# Patient Record
Sex: FEMALE | Race: WHITE | Employment: OTHER | URBAN - METROPOLITAN AREA
[De-identification: names, ages, dates, MRNs, and addresses within clinical notes are randomized per-mention and may not be internally consistent; named-entity substitution may affect disease eponyms.]

---

## 2017-02-24 ENCOUNTER — OFFICE VISIT (OUTPATIENT)
Dept: ORTHOPEDIC SURGERY | Age: 77
End: 2017-02-24

## 2017-02-24 VITALS
HEIGHT: 61 IN | BODY MASS INDEX: 27.49 KG/M2 | TEMPERATURE: 97.7 F | SYSTOLIC BLOOD PRESSURE: 137 MMHG | WEIGHT: 145.6 LBS | HEART RATE: 57 BPM | DIASTOLIC BLOOD PRESSURE: 65 MMHG

## 2017-02-24 DIAGNOSIS — Z96.652 STATUS POST TOTAL LEFT KNEE REPLACEMENT: ICD-10-CM

## 2017-02-24 DIAGNOSIS — M54.5 BILATERAL LOW BACK PAIN, UNSPECIFIED CHRONICITY, WITH SCIATICA PRESENCE UNSPECIFIED: Primary | ICD-10-CM

## 2017-02-24 DIAGNOSIS — M16.11 PRIMARY OSTEOARTHRITIS OF RIGHT HIP: ICD-10-CM

## 2017-02-24 DIAGNOSIS — M17.12 PRIMARY OSTEOARTHRITIS OF LEFT KNEE: ICD-10-CM

## 2017-02-24 RX ORDER — BETAMETHASONE SODIUM PHOSPHATE AND BETAMETHASONE ACETATE 3; 3 MG/ML; MG/ML
6 INJECTION, SUSPENSION INTRA-ARTICULAR; INTRALESIONAL; INTRAMUSCULAR; SOFT TISSUE ONCE
Qty: 1 ML | Refills: 0
Start: 2017-02-24 | End: 2017-02-24

## 2017-02-24 NOTE — PROGRESS NOTES
[address] [place] [address]  [phone]           Patient: Esperanza Falcon                MRN: [id number]       SSN: [id number]  YOB: 1940        AGE: 68 y.o. SEX: female  Body mass index is 27.51 kg/(m^2). PCP: Kala Atkins MD  02/24/17      This office note has been dictated. REVIEW OF SYSTEMS:  Constitutional: Negative for fever, chills, weight loss and malaise/fatigue. HENT: Negative. Eyes: Negative. Respiratory: Negative. Cardiovascular: Negative. Gastrointestinal: No bowel incontinence or constipation. Genitourinary: No bladder incontinence or saddle anesthesia. Skin: Negative. Neurological: Negative. Endo/Heme/Allergies: Negative. Psychiatric/Behavioral: Negative. Musculoskeletal: As per HPI above. Past Medical History:   Diagnosis Date    Constipation     Hypercholesteremia     Microscopic hematuria     Stroke [facility])     blurred vision, resolved (taking plavix)     PEACE (stress urinary incontinence, female)     UTI (urinary tract infection)          Current Outpatient Prescriptions:     B.infantis-B.ani-B.long-B.bifi (PROBIOTIC 4X) 10-15 mg TbEC, Take  by mouth., Disp: , Rfl:     polyethylene glycol (MIRALAX) 17 gram/dose powder, Take 17 g by mouth daily. , Disp: , Rfl:     clopidogrel (PLAVIX) 75 mg tablet, 75 mg., Disp: , Rfl:     ESTRACE 0.01 % (0.1 mg/gram) vaginal cream, APPLY 2/3 LENGTH OF PINKY FINGER AND INSERT INTO VAGINA ONCE A WEEK, Disp: 42.5 g, Rfl: 0    amLODIPine (NORVASC) 5 mg tablet, 5 mg., Disp: , Rfl:     sertraline (ZOLOFT) 50 mg tablet, 50 mg., Disp: , Rfl:     ferrous sulfate 325 mg (65 mg iron) tablet, Take 1 Tab by mouth two (2) times daily (with meals). , Disp: 60 Tab, Rfl: 2    simvastatin (ZOCOR) 40 mg tablet, Take  by mouth nightly., Disp: , Rfl:     CHOLECALCIFEROL, VITAMIN D3, (VITAMIN D3 PO), Take 1,000 Units by mouth daily. , Disp: , Rfl:     estradiol (ESTRACE) 0.01 % (0.1 mg/g) vaginal cream, use one  x  /wik, Disp: 42.5 g, Rfl: 3    fish oil-dha-epa 1,200-144-216 mg Cap, Take  by mouth.  , Disp: , Rfl:     oxyCODONE-acetaminophen (PERCOCET 7.5) 7.5-325 mg per tablet, Take 1-2 Tabs by mouth every four (4) hours as needed. Max Daily Amount: 12 Tabs., Disp: 60 Tab, Rfl: 0    Allergies   Allergen Reactions    Erythromycin Rash and Itching           Furacin [Nitrofurazone] Hives and Other (comments)     Intolerance    Tobrex [Tobramycin Sulfate] Other (comments)     Intolerance       Social History     Social History    Marital status:      Spouse name: N/A    Number of children: N/A    Years of education: N/A     Occupational History    Not on file. Social History Main Topics    Smoking status: Never Smoker    Smokeless tobacco: Not on file    Alcohol use No    Drug use: No    Sexual activity: Not on file     Other Topics Concern    Not on file     Social History Narrative       Past Surgical History:   Procedure Laterality Date    HX CATARACT REMOVAL Bilateral     HX CHOLECYSTECTOMY  1972    HX OTHER SURGICAL      skin ca removed     HX TONSILLECTOMY  1946           * Patient was identified by name and date of birth   * Agreement on procedure being performed was verified  * Risks and Benefits explained to the patient  * Procedure site verified and marked as necessary  * Patient was positioned for comfort  * Consent was signed and verified  11:16 AM    The patient was instructed on post injection care. We did see Ms. Hollie Grande for follow-up in regards to multiple problems. The patient is having low back pain, which is radiating into her lower extremities and buttocks when she is up ambulating for extended periods, as well as standing at the kitchen sink doing dishes or cooking. She denies any change in bowel or bladder habits. She is having some right hip pain as well on occasion.  She is having some trouble getting in and out of a car. She is still able to put her socks and shoes on without much problem. She denies any injury to the hip. In regards to the knees she is status post right knee replacement and did very well with it. She is very happy with the results. The left knee does have known advancing arthritis, which is worsening. The last injection gave her moderate relief. PHYSICAL EXAMINATION: In general the patient is alert and oriented x 3 and is in no acute distress. The patient is well-developed and well-nourished with a normal affect. The patient is afebrile. HEENT:  Head is normocephalic and atraumatic. Pupils are equally round and reactive to light and accommodation. Extraocular eye movements are intact. Neck is supple. Trachea is midline. No JVD is present. Breathing is nonlabored. Examination of the back reveals the skin to be intact. There is no erythema or ecchymosis. There is no warmth or signs for infection or cellulitis. There is a curve noted to the lumbar spine. There are no paraspinal muscular spasms. The pelvis is stable. There is no pain with palpation to the trochanteric bursa. She has well maintained range of motion of the right hip. However, forced internal rotation does cause some mild discomfort to the groin and buttocks. There is negative straight leg raise. There is negative calf tenderness and swelling. There is negative Homans. There is no evidence of DVT noted. Examination of the left knee reveals the skin to be intact. There is no erythema or ecchymosis. There is no warmth or signs for infection or cellulitis. She has pain to palpation tricompartmentally and crepitus arising from the anterior compartment. Right knee skin is intact. The surgical wound has healed nicely. There is full range of motion and excellent stability. Patella tracks nicely without rubs or crepitus.      RADIOGRAPHS:  Review of radiographs including AP and lateral of the lumbar spine reveals multilevel degenerative changes with a right apex lumbar scoliotic curve. It does reveal advanced arthritis of the right hip with approximately 1 mm of joint space remaining and some osteophyte formation noted. There is degenerative disc disease at multiple levels. ASSESSMENT:     1. Lumbar degenerative disc disease and scoliosis. 2. Right hip osteoarthritis. 3. Right knee replacement. 4. Left knee osteoarthritis. PLAN:  At this point we discussed treatment options. We are going to move forward with an MRI of the lumbar spine and refer her to The [address] [place] [address] for further evaluation and treatment. I think that a lot of her symptomatology is coming from her back. She does have arthritis of her hip. We will get her set up for intraarticular injection for the right hip. Hopefully this will provide her with some relief. Today in the office we will move forward with a Cortisone injection for the left knee. After informed and written consent the left knee was prepped with Betadine and 6 mg of Celestone was injected without complications. The patient tolerated the injection well. She was instructed on post injection care. We will see her back in the office in approximately three months time for reevaluation or sooner if necessary.               JR Woodrow DAO, PAWolfgangC, ATC

## 2017-03-07 ENCOUNTER — HOSPITAL ENCOUNTER (OUTPATIENT)
Age: 77
Discharge: HOME | End: 2017-03-07
Attending: PHYSICIAN ASSISTANT
Payer: MEDICARE

## 2017-03-07 DIAGNOSIS — M54.5 BILATERAL LOW BACK PAIN, UNSPECIFIED CHRONICITY, WITH SCIATICA PRESENCE UNSPECIFIED: ICD-10-CM

## 2017-03-07 PROCEDURE — 72148 MRI LUMBAR SPINE W/O DYE: CPT

## 2017-03-09 ENCOUNTER — HOSPITAL ENCOUNTER (OUTPATIENT)
Dept: GENERAL RADIOLOGY | Age: 77
Discharge: HOME | End: 2017-03-09
Attending: PHYSICIAN ASSISTANT
Payer: MEDICARE

## 2017-03-09 DIAGNOSIS — M16.11 PRIMARY OSTEOARTHRITIS OF RIGHT HIP: ICD-10-CM

## 2017-03-09 PROCEDURE — 74011636320 HC RX REV CODE- 636/320: Performed by: PHYSICIAN ASSISTANT

## 2017-03-09 PROCEDURE — 77002 NEEDLE LOCALIZATION BY XRAY: CPT

## 2017-03-09 PROCEDURE — 74011250636 HC RX REV CODE- 250/636: Performed by: PHYSICIAN ASSISTANT

## 2017-03-09 PROCEDURE — 74011000250 HC RX REV CODE- 250: Performed by: PHYSICIAN ASSISTANT

## 2017-03-09 RX ORDER — METHYLPREDNISOLONE ACETATE 40 MG/ML
40 INJECTION, SUSPENSION INTRA-ARTICULAR; INTRALESIONAL; INTRAMUSCULAR; SOFT TISSUE
Status: COMPLETED | OUTPATIENT
Start: 2017-03-09 | End: 2017-03-09

## 2017-03-09 RX ORDER — LIDOCAINE HYDROCHLORIDE 10 MG/ML
30 INJECTION, SOLUTION EPIDURAL; INFILTRATION; INTRACAUDAL; PERINEURAL
Status: COMPLETED | OUTPATIENT
Start: 2017-03-09 | End: 2017-03-09

## 2017-03-09 RX ADMIN — LIDOCAINE HYDROCHLORIDE 10 ML: 10 INJECTION, SOLUTION EPIDURAL; INFILTRATION; INTRACAUDAL; PERINEURAL at 13:00

## 2017-03-09 RX ADMIN — METHYLPREDNISOLONE ACETATE 40 MG: 40 INJECTION, SUSPENSION INTRA-ARTICULAR; INTRALESIONAL; INTRAMUSCULAR; SOFT TISSUE at 13:00

## 2017-03-09 RX ADMIN — IOPAMIDOL 8 ML: 408 INJECTION, SOLUTION INTRATHECAL at 13:00

## 2017-03-27 ENCOUNTER — OFFICE VISIT (OUTPATIENT)
Dept: ORTHOPEDIC SURGERY | Age: 77
End: 2017-03-27

## 2017-03-27 VITALS
HEIGHT: 61 IN | SYSTOLIC BLOOD PRESSURE: 148 MMHG | OXYGEN SATURATION: 99 % | TEMPERATURE: 98.5 F | HEART RATE: 67 BPM | WEIGHT: 143.6 LBS | DIASTOLIC BLOOD PRESSURE: 49 MMHG | BODY MASS INDEX: 27.11 KG/M2 | RESPIRATION RATE: 16 BRPM

## 2017-03-27 DIAGNOSIS — M47.816 LUMBAR FACET ARTHROPATHY: ICD-10-CM

## 2017-03-27 DIAGNOSIS — M79.2 NEURITIS: ICD-10-CM

## 2017-03-27 DIAGNOSIS — M48.061 LUMBAR SPINAL STENOSIS: Primary | ICD-10-CM

## 2017-03-27 RX ORDER — GABAPENTIN 100 MG/1
200 CAPSULE ORAL
Qty: 60 CAP | Refills: 1 | Status: SHIPPED | OUTPATIENT
Start: 2017-03-27 | End: 2017-05-11 | Stop reason: SDUPTHER

## 2017-03-27 NOTE — MR AVS SNAPSHOT
Visit Information Date & Time Provider Department Dept. Phone Encounter #  
 3/27/2017  1:00 PM Sean De La Cruz MD [facility] [phone] [id number] Follow-up Instructions Return in about 6 weeks (around 5/8/2017) for PT follow up, Medication follow up. Your Appointments 5/26/2017 10:30 AM  
Follow Up with Lev Traore PA-C  
[facility] ([facility]) Appt Note: LT KNEE [address] [facility], [address] [facility]  
[phone] [address] [facility], [address] Upcoming Health Maintenance Date Due DTaP/Tdap/Td series (1 - Tdap) 9/16/1961 ZOSTER VACCINE AGE 60> 9/16/2000 GLAUCOMA SCREENING Q2Y 9/16/2005 OSTEOPOROSIS SCREENING (DEXA) 9/16/2005 Pneumococcal 65+ Low/Medium Risk (1 of 2 - PCV13) 9/16/2005 MEDICARE YEARLY EXAM 9/16/2005 INFLUENZA AGE 9 TO ADULT 8/1/2016 Allergies as of 3/27/2017  Review Complete On: 3/27/2017 By: Maureen Flores Severity Noted Reaction Type Reactions Erythromycin  11/14/2014   Side Effect Rash, Itching Furacin [Nitrofurazone]  09/27/2012    Hives, Other (comments) Intolerance Tobrex [Tobramycin Sulfate]  09/27/2012    Other (comments) Intolerance Current Immunizations  Never Reviewed No immunizations on file. Not reviewed this visit You Were Diagnosed With   
  
 Codes Comments Lumbar spinal stenosis    -  Primary ICD-10-CM: M48.06 
ICD-9-CM: 724.02 Lumbar facet arthropathy (HCC)     ICD-10-CM: M12.88 ICD-9-CM: 721.3 Neuritis     ICD-10-CM: M79.2 ICD-9-CM: 729.2 Vitals BP Pulse Temp Resp Height(growth percentile) Weight(growth percentile) 148/49 67 98.5 °F (36.9 °C) (Oral) 16 5' 1\" (1.549 m) 143 lb 9.6 oz (65.1 kg) SpO2 BMI OB Status Smoking Status 99% 27.13 kg/m2 Menopause Never Smoker BMI and BSA Data Body Mass Index Body Surface Area  
 27.13 kg/m 2 1.67 m 2 Preferred Pharmacy Pharmacy Name Phone [address], [address] [facility] [address] [facility] [phone] Your Updated Medication List  
  
   
This list is accurate as of: 3/27/17  2:04 PM.  Always use your most recent med list. amLODIPine 5 mg tablet Commonly known as:  Savage Presser 5 mg.  
  
 clopidogrel 75 mg Tab Commonly known as:  PLAVIX 75 mg.  
  
 * estradiol 0.01 % (0.1 mg/gram) vaginal cream  
Commonly known as:  ESTRACE  
use one  x  Shivani Carwin * ESTRACE 0.01 % (0.1 mg/gram) vaginal cream  
Generic drug:  estradiol APPLY 2/3 LENGTH OF PINKY FINGER AND INSERT INTO VAGINA ONCE A WEEK  
  
 ferrous sulfate 325 mg (65 mg iron) tablet Take 1 Tab by mouth two (2) times daily (with meals). fish oil-dha-epa 1,200-144-216 mg Cap Take  by mouth.  
  
 gabapentin 100 mg capsule Commonly known as:  NEURONTIN Take 2 Caps by mouth nightly. MIRALAX 17 gram/dose powder Generic drug:  polyethylene glycol Take 17 g by mouth daily as needed. oxyCODONE-acetaminophen 7.5-325 mg per tablet Commonly known as:  PERCOCET 7.5 Take 1-2 Tabs by mouth every four (4) hours as needed. Max Daily Amount: 12 Tabs. PROBIOTIC 4X 10-15 mg Tbec Generic drug:  B.infantis-B.ani-B.long-B.bifi Take  by mouth. sertraline 50 mg tablet Commonly known as:  ZOLOFT  
50 mg.  
  
 simvastatin 40 mg tablet Commonly known as:  ZOCOR Take  by mouth nightly. VITAMIN D3 PO Take 1,000 Units by mouth daily. * Notice: This list has 2 medication(s) that are the same as other medications prescribed for you. Read the directions carefully, and ask your doctor or other care provider to review them with you. Prescriptions Printed Refills  
 gabapentin (NEURONTIN) 100 mg capsule 1 Sig: Take 2 Caps by mouth nightly. Class: Print Route: Oral  
  
We Performed the Following REFERRAL TO PHYSICAL THERAPY [XHY15 Custom] Comments:  
 Eval/Treat Aqua Therapy Follow-up Instructions Return in about 6 weeks (around 5/8/2017) for PT follow up, Medication follow up. Referral Information Referral ID Referred By Referred To  
  
 [id number] Otelia Pallas Not Available Visits Status Start Date End Date 1 New Request 3/27/17 3/27/18 If your referral has a status of pending review or denied, additional information will be sent to support the outcome of this decision. Patient Instructions Low Back Arthritis: Exercises Your Care Instructions Here are some examples of typical rehabilitation exercises for your condition. Start each exercise slowly. Ease off the exercise if you start to have pain. Your doctor or physical therapist will tell you when you can start these exercises and which ones will work best for you. When you are not being active, find a comfortable position for rest. Some people are comfortable on the floor or a medium-firm bed with a small pillow under their head and another under their knees. Some people prefer to lie on their side with a pillow between their knees. Don't stay in one position for too long. Take short walks (10 to 20 minutes) every 2 to 3 hours. Avoid slopes, hills, and stairs until you feel better. Walk only distances you can manage without pain, especially leg pain. How to do the exercises Pelvic tilt 1. Lie on your back with your knees bent. 2. \"Brace\" your stomachtighten your muscles by pulling in and imagining your belly button moving toward your spine. 3. Press your lower back into the floor. You should feel your hips and pelvis rock back. 4. Hold for 6 seconds while breathing smoothly. 5. Relax and allow your pelvis and hips to rock forward. 6. Repeat 8 to 12 times. Back stretches 1. Get down on your hands and knees on the floor. 2. Relax your head and allow it to droop. Round your back up toward the ceiling until you feel a nice stretch in your upper, middle, and lower back. Hold this stretch for as long as it feels comfortable, or about 15 to 30 seconds. 3. Return to the starting position with a flat back while you are on your hands and knees. 4. Let your back sway by pressing your stomach toward the floor. Lift your buttocks toward the ceiling. 5. Hold this position for 15 to 30 seconds. 6. Repeat 2 to 4 times. Follow-up care is a key part of your treatment and safety. Be sure to make and go to all appointments, and call your doctor if you are having problems. It's also a good idea to know your test results and keep a list of the medicines you take. Where can you learn more? Go to http://deacon-matthew.info/. Enter B525 in the search box to learn more about \"Low Back Arthritis: Exercises. \" Current as of: May 23, 2016 Content Version: 11.1 © 4042-2609 [employer]. Care instructions adapted under license by [employer] (which disclaims liability or warranty for this information). If you have questions about a medical condition or this instruction, always ask your healthcare professional. [address] any warranty or liability for your use of this information. Lumbar Stenosis: Care Instructions Your Care Instructions Stenosis in the spine is a narrowing of the canal that is around the spinal cord and nerve roots in your back. It can happen as part of aging. Sometimes bone and other tissue grow into this canal and press on the spinal nerves. This can cause pain, numbness, and weakness. When it happens in the lower part of your back, it is called lumbar stenosis. Lumbar stenosis can cause problems in the legs, feet, and rear end (buttocks). You may be able to relieve symptoms of lumbar stenosis with pain medicine. Your doctor may suggest physical therapy and exercises to keep your spine strong and flexible. Some people try cortisone shots to reduce swelling. If pain and numbness in your legs are still so bad that you cannot do your normal activities, you may need surgery. Follow-up care is a key part of your treatment and safety. Be sure to make and go to all appointments, and call your doctor if you are having problems. It's also a good idea to know your test results and keep a list of the medicines you take. How can you care for yourself at home? · Take an over-the-counter pain medicine, such as acetaminophen (Tylenol), ibuprofen (Advil, Motrin), or naproxen (Aleve). Read and follow all instructions on the label. · Do not take two or more pain medicines at the same time unless the doctor told you to. Many pain medicines have acetaminophen, which is Tylenol. Too much acetaminophen (Tylenol) can be harmful. · Stay at a healthy weight. Being overweight puts extra strain on your spine. · Change positions often when you sit or stand. This can ease pain. For example, lean forward. This may reduce pressure on the spinal cord and its nerves. · Avoid doing things that make your symptoms worse. Walking downhill and standing for a long time may cause pain. · Stretch and strengthen your back muscles as your doctor or physical therapist recommends. If your doctor says it is okay to do them, these exercises may help. ¨ Lie on your back with your knees bent. Gently pull one bent knee to your chest. Put that foot back on the floor, and then pull the other knee to your chest. 
¨ Do pelvic tilts. Lie on your back with your knees bent. Tighten your stomach muscles. Pull your belly button (navel) in and up toward your ribs. You should feel like your back is pressing to the floor and your hips and pelvis are slightly lifting off the floor.  Hold for 6 seconds while breathing smoothly. ¨ Stand with your back flat against a wall. Slowly slide down until your knees are slightly bent. Hold for 10 seconds, then slide back up the wall. · Remove or change anything in your house that may cause you to fall. Keep walkways clear of clutter, electrical cords, and throw rugs. When should you call for help? Call 911 anytime you think you may need emergency care. For example, call if: 
· You are unable to move a leg at all. Call your doctor now or seek immediate medical care if: 
· You have new or worse symptoms in your legs, belly, or buttocks. Symptoms may include: ¨ Numbness or tingling. ¨ Weakness. ¨ Pain. · You lose bladder or bowel control. Watch closely for changes in your health, and be sure to contact your doctor if: 
· You are not getting better as expected. Where can you learn more? Go to http://deacon-matthew.info/. Toni Hansen in the search box to learn more about \"Lumbar Stenosis: Care Instructions. \" Current as of: May 23, 2016 Content Version: 11.1 © 7750-3275 AccessSportsMedia.com. Care instructions adapted under license by [employer] (which disclaims liability or warranty for this information). If you have questions about a medical condition or this instruction, always ask your healthcare professional. [place] [address] any warranty or liability for your use of this information. Introducing [facility] & HEALTH SERVICES! Dear Matt Trinidad: Thank you for requesting a [employer] account. Our records indicate that you already have an active [employer] account. You can access your account anytime at [email or web address] Did you know that you can access your hospital and ER discharge instructions at any time in [employer]? You can also review all of your test results from your hospital stay or ER visit. Additional Information If you have questions, please visit the Frequently Asked Questions section of the [employer] website at [email or web address] Remember, [employer]O is NOT to be used for urgent needs. For medical emergencies, dial 911. Now available from your iPhone and Android! Please provide this summary of care documentation to your next provider. Your primary care clinician is listed as Jules Underwood. If you have any questions after today's visit, please call [phone].

## 2017-03-27 NOTE — PATIENT INSTRUCTIONS
Low Back Arthritis: Exercises  Your Care Instructions  Here are some examples of typical rehabilitation exercises for your condition. Start each exercise slowly. Ease off the exercise if you start to have pain. Your doctor or physical therapist will tell you when you can start these exercises and which ones will work best for you. When you are not being active, find a comfortable position for rest. Some people are comfortable on the floor or a medium-firm bed with a small pillow under their head and another under their knees. Some people prefer to lie on their side with a pillow between their knees. Don't stay in one position for too long. Take short walks (10 to 20 minutes) every 2 to 3 hours. Avoid slopes, hills, and stairs until you feel better. Walk only distances you can manage without pain, especially leg pain. How to do the exercises  Pelvic tilt    1. Lie on your back with your knees bent. 2. \"Brace\" your stomach--tighten your muscles by pulling in and imagining your belly button moving toward your spine. 3. Press your lower back into the floor. You should feel your hips and pelvis rock back. 4. Hold for 6 seconds while breathing smoothly. 5. Relax and allow your pelvis and hips to rock forward. 6. Repeat 8 to 12 times. Back stretches    1. Get down on your hands and knees on the floor. 2. Relax your head and allow it to droop. Round your back up toward the ceiling until you feel a nice stretch in your upper, middle, and lower back. Hold this stretch for as long as it feels comfortable, or about 15 to 30 seconds. 3. Return to the starting position with a flat back while you are on your hands and knees. 4. Let your back sway by pressing your stomach toward the floor. Lift your buttocks toward the ceiling. 5. Hold this position for 15 to 30 seconds. 6. Repeat 2 to 4 times. Follow-up care is a key part of your treatment and safety.  Be sure to make and go to all appointments, and call your doctor if you are having problems. It's also a good idea to know your test results and keep a list of the medicines you take. Where can you learn more? Go to http://deacon-matthew.info/. Enter E941 in the search box to learn more about \"Low Back Arthritis: Exercises. \"  Current as of: May 23, 2016  Content Version: 11.1  © 7955-3664 [employer]. Care instructions adapted under license by [employer] (which disclaims liability or warranty for this information). If you have questions about a medical condition or this instruction, always ask your healthcare professional. [place] [address] any warranty or liability for your use of this information. Lumbar Stenosis: Care Instructions  Your Care Instructions    Stenosis in the spine is a narrowing of the canal that is around the spinal cord and nerve roots in your back. It can happen as part of aging. Sometimes bone and other tissue grow into this canal and press on the spinal nerves. This can cause pain, numbness, and weakness. When it happens in the lower part of your back, it is called lumbar stenosis. Lumbar stenosis can cause problems in the legs, feet, and rear end (buttocks). You may be able to relieve symptoms of lumbar stenosis with pain medicine. Your doctor may suggest physical therapy and exercises to keep your spine strong and flexible. Some people try cortisone shots to reduce swelling. If pain and numbness in your legs are still so bad that you cannot do your normal activities, you may need surgery. Follow-up care is a key part of your treatment and safety. Be sure to make and go to all appointments, and call your doctor if you are having problems. It's also a good idea to know your test results and keep a list of the medicines you take. How can you care for yourself at home?   · Take an over-the-counter pain medicine, such as acetaminophen (Tylenol), ibuprofen (Advil, Motrin), or naproxen (Mauricio). Read and follow all instructions on the label. · Do not take two or more pain medicines at the same time unless the doctor told you to. Many pain medicines have acetaminophen, which is Tylenol. Too much acetaminophen (Tylenol) can be harmful. · Stay at a healthy weight. Being overweight puts extra strain on your spine. · Change positions often when you sit or stand. This can ease pain. For example, lean forward. This may reduce pressure on the spinal cord and its nerves. · Avoid doing things that make your symptoms worse. Walking downhill and standing for a long time may cause pain. · Stretch and strengthen your back muscles as your doctor or physical therapist recommends. If your doctor says it is okay to do them, these exercises may help. ¨ Lie on your back with your knees bent. Gently pull one bent knee to your chest. Put that foot back on the floor, and then pull the other knee to your chest.  ¨ Do pelvic tilts. Lie on your back with your knees bent. Tighten your stomach muscles. Pull your belly button (navel) in and up toward your ribs. You should feel like your back is pressing to the floor and your hips and pelvis are slightly lifting off the floor. Hold for 6 seconds while breathing smoothly. ¨ Stand with your back flat against a wall. Slowly slide down until your knees are slightly bent. Hold for 10 seconds, then slide back up the wall. · Remove or change anything in your house that may cause you to fall. Keep walkways clear of clutter, electrical cords, and throw rugs. When should you call for help? Call 911 anytime you think you may need emergency care. For example, call if:  · You are unable to move a leg at all. Call your doctor now or seek immediate medical care if:  · You have new or worse symptoms in your legs, belly, or buttocks. Symptoms may include:  ¨ Numbness or tingling. ¨ Weakness. ¨ Pain. · You lose bladder or bowel control.   Watch closely for changes in your health, and be sure to contact your doctor if:  · You are not getting better as expected. Where can you learn more? Go to http://deacon-matthew.info/. Julia Canseco in the search box to learn more about \"Lumbar Stenosis: Care Instructions. \"  Current as of: May 23, 2016  Content Version: 11.1  © 1106-6592 [employer]. Care instructions adapted under license by [employer] (which disclaims liability or warranty for this information). If you have questions about a medical condition or this instruction, always ask your healthcare professional. [place] [address] any warranty or liability for your use of this information.

## 2017-03-27 NOTE — PROGRESS NOTES
[facility]  [address] [place], [address]  Phone: [phone]  Fax: [phone]          HISTORY OF PRESENT ILLNESS:  Reinier Owen is a 68 y.o. female with history of lumbar pain. Pt presents to the office today as a new patient referred by Dr. Julia Urban. She c/o pain in the lower back that occasionally radiates down the thighs. Pt had a prior right knee replacement in 04/2015 and states that she experiences left knee pain at this time. Pt's pain is worse when standing and walking and better when sitting and lying down. She admits to relief when bending forward. Pt reports difficulty finding a comfortable position at night due to pain in the right hip/buttock. Pt has been taking Plavix for years. Pt at this time [facility]. Pt states that she likes to work in her yard with her . Pain Scale: 3/10     PCP: William Lawson MD      Past Medical History:   Diagnosis Date    Constipation     Hypercholesteremia     Microscopic hematuria     Stroke [facility])     blurred vision, resolved (taking plavix)     PEACE (stress urinary incontinence, female)     UTI (urinary tract infection)         Social History     Social History    Marital status:      Spouse name: N/A    Number of children: N/A    Years of education: N/A     Occupational History    Not on file. Social History Main Topics    Smoking status: Never Smoker    Smokeless tobacco: Not on file    Alcohol use No    Drug use: No    Sexual activity: Not on file     Other Topics Concern    Not on file     Social History Narrative       Current Outpatient Prescriptions   Medication Sig Dispense Refill    gabapentin (NEURONTIN) 100 mg capsule Take 2 Caps by mouth nightly. 60 Cap 1    B.infantis-B.ani-B.long-B.bifi (PROBIOTIC 4X) 10-15 mg TbEC Take  by mouth.       polyethylene glycol (MIRALAX) 17 gram/dose powder Take 17 g by mouth daily as needed.  clopidogrel (PLAVIX) 75 mg tablet 75 mg.      ESTRACE 0.01 % (0.1 mg/gram) vaginal cream APPLY 2/3 LENGTH OF PINKY FINGER AND INSERT INTO VAGINA ONCE A WEEK 42.5 g 0    simvastatin (ZOCOR) 40 mg tablet Take  by mouth nightly.  CHOLECALCIFEROL, VITAMIN D3, (VITAMIN D3 PO) Take 1,000 Units by mouth daily.  estradiol (ESTRACE) 0.01 % (0.1 mg/g) vaginal cream use one  x  /wik 42.5 g 3    fish oil-dha-epa 1,200-144-216 mg Cap Take  by mouth.  amLODIPine (NORVASC) 5 mg tablet 5 mg.  sertraline (ZOLOFT) 50 mg tablet 50 mg.      ferrous sulfate 325 mg (65 mg iron) tablet Take 1 Tab by mouth two (2) times daily (with meals). 60 Tab 2    oxyCODONE-acetaminophen (PERCOCET 7.5) 7.5-325 mg per tablet Take 1-2 Tabs by mouth every four (4) hours as needed. Max Daily Amount: 12 Tabs. 60 Tab 0       Allergies   Allergen Reactions    Erythromycin Rash and Itching           Furacin [Nitrofurazone] Hives and Other (comments)     Intolerance    Tobrex [Tobramycin Sulfate] Other (comments)     Intolerance       REVIEW OF SYSTEMS    Constitutional: Negative for fever, chills, or weight change. Respiratory: Negative for cough or shortness of breath. Cardiovascular: Negative for chest pain or palpitations. Gastrointestinal: Negative for acid reflux, change in bowel habits, or constipation. Genitourinary: Negative for dysuria and flank pain. Musculoskeletal: Positive for lumbar pain. Skin: Negative for rash. Neurological: Negative for headaches, dizziness, or numbness. Endo/Heme/Allergies: Negative for increased bruising. Psychiatric/Behavioral: Negative for difficulty with sleep.     PHYSICAL EXAMINATION  Visit Vitals    /49    Pulse 67    Temp 98.5 °F (36.9 °C) (Oral)    Resp 16    Ht 5' 1\" (1.549 m)    Wt 143 lb 9.6 oz (65.1 kg)    SpO2 99%    BMI 27.13 kg/m2       Constitutional: Awake, alert, and in no acute distress  HEENT: Normocephalic. Atraumatic. Oropharynx is moist and clear. PERRL. EOMI. Sclerae are nonicteric  Heart: Regular rate and rhythm  Lungs: Clear to auscultation bilaterally  Abdomen: Soft and nontender. Bowel sounds are present  Neurological: 1+ symmetrical DTRs in the upper extremities. 1+ symmetrical DTRs in the lower extremities. Sensation to light touch is intact. Negative Adamaris's sign bilaterally. Skin: warm, dry, and intact. Musculoskeletal: Good ROM in the cervical spine on all planes. No tenderness to palpation in the upper trapezius or lower lumbar regions. No tenderness to palpation over the SI joints or over the greater trochanters. Pt's right shoulder is higher than the left. Moderate pain with extension and axial loading. Better with forward flexion. No pain with internal or external rotation of her hips. Negative straight leg raise bilaterally. Biceps  Triceps Deltoids Wrist Ext Wrist Flex Hand Intrin   Right +4/5 +4/5 +4/5 +4/5 +4/5 +4/5   Left +4/5 +4/5 +4/5 +4/5 +4/5 +4/5      Hip Flex  Quads Hamstrings Ankle DF EHL Ankle PF   Right +4/5 +4/5 +4/5 +4/5 +4/5 +4/5   Left +4/5 +4/5 +4/5 +4/5 +4/5 +4/5     IMAGING:    Lumbar spine MRI from 03/07/2017 was personally reviewed with the Pt and demonstrated:    Results from Hospital Encounter encounter on 03/07/17   MRI LUMB SPINE WO CONT   Narrative Procedure: MRI of the lumbar spine without contrast.    CPT code: 25182    Comparisons: None. Indications:  Low back pain and radiculopathy    Technique: T1 weighted, T2 FSE with fat saturation, FSE inversion recovery  sagittal images are supplemented by T2 weighted with fat saturation and T1  weighted axial images. Findings: There is dextroscoliosis centered at approximately L2. Not well evaluated  without coronal images. Sagittal images reveal overall normal vertebral body morphology. No fractures  noted. No suspicious lesions.   Alignments are anatomic, no evidence for subluxation. Conus medullaris ends at the L1 vertebral body level. Correlation of axial and sagittal images reveals the following:    At L1-L2: Mild circumferential disc osteophyte complex. Mild facet arthropathy. Mild central canal stenosis. Moderate to severe foraminal stenosis. At L2-L3: Mild retrolisthesis L2 on L3. Vertebral body oriented to the right  secondary to scoliosis. Uncovering of the disc posterior. Overlying broad-based  disc osteophyte complex. Mild facet arthropathy and buckling of the ligamentum  flavum. Moderate central canal stenosis. Moderate to severe or for severe left  foraminal stenosis. Moderate right. At L3-L4: Moderate circumferential disc osteophyte complex. Vertebral body  oriented to the right secondary to scoliosis. Moderate facet arthropathy and  buckling of the ligamentum flavum posteriorly resulting in moderate or moderate  to severe central canal stenosis moderate to severe or severe left foraminal  stenosis. Moderate right foraminal stenosis. At L4-L5: Moderate circumferential disc osteophyte complex. Vertebral bodies  oriented to the right. Moderate facet arthropathy and prominent buckling of the  ligamentum flavum. Again moderate central canal stenosis. Moderate left and  moderate to severe right foraminal stenosis. At L5-S1: Grade 1 anterolisthesis of L5 on S1 without evidence for pars defects. Uncovering of the disc posteriorly and overlying broad-based disc protrusion. Moderate facet arthropathy with fluid in the facet joints and buckling of the  ligamentum flavum. Moderate central canal stenosis. Moderate to severe bilateral  foraminal stenosis. Visualized portions of the sacroiliac joints are unremarkable. Incidentally  imaged retroperitoneal structures are unremarkable as well. Impression Impression:    Multilevel multifactorial degenerative changes as above.        ASSESSMENT   Danielle Conte was seen today for back pain and hip pain.    Diagnoses and all orders for this visit:    Lumbar spinal stenosis  -     REFERRAL TO PHYSICAL THERAPY    Lumbar facet arthropathy (HCC)  -     REFERRAL TO PHYSICAL THERAPY    Neuritis  -     REFERRAL TO PHYSICAL THERAPY  -     gabapentin (NEURONTIN) 100 mg capsule; Take 2 Caps by mouth nightly. IMPRESSION AND PLAN:  Louana Osler is a 68 y.o. female with history of lumbar pain. She c/o pain in the lower back that occasionally radiates down the thighs. Pt had a prior right knee replacement in 04/2015 and states that she experiences left knee pain at this time. She reports difficulty finding a comfortable position at night due to pain in the right hip/buttock. 1) Pt was given information on lumbar stenosis and lumbar arthritis exercises. 2) Discussed treatment options with the Pt, including medication, physical therapy, steroid injections, and surgical intervention. 3) I recommend the Pt try water exercise, mary chi, and chair yoga. 4) She was informed of proper lifting mechanics. 5) [facility] 6) I recommended the Pt try Tylenol Arthritis if needed. 7) Pt was prescribed Neurontin 100 mg 2 tabs QHS, tapering up as directed. 8) Ms. Sameera Decker has a reminder for a \"due or due soon\" health maintenance. I have asked that she contact her primary care provider, Kristin Sky MD, for follow-up on this health maintenance. 9)  reviewed. 10) Pt will follow-up in 6 weeks.         Written by Valdemar August, as dictated by Bjorn Sutton MD.

## 2017-04-11 ENCOUNTER — HOSPITAL ENCOUNTER (OUTPATIENT)
Dept: PHYSICAL THERAPY | Age: 77
Discharge: HOME | End: 2017-04-11
Payer: MEDICARE

## 2017-04-11 PROCEDURE — G8981 BODY POS CURRENT STATUS: HCPCS

## 2017-04-11 PROCEDURE — G8982 BODY POS GOAL STATUS: HCPCS

## 2017-04-11 PROCEDURE — 97110 THERAPEUTIC EXERCISES: CPT

## 2017-04-11 PROCEDURE — 97162 PT EVAL MOD COMPLEX 30 MIN: CPT

## 2017-04-11 NOTE — PROGRESS NOTES
PT DAILY TREATMENT NOTE - [facility]     Patient Name: Flavia Chaudhry  Date:2017  : 1940  [x]  Patient  Verified  Payor: Brian Chavarria / Plan[place] / Product Type: Medicare /    In time:11:15  Out time:11:59  Total Treatment Time (min): 44  Total Timed Codes (min): 10  1:1 Treatment Time ([address] only): 44   Visit #: 1 of 10    Treatment Area: Spinal stenosis, lumbar region [M48.06]  Other specific arthropathies, not elsewhere classified, other specified site [M12.88]  Neuralgia and neuritis, unspecified [M79.2]    SUBJECTIVE  Pain Level (0-10 scale): 2  Any medication changes, allergies to medications, adverse drug reactions, diagnosis change, or new procedure performed?: [x] No    [] Yes (see summary sheet for update)  Subjective functional status/changes:   [] No changes reported  See POC    OBJECTIVE    34 min [x]Eval                  []Re-Eval     10 min Therapeutic Exercise:  [] See flow sheet :HEP instruction and demonstration, pt education regarding anatomy and physiology of the spine and LEs and how it relates to the pt's condition. Rationale: increase ROM and increase strength to improve the patients ability to tolerate ADLs       With   [] TE   [] TA   [] neuro   [] other: Patient Education: [x] Review HEP    [] Progressed/Changed HEP based on:   [] positioning   [] body mechanics   [] transfers   [] heat/ice application    [] other:      Other Objective/Functional Measures: See evaluation     Pain Level (0-10 scale) post treatment: 0    ASSESSMENT/Changes in Function: Pt given HEP handout to perform. Pt understood exercises in HEP handout. Pt demonstrated decreased AROM, decreased strength, and muscle tightness. We will trial 1x/week on land and 1x/week aquatic therapy to help with core stability and overall trunk mobility/stiffness.  Pt would benefit from physical therapy to improve the above impairments to help the pt return to performing ADLs, functional and yard work activities. Patient will continue to benefit from skilled PT services to modify and progress therapeutic interventions, address functional mobility deficits, address ROM deficits, address strength deficits, analyze and address soft tissue restrictions, analyze and cue movement patterns, analyze and modify body mechanics/ergonomics, assess and modify postural abnormalities, address imbalance/dizziness and instruct in home and community integration to attain remaining goals.      [x]  See Plan of Care  []  See progress note/recertification  []  See Discharge Summary         Progress towards goals / Updated goals:  See POC    PLAN  [x]  Upgrade activities as tolerated     [x]  Continue plan of care  [x]  Update interventions per flow sheet       []  Discharge due to:_  []  Other:_      [facility] 4/11/2017  11:55 AM    Future Appointments  Date Time Provider Arthur Carvalho   4/13/2017 12:30 PM Roberta Clamp, PTA [facility]   4/18/2017 1:00 PM Alberta Hence, PT [facility]   4/20/2017 9:30 AM Roberta Clamp, PTA [facility]   4/25/2017 12:30 PM Alberta Hence, PT [facility]   4/28/2017 10:00 AM Roberta Clamp, PTA [facility]   5/1/2017 9:30 AM Corey Gramajo [facility]   5/4/2017 10:00 AM Roberta Clamp, PTA [facility]   5/8/2017 9:30 AM Corey Gramajo [facility]   5/10/2017 11:00 AM Roberta Clamp, PTA [facility]   5/11/2017 10:50 AM [facility]  [address] [facility]   5/26/2017 10:30 AM [facility]

## 2017-04-11 NOTE — PROGRESS NOTES
[facility]  [address]  [place], [address]  [phone] fax    Plan of Care/ Statement of Necessity for Physical Therapy Services    Patient name: Jaswinder Nunez Start of Care: 2017   Referral source: Juan M Richardson MD : 1940    Medical Diagnosis: Spinal stenosis, lumbar region [M48.06]  Other specific arthropathies, not elsewhere classified, other specified site [M12.88]  Neuralgia and neuritis, unspecified [M79.2]   Onset Date: 6 months with worsening over the past 4 months    Treatment Diagnosis: LBP   Prior Hospitalization: see medical history Provider#: [id number]   Medications: Verified on Patient summary List    Comorbidities: hx of skin cancer (states she had the cancerous lesions removed), scoliosis, CVA  which affected the right eye \"healed itself\", osteoporosis, arthritis, right TKA , bladder support surgery 2016 (states her MD gave her clearance to perform aquatic therapy)   Prior Level of Function: Independent with ADLs, functional, and yard work activities with no pain reported before 6 months ago. The Plan of Care and following information is based on the information from the initial evaluation. Assessment/ key information:   Pt is a 68year old female who presents to therapy today with LBP. Pt also reports having right hip pain as well. Pt states that she noticed her symptoms about 6 months ago and \"it has been worsening since the beginning of the year\". Pt reports having trouble lifting laundry baskets, standing tolerance, throbbing pain in the legs, and trouble sleeping. MRI from 3/7/2017 states dextroscoliosis at L2, osteophytes at several lumbar levels, foraminal stenosis at several lumbar levels, mild retrolisthesis L2 on L3, grade 1 anterolisthesis of L5 on S1. Pt demonstrated decreased AROM, decreased strength, and muscle tightness.  We will trial 1x/week on land and 1x/week aquatic therapy to help with core stability and overall trunk mobility/stiffness. Pt would benefit from physical therapy to improve the above impairments to help the pt return to performing ADLs, functional and yard work activities. Evaluation Complexity History HIGH Complexity :3+ comorbidities / personal factors will impact the outcome/ POC ; Examination MEDIUM Complexity : 3 Standardized tests and measures addressing body structure, function, activity limitation and / or participation in recreation  ;Presentation LOW Complexity : Stable, uncomplicated  ;Clinical Decision Making MEDIUM Complexity : FOTO score of 26-74  Overall Complexity Rating: MEDIUM  Problem List: pain affecting function, decrease ROM, decrease strength, impaired gait/ balance, decrease ADL/ functional abilitiies, decrease activity tolerance, decrease flexibility/ joint mobility and decrease transfer abilities   Treatment Plan may include any combination of the following: Therapeutic exercise, Therapeutic activities, Neuromuscular re-education, Physical agent/modality, Gait/balance training, Manual therapy, Aquatic therapy, Patient education, Self Care training, Functional mobility training, Home safety training and Stair training  Patient / Family readiness to learn indicated by: asking questions, trying to perform skills and interest  Persons(s) to be included in education: patient (P)  Barriers to Learning/Limitations: None  Patient Goal (s): pain with be less  Patient Self Reported Health Status: good  Rehabilitation Potential: fair    Short Term Goals: To be accomplished in 1 weeks:  1. Pt will report compliance and independence to [facility] to help the pt manage their pain and symptoms. Long Term Goals: To be accomplished in 5 weeks:  1. Pt will increase FOTO score to 54 points to improve ability to perform ADLs.   2. Pt will increase AROM l/s EXT to > 50% of WNL, B rotation and SB to 50-75% of WNL to improve mobility needed for prolonged standing. 3. Pt will increase AROM right hip ABD to 35 degs to improve ability to tolerate yard work. 4. Pt will report being able fall asleep with mild to no difficulty secondary to LBP to improve sleep quality. Frequency / Duration: Patient to be seen 2 times per week for 5 weeks. Patient/ Caregiver education and instruction: Diagnosis, prognosis, self care, activity modification and exercises   [x]  Plan of care has been reviewed with SHELIA    G-Codes (GP)  Position   Current  CK= 40-59%   Goal  CK= 40-59%    The severity rating is based on clinical judgment and the FOTO score. Certification Period: 4/11/2017 - 5/10/2017    Ismael Gamino, PT 4/11/2017 11:55 AM    ________________________________________________________________________    I certify that the above Therapy Services are being furnished while the patient is under my care. I agree with the treatment plan and certify that this therapy is necessary.     [de-identified] Signature:____________________  Date:____________Time: _________    Please sign and return to [facility] [address] [place] [address], [facility] [address]  [phone] fax

## 2017-04-13 ENCOUNTER — HOSPITAL ENCOUNTER (OUTPATIENT)
Dept: PHYSICAL THERAPY | Age: 77
Discharge: HOME | End: 2017-04-13
Payer: MEDICARE

## 2017-04-13 PROCEDURE — 97140 MANUAL THERAPY 1/> REGIONS: CPT

## 2017-04-13 PROCEDURE — 97110 THERAPEUTIC EXERCISES: CPT

## 2017-04-13 NOTE — PROGRESS NOTES
PT DAILY TREATMENT NOTE - [facility] 3-16    Patient Name: Lizeth Hidalgo  Date:2017  : 1940  [x]  Patient  Verified  Payor: [place] MED[place]ARE / Plan: [place] MEDICARE PART A & B / Product Type: Medicare /    In time:4:30  Out time:5:26  Total Treatment Time (min): 56  Total Timed Codes (min): 46  1:1 Treatment Time ([address] only): 36   Visit #: 2 of 8    Treatment Area: Spinal stenosis, lumbar region [M48.06]  Other specific arthropathies, not elsewhere classified, other specified site [M12.88]  Neuralgia and neuritis, unspecified [M79.2]    SUBJECTIVE  Pain Level (0-10 scale): 2/10  Any medication changes, allergies to medications, adverse drug reactions, diagnosis change, or new procedure performed?: [x] No    [] Yes (see summary sheet for update)  Subjective functional status/changes:   [] No changes reported  The patient states that her back was quite bothersome last night. OBJECTIVE  Modality rationale: decrease pain and increase tissue extensibility to improve the patients ability to improve ADL ease. Min Type Additional Details   10 []  Ice     [x]  heat  []  Ice massage  []  Laser   []  Anodyne Position: seated  Location: lumbar spine   [] Skin assessment post-treatment:  []intact []redness- no adverse reaction    []redness  adverse reaction:     38 min Therapeutic Exercise:  [] See flow sheet :   Rationale: increase ROM and increase strength to improve the patients ability to improve ADl ease. 8 min Manual Therapy:  PROM - R hip/flex/IR/ER   Rationale: decrease pain, increase ROM and increase tissue extensibility to improve ADL ease. With   [] TE   [] TA   [] neuro   [] other: Patient Education: [x] Review HEP    [] Progressed/Changed HEP based on:   [] positioning   [] body mechanics   [] transfers   [] heat/ice application    [] other:      Other Objective/Functional Measures:   Fairly good tolerance to PT up to this point. Noted groin pain with closed chain activity.  Posterior glute complaints may be related to R hip  osteoarthritic in origin. Pain Level (0-10 scale) post treatment: 1/10    ASSESSMENT/Changes in Function: The patient reports compliance. Monitor complaints of R groin and arthritic in origin. Patient will continue to benefit from skilled PT services to modify and progress therapeutic interventions, address functional mobility deficits, address ROM deficits, address strength deficits, analyze and address soft tissue restrictions, analyze and cue movement patterns, analyze and modify body mechanics/ergonomics, assess and modify postural abnormalities and instruct in home and community integration to attain remaining goals. []  See Plan of Care  []  See progress note/recertification  []  See Discharge Summary         Progress towards goals / Updated goals:  Short Term Goals: To be accomplished in 1 weeks:  1. Pt will report compliance and independence to [facility] to help the pt manage their pain and symptoms. Met - 4/13/2017    Long Term Goals: To be accomplished in 5 weeks:  1. Pt will increase FOTO score to 54 points to improve ability to perform ADLs. 2. Pt will increase AROM l/s EXT to > 50% of WNL, B rotation and SB to 50-75% of WNL to improve mobility needed for prolonged standing. 3. Pt will increase AROM right hip ABD to 35 degs to improve ability to tolerate yard work.   4. Pt will report being able fall asleep with mild to no difficulty secondary to LBP to improve sleep quality.     PLAN  []  Upgrade activities as tolerated     [x]  Continue plan of care  []  Update interventions per flow sheet       []  Discharge due to:_  []  Other:_      Javi Hooker, PT 4/13/2017  7:07 PM

## 2017-04-18 ENCOUNTER — HOSPITAL ENCOUNTER (OUTPATIENT)
Dept: PHYSICAL THERAPY | Age: 77
Discharge: HOME | End: 2017-04-18
Payer: MEDICARE

## 2017-04-18 PROCEDURE — 97113 AQUATIC THERAPY/EXERCISES: CPT

## 2017-04-18 NOTE — PROGRESS NOTES
PT DAILY TREATMENT NOTE - [facility]     Patient Name: Joselito Lambert  Date:2017  : 1940  [x]  Patient  Verified  Payor: Michael Bello / Plan: [place] MEDICARE PART A & B / Product Type: Medicare /    In time:1:07  Out time:1:38  Total Treatment Time (min): 31  Total Timed Codes (min): 31  1:1 Treatment Time ( [address] only): 31   Visit #: 3 of 8    Treatment Area: Other specific arthropathies, not elsewhere classified, other specified site [M12.88]  Neuralgia and neuritis, unspecified [M79.2]  Low back pain [M54.5]    SUBJECTIVE  Pain Level (0-10 scale): 1  Any medication changes, allergies to medications, adverse drug reactions, diagnosis change, or new procedure performed?: [x] No    [] Yes (see summary sheet for update)  Subjective functional status/changes:   [] No changes reported  \"I felt good after the land session. The pain is a 1 today. Stiffness mostly\"    OBJECTIVE    31 min Therapeutic Exercise:  [x] See flow sheet : aquatics   Rationale: increase ROM and increase strength to improve the patients ability to tolerate ADLs          With   [] TE   [] TA   [] neuro   [] other: Patient Education: [x] Review HEP    [] Progressed/Changed HEP based on:   [] positioning   [] body mechanics   [] transfers   [] heat/ice application    [] other:      Other Objective/Functional Measures: Initiated aquatic exercises today. Pain Level (0-10 scale) post treatment: 0    ASSESSMENT/Changes in Function: Focused most of the session today on B hip and low back mobility/ROM. Needed cueing to avoid marching too far forward and maintain erect posture during marches today. Attempted TM but pt had was unable to take normal size steps and was \"hopping\" instead of walking, even with verbal and visual cueing. Will attempt the TM again in upcoming sessions. Reported having no pain post session. Continue POC as tolerated.     Patient will continue to benefit from skilled PT services to modify and progress therapeutic interventions, address functional mobility deficits, address ROM deficits, address strength deficits, analyze and address soft tissue restrictions, analyze and cue movement patterns, analyze and modify body mechanics/ergonomics, assess and modify postural abnormalities, address imbalance/dizziness and instruct in home and community integration to attain remaining goals. []  See Plan of Care  []  See progress note/recertification  []  See Discharge Summary         Progress towards goals / Updated goals:  Short Term Goals: To be accomplished in 1 weeks:  1. Pt will report compliance and independence to [facility] to help the pt manage their pain and symptoms. Met - 4/13/2017    Long Term Goals: To be accomplished in 5 weeks:  1. Pt will increase FOTO score to 54 points to improve ability to perform ADLs. 2. Pt will increase AROM l/s EXT to > 50% of WNL, B rotation and SB to 50-75% of WNL to improve mobility needed for prolonged standing. 3. Pt will increase AROM right hip ABD to 35 degs to improve ability to tolerate yard work. 4. Pt will report being able fall asleep with mild to no difficulty secondary to LBP to improve sleep quality.     PLAN  [x]  Upgrade activities as tolerated     [x]  Continue plan of care  [x]  Update interventions per flow sheet       []  Discharge due to:_  []  Other:_      Lorraine Riley, PT 4/18/2017  1:20 PM    Future Appointments  Date Time Provider Arthur Carvalho   4/20/2017 9:30 AM [facility] Mixer[facility]   4/25/2017 12:30 PM [facility]   4/28/2017 10:00 AM Mariana Mixer, PTA [facility]   5/1/2017 9:30 AM Any Bree [facility]   5/4/2017 10:00 AM Mariana Mixer, PTA [facility]   5/8/2017 9:30 AM Any Bree [facility]   5/10/2017 11:00 AM Mariana Mixer, PTA [facility]   5/11/2017 10:50 AM John Alvarado  [address]   5/26/2017 10:30 AM ROOSEVELT Neville [address]

## 2017-04-20 ENCOUNTER — HOSPITAL ENCOUNTER (OUTPATIENT)
Dept: PHYSICAL THERAPY | Age: 77
Discharge: HOME | End: 2017-04-20
Payer: MEDICARE

## 2017-04-20 PROCEDURE — 97140 MANUAL THERAPY 1/> REGIONS: CPT

## 2017-04-20 PROCEDURE — 97112 NEUROMUSCULAR REEDUCATION: CPT

## 2017-04-20 NOTE — PROGRESS NOTES
PT DAILY TREATMENT NOTE - [facility]     Patient Name: Redd Gonzalez  Date:2017  : 1940  [x]  Patient  Verified  Payor: Roland Luciano / Plan: [place] MEDICARE PART A & B / Product Type: Medicare /    In time:9:35  Out time:10:33  Total Treatment Time (min): 58  Total Timed Codes (min): 48  1:1 Treatment Time ( [address] only): 32   Visit #: 4 of 8    Treatment Area:  Other specific arthropathies, not elsewhere classified, other specified site [M12.88]  Neuralgia and neuritis, unspecified [M79.2]  Low back pain [M54.5]    SUBJECTIVE  Pain Level (0-10 scale): 1  Any medication changes, allergies to medications, adverse drug reactions, diagnosis change, or new procedure performed?: [x] No    [] Yes (see summary sheet for update)  Subjective functional status/changes:   [] No changes reported  Pt reports improving pain levels at night when trying to sleep    OBJECTIVE    Modality rationale: decrease pain and increase tissue extensibility to improve the patients ability to perform daily tasks and ADLs with increased ease   Min Type Additional Details    [] Estim:  []Unatt       []IFC  []Premod                        []Other:  []w/ice   []w/heat  Position:  Location:    [] Estim: []Att    []TENS instruct  []NMES                    []Other:  []w/US   []w/ice   []w/heat  Position:  Location:    []  Traction: [] Cervical       []Lumbar                       [] Prone          []Supine                       []Intermittent   []Continuous Lbs:  [] before manual  [] after manual    []  Ultrasound: []Continuous   [] Pulsed                           []1MHz   []3MHz W/cm2:  Location:    []  Iontophoresis with dexamethasone         Location: [] Take home patch   [] In clinic   10 []  Ice     [x]  heat  []  Ice massage  []  Laser   []  Anodyne Position: supine  Location: L/S    []  Laser with stim  []  Other:  Position:  Location:    []  Vasopneumatic Device Pressure:       [] lo [] med [] hi   Temperature: [] lo [] med [] hi [] Skin assessment post-treatment:  []intact []redness- no adverse reaction    []redness  adverse reaction:         32 min Therapeutic Exercise:  [] See flow sheet :   Rationale: increase ROM and increase strength to improve the patients ability to perform daily tasks and ADLs with increased efficiency and improved tolerance    8 min Neuromuscular Re-education:  []  See flow sheet :   Rationale: increase strength, improve coordination and increase proprioception  to improve the patients ability to activate core musculature fore reduced lumbar pain with daily activities    8 min Manual Therapy:  PROM R hip- flex/IR/ER/ext, STM R L/S   Rationale: increase ROM and increase tissue extensibility to improve functional mobility and ease with ADLs               With   [] TE   [] TA   [] neuro   [] other: Patient Education: [x] Review HEP    [] Progressed/Changed HEP based on:   [] positioning   [] body mechanics   [] transfers   [] heat/ice application    [] other:      Other Objective/Functional Measures: pt with limited excursion during PPT performance, fair TA activation     Pain Level (0-10 scale) post treatment: 0    ASSESSMENT/Changes in Function: Pt reports improved sleep at night with reduction in lumbar pain. She still has limited walking/activity tolerance at this time but reports good overall functional mobility. R hip mobility limited compared to L but pt reports only intermittent groin discomfort at this time. Continue per POC    Patient will continue to benefit from skilled PT services to modify and progress therapeutic interventions, address functional mobility deficits, address ROM deficits, address strength deficits, analyze and address soft tissue restrictions, analyze and cue movement patterns, analyze and modify body mechanics/ergonomics and assess and modify postural abnormalities to attain remaining goals.      []  See Plan of Care  []  See progress note/recertification  []  See Discharge Summary Progress towards goals / Updated goals:  Short Term Goals: To be accomplished in 1 weeks:  1. Pt will report compliance and independence to HEP to help the pt manage their pain and symptoms. Met - 4/13/2017    Long Term Goals: To be accomplished in 5 weeks:  1. Pt will increase FOTO score to 54 points to improve ability to perform ADLs. 2. Pt will increase AROM l/s EXT to > 50% of WNL, B rotation and SB to 50-75% of WNL to improve mobility needed for prolonged standing. 3. Pt will increase AROM right hip ABD to 35 degs to improve ability to tolerate yard work. 4. Pt will report being able fall asleep with mild to no difficulty secondary to LBP to improve sleep quality.  Pt reports improving sleep 4/20/17     PLAN  [x]  Upgrade activities as tolerated     []  Continue plan of care  []  Update interventions per flow sheet       []  Discharge due to:_  []  Other:_      Redd Mills DPT 4/20/2017  9:41 AM    Future Appointments  Date Time Provider Arthur Carvalho   4/25/2017 12:30 PM Bj Castillo, PT [facility]   4/28/2017 10:00 AM Azalee Fess, PTA [facility]   5/1/2017 9:30 AM Redd Mills [facility]   5/4/2017 10:00 AM Azalee Fess, PTA [facility]   5/8/2017 9:30 AM Redd Mills [facility]   5/10/2017 11:00 AM Azalee Fess, PTA [facility]   5/11/2017 10:50 AM Vishal Sanders  [facility]   5/26/2017 10:30 AM ROOSEVELT Dahl [address]

## 2017-04-25 ENCOUNTER — HOSPITAL ENCOUNTER (OUTPATIENT)
Dept: PHYSICAL THERAPY | Age: 77
Discharge: HOME | End: 2017-04-25
Payer: MEDICARE

## 2017-04-25 PROCEDURE — 97113 AQUATIC THERAPY/EXERCISES: CPT

## 2017-04-25 NOTE — PROGRESS NOTES
PT DAILY TREATMENT NOTE - [facility]     Patient Name: Sam Sage  Date:2017  : 1940  [x]  Patient  Verified  Payor: Sim Albrecht / Plan: [place] MEDICARE PART A & B / Product Type: Medicare /    In time: 12:29   Out time: 1:00  Total Treatment Time (min): 31  Total Timed Codes (min): 31  1:1 Treatment Time ( [address] only): 31  Visit #: 5 of 8    Treatment Area: Other specific arthropathies, not elsewhere classified, other specified site [M12.88]  Neuralgia and neuritis, unspecified [M79.2]  Low back pain [M54.5]    SUBJECTIVE  Pain Level (0-10 scale): 2 with walking, 0 at rest  Any medication changes, allergies to medications, adverse drug reactions, diagnosis change, or new procedure performed?: [x] No    [] Yes (see summary sheet for update)  Subjective functional status/changes:   [] No changes reported  \"Just some pain when I am walking\"    OBJECTIVE    31 min Therapeutic Exercise:  [x] See flow sheet : aquatics   Rationale: increase ROM and increase strength to improve the patients ability to perform ADLs          With   [] TE   [] TA   [] neuro   [] other: Patient Education: [x] Review HEP    [] Progressed/Changed HEP based on:   [] positioning   [] body mechanics   [] transfers   [] heat/ice application    [] other:      Other Objective/Functional Measures: Increased reps per flow sheet to improve mobility and ROM of the LEs and low back. Finger-touch UE support needed for hipx3 exercise on both LEs. FOTO: 45 points (increase in 4 points since start of care). Pain Level (0-10 scale) post treatment: 0.5-1    ASSESSMENT/Changes in Function: Pt reports having improvement in overall mobility since starting therapy. Pt was able to walk on the TM today with moderate verbal cueing for heel to toe pattern (B UE support needed for TM). Decreased LBP reported post session today. Continue POC as tolerated.      Patient will continue to benefit from skilled PT services to modify and progress therapeutic interventions, address functional mobility deficits, address ROM deficits, address strength deficits, analyze and address soft tissue restrictions, analyze and cue movement patterns, analyze and modify body mechanics/ergonomics and assess and modify postural abnormalities to attain remaining goals. []  See Plan of Care  []  See progress note/recertification  []  See Discharge Summary         Progress towards goals / Updated goals:  Short Term Goals: To be accomplished in 1 weeks:  1. Pt will report compliance and independence to [facility] to help the pt manage their pain and symptoms. Met - 4/13/2017    Long Term Goals: To be accomplished in 5 weeks:  1. Pt will increase FOTO score to 54 points to improve ability to perform ADLs. Progressing 45 points (increase in 4 points since start of care) 4/25/2017  2. Pt will increase AROM l/s EXT to > 50% of WNL, B rotation and SB to 50-75% of WNL to improve mobility needed for prolonged standing. 3. Pt will increase AROM right hip ABD to 35 degs to improve ability to tolerate yard work. 4. Pt will report being able fall asleep with mild to no difficulty secondary to LBP to improve sleep quality.  Pt reports improving sleep 4/20/17     PLAN  [x]  Upgrade activities as tolerated     [x]  Continue plan of care  [x]  Update interventions per flow sheet       []  Discharge due to:_  []  Other:_      Adenike Paredes, PT 4/25/2017  12:45 PM    Future Appointments  Date Time Provider Arthur Carvalho   4/25/2017 12:30 PM Adenike Paredes, PT [facility]   4/28/2017 10:00 AM Hector Guerrier PTA [facility]   5/1/2017 9:30 AM Vineete Friday [facility]   5/4/2017 10:00 AM [facility]   5/8/2017 9:30 AM Vineet Friday [facility]   5/10/2017 11:00 AM Hector Guerrier PTA [facility]   5/11/2017 10:50 AM Ki Hopper  [address] 5/26/2017 10:30 AM ROOSEVELT Arnold Dani 69

## 2017-04-28 ENCOUNTER — HOSPITAL ENCOUNTER (OUTPATIENT)
Dept: PHYSICAL THERAPY | Age: 77
Discharge: HOME | End: 2017-04-28
Payer: MEDICARE

## 2017-04-28 PROCEDURE — 97140 MANUAL THERAPY 1/> REGIONS: CPT

## 2017-04-28 PROCEDURE — 97110 THERAPEUTIC EXERCISES: CPT

## 2017-04-28 PROCEDURE — 97112 NEUROMUSCULAR REEDUCATION: CPT

## 2017-04-28 NOTE — PROGRESS NOTES
PT DAILY TREATMENT NOTE - [facility]     Patient Name: Jean Calix  Date:2017  : 1940  [x]  Patient  Verified  Payor: Azucena Hawthorne / Plan: [place] / Product Type: Medicare /    In time:10:00  Out time:11:00  Total Treatment Time (min): 60  Total Timed Codes (min): 50  1:1 Treatment Time ( [address] only): 41   Visit #: 6 of 8    Treatment Area: Other specific arthropathies, not elsewhere classified, other specified site [M12.88]  Neuralgia and neuritis, unspecified [M79.2]  Low back pain [M54.5]    SUBJECTIVE  Pain Level (0-10 scale): 2/10  Any medication changes, allergies to medications, adverse drug reactions, diagnosis change, or new procedure performed?: [x] No    [] Yes (see summary sheet for update)  Subjective functional status/changes:   [] No changes reported  \"I think it's helping. \"    OBJECTIVE    Modality rationale: decrease pain and increase tissue extensibility to improve the patients ability to perform ADL's.    Min Type Additional Details    [] Estim:  []Unatt       []IFC  []Premod                        []Other:  []w/ice   []w/heat  Position:  Location:    [] Estim: []Att    []TENS instruct  []NMES                    []Other:  []w/US   []w/ice   []w/heat  Position:  Location:    []  Traction: [] Cervical       []Lumbar                       [] Prone          []Supine                       []Intermittent   []Continuous Lbs:  [] before manual  [] after manual    []  Ultrasound: []Continuous   [] Pulsed                           []1MHz   []3MHz W/cm2:  Location:    []  Iontophoresis with dexamethasone         Location: [] Take home patch   [] In clinic   10 []  Ice     [x]  heat  []  Ice massage  []  Laser   []  Anodyne Position: Seated  Location:L/S    []  Laser with stim  []  Other:  Position:  Location:    []  Vasopneumatic Device Pressure:       [] lo [] med [] hi   Temperature: [] lo [] med [] hi   [] Skin assessment post-treatment:  []intact []redness- no adverse reaction    []redness  adverse reaction:     32 min Therapeutic Exercise:  [x] See flow sheet :   Rationale: increase ROM and increase strength to improve the patients ability to perform ADL's. 10 min Neuromuscular Re-education:  [x]  See flow sheet :   Rationale: increase strength and increase proprioception  to improve the patients ability to perform functional activities. 8 min Manual Therapy:  STM/DTM (R) QL, L/S paraspinals and piriformis. (R) hip IR/ER PROM. Rationale: decrease pain, increase ROM, increase tissue extensibility and decrease trigger points to improve activity tolerance. With   [x] TE   [] TA   [] neuro   [] other: Patient Education: [x] Review HEP    [] Progressed/Changed HEP based on:   [] positioning   [] body mechanics   [] transfers   [] heat/ice application    [] other:      Other Objective/Functional Measures: Cueing for TA contraction with standing exercises. Pain Level (0-10 scale) post treatment: 0/10    ASSESSMENT/Changes in Function: Restrictions noted in (R) piriformis and L/S paraspinals. Pt reported a decrease in symptoms after treatment. Patient will continue to benefit from skilled PT services to modify and progress therapeutic interventions, address functional mobility deficits, address ROM deficits, address strength deficits, analyze and address soft tissue restrictions and analyze and modify body mechanics/ergonomics to attain remaining goals. [x]  See Plan of Care  []  See progress note/recertification  []  See Discharge Summary         Progress towards goals / Updated goals:  Short Term Goals: To be accomplished in 1 weeks:  1. Pt will report compliance and independence to HEP to help the pt manage their pain and symptoms. Met - 4/13/2017    Long Term Goals: To be accomplished in 5 weeks:  1. Pt will increase FOTO score to 54 points to improve ability to perform ADLs. Progressing 45 points (increase in 4 points since start of care) 4/25/2017  2.  Pt will increase AROM l/s EXT to > 50% of WNL, B rotation and SB to 50-75% of WNL to improve mobility needed for prolonged standing. 3. Pt will increase AROM right hip ABD to 35 degs to improve ability to tolerate yard work. 4. Pt will report being able fall asleep with mild to no difficulty secondary to LBP to improve sleep quality.  Pt reports improving sleep 4/20/17     PLAN  []  Upgrade activities as tolerated     [x]  Continue plan of care  []  Update interventions per flow sheet       []  Discharge due to:_  []  Other:_      Almita Calhoun PTA 4/28/2017  9:49 AM    Future Appointments  Date Time Provider Arthur Maia   4/28/2017 10:00 AM Almita Calhoun PTA [facility]   5/1/2017 9:30 AM Roselyn Segundo [facility]   5/4/2017 10:00 [facility]   5/8/2017 9:30 AM [facility]   5/10/2017 11:00 AM [facility]   5/11/2017 10:50 AM Sahil Bunn  [address]   5/26/2017 10:30 AM ROOSEVELT Turpin Dani 69

## 2017-05-01 ENCOUNTER — HOSPITAL ENCOUNTER (OUTPATIENT)
Dept: PHYSICAL THERAPY | Age: 77
Discharge: HOME | End: 2017-05-01
Payer: MEDICARE

## 2017-05-01 PROCEDURE — 97113 AQUATIC THERAPY/EXERCISES: CPT

## 2017-05-01 NOTE — PROGRESS NOTES
PT DAILY TREATMENT NOTE - [facility]     Patient Name: Prema Berry  Date:2017  : 1940  [x]  Patient  Verified  Payor: Linnea Rios / Plan: [place] MEDICARE PART A & B / Product Type: Medicare /    In time:9:34  Out time:10:00  Total Treatment Time (min): 26  Total Timed Codes (min): 26  1:1 Treatment Time ( [address] only): 26   Visit #: 7 of 8    Treatment Area: Other specific arthropathies, not elsewhere classified, other specified site [M12.88]  Neuralgia and neuritis, unspecified [M79.2]  Low back pain [M54.5]    SUBJECTIVE  Pain Level (0-10 scale): 1  Any medication changes, allergies to medications, adverse drug reactions, diagnosis change, or new procedure performed?: [x] No    [] Yes (see summary sheet for update)  Subjective functional status/changes:   [] No changes reported  \"I feel pretty good\"    OBJECTIVE    26 min Therapeutic Exercise:  [] See flow sheet :aquatics   Rationale: increase ROM, improve coordination and increase proprioception to improve the patients ability to perform daily tasks and community ambulation with increased ease            With   [] TE   [] TA   [] neuro   [] other: Patient Education: [x] Review HEP    [] Progressed/Changed HEP based on:   [] positioning   [] body mechanics   [] transfers   [] heat/ice application    [] other:      Other Objective/Functional Measures: pt shows some increasing confidence removing UE assist, still requires intermittent HHA though    Pt demonstrates limited mobility 90-90 hip IR/ER R>L     Pain Level (0-10 scale) post treatment: 0    ASSESSMENT/Changes in Function: Pt reports that she is feeling better but still inconsistent with \"stiffness\". She shows good tolerance and form with all therex requiring only minimal cuing. Hip mobility still limited R>L at this time. Continue to progress per POC.      Patient will continue to benefit from skilled PT services to modify and progress therapeutic interventions, address functional mobility deficits, address ROM deficits, address strength deficits, analyze and address soft tissue restrictions, analyze and cue movement patterns, analyze and modify body mechanics/ergonomics and assess and modify postural abnormalities to attain remaining goals. []  See Plan of Care  []  See progress note/recertification  []  See Discharge Summary         Progress towards goals / Updated goals:  Short Term Goals: To be accomplished in 1 weeks:  1. Pt will report compliance and independence to [facility] to help the pt manage their pain and symptoms. Met - 4/13/2017    Long Term Goals: To be accomplished in 5 weeks:  1. Pt will increase FOTO score to 54 points to improve ability to perform ADLs. Progressing 45 points (increase in 4 points since start of care) 4/25/2017  2. Pt will increase AROM l/s EXT to > 50% of WNL, B rotation and SB to 50-75% of WNL to improve mobility needed for prolonged standing. Met- 50% ext, 60% SB B 5/1/17  3. Pt will increase AROM right hip ABD to 35 degs to improve ability to tolerate yard work. 4. Pt will report being able fall asleep with mild to no difficulty secondary to LBP to improve sleep quality.  Pt reports improving sleep 4/20/17     PLAN  []  Upgrade activities as tolerated     []  Continue plan of care  []  Update interventions per flow sheet       []  Discharge due to:_  []  Other:_      Ellen Neumann DPT 5/1/2017  9:34 AM    Future Appointments  Date Time Provider Arthur Maia   5/4/2017 10:00 AM Azar Rincon PTA [facility]   5/8/2017 9:30 AM Ellen Neumann [facility]   5/10/2017 11:00 AM Azar Rincon PTA [facility]   5/11/2017 10:50 AM Leandra Smith  [address]   5/26/2017 10:30 AM ROOSEVELT Adhikari 75

## 2017-05-04 ENCOUNTER — HOSPITAL ENCOUNTER (OUTPATIENT)
Dept: PHYSICAL THERAPY | Age: 77
Discharge: HOME | End: 2017-05-04
Payer: MEDICARE

## 2017-05-04 PROCEDURE — G8982 BODY POS GOAL STATUS: HCPCS

## 2017-05-04 PROCEDURE — G8981 BODY POS CURRENT STATUS: HCPCS

## 2017-05-04 PROCEDURE — 97110 THERAPEUTIC EXERCISES: CPT

## 2017-05-04 PROCEDURE — 97112 NEUROMUSCULAR REEDUCATION: CPT

## 2017-05-04 NOTE — PROGRESS NOTES
PT DAILY TREATMENT NOTE - [facility]     Patient Name: Prema Berry  Date:2017  : 1940  [x]  Patient  Verified  Payor: [place] MEDICARE / Plan: [place] MEDICARE PART A & B / Product Type: Medicare /    In time:10:02  Out time:10:54  Total Treatment Time (min): 52  Total Timed Codes (min): 42  1:1 Treatment Time ( [address] only): 24   Visit #: 8 of 8    Treatment Area: Other specific arthropathies, not elsewhere classified, other specified site [M12.88]  Neuralgia and neuritis, unspecified [M79.2]  Low back pain [M54.5]    SUBJECTIVE  Pain Level (0-10 scale): 2/10  Any medication changes, allergies to medications, adverse drug reactions, diagnosis change, or new procedure performed?: [x] No    [] Yes (see summary sheet for update)  Subjective functional status/changes:   [] No changes reported  \"I did a lot of work yesterday, scrubbed a floor and trimmed some hedges, I'm sore today. \"    OBJECTIVE    Modality rationale: decrease pain and increase tissue extensibility to improve the patients ability to perform ADL's.    Min Type Additional Details    [] Estim:  []Unatt       []IFC  []Premod                        []Other:  []w/ice   []w/heat  Position:  Location:    [] Estim: []Att    []TENS instruct  []NMES                    []Other:  []w/US   []w/ice   []w/heat  Position:  Location:    []  Traction: [] Cervical       []Lumbar                       [] Prone          []Supine                       []Intermittent   []Continuous Lbs:  [] before manual  [] after manual    []  Ultrasound: []Continuous   [] Pulsed                           []1MHz   []3MHz W/cm2:  Location:    []  Iontophoresis with dexamethasone         Location: [] Take home patch   [] In clinic   10 []  Ice     [x]  heat  []  Ice massage  []  Laser   []  Anodyne Position: Seated  Location:L/S    []  Laser with stim  []  Other:  Position:  Location:    []  Vasopneumatic Device Pressure:       [] lo [] med [] hi   Temperature: [] lo [] med [] hi   [] Skin assessment post-treatment:  []intact []redness- no adverse reaction    []redness  adverse reaction:     34 min Therapeutic Exercise:  [x] See flow sheet :   Rationale: increase ROM and increase strength to improve the patients ability to perform ADL's.    8 min Neuromuscular Re-education:  [x]  See flow sheet :   Rationale: increase strength and increase proprioception  to improve the patients ability to perform functional activities. With   [x] TE   [] TA   [] neuro   [] other: Patient Education: [x] Review HEP    [] Progressed/Changed HEP based on:   [] positioning   [] body mechanics   [] transfers   [] heat/ice application    [] other:      Other Objective/Functional Measures: Pt reports 50% improvement with sleep tolerance. AROM (R) hip abd 26 degrees, tested supine secondary to trunk lateral flexion with hip abd while standing. Improved TA contraction, required occasional cueing. Pain Level (0-10 scale) post treatment: 1/10    ASSESSMENT/Changes in Function:     []  See Plan of Care  [x]  See progress note/recertification  []  See Discharge Summary         Progress towards goals / Updated goals:  Short Term Goals: To be accomplished in 1 weeks:  1. Pt will report compliance and independence to HEP to help the pt manage their pain and symptoms. Met - 4/13/2017    Long Term Goals: To be accomplished in 5 weeks:  1. Pt will increase FOTO score to 54 points to improve ability to perform ADLs. Progressing 45 points (increase in 4 points since start of care) 4/25/2017  2. Pt will increase AROM l/s EXT to > 50% of WNL, B rotation and SB to 50-75% of WNL to improve mobility needed for prolonged standing. Met- 50% ext, 60% SB B 5/1/17  3. Pt will increase AROM right hip ABD to 35 degs to improve ability to tolerate yard work. - AROM (R hip abd 26 degrees. 5/4/2017  4. Pt will report being able fall asleep with mild to no difficulty secondary to LBP to improve sleep quality.  Pt reports improving sleep 4/20/17; Pt reports 50% improvement. 5/4/2017    PLAN  []  Upgrade activities as tolerated     [x]  Continue plan of care  []  Update interventions per flow sheet       []  Discharge due to:_  [x]  Other:_   Transition to performing only land based therapy.     Hui Salmon PTA 5/4/2017  10:21 AM    Future Appointments  Date Time Provider Arthur Montalvoi   5/8/2017 9:30 AM [id number] [facility]   5/10/2017 11:00 AM [facility]   5/11/2017 10:50 AM Karla Johnston  [address]   5/26/2017 10:30 AM ROOSEVELT Jordan Dani 69

## 2017-05-08 ENCOUNTER — APPOINTMENT (OUTPATIENT)
Dept: PHYSICAL THERAPY | Age: 77
End: 2017-05-08
Payer: MEDICARE

## 2017-05-08 ENCOUNTER — HOSPITAL ENCOUNTER (OUTPATIENT)
Dept: PHYSICAL THERAPY | Age: 77
Discharge: HOME | End: 2017-05-08
Payer: MEDICARE

## 2017-05-08 PROCEDURE — 97110 THERAPEUTIC EXERCISES: CPT

## 2017-05-08 PROCEDURE — 97112 NEUROMUSCULAR REEDUCATION: CPT

## 2017-05-08 NOTE — PROGRESS NOTES
PT DAILY TREATMENT NOTE - [facility]     Patient Name: Rossi Ordonez  Date:2017  : 1940  [x]  Patient  Verified  Payor: Brayan Tovar / Plan: [place] MEDICARE PART A & B / Product Type: Medicare /    In time:10:03  Out time:10:38  Total Treatment Time (min): 35  Total Timed Codes (min): 35  1:1 Treatment Time ( [address] only): 35   Visit #: 1 of 6    Treatment Area: Other specific arthropathies, not elsewhere classified, other specified site [M12.88]  Neuralgia and neuritis, unspecified [M79.2]  Low back pain [M54.5]    SUBJECTIVE  Pain Level (0-10 scale): 1/10  Any medication changes, allergies to medications, adverse drug reactions, diagnosis change, or new procedure performed?: [x] No    [] Yes (see summary sheet for update)  Subjective functional status/changes:   [] No changes reported  \"It's no worse. \"    OBJECTIVE    Modality rationale: PD modalities.    Min Type Additional Details    [] Estim:  []Unatt       []IFC  []Premod                        []Other:  []w/ice   []w/heat  Position:  Location:    [] Estim: []Att    []TENS instruct  []NMES                    []Other:  []w/US   []w/ice   []w/heat  Position:  Location:    []  Traction: [] Cervical       []Lumbar                       [] Prone          []Supine                       []Intermittent   []Continuous Lbs:  [] before manual  [] after manual    []  Ultrasound: []Continuous   [] Pulsed                           []1MHz   []3MHz W/cm2:  Location:    []  Iontophoresis with dexamethasone         Location: [] Take home patch   [] In clinic    []  Ice     []  heat  []  Ice massage  []  Laser   []  Anodyne Position:  Location:    []  Laser with stim  []  Other:  Position:  Location:    []  Vasopneumatic Device Pressure:       [] lo [] med [] hi   Temperature: [] lo [] med [] hi   [] Skin assessment post-treatment:  []intact []redness- no adverse reaction    []redness  adverse reaction:     27 min Therapeutic Exercise:  [x] See flow sheet :   Rationale: increase ROM and increase strength to improve the patients ability to perform ADL's.    8 min Neuromuscular Re-education:  [x]  See flow sheet :   Rationale: increase strength, improve balance and increase proprioception  to improve the patients ability to perform functional activities. With   [x] TE   [] TA   [] neuro   [] other: Patient Education: [x] Review HEP    [] Progressed/Changed HEP based on:   [] positioning   [] body mechanics   [] transfers   [] heat/ice application    [] other:      Other Objective/Functional Measures: Added marching with shoulder extension. Pain Level (0-10 scale) post treatment: 1/10    ASSESSMENT/Changes in Function: Demonstrated improved TA contraction with PPT, mild difficulty maintaining TA and balance standing marching with shoulder extension. Pt reported no pain increase while performing exercises. Patient will continue to benefit from skilled PT services to modify and progress therapeutic interventions, address functional mobility deficits, address ROM deficits, address strength deficits, analyze and cue movement patterns and analyze and modify body mechanics/ergonomics to attain remaining goals. [x]  See Plan of Care  []  See progress note/recertification  []  See Discharge Summary         Progress towards goals / Updated goals:  Short Term Goals: To be accomplished in 1 weeks:  1. Pt will report compliance and independence to HEP to help the pt manage their pain and symptoms. Met - 4/13/2017    Long Term Goals: To be accomplished in 5 weeks:  1. Pt will increase FOTO score to 54 points to improve ability to perform ADLs. Progressing 45 points (increase in 4 points since start of care) 4/25/2017  2. Pt will increase AROM l/s EXT to > 50% of WNL, B rotation and SB to 50-75% of WNL to improve mobility needed for prolonged standing. Met- 50% ext, 60% SB B 5/1/17  3. Pt will increase AROM right hip ABD to 35 degs to improve ability to tolerate yard work.  - AROM (R hip abd 26 degrees. 5/4/2017  4. Pt will report being able fall asleep with mild to no difficulty secondary to LBP to improve sleep quality. Pt reports improving sleep 4/20/17; Pt reports 50% improvement.  5/4/2017    PLAN  []  Upgrade activities as tolerated     [x]  Continue plan of care  []  Update interventions per flow sheet       []  Discharge due to:_  []  Other:_      [facility] 5/8/2017  9:32 AM    Future Appointments  Date Time Provider Arthur Carvalho   5/8/2017 10:00 AM [facility]   [facility]   5/11/2017 10:50 AM Noemi Kraft  [address] [facility]   5/16/2017 11:00 [facility]   5/18/2017 10:00 AM [facility]   5/22/2017 11:30 [facility]   5/26/2017 9:00 AM [facility]   5/26/2017 10:30 AM ROOSEVELT Serrano Dani 69

## 2017-05-10 ENCOUNTER — APPOINTMENT (OUTPATIENT)
Dept: PHYSICAL THERAPY | Age: 77
End: 2017-05-10
Payer: MEDICARE

## 2017-05-10 ENCOUNTER — HOSPITAL ENCOUNTER (OUTPATIENT)
Dept: PHYSICAL THERAPY | Age: 77
Discharge: HOME | End: 2017-05-10
Payer: MEDICARE

## 2017-05-10 PROCEDURE — 97110 THERAPEUTIC EXERCISES: CPT

## 2017-05-10 PROCEDURE — 97112 NEUROMUSCULAR REEDUCATION: CPT

## 2017-05-10 NOTE — PROGRESS NOTES
PT DAILY TREATMENT NOTE - [facility]     Patient Name: Raine Kramer  Date:5/10/2017  : 1940  [x]  Patient  Verified  Payor: [place] MEDICARE / Plan: [place] MEDICARE PART A & B / Product Type: Medicare /    In time:11:00  Out time:11:49  Total Treatment Time (min): 49  Total Timed Codes (min): 39  1:1 Treatment Time ( [address] only): 30   Visit #: 2 of 6    Treatment Area: Other specific arthropathies, not elsewhere classified, other specified site [M12.88]  Neuralgia and neuritis, unspecified [M79.2]  Low back pain [M54.5]    SUBJECTIVE  Pain Level (0-10 scale): 1/10  Any medication changes, allergies to medications, adverse drug reactions, diagnosis change, or new procedure performed?: [x] No    [] Yes (see summary sheet for update)  Subjective functional status/changes:   [] No changes reported  \"I'm doing okay, wasn't sore after the last visit. \"    OBJECTIVE     Modality rationale: decrease pain and increase tissue extensibility to improve the patients ability to perform ADL's.    Min Type Additional Details    [] Estim:  []Unatt       []IFC  []Premod                        []Other:  []w/ice   []w/heat  Position:  Location:    [] Estim: []Att    []TENS instruct  []NMES                    []Other:  []w/US   []w/ice   []w/heat  Position:  Location:    []  Traction: [] Cervical       []Lumbar                       [] Prone          []Supine                       []Intermittent   []Continuous Lbs:  [] before manual  [] after manual    []  Ultrasound: []Continuous   [] Pulsed                           []1MHz   []3MHz W/cm2:  Location:    []  Iontophoresis with dexamethasone         Location: [] Take home patch   [] In clinic   10 []  Ice     [x]  heat  []  Ice massage  []  Laser   []  Anodyne Position: Seated  Location:L/S    []  Laser with stim  []  Other:  Position:  Location:    []  Vasopneumatic Device Pressure:       [] lo [] med [] hi   Temperature: [] lo [] med [] hi   [] Skin assessment post-treatment: []intact []redness- no adverse reaction    []redness  adverse reaction:     29 min Therapeutic Exercise:  [x] See flow sheet :   Rationale: increase ROM and increase strength to improve the patients ability to perform ADL's. 10 min Neuromuscular Re-education:  [x]  See flow sheet :   Rationale: increase strength and increase proprioception  to improve the patients ability to perform functional activities. With   [x] TE   [] TA   [] neuro   [] other: Patient Education: [x] Review HEP    [] Progressed/Changed HEP based on:   [] positioning   [] body mechanics   [] transfers   [] heat/ice application    [] other:      Other Objective/Functional Measures: Added mini-squats, pt able to perform with proper mechanics, no pain associated with exercise. Pain Level (0-10 scale) post treatment: 0/10     ASSESSMENT/Changes in Function: Pt reports improved ease with performing ADL's. Patient will continue to benefit from skilled PT services to modify and progress therapeutic interventions, address functional mobility deficits, address ROM deficits, address strength deficits, analyze and cue movement patterns and analyze and modify body mechanics/ergonomics to attain remaining goals. [x]  See Plan of Care  []  See progress note/recertification  []  See Discharge Summary         Progress towards goals / Updated goals:  Short Term Goals: To be accomplished in 1 weeks:  1. Pt will report compliance and independence to HEP to help the pt manage their pain and symptoms. Met - 4/13/2017    Long Term Goals: To be accomplished in 5 weeks:   1. Pt will increase FOTO score to 54 points to improve ability to perform ADLs. Progressing 45 points (increase in 4 points since start of care) 4/25/2017  2. Pt will increase AROM l/s EXT to > 50% of WNL, B rotation and SB to 50-75% of WNL to improve mobility needed for prolonged standing. Met- 50% ext, 60% SB B 5/1/17  3.  Pt will increase AROM right hip ABD to 35 degs to improve ability to tolerate yard work. - AROM (R hip abd 26 degrees. 5/4/2017  4. Pt will report being able fall asleep with mild to no difficulty secondary to LBP to improve sleep quality. Pt reports improving sleep 4/20/17; Pt reports 50% improvement.  5/4/2017    PLAN  []  Upgrade activities as tolerated     [x]  Continue plan of care  []  Update interventions per flow sheet       []  Discharge due to:_  []  Other:_      Allen Salgado PTA 5/10/2017  10:54 AM    Future Appointments  Date Time Provider Arthur Carvalho   5/10/2017 11:00 AM [facility]   5/11/2017 10:50 AM Leopoldo Dally,  [address]   5/16/2017 11:00 AM Allen Salgado PTA [facility]   5/18/2017 10:00 AM Lesley Marie [facility]   5/22/2017 11:30 AM Allen Salgado P[facility]   5/26/2017 9:00 AM Allen Salgado PTA [facility]   5/26/2017 10:30 AM ROOSEVELT Pacheco 69

## 2017-05-11 ENCOUNTER — OFFICE VISIT (OUTPATIENT)
Dept: ORTHOPEDIC SURGERY | Age: 77
End: 2017-05-11

## 2017-05-11 VITALS
RESPIRATION RATE: 18 BRPM | HEART RATE: 62 BPM | TEMPERATURE: 98.2 F | OXYGEN SATURATION: 98 % | SYSTOLIC BLOOD PRESSURE: 151 MMHG | WEIGHT: 146 LBS | BODY MASS INDEX: 27.59 KG/M2 | DIASTOLIC BLOOD PRESSURE: 65 MMHG

## 2017-05-11 DIAGNOSIS — M47.816 LUMBAR FACET ARTHROPATHY: Primary | ICD-10-CM

## 2017-05-11 DIAGNOSIS — M48.061 LUMBAR SPINAL STENOSIS: ICD-10-CM

## 2017-05-11 DIAGNOSIS — M79.2 NEURITIS: ICD-10-CM

## 2017-05-11 RX ORDER — GABAPENTIN 100 MG/1
CAPSULE ORAL
Qty: 90 CAP | Refills: 2 | Status: SHIPPED | OUTPATIENT
Start: 2017-05-11 | End: 2017-08-14 | Stop reason: SDUPTHER

## 2017-05-11 NOTE — PROGRESS NOTES
[facility]  [address] [place], [facility] [address]  Phone: [phone]  Fax: [phone]      ASSESSMENT   Sam Murry was seen today for back pain and follow-up. Diagnoses and all orders for this visit:    Lumbar facet arthropathy (HCC)    Lumbar spinal stenosis    Neuritis  -     gabapentin (NEURONTIN) 100 mg capsule; 1 in the am and 2 in the evening as directed  Indications: NEUROPATHIC PAIN         IMPRESSION AND PLAN:  Dorothy Priest is a 68 y.o. female with history of lumbar pain. Pt c/o pain in the lower back that occasionally radiates down the thighs. She has tried physical therapy since her last office visit and admits to gradual improvement. Pt started Neurontin 100 mg 2 tabs since her last office visit but is unsure if it is effective. 1) Pt was given information on lumbar arthritis exercises. 2) Discussed treatment options with the Pt, including physical therapy, medication, steroid injections, and a RFA. 3) She will increase her Neurontin 100 mg to 1 tabs AM and 2 tabs QHS to better manage her symptoms. Pt received refills at this time. 4) Pt will continue with physical therapy. 5) I recommended the Pt try mary chi and chair yoga. 6) Ms. Zara Martinez has a reminder for a \"due or due soon\" health maintenance. I have asked that she contact her primary care provider, Ana Rahman MD, for follow-up on this health maintenance. 7)  demonstrated consistency with prescribing. 8) Pt will follow-up in 6-8 weeks. HISTORY OF PRESENT ILLNESS:  Dorothy Priest is a 68 y.o. female with history of lumbar pain. Pt c/o pain in the lower back that occasionally radiates down the thighs. She has tried physical therapy since her last office visit and admits to gradual improvement.  Pt states that she attended 4 sessions of aqua physical therapy and recently switched to land physical therapy where she uses a stationary bicycle and performed stretches. She continues to perform exercises in the morning on days she does not attend physical therapy. Pt admits to increased stiffness when waking in the morning. Her lower back pain is worse when standing and walking. She started Neurontin 100 mg 2 tabs since her last office visit but is unsure if it is effective. Pt denies experiencing any numbness, tingling or burning pain in the legs and feet at this time. She denies experiencing any day time drowsiness. Pt at this time desires to proceed with medication evaluation. Of note, Pt had a prior right knee replacement. Pain Scale: 1/10    PCP: Tarah Singh MD       Past Medical History:   Diagnosis Date    Constipation     Hypercholesteremia     Microscopic hematuria     Stroke [facility])     blurred vision, resolved (taking plavix)     PEACE (stress urinary incontinence, female)     UTI (urinary tract infection)         Social History     Social History    Marital status:      Spouse name: N/A    Number of children: N/A    Years of education: N/A     Occupational History    Not on file. Social History Main Topics    Smoking status: Never Smoker    Smokeless tobacco: Not on file    Alcohol use No    Drug use: No    Sexual activity: Not on file     Other Topics Concern    Not on file     Social History Narrative       Current Outpatient Prescriptions   Medication Sig Dispense Refill    gabapentin (NEURONTIN) 100 mg capsule 1 in the am and 2 in the evening as directed  Indications: NEUROPATHIC PAIN 90 Cap 2    B.infantis-B.ani-B.long-B.bifi (PROBIOTIC 4X) 10-15 mg TbEC Take  by mouth.  polyethylene glycol (MIRALAX) 17 gram/dose powder Take 17 g by mouth daily as needed.  clopidogrel (PLAVIX) 75 mg tablet Take 75 mg by mouth daily.       ESTRACE 0.01 % (0.1 mg/gram) vaginal cream APPLY 2/3 LENGTH OF PINKY FINGER AND INSERT INTO VAGINA ONCE A WEEK 42.5 g 0    amLODIPine (NORVASC) 5 mg tablet Take 5 mg by mouth daily.      sertraline (ZOLOFT) 50 mg tablet 50 mg.      ferrous sulfate 325 mg (65 mg iron) tablet Take 1 Tab by mouth two (2) times daily (with meals). 60 Tab 2    simvastatin (ZOCOR) 40 mg tablet Take  by mouth nightly.  CHOLECALCIFEROL, VITAMIN D3, (VITAMIN D3 PO) Take 1,000 Units by mouth daily.  estradiol (ESTRACE) 0.01 % (0.1 mg/g) vaginal cream use one  x  /wik 42.5 g 3    fish oil-dha-epa 1,200-144-216 mg Cap Take  by mouth.  oxyCODONE-acetaminophen (PERCOCET 7.5) 7.5-325 mg per tablet Take 1-2 Tabs by mouth every four (4) hours as needed. Max Daily Amount: 12 Tabs. 60 Tab 0       Allergies   Allergen Reactions    Erythromycin Rash and Itching       inflammation    Furacin [Nitrofurazone] Hives and Other (comments)     Intolerance    Tobrex [Tobramycin Sulfate] Other (comments)     Intolerance, extreme redness         REVIEW OF SYSTEMS    Constitutional: Negative for fever, chills, or weight change. Respiratory: Negative for cough or shortness of breath. Cardiovascular: Negative for chest pain or palpitations. Gastrointestinal: Negative for acid reflux, change in bowel habits, or constipation. Genitourinary: Negative for dysuria and flank pain. Musculoskeletal: Positive for lumbar pain. Skin: Negative for rash. Neurological: Negative for headaches, dizziness, or numbness. Endo/Heme/Allergies: Negative for increased bruising. Psychiatric/Behavioral: Negative for difficulty with sleep. PHYSICAL EXAMINATION  Visit Vitals    /65    Pulse 62    Temp 98.2 °F (36.8 °C) (Oral)    Resp 18    Wt 146 lb (66.2 kg)    SpO2 98%    BMI 27.59 kg/m2       Constitutional: Awake, alert, and in no acute distress  Neurological: 1+ symmetrical DTRs in the lower extremities. Sensation to light touch is intact. Skin: warm, dry, and intact. Musculoskeletal: Tenderness to palpation in the lower lumbar region and over the right SI joint.  Moderate pain with extension and axial loading. Slightly limited ROM with internal rotation of her right hip. Negative straight leg raise bilaterally. Hip Flex  Quads Hamstrings Ankle DF EHL Ankle PF   Right +4/5 +4/5 +4/5 +4/5 +4/5 +4/5   Left +4/5 +4/5 +4/5 +4/5 +4/5 +4/5     IMAGING:    Lumbar spine MRI from 05/11/2017 was personally reviewed with the Pt and demonstrated:    Results from [place] encounter on 03/07/17   MRI LUMB SPINE WO CONT   Narrative Procedure: MRI of the lumbar spine without contrast.    CPT code: 98231    Comparisons: None. Indications:  Low back pain and radiculopathy    Technique: T1 weighted, T2 FSE with fat saturation, FSE inversion recovery  sagittal images are supplemented by T2 weighted with fat saturation and T1  weighted axial images. Findings: There is dextroscoliosis centered at approximately L2. Not well evaluated  without coronal images. Sagittal images reveal overall normal vertebral body morphology. No fractures  noted. No suspicious lesions. Alignments are anatomic, no evidence for subluxation. Conus medullaris ends at the L1 vertebral body level. Correlation of axial and sagittal images reveals the following:    At L1-L2: Mild circumferential disc osteophyte complex. Mild facet arthropathy. Mild central canal stenosis. Moderate to severe foraminal stenosis. At L2-L3: Mild retrolisthesis L2 on L3. Vertebral body oriented to the right  secondary to scoliosis. Uncovering of the disc posterior. Overlying broad-based  disc osteophyte complex. Mild facet arthropathy and buckling of the ligamentum  flavum. Moderate central canal stenosis. Moderate to severe or for severe left  foraminal stenosis. Moderate right. At L3-L4: Moderate circumferential disc osteophyte complex. Vertebral body  oriented to the right secondary to scoliosis.  Moderate facet arthropathy and  buckling of the ligamentum flavum posteriorly resulting in moderate or moderate  to severe central canal stenosis moderate to severe or severe left foraminal  stenosis. Moderate right foraminal stenosis. At L4-L5: Moderate circumferential disc osteophyte complex. Vertebral bodies  oriented to the right. Moderate facet arthropathy and prominent buckling of the  ligamentum flavum. Again moderate central canal stenosis. Moderate left and  moderate to severe right foraminal stenosis. At L5-S1: Grade 1 anterolisthesis of L5 on S1 without evidence for pars defects. Uncovering of the disc posteriorly and overlying broad-based disc protrusion. Moderate facet arthropathy with fluid in the facet joints and buckling of the  ligamentum flavum. Moderate central canal stenosis. Moderate to severe bilateral  foraminal stenosis. Visualized portions of the sacroiliac joints are unremarkable. Incidentally  imaged retroperitoneal structures are unremarkable as well. Impression Impression:    Multilevel multifactorial degenerative changes as above. Written by Hayes Torre, as dictated by Dangelo Finney MD.  I, Dr. Dangelo Finney confirm that all documentation is accurate.

## 2017-05-11 NOTE — PATIENT INSTRUCTIONS
Low Back Arthritis: Exercises  Your Care Instructions  Here are some examples of typical rehabilitation exercises for your condition. Start each exercise slowly. Ease off the exercise if you start to have pain. Your doctor or physical therapist will tell you when you can start these exercises and which ones will work best for you. When you are not being active, find a comfortable position for rest. Some people are comfortable on the floor or a medium-firm bed with a small pillow under their head and another under their knees. Some people prefer to lie on their side with a pillow between their knees. Don't stay in one position for too long. Take short walks (10 to 20 minutes) every 2 to 3 hours. Avoid slopes, hills, and stairs until you feel better. Walk only distances you can manage without pain, especially leg pain. How to do the exercises  Pelvic tilt    1. Lie on your back with your knees bent. 2. \"Brace\" your stomach--tighten your muscles by pulling in and imagining your belly button moving toward your spine. 3. Press your lower back into the floor. You should feel your hips and pelvis rock back. 4. Hold for 6 seconds while breathing smoothly. 5. Relax and allow your pelvis and hips to rock forward. 6. Repeat 8 to 12 times. Back stretches    1. Get down on your hands and knees on the floor. 2. Relax your head and allow it to droop. Round your back up toward the ceiling until you feel a nice stretch in your upper, middle, and lower back. Hold this stretch for as long as it feels comfortable, or about 15 to 30 seconds. 3. Return to the starting position with a flat back while you are on your hands and knees. 4. Let your back sway by pressing your stomach toward the floor. Lift your buttocks toward the ceiling. 5. Hold this position for 15 to 30 seconds. 6. Repeat 2 to 4 times. Follow-up care is a key part of your treatment and safety.  Be sure to make and go to all appointments, and call your doctor if you are having problems. It's also a good idea to know your test results and keep a list of the medicines you take. Where can you learn more? Go to http://deacon-matthew.info/. Enter T392 in the search box to learn more about \"Low Back Arthritis: Exercises. \"  Current as of: May 23, 2016  Content Version: 11.2  © 5070-0059 [employer]. Care instructions adapted under license by [employer] (which disclaims liability or warranty for this information). If you have questions about a medical condition or this instruction, always ask your healthcare professional. [place] [address] any warranty or liability for your use of this information. Learning About Medial Branch Block and Neurotomy  What are medial branch block and neurotomy? Facet joints connect your vertebrae to each other. Problems in these joints can cause chronic (long-term) pain in the neck or back. They can sometimes affect the shoulders, arms, buttocks, or legs. Medial branch nerves are the nerves that carry many of the pain messages from your facet joints. Radiofrequency medial branch neurotomy is a type of medial branch neurotomy that is used to relieve arthritis pain. It uses radio waves to damage nerves in your neck or back so that they can no longer send pain messages to your brain. Before your doctor knows if a neurotomy will help you, he or she will do a medial branch block to find out if certain nerves are the ones that are a source of your pain. You will need two separate visits to the outpatient center or hospital to have both procedures. How is a medial branch block done? The doctor will use a tiny needle to numb the skin where you will get the block. Then he or she puts the block needle into the numbed area. You may feel some pressure, but you should not feel pain.  Using fluoroscopy (live X-ray) to guide the needle, the doctor injects medicine onto one or more nerves to make them numb. If you get relief from your pain in the next 4 to 6 hours, it's a sign that those nerves may be contributing to your pain. The relief will last only a short time. You may then have a medial branch neurotomy at a later visit to try to get longer relief. It takes 20 to 30 minutes to get the block. You can go home after the doctor watches you for about an hour. You will get instructions on how to report how much pain you have when you are at home. You will need someone to drive you home. How is medial branch neurotomy done? The doctor will use a tiny needle to numb the skin where you will get the neurotomy. Then he or she puts the neurotomy needle into the numbed area. You may feel some pressure. Using fluoroscopy (live X-ray) to guide the needle, the doctor sends radio waves through the needle to the nerve for 60 to 90 seconds. The radio waves heat the nerve, which damages it. The doctor may do this several times. And he or she may treat more than one nerve. It takes 45 to 90 minutes to get a neurotomy, depending on how many nerves are heated. You will probably go home 30 to 60 minutes later. You will need someone to drive you home. What can you expect after a neurotomy? You may feel a little sore or tender at the injection site at first. But after a successful neurotomy, most people have pain relief right away. It often lasts for 9 to 12 months or longer. Sometimes the pain relief is permanent. If your pain does come back, it may mean that the damaged nerve has healed and can send pain messages again. Or it can mean that a different nerve is causing pain. Your doctor will discuss your options with you. Follow-up care is a key part of your treatment and safety. Be sure to make and go to all appointments, and call your doctor if you are having problems. It's also a good idea to know your test results and keep a list of the medicines you take. Where can you learn more?   Go to http://deacon-matthew.info/. Enter S369 in the search box to learn more about \"Learning About Medial Branch Block and Neurotomy. \"  Current as of: October 14, 2016  Content Version: 11.2  © 3370-3848 [employer]. Care instructions adapted under license by [employer] (which disclaims liability or warranty for this information). If you have questions about a medical condition or this instruction, always ask your healthcare professional. [address] any warranty or liability for your use of this information.

## 2017-05-16 ENCOUNTER — APPOINTMENT (OUTPATIENT)
Dept: PHYSICAL THERAPY | Age: 77
End: 2017-05-16
Payer: MEDICARE

## 2017-05-18 ENCOUNTER — HOSPITAL ENCOUNTER (OUTPATIENT)
Dept: PHYSICAL THERAPY | Age: 77
Discharge: HOME | End: 2017-05-18
Payer: MEDICARE

## 2017-05-18 PROCEDURE — 97112 NEUROMUSCULAR REEDUCATION: CPT

## 2017-05-18 PROCEDURE — 97140 MANUAL THERAPY 1/> REGIONS: CPT

## 2017-05-18 NOTE — PROGRESS NOTES
PT DAILY TREATMENT NOTE - [facility]     Patient Name: Anjum Pettit  Date:2017  : 1940  [x]  Patient  Verified  Payor: [place] MEDICARE / Plan: [place] MEDICARE PART A & B / Product Type: Medicare /    In time:10:00  Out time:10:56  Total Treatment Time (min): 56  Total Timed Codes (min): 46  1:1 Treatment Time ( [address] only): 33   Visit #: 3 of 6    Treatment Area:  Other specific arthropathies, not elsewhere classified, other specified site [M12.88]  Neuralgia and neuritis, unspecified [M79.2]  Low back pain [M54.5]    SUBJECTIVE  Pain Level (0-10 scale): 3  Any medication changes, allergies to medications, adverse drug reactions, diagnosis change, or new procedure performed?: [x] No    [] Yes (see summary sheet for update)  Subjective functional status/changes:   [] No changes reported  Pt report some soreness today from working in her yard, specifically hamstrings and R glute    OBJECTIVE    Modality rationale: decrease pain and increase tissue extensibility to improve the patients ability to perform daily tasks and ADLs   Min Type Additional Details    [] Estim:  []Unatt       []IFC  []Premod                        []Other:  []w/ice   []w/heat  Position:  Location:    [] Estim: []Att    []TENS instruct  []NMES                    []Other:  []w/US   []w/ice   []w/heat  Position:  Location:    []  Traction: [] Cervical       []Lumbar                       [] Prone          []Supine                       []Intermittent   []Continuous Lbs:  [] before manual  [] after manual    []  Ultrasound: []Continuous   [] Pulsed                           []1MHz   []3MHz W/cm2:  Location:    []  Iontophoresis with dexamethasone         Location: [] Take home patch   [] In clinic   10 []  Ice     [x]  heat  []  Ice massage  []  Laser   []  Anodyne Position: seated  Location: L/S    []  Laser with stim  []  Other:  Position:  Location:    []  Vasopneumatic Device Pressure:       [] lo [] med [] hi   Temperature: [] lo [] med [] hi   [] Skin assessment post-treatment:  []intact []redness- no adverse reaction    []redness  adverse reaction:     28 min Therapeutic Exercise:  [] See flow sheet :   Rationale: increase ROM and increase strength to improve the patients ability to perform functional tasks with increased independence and tolerance    10 min Neuromuscular Re-education:  []  See flow sheet :   Rationale: improve coordination and increase proprioception  to improve the patients ability to activate core musculature for improved lumbar pain with daily activities    8 min Manual Therapy:  DTM R glutes, sacral springs   Rationale: decrease pain and increase tissue extensibility to improve functional mobility and ADL ease               With   [] TE   [] TA   [] neuro   [] other: Patient Education: [x] Review HEP    [] Progressed/Changed HEP based on:   [] positioning   [] body mechanics   [] transfers   [] heat/ice application    [] other:      Other Objective/Functional Measures:   Pt continues to demonstrate limited AROM hip abd on R, increased PROM with L hip stabilization     Pain Level (0-10 scale) post treatment: 1    ASSESSMENT/Changes in Function: Pt reports feeling some increased soreness post yardwork, she tolerates all interventions well with reduced pain post session. Continued pelvic instability limiting R hip AROM abduction and contributing to pt posterior glute soreness. Continue to progress core activation and stability. Patient will continue to benefit from skilled PT services to modify and progress therapeutic interventions, address functional mobility deficits, address ROM deficits, address strength deficits, analyze and address soft tissue restrictions, analyze and cue movement patterns, analyze and modify body mechanics/ergonomics and assess and modify postural abnormalities to attain remaining goals.      []  See Plan of Care  []  See progress note/recertification  []  See Discharge Summary         Progress towards goals / Updated goals:  Short Term Goals: To be accomplished in 1 weeks:  1. Pt will report compliance and independence to HEP to help the pt manage their pain and symptoms. Met - 4/13/2017    Long Term Goals: To be accomplished in 5 weeks:   1. Pt will increase FOTO score to 54 points to improve ability to perform ADLs. Progressing 45 points (increase in 4 points since start of care) 4/25/2017  2. Pt will increase AROM l/s EXT to > 50% of WNL, B rotation and SB to 50-75% of WNL to improve mobility needed for prolonged standing. Met- 50% ext, 60% SB B 5/1/17  3. Pt will increase AROM right hip ABD to 35 degs to improve ability to tolerate yard work. - AROM (R hip abd 26 degrees. 5/4/2017; slow progress unchanged from last measurement 5/18/17  4. Pt will report being able fall asleep with mild to no difficulty secondary to LBP to improve sleep quality. Pt reports improving sleep 4/20/17; Pt reports 50% improvement.  5/4/2017    PLAN  [x]  Upgrade activities as tolerated     []  Continue plan of care  []  Update interventions per flow sheet       []  Discharge due to:_  []  Other:_      Any Giron DPT 5/18/2017  10:10 AM    Future Appointments  Date Time Provider Arthur Carvalho   5/22/2017 11:30 AM Mariana Lopez PTA [facility]   5/26/2017 9:00 AM Mariana Lopez PTA [facility]   5/26/2017 10:30 AM ROOSEVELT Farias   5/30/2017 1:00 PM [id number] [facility]   7/5/2017 11:15 AM John Alvarado  [id number]

## 2017-05-22 ENCOUNTER — HOSPITAL ENCOUNTER (OUTPATIENT)
Dept: PHYSICAL THERAPY | Age: 77
Discharge: HOME | End: 2017-05-22
Payer: MEDICARE

## 2017-05-22 PROCEDURE — 97112 NEUROMUSCULAR REEDUCATION: CPT

## 2017-05-22 NOTE — PROGRESS NOTES
PT DAILY TREATMENT NOTE - [facility]     Patient Name: Rai Quiver  Date:2017  : 1940  [x]  Patient  Verified  Payor: Elton Smith / Plan: [place] MEDICARE PART A & B / Product Type: Medicare /    In time:11:30  Out time:12:24  Total Treatment Time (min): 54  Total Timed Codes (min): 44  1:1 Treatment Time ( [address] only): 20   Visit #: 4 of 6    Treatment Area: Other specific arthropathies, not elsewhere classified, other specified site [M12.88]  Neuralgia and neuritis, unspecified [M79.2]  Low back pain [M54.5]    SUBJECTIVE  Pain Level (0-10 scale): 1/10  Any medication changes, allergies to medications, adverse drug reactions, diagnosis change, or new procedure performed?: [x] No    [] Yes (see summary sheet for update)  Subjective functional status/changes:   [] No changes reported  \"Haven't had any trouble at home. \"    OBJECTIVE    Modality rationale: decrease pain and increase tissue extensibility to improve the patients ability to perform ADL's.    Min Type Additional Details    [] Estim:  []Unatt       []IFC  []Premod                        []Other:  []w/ice   []w/heat  Position:  Location:    [] Estim: []Att    []TENS instruct  []NMES                    []Other:  []w/US   []w/ice   []w/heat  Position:  Location:    []  Traction: [] Cervical       []Lumbar                       [] Prone          []Supine                       []Intermittent   []Continuous Lbs:  [] before manual  [] after manual    []  Ultrasound: []Continuous   [] Pulsed                           []1MHz   []3MHz W/cm2:  Location:    []  Iontophoresis with dexamethasone         Location: [] Take home patch   [] In clinic   10 []  Ice     [x]  heat  []  Ice massage  []  Laser   []  Anodyne Position: Supine  Location:(R) hip    []  Laser with stim  []  Other:  Position:  Location:    []  Vasopneumatic Device Pressure:       [] lo [] med [] hi   Temperature: [] lo [] med [] hi   [] Skin assessment post-treatment:  []intact []redness- no adverse reaction    []redness  adverse reaction:     34 min Therapeutic Exercise:  [x] See flow sheet :   Rationale: increase ROM and increase strength to improve the patients ability to perform ADL's. 10 min Neuromuscular Re-education:  [x]  See flow sheet :   Rationale: increase strength and increase proprioception  to improve the patients ability to perform functional activities. With   [x] TE   [] TA   [] neuro   [] other: Patient Education: [x] Review HEP    [] Progressed/Changed HEP based on:   [] positioning   [] body mechanics   [] transfers   [] heat/ice application    [] other:      Other Objective/Functional Measures: Added butterfly hip adductor stretch for 1'. Pain Level (0-10 scale) post treatment: 0/10    ASSESSMENT/Changes in Function: Pt reports occasional pain in (R) groin and low back if she performs a lot of standing activities in one day. AROM Hip abd (L)>(R). Patient will continue to benefit from skilled PT services to modify and progress therapeutic interventions, address functional mobility deficits, address ROM deficits, address strength deficits, analyze and cue movement patterns and analyze and modify body mechanics/ergonomics to attain remaining goals. [x]  See Plan of Care  []  See progress note/recertification  []  See Discharge Summary         Progress towards goals / Updated goals:  Short Term Goals: To be accomplished in 1 weeks:  1. Pt will report compliance and independence to HEP to help the pt manage their pain and symptoms. Met - 4/13/2017    Long Term Goals: To be accomplished in 5 weeks:   1. Pt will increase FOTO score to 54 points to improve ability to perform ADLs. Progressing 45 points (increase in 4 points since start of care) 4/25/2017  2. Pt will increase AROM l/s EXT to > 50% of WNL, B rotation and SB to 50-75% of WNL to improve mobility needed for prolonged standing. Met- 50% ext, 60% SB B 5/1/17  3.  Pt will increase AROM right hip ABD to 35 degs to improve ability to tolerate yard work. - AROM (R hip abd 26 degrees. 5/4/2017; slow progress unchanged from last measurement 5/18/17  4. Pt will report being able fall asleep with mild to no difficulty secondary to LBP to improve sleep quality. Pt reports improving sleep 4/20/17; Pt reports 50% improvement.  5/4/2017    PLAN  []  Upgrade activities as tolerated     [x]  Continue plan of care  []  Update interventions per flow sheet       []  Discharge due to:_  []  Other:_      Lynsey Madrigal PTA 5/22/2017  11:41 AM    Future Appointments  Date Time Provider Arthur Carvalho   5/26/2017 9:00 AM Lynsey Madrigal PTA [id number]   5/26/2017 10:30 AM ROOSEVELT Rojo Dani [address]   5/30/2017 1:00 PM [id number] [facility]   6/29/2017 9:30 AM HBV IVAN LENA  [facility]   7/5/2017 11:15 AM Robert Kang  [address]

## 2017-05-26 ENCOUNTER — OFFICE VISIT (OUTPATIENT)
Dept: ORTHOPEDIC SURGERY | Age: 77
End: 2017-05-26

## 2017-05-26 ENCOUNTER — HOSPITAL ENCOUNTER (OUTPATIENT)
Dept: PHYSICAL THERAPY | Age: 77
Discharge: HOME | End: 2017-05-26
Payer: MEDICARE

## 2017-05-26 VITALS
WEIGHT: 144.2 LBS | DIASTOLIC BLOOD PRESSURE: 66 MMHG | BODY MASS INDEX: 27.23 KG/M2 | HEART RATE: 59 BPM | HEIGHT: 61 IN | SYSTOLIC BLOOD PRESSURE: 125 MMHG | TEMPERATURE: 96.9 F

## 2017-05-26 DIAGNOSIS — M17.12 PRIMARY OSTEOARTHRITIS OF LEFT KNEE: Primary | ICD-10-CM

## 2017-05-26 PROCEDURE — 97110 THERAPEUTIC EXERCISES: CPT

## 2017-05-26 PROCEDURE — 97112 NEUROMUSCULAR REEDUCATION: CPT

## 2017-05-26 RX ORDER — BETAMETHASONE SODIUM PHOSPHATE AND BETAMETHASONE ACETATE 3; 3 MG/ML; MG/ML
6 INJECTION, SUSPENSION INTRA-ARTICULAR; INTRALESIONAL; INTRAMUSCULAR; SOFT TISSUE ONCE
Qty: 1 ML | Refills: 0
Start: 2017-05-26 | End: 2017-05-26

## 2017-05-26 NOTE — PROGRESS NOTES
PT DAILY TREATMENT NOTE - [facility]     Patient Name: Jak Butler  Date:2017  : 1940  [x]  Patient  Verified  Payor: Sudeep Johnson / Plan: [place] MEDICARE PART A & B / Product Type: Medicare /    In time:9:03  Out time:9:44  Total Treatment Time (min): 41  Total Timed Codes (min): 41  1:1 Treatment Time ( [address] only): 41   Visit #: 5 of 6    Treatment Area: Other specific arthropathies, not elsewhere classified, other specified site [M12.88]  Neuralgia and neuritis, unspecified [M79.2]  Low back pain [M54.5]    SUBJECTIVE  Pain Level (0-10 scale): 0/10  Any medication changes, allergies to medications, adverse drug reactions, diagnosis change, or new procedure performed?: [x] No    [] Yes (see summary sheet for update)  Subjective functional status/changes:   [] No changes reported  \"Feeling good. \"    OBJECTIVE    Modality rationale: PD   Min Type Additional Details    [] Estim:  []Unatt       []IFC  []Premod                        []Other:  []w/ice   []w/heat  Position:  Location:    [] Estim: []Att    []TENS instruct  []NMES                    []Other:  []w/US   []w/ice   []w/heat  Position:  Location:    []  Traction: [] Cervical       []Lumbar                       [] Prone          []Supine                       []Intermittent   []Continuous Lbs:  [] before manual  [] after manual    []  Ultrasound: []Continuous   [] Pulsed                           []1MHz   []3MHz W/cm2:  Location:    []  Iontophoresis with dexamethasone         Location: [] Take home patch   [] In clinic    []  Ice     []  heat  []  Ice massage  []  Laser   []  Anodyne Position:  Location:    []  Laser with stim  []  Other:  Position:  Location:    []  Vasopneumatic Device Pressure:       [] lo [] med [] hi   Temperature: [] lo [] med [] hi   [] Skin assessment post-treatment:  []intact []redness- no adverse reaction    []redness  adverse reaction:     31 min Therapeutic Exercise:  [x] See flow sheet :   Rationale: increase ROM and increase strength to improve the patients ability to perform ADL's. 10 min Neuromuscular Re-education:  [x]  See flow sheet :   Rationale: increase strength and increase proprioception  to improve the patients ability to perform functional activities. With   [x] TE   [] TA   [] neuro   [] other: Patient Education: [x] Review HEP    [] Progressed/Changed HEP based on:   [] positioning   [] body mechanics   [] transfers   [] heat/ice application    [] other:      Other Objective/Functional Measures: Pt denied pain during treatment. Pain Level (0-10 scale) post treatment: 0/10    ASSESSMENT/Changes in Function: FOTO improved to 53%. Patient will continue to benefit from skilled PT services to modify and progress therapeutic interventions, address functional mobility deficits, address ROM deficits, address strength deficits, analyze and cue movement patterns and analyze and modify body mechanics/ergonomics to attain remaining goals. [x]  See Plan of Care  []  See progress note/recertification  []  See Discharge Summary         Progress towards goals / Updated goals:  Short Term Goals: To be accomplished in 1 weeks:  1. Pt will report compliance and independence to HEP to help the pt manage their pain and symptoms. Met - 4/13/2017    Long Term Goals: To be accomplished in 5 weeks:   1. Pt will increase FOTO score to 54 points to improve ability to perform ADLs. Progressing 45 points (increase in 4 points since start of care) 4/25/2017; Almost met, 53%. 5/26/2017  2. Pt will increase AROM l/s EXT to > 50% of WNL, B rotation and SB to 50-75% of WNL to improve mobility needed for prolonged standing. Met- 50% ext, 60% SB B 5/1/17  3. Pt will increase AROM right hip ABD to 35 degs to improve ability to tolerate yard work. - AROM (R hip abd 26 degrees. 5/4/2017; slow progress unchanged from last measurement 5/18/17  4.  Pt will report being able fall asleep with mild to no difficulty secondary to LBP to improve sleep quality. Pt reports improving sleep 4/20/17; Pt reports 50% improvement.  5/4/2017    PLAN  []  Upgrade activities as tolerated     [x]  Continue plan of care  []  Update interventions per flow sheet       []  Discharge due to:_  []  Other:_      Suzanne Ramirezland, PTA 5/26/2017  9:04 AM    Future Appointments  Date Time Provider Arthur Carvalho   5/26/2017 10:30 AM ROOSEVELT Donahue Dani [address]   5/30/2017 1:00 PM [id number] [facility]   6/29/2017 9:30 AM HBV IVAN LENA RM [facility]   7/5/2017 11:15 AM Noemi Kraft  [address]

## 2017-05-26 NOTE — PROGRESS NOTES
Chief Complaint   Patient presents with    Knee Pain     Chidi    Hip Pain     Right    Back Pain     Lower

## 2017-05-26 NOTE — PROGRESS NOTES
[address] [place] [address]  [phone]           Patient: Beau Walls                MRN: [id number]       SSN: [id number]  YOB: 1940        AGE: 68 y.o. SEX: female  Body mass index is 27.25 kg/(m^2). PCP: Ray Squires MD  05/26/17      This office note has been dictated. REVIEW OF SYSTEMS:  Constitutional: Negative for fever, chills, weight loss and malaise/fatigue. HENT: Negative. Eyes: Negative. Respiratory: Negative. Cardiovascular: Negative. Gastrointestinal: No bowel incontinence or constipation. Genitourinary: No bladder incontinence or saddle anesthesia. Skin: Negative. Neurological: Negative. Endo/Heme/Allergies: Negative. Psychiatric/Behavioral: Negative. Musculoskeletal: As per HPI above. Past Medical History:   Diagnosis Date    Constipation     Hypercholesteremia     Microscopic hematuria     Stroke [facility])     blurred vision, resolved (taking plavix)     PEACE (stress urinary incontinence, female)     UTI (urinary tract infection)          Current Outpatient Prescriptions:     gabapentin (NEURONTIN) 100 mg capsule, 1 in the am and 2 in the evening as directed  Indications: NEUROPATHIC PAIN, Disp: 90 Cap, Rfl: 2    B.infantis-B.ani-B.long-B.bifi (PROBIOTIC 4X) 10-15 mg TbEC, Take  by mouth., Disp: , Rfl:     polyethylene glycol (MIRALAX) 17 gram/dose powder, Take 17 g by mouth daily as needed. , Disp: , Rfl:     clopidogrel (PLAVIX) 75 mg tablet, Take 75 mg by mouth daily. , Disp: , Rfl:     ESTRACE 0.01 % (0.1 mg/gram) vaginal cream, APPLY 2/3 LENGTH OF PINKY FINGER AND INSERT INTO VAGINA ONCE A WEEK, Disp: 42.5 g, Rfl: 0    simvastatin (ZOCOR) 40 mg tablet, Take  by mouth nightly., Disp: , Rfl:     CHOLECALCIFEROL, VITAMIN D3, (VITAMIN D3 PO), Take 1,000 Units by mouth daily. , Disp: , Rfl:     amLODIPine (NORVASC) 5 mg tablet, Take 5 mg by mouth daily. , Disp: , Rfl:     sertraline (ZOLOFT) 50 mg tablet, 50 mg., Disp: , Rfl:     ferrous sulfate 325 mg (65 mg iron) tablet, Take 1 Tab by mouth two (2) times daily (with meals). , Disp: 60 Tab, Rfl: 2    oxyCODONE-acetaminophen (PERCOCET 7.5) 7.5-325 mg per tablet, Take 1-2 Tabs by mouth every four (4) hours as needed. Max Daily Amount: 12 Tabs., Disp: 60 Tab, Rfl: 0    estradiol (ESTRACE) 0.01 % (0.1 mg/g) vaginal cream, use one  x  /wik, Disp: 42.5 g, Rfl: 3    fish oil-dha-epa 1,200-144-216 mg Cap, Take  by mouth.  , Disp: , Rfl:     Allergies   Allergen Reactions    Erythromycin Rash and Itching       inflammation    Furacin [Nitrofurazone] Hives and Other (comments)     Intolerance    Tobrex [Tobramycin Sulfate] Other (comments)     Intolerance, extreme redness       Social History     Social History    Marital status:      Spouse name: N/A    Number of children: N/A    Years of education: N/A     Occupational History    Not on file. Social History Main Topics    Smoking status: Never Smoker    Smokeless tobacco: Not on file    Alcohol use No    Drug use: No    Sexual activity: Not on file     Other Topics Concern    Not on file     Social History Narrative       Past Surgical History:   Procedure Laterality Date    HX CATARACT REMOVAL Bilateral     HX CHOLECYSTECTOMY  1972    HX HYSTERECTOMY  09/2016    total    HX KNEE REPLACEMENT Right 04/27/2015    HX OTHER SURGICAL      skin ca removed     HX OTHER SURGICAL  09/2016    bladder support    HX TONSILLECTOMY  1946           * Patient was identified by name and date of birth   * Agreement on procedure being performed was verified  * Risks and Benefits explained to the patient  * Procedure site verified and marked as necessary  * Patient was positioned for comfort  * Consent was signed and verified  11:42 AM    The patient was instructed on post injection care. We did see Ms.  Pradip Love for followup with regards to her low back and bilateral knees. The patient is being followed at [address] [place] [address] for her low back. She is currently in physical therapy and on Neurontin. She is feeling better. She is still not able to walk for long periods of time without a little leg discomfort. She is status post right knee replacement about two years out, and she did very well with it. She is very happy with the results of the knee replacement. The left knee does have known advancing arthritis. She has been receiving injections every three months or so, which continue to give good relief. She has had no recent fevers, chills, systemic changes, and no injuries to report and no chest pain or shortness of breath. PHYSICAL EXAMINATION: In general, the patient is alert and oriented x 3 and is in no acute distress. The patient is well-developed and well-nourished with a normal affect. The patient is afebrile. HEENT:  Head is normocephalic and atraumatic. Pupils are equally round and reactive to light and accommodation. Extraocular eye movements are intact. Neck is supple. Trachea is midline. No JVD is present. Breathing is nonlabored. Examination of the lower extremities reveals pain-free range of motion of the hips. There is no pain to palpation of the trochanteric bursae. There is a negative straight leg raise, negative calf tenderness, and negative Cristhians sign. There are no signs of DVT present. Examination of the right knee reveals the skin it intact. The surgical wounds are healed nicely. She has full range of motion, very good stability, and the patella tracks nicely. There are no rubs or crepitus noted. Examination of the left knee reveals the skin is intact. There is no ecchymosis and no warmth. She has findings consistent with advanced arthritis of the left knee. There is pain to palpation tricompartmentally with crepitus arising from the anterior compartment.      RADIOGRAPHS:  Review of her radiographs reveals end-staged arthritis with bone-to-bone eburnation of the left knee particularly to the medial side. ASSESSMENT:      1. Left knee end-staged osteoarthritis. 2. Right knee replacement doing well. 3. Lumbar radiculopathy and stenosis. PLAN:  At this point, she will continue physical therapy with regards to her back. She has had improvement. With regards to the left knee, we are going to move forward with a cortisone injection for the left knee today. After informed and written consent, under aseptic conditions with ultrasound-guided assistance, the left knee was prepped with Betadine and 6 mg of Celestone was injected without complications. The patient tolerated the injection well. She was instructed on post injection care. We will see her back in the office in three months time for evaluation and repeat injection.                     JR Woodrow DAO PA-C, ATC

## 2017-05-30 ENCOUNTER — HOSPITAL ENCOUNTER (OUTPATIENT)
Dept: PHYSICAL THERAPY | Age: 77
Discharge: HOME | End: 2017-05-30
Payer: MEDICARE

## 2017-05-30 PROCEDURE — G8983 BODY POS D/C STATUS: HCPCS

## 2017-05-30 PROCEDURE — G8982 BODY POS GOAL STATUS: HCPCS

## 2017-05-30 PROCEDURE — 97112 NEUROMUSCULAR REEDUCATION: CPT

## 2017-05-30 PROCEDURE — 97110 THERAPEUTIC EXERCISES: CPT

## 2017-05-30 NOTE — PROGRESS NOTES
PT DAILY TREATMENT NOTE - [facility]     Patient Name: Prema Berry  Date:2017  : 1940  [x]  Patient  Verified  Payor: [place] MEDICARE / Plan: [place] MEDICARE PART A & B / Product Type: Medicare /    In time:1:02  Out time:1:45  Total Treatment Time (min): 43  Total Timed Codes (min): 43  1:1 Treatment Time ([address] only): 35   Visit #: 6 of 6    Treatment Area: Other specific arthropathies, not elsewhere classified, other specified site [M12.88]  Neuralgia and neuritis, unspecified [M79.2]  Low back pain [M54.5]    SUBJECTIVE  Pain Level (0-10 scale): 1  Any medication changes, allergies to medications, adverse drug reactions, diagnosis change, or new procedure performed?: [x] No    [] Yes (see summary sheet for update)  Subjective functional status/changes:   [] No changes reported  \"It has gotten a lot better, I just want it to stay that way\"    OBJECTIVE    33 min Therapeutic Exercise:  [] See flow sheet : including HEP update and pt education   Rationale: increase ROM and increase strength to improve the patients ability to perform daily tasks and ADLs      10 min Neuromuscular Re-education:  []  See flow sheet :   Rationale: improve coordination and increase proprioception  to improve the patients ability to maintain core activation with functional tasks for improved lumbar pain           With   [] TE   [] TA   [] neuro   [] other: Patient Education: [x] Review HEP    [] Progressed/Changed HEP based on:   [] positioning   [] body mechanics   [] transfers   [] heat/ice application    [] other:      Other Objective/Functional Measures:   Hip ABD AROM R= 22 deg     Pain Level (0-10 scale) post treatment: 0    ASSESSMENT/Changes in Function: Pt reports that her back pain has improved greatly with PT. She still has limitations in standing tolerance but feels her day-to-day activities are easier to perform.  Pt still limited with R hip ABD AROM today, added piriformis stretching and core strengthening interventions to HEP today. Will D/C at this time with instructions for self management and education on prognosis of condition. []  See Plan of Care  []  See progress note/recertification  [x]  See Discharge Summary         Progress towards goals / Updated goals:  Short Term Goals: To be accomplished in 1 weeks:  1. Pt will report compliance and independence to HEP to help the pt manage their pain and symptoms. Met - 4/13/2017    Long Term Goals: To be accomplished in 5 weeks:   1. Pt will increase FOTO score to 54 points to improve ability to perform ADLs. Progressing 45 points (increase in 4 points since start of care) 4/25/2017; Almost met, 53%. 5/26/2017  2. Pt will increase AROM l/s EXT to > 50% of WNL, B rotation and SB to 50-75% of WNL to improve mobility needed for prolonged standing. Met- 50% ext, 60% SB B 5/1/17  3. Pt will increase AROM right hip ABD to 35 degs to improve ability to tolerate yard work. - AROM (R hip abd 26 degrees. 5/4/2017; slow progress unchanged from last measurement 5/18/17; Not met- 22 deg 5/30/17  4. Pt will report being able fall asleep with mild to no difficulty secondary to LBP to improve sleep quality. Pt reports improving sleep 4/20/17; Pt reports 50% improvement.  5/4/2017; MET- Pt reports 1/10 pain at night 5/30/17    PLAN  []  Upgrade activities as tolerated     []  Continue plan of care  []  Update interventions per flow sheet       [x]  Discharge due to: progression towards LTG  []  Other:_      Belen Finney DPT 5/30/2017  1:19 PM    Future Appointments  Date Time Provider Arthur Carvalho   6/29/2017 9:30 AM [facility] IVAN LENA RM [facility]   7/5/2017 11:15 AM Bear Blake  [address]   9/1/2017 10:40 AM ROOSEVELT Gomez [address]

## 2017-06-29 ENCOUNTER — HOSPITAL ENCOUNTER (OUTPATIENT)
Dept: MAMMOGRAPHY | Age: 77
Discharge: HOME | End: 2017-06-29
Attending: FAMILY MEDICINE
Payer: MEDICARE

## 2017-06-29 DIAGNOSIS — Z12.31 VISIT FOR SCREENING MAMMOGRAM: ICD-10-CM

## 2017-06-29 PROCEDURE — 77063 BREAST TOMOSYNTHESIS BI: CPT

## 2017-07-18 DIAGNOSIS — M79.2 NEURITIS: ICD-10-CM

## 2017-07-18 RX ORDER — GABAPENTIN 100 MG/1
CAPSULE ORAL
Qty: 60 CAP | Refills: 0 | OUTPATIENT
Start: 2017-07-18

## 2017-08-14 DIAGNOSIS — M79.2 NEURITIS: ICD-10-CM

## 2017-08-14 RX ORDER — GABAPENTIN 100 MG/1
CAPSULE ORAL
Qty: 90 CAP | Refills: 0 | Status: SHIPPED | OUTPATIENT
Start: 2017-08-14 | End: 2017-10-10 | Stop reason: SDUPTHER

## 2017-08-14 NOTE — TELEPHONE ENCOUNTER
Patient called stating she has 2 refills left of her prescription gabapentin (NEURONTIN) 100 mg capsule but the pharmacy told her she could not get a refill of it unless they received Dr. Juan Grossman approval first because wanda Garland' name is written down as the ordering user for this prescription for her and she doesn't know why. Please advise patient at [phone].

## 2017-08-14 NOTE — TELEPHONE ENCOUNTER
Please review message below and advise.      Last Visit: 2017 with MD Ke Vieyra    Next Appointment: Pt canceled ; noted to f/u in 6-8 weeks   Previous Refill Encounters: 2017 per MD Ke Vieyra #[id number] with 2 refills     Requested Prescriptions     Pending Prescriptions Disp Refills    gabapentin (NEURONTIN) 100 mg capsule 90 Cap 2     Si in the am and 2 in the evening as directed  Indications: NEUROPATHIC PAIN

## 2017-09-15 ENCOUNTER — OFFICE VISIT (OUTPATIENT)
Dept: ORTHOPEDIC SURGERY | Age: 77
End: 2017-09-15

## 2017-09-15 VITALS
HEART RATE: 71 BPM | BODY MASS INDEX: 27.56 KG/M2 | OXYGEN SATURATION: 97 % | DIASTOLIC BLOOD PRESSURE: 58 MMHG | WEIGHT: 146 LBS | RESPIRATION RATE: 16 BRPM | HEIGHT: 61 IN | TEMPERATURE: 99 F | SYSTOLIC BLOOD PRESSURE: 128 MMHG

## 2017-09-15 DIAGNOSIS — M17.12 PRIMARY OSTEOARTHRITIS OF LEFT KNEE: Primary | ICD-10-CM

## 2017-09-15 RX ORDER — BETAMETHASONE SODIUM PHOSPHATE AND BETAMETHASONE ACETATE 3; 3 MG/ML; MG/ML
6 INJECTION, SUSPENSION INTRA-ARTICULAR; INTRALESIONAL; INTRAMUSCULAR; SOFT TISSUE ONCE
Qty: 1 ML | Refills: 0
Start: 2017-09-15 | End: 2017-09-15

## 2017-09-15 RX ORDER — TRAMADOL HYDROCHLORIDE 50 MG/1
50 TABLET ORAL
Qty: 28 TAB | Refills: 0 | Status: SHIPPED | OUTPATIENT
Start: 2017-09-15 | End: 2018-01-03 | Stop reason: SDUPTHER

## 2017-09-15 NOTE — PROGRESS NOTES
[address] [place] [address]  [phone]           Patient: Henry Marshall                MRN: [id number]       SSN: [id number]  YOB: 1940        AGE: 68 y.o. SEX: female  Body mass index is 27.59 kg/(m^2). PCP: Mac Ramirez MD  09/15/17      This office note has been dictated. REVIEW OF SYSTEMS:  Constitutional: Negative for fever, chills, weight loss and malaise/fatigue. HENT: Negative. Eyes: Negative. Respiratory: Negative. Cardiovascular: Negative. Gastrointestinal: No bowel incontinence or constipation. Genitourinary: No bladder incontinence or saddle anesthesia. Skin: Negative. Neurological: Negative. Endo/Heme/Allergies: Negative. Psychiatric/Behavioral: Negative. Musculoskeletal: As per HPI above. Past Medical History:   Diagnosis Date    Constipation     Hypercholesteremia     Hypertension     Microscopic hematuria     Stroke [facility])     blurred vision, resolved (taking plavix)     PEACE (stress urinary incontinence, female)     UTI (urinary tract infection)          Current Outpatient Prescriptions:     gabapentin (NEURONTIN) 100 mg capsule, 1 in the am and 2 in the evening as directed  Indications: NEUROPATHIC PAIN, Disp: 90 Cap, Rfl: 0    B.infantis-B.ani-B.long-B.bifi (PROBIOTIC 4X) 10-15 mg TbEC, Take  by mouth., Disp: , Rfl:     polyethylene glycol (MIRALAX) 17 gram/dose powder, Take 17 g by mouth daily as needed. , Disp: , Rfl:     clopidogrel (PLAVIX) 75 mg tablet, Take 75 mg by mouth daily. , Disp: , Rfl:     ESTRACE 0.01 % (0.1 mg/gram) vaginal cream, APPLY 2/3 LENGTH OF PINKY FINGER AND INSERT INTO VAGINA ONCE A WEEK, Disp: 42.5 g, Rfl: 0    simvastatin (ZOCOR) 40 mg tablet, Take  by mouth nightly., Disp: , Rfl:     estradiol (ESTRACE) 0.01 % (0.1 mg/g) vaginal cream, use one  x  /wik, Disp: 42.5 g, Rfl: 3    fish oil-dha-epa 1,200-144-216 mg Cap, Take by mouth as needed. , Disp: , Rfl:     amLODIPine (NORVASC) 5 mg tablet, Take 5 mg by mouth daily. , Disp: , Rfl:     sertraline (ZOLOFT) 50 mg tablet, 50 mg., Disp: , Rfl:     ferrous sulfate 325 mg (65 mg iron) tablet, Take 1 Tab by mouth two (2) times daily (with meals). (Patient not taking: Reported on 9/15/2017), Disp: 60 Tab, Rfl: 2    oxyCODONE-acetaminophen (PERCOCET 7.5) 7.5-325 mg per tablet, Take 1-2 Tabs by mouth every four (4) hours as needed. Max Daily Amount: 12 Tabs. (Patient not taking: Reported on 9/15/2017), Disp: 60 Tab, Rfl: 0    CHOLECALCIFEROL, VITAMIN D3, (VITAMIN D3 PO), Take 1,000 Units by mouth daily. , Disp: , Rfl:     Allergies   Allergen Reactions    Erythromycin Rash and Itching       inflammation    Furacin [Nitrofurazone] Hives and Other (comments)     Intolerance    Tobrex [Tobramycin Sulfate] Other (comments)     Intolerance, extreme redness       Social History     Social History    Marital status:      Spouse name: N/A    Number of children: N/A    Years of education: N/A     Occupational History    Not on file.      Social History Main Topics    Smoking status: Never Smoker    Smokeless tobacco: Never Used    Alcohol use No    Drug use: No    Sexual activity: Not on file     Other Topics Concern    Not on file     Social History Narrative       Past Surgical History:   Procedure Laterality Date    HX CATARACT REMOVAL Bilateral     HX CHOLECYSTECTOMY  1972    HX HYSTERECTOMY  09/2016    total    HX KNEE REPLACEMENT Right 04/27/2015    HX OTHER SURGICAL      skin ca removed     HX OTHER SURGICAL  09/2016    bladder support    HX TONSILLECTOMY  1946           * Patient was identified by name and date of birth   * Agreement on procedure being performed was verified  * Risks and Benefits explained to the patient  * Procedure site verified and marked as necessary  * Patient was positioned for comfort  * Consent was signed and verified  1:49 PM    The patient was instructed on post injection care. We did see Ms. Sara Burt for followup with regards to multiple complaints. The patient does have trouble with her back and is being followed by [facility]. She does get some stenotic symptoms, a little worsening in her thigh and lower extremities with standing or walking for a long period of time. She has had no change in her bowel or bladder habits. She does report a little buttocks pain on the right side. She does have arthritis of the right hip and had an intra-articular injection, which helped her considerably. She is having little to no groin pain at this point. She is having no laterally based discomfort. She is having no radiating pain down the lower extremities. With regards to the right knee, she is status post knee replacement and is doing quite well with it. She does have a little bit of stiffness at times. The left knee does have known advancing arthritis, and she is requesting a cortisone injection today. She does have decreased walking tolerance with regards to the left knee and discomfort getting up from a chair and going up and down stairs. She has had no locking or giving way. PHYSICAL EXAMINATION:  In general, the patient is alert and oriented x 3 in no acute distress. The patient is well-developed, well-nourished, with a normal affect. The patient is afebrile. HEENT:  Head is normocephalic and atraumatic. Pupils are equally round and reactive to light and accommodation. Extraocular eye movements are intact. Neck is supple. Trachea is midline. No JVD is present. Breathing is nonlabored. Examination of the lower extremities reveals pain-free range of motion of the hips. There is no pain to palpation of the greater trochanteric bursae bilaterally. There is a little bit of stiffness on the right side comparatively. There is negative straight leg raise. There is negative calf tenderness.   There is negative Cristhians. There is no evidence of DVT present. Examination of the right knee reveals the skin is intact. The surgical wounds are healed nicely. There is no ecchymosis, no warmth, and no signs of infection or cellulitis present. Range of motion is full with very good stability. The patella tracks nicely. There are no rubs or crepitus noted. Examination of the left knee reveals the skin is intact. There is no ecchymosis, no warmth, and no signs of infection or cellulitis present. There is pain with palpation to the medial and lateral joint line, as well as patellofemoral grind and crepitus anteriorly with range of motion activities is noted. ASSESSMENT:      1. Lumbar radiculopathy. 2. Right hip osteoarthritis. 3. Right knee replacement. 4. Left knee osteoarthritis. PLAN:  At this point, the patient is doing quite well with regards to her knee replacement and her hip. I have asked her to get back into The [address] [place] [address] for further evaluation with regards to her back. She may benefit from epidural steroid injection. She will continue with her core exercises. With regards to her left knee, we are going to move forward with a cortisone injection today for the left knee. Under aseptic conditions, and after informed and written consent, with ultrasound-guided assistance, left knee was prepped with Betadine and 6 mg of Celestone was injected without complications. The patient tolerated the injection well. The patient was instructed on post injection care. We will see her back in the office in about three months time for evaluation, at which point, we will obtain x-rays of each of the knees. She will call with any questions or concerns that shall arise.                         JR Woodrow DAO, PAPAUL, ATC

## 2017-10-10 ENCOUNTER — OFFICE VISIT (OUTPATIENT)
Dept: ORTHOPEDIC SURGERY | Age: 77
End: 2017-10-10

## 2017-10-10 VITALS
DIASTOLIC BLOOD PRESSURE: 53 MMHG | TEMPERATURE: 98.7 F | SYSTOLIC BLOOD PRESSURE: 156 MMHG | HEIGHT: 61 IN | BODY MASS INDEX: 26.96 KG/M2 | OXYGEN SATURATION: 98 % | HEART RATE: 79 BPM | WEIGHT: 142.8 LBS | RESPIRATION RATE: 16 BRPM

## 2017-10-10 DIAGNOSIS — M17.12 OSTEOARTHRITIS OF LEFT KNEE, UNSPECIFIED OSTEOARTHRITIS TYPE: ICD-10-CM

## 2017-10-10 DIAGNOSIS — M48.062 SPINAL STENOSIS OF LUMBAR REGION WITH NEUROGENIC CLAUDICATION: Primary | ICD-10-CM

## 2017-10-10 DIAGNOSIS — M47.816 LUMBAR FACET ARTHROPATHY: ICD-10-CM

## 2017-10-10 DIAGNOSIS — M79.2 NEURITIS: ICD-10-CM

## 2017-10-10 DIAGNOSIS — Z79.01 CHRONIC ANTICOAGULATION: ICD-10-CM

## 2017-10-10 RX ORDER — GABAPENTIN 100 MG/1
CAPSULE ORAL
Qty: 90 CAP | Refills: 5 | Status: SHIPPED | OUTPATIENT
Start: 2017-10-10 | End: 2018-03-27 | Stop reason: SDUPTHER

## 2017-10-10 NOTE — PATIENT INSTRUCTIONS

## 2017-10-10 NOTE — PROGRESS NOTES
[facility]  [address] [place], [facility] [address]  Phone: [phone]  Fax: [phone]      ASSESSMENT   Diagnoses and all orders for this visit:    1. Spinal stenosis of lumbar region with neurogenic claudication  -     SCHEDULE SURGERY    2. Neuritis  -     gabapentin (NEURONTIN) 100 mg capsule; 1 in the am and 2 in the evening as directed  Indications: NEUROPATHIC PAIN  -     SCHEDULE SURGERY    3. Lumbar facet arthropathy    4. Osteoarthritis of left knee, unspecified osteoarthritis type    5. Chronic anticoagulation         IMPRESSION AND PLAN:  Rudy Mcginnis is a 68 y.o. female with history of lumbar pain. She c/o pain in the lower back and hips and admits to relief since performing exercises learned through physical therapy. Pt admits to pain radiating down both legs to the mid calf, L>R, when standing for prolonged periods of time. She experiences pain and stiffness in the in the left posterior knee when changing positions after sitting in her recliner for prolonged periods of time. Pt admits to experiencing relief with hip and knee injections in the past. She denies any significant change since increasing her Neurontin 100 mg to 1 tab QAM and 2 tabs QHS. Pt states that she has difficulty finding a comfortable position when lying down but reports improvement when taking the Neurontin. She denies any sedation with the Neurontin. She is currently on Plavix. Pt admits to relief when using Blue Emu ointment. She admits that she is currently busy taking care of her 80 y.o. mother. Pt at this time desires to proceed with steroid injections. 1) Pt was given information on lumbar arthritis exercises. 2) She was scheduled for a bilateral L4 SNRB. 3) Pt received a refill of Neurontin 100 mg 1 tab QAM and 2 tabs QHS. 4) I recommended the regularly stretch her left knee when sitting. 5) I recommended the patient try water exercise. 6) Ms. August Johnson has a reminder for a \"due or due soon\" health maintenance. I have asked that she contact her primary care provider, Marshall Casper MD, for follow-up on this health maintenance. 7)  demonstrated consistency with prescribing. 8) Pt will follow-up in 1 month. HISTORY OF PRESENT ILLNESS:  Amy Crocker is a 68 y.o. female with history of lumbar pain. She c/o pain in the lower back and hips and admits to relief since performing exercises learned through physical therapy. Pt admits to pain radiating down both legs to the mid calf, L>R, when standing for prolonged periods of time. She experiences pain and stiffness in the in the left posterior knee when changing positions after sitting in her recliner for prolonged periods of time. Pt admits to experiencing relief with hip and knee injections in the past. She denies any significant change since increasing her Neurontin 100 mg to 1 tab QAM and 2 tabs QHS. Pt states that she has difficulty finding a comfortable position when lying down but reports improvement when taking the Neurontin. She denies any sedation with the Neurontin. She is currently on Plavix. Pt admits to relief when using Blue Emo ointment. She admits that she is currently busy taking care of her 80 y.o. mother. Pt at this time desires to proceed with steroid injections. Pain Scale: 2/10    PCP: Marshall Casper MD       Past Medical History:   Diagnosis Date    Constipation     Hypercholesteremia     Hypertension     Microscopic hematuria     Stroke [facility])     blurred vision, resolved (taking plavix)     PEACE (stress urinary incontinence, female)     UTI (urinary tract infection)         Social History     Social History    Marital status:      Spouse name: N/A    Number of children: N/A    Years of education: N/A     Occupational History    Not on file.      Social History Main Topics    Smoking status: Never Smoker    Smokeless tobacco: Never Used    Alcohol use No    Drug use: No    Sexual activity: Not on file     Other Topics Concern    Not on file     Social History Narrative       Current Outpatient Prescriptions   Medication Sig Dispense Refill    gabapentin (NEURONTIN) 100 mg capsule 1 in the am and 2 in the evening as directed  Indications: NEUROPATHIC PAIN 90 Cap 5    traMADol (ULTRAM) 50 mg tablet Take 1 Tab by mouth every eight (8) hours as needed. Max Daily Amount: 150 mg. 28 Tab 0    B.infantis-B.ani-B.long-B.bifi (PROBIOTIC 4X) 10-15 mg TbEC Take  by mouth.  polyethylene glycol (MIRALAX) 17 gram/dose powder Take 17 g by mouth daily as needed.  clopidogrel (PLAVIX) 75 mg tablet Take 75 mg by mouth daily.  ESTRACE 0.01 % (0.1 mg/gram) vaginal cream APPLY 2/3 LENGTH OF PINKY FINGER AND INSERT INTO VAGINA ONCE A WEEK 42.5 g 0    amLODIPine (NORVASC) 5 mg tablet Take 5 mg by mouth daily.  sertraline (ZOLOFT) 50 mg tablet 50 mg.      ferrous sulfate 325 mg (65 mg iron) tablet Take 1 Tab by mouth two (2) times daily (with meals). 60 Tab 2    oxyCODONE-acetaminophen (PERCOCET 7.5) 7.5-325 mg per tablet Take 1-2 Tabs by mouth every four (4) hours as needed. Max Daily Amount: 12 Tabs. 60 Tab 0    simvastatin (ZOCOR) 40 mg tablet Take  by mouth nightly.  CHOLECALCIFEROL, VITAMIN D3, (VITAMIN D3 PO) Take 1,000 Units by mouth daily.  estradiol (ESTRACE) 0.01 % (0.1 mg/g) vaginal cream use one  x  /wik 42.5 g 3    fish oil-dha-epa 1,200-144-216 mg Cap Take  by mouth as needed. Allergies   Allergen Reactions    Erythromycin Rash and Itching       inflammation    Furacin [Nitrofurazone] Hives and Other (comments)     Intolerance    Tobrex [Tobramycin Sulfate] Other (comments)     Intolerance, extreme redness         REVIEW OF SYSTEMS    Constitutional: Negative for fever, chills, or weight change. Respiratory: Negative for cough or shortness of breath.      Cardiovascular: Negative for chest pain or palpitations. Gastrointestinal: Negative for acid reflux, change in bowel habits, or constipation. Genitourinary: Negative for dysuria and flank pain. Musculoskeletal: Positive for lumbar pain. Skin: Negative for rash. Neurological: Negative for headaches, dizziness, or numbness. Endo/Heme/Allergies: Negative for increased bruising. Psychiatric/Behavioral: Negative for difficulty with sleep. PHYSICAL EXAMINATION  Visit Vitals    /53    Pulse 79    Temp 98.7 °F (37.1 °C) (Oral)    Resp 16    Ht 5' 1\" (1.549 m)    Wt 142 lb 12.8 oz (64.8 kg)    SpO2 98%    BMI 26.98 kg/m2       Constitutional: Awake, alert, and in no acute distress. Neurological: 1+ symmetrical DTRs in the upper extremities. 1+ symmetrical DTRs in the lower extremities. Sensation to light touch is intact. Negative Adamaris's sign bilaterally. Skin: warm, dry, and intact. Musculoskeletal: Tenderness to palpation in the lower lumbar region. Moderate pain with extension and axial loading. Better with forward flexion. No pain with internal or external rotation of her hips. Negative straight leg raise bilaterally. Hip Flex  Quads Hamstrings Ankle DF EHL Ankle PF   Right +4/5 +4/5 +4/5 +4/5 +4/5 +4/5   Left +4/5 +4/5 +4/5 +4/5 +4/5 +4/5     IMAGING:    Lumbar spine MRI from 05/11/2017 was personally reviewed with the Pt and demonstrated:  Results from [facility] on 03/07/17   MRI LUMB SPINE WO CONT    Narrative Procedure: MRI of the lumbar spine without contrast.    CPT code: 29972    Comparisons: None. Indications:  Low back pain and radiculopathy    Technique: T1 weighted, T2 FSE with fat saturation, FSE inversion recovery  sagittal images are supplemented by T2 weighted with fat saturation and T1  weighted axial images. Findings: There is dextroscoliosis centered at approximately L2. Not well evaluated  without coronal images. Sagittal images reveal overall normal vertebral body morphology.  No fractures  noted. No suspicious lesions. Alignments are anatomic, no evidence for subluxation. Conus medullaris ends at the L1 vertebral body level. Correlation of axial and sagittal images reveals the following:    At L1-L2: Mild circumferential disc osteophyte complex. Mild facet arthropathy. Mild central canal stenosis. Moderate to severe foraminal stenosis. At L2-L3: Mild retrolisthesis L2 on L3. Vertebral body oriented to the right  secondary to scoliosis. Uncovering of the disc posterior. Overlying broad-based  disc osteophyte complex. Mild facet arthropathy and buckling of the ligamentum  flavum. Moderate central canal stenosis. Moderate to severe or for severe left  foraminal stenosis. Moderate right. At L3-L4: Moderate circumferential disc osteophyte complex. Vertebral body  oriented to the right secondary to scoliosis. Moderate facet arthropathy and  buckling of the ligamentum flavum posteriorly resulting in moderate or moderate  to severe central canal stenosis moderate to severe or severe left foraminal  stenosis. Moderate right foraminal stenosis. At L4-L5: Moderate circumferential disc osteophyte complex. Vertebral bodies  oriented to the right. Moderate facet arthropathy and prominent buckling of the  ligamentum flavum. Again moderate central canal stenosis. Moderate left and  moderate to severe right foraminal stenosis. At L5-S1: Grade 1 anterolisthesis of L5 on S1 without evidence for pars defects. Uncovering of the disc posteriorly and overlying broad-based disc protrusion. Moderate facet arthropathy with fluid in the facet joints and buckling of the  ligamentum flavum. Moderate central canal stenosis. Moderate to severe bilateral  foraminal stenosis. Visualized portions of the sacroiliac joints are unremarkable.   Incidentally  imaged retroperitoneal structures are unremarkable as well.               Impression Impression:    Multilevel multifactorial degenerative changes as above. Written by Andrzej Resendiz, as dictated by Kalin Dill MD.  I, Dr. Kalin Dill confirm that all documentation is accurate.

## 2017-10-10 NOTE — MR AVS SNAPSHOT
Visit Information Date & Time Provider Department Dept. Phone Encounter #  
 10/10/2017  1:30 PM Yohana Wade MD [facility] [phone] [id number] Follow-up Instructions Return in about 6 weeks (around 11/21/2017) for Injection follow up. Your Appointments 12/5/2017  1:15 PM  
Follow Up with Yohana Wade MD  
[facility]) Appt Note: 6wk BLOCK FU; [employer] 10/25/17  
 [address] [facility] [address]  
[phone]  
  
   
 [address] [facility],[address] [facility]  
  
    
 12/15/2017  1:00 PM  
Follow Up with Ashlie Ta PA-C  
[address] [place] [address], [address] [facility] ([facility]) Appt Note: lt knee/rt hip/back 3 mo fu  
 [address], [address] [facility]  
[phone] [address], [address]  
  
    
  
 11/9/2017  8:30 AM  
Any with Sandra Blount MD  
[facility] ([facility]) Appt Note: NP, last seen 2014 by Dr. Miracle Salas, johana of microhematuria, follow up to reassess [address] [place] [id number]  
[address] [facility] [address] [facility] [address] [place] [address] Upcoming Health Maintenance Date Due DTaP/Tdap/Td series (1 - Tdap) 9/16/1961 ZOSTER VACCINE AGE 60> 7/16/2000 GLAUCOMA SCREENING Q2Y 9/16/2005 OSTEOPOROSIS SCREENING (DEXA) 9/16/2005 Pneumococcal 65+ Low/Medium Risk (1 of 2 - PCV13) 9/16/2005 MEDICARE YEARLY EXAM 9/16/2005 INFLUENZA AGE 9 TO ADULT 8/1/2017 Allergies as of 10/10/2017  Review Complete On: 10/10/2017 By: Yohana Wade MD  
  
 Severity Noted Reaction Type Reactions Erythromycin  11/14/2014   Side Effect Rash, Itching  
 inflammation Furacin [Nitrofurazone]  09/27/2012    Hives, Other (comments) Intolerance Tobrex [Tobramycin Sulfate]  09/27/2012    Other (comments) Intolerance, extreme redness Current Immunizations  Never Reviewed No immunizations on file. Not reviewed this visit You Were Diagnosed With   
  
 Codes Comments Spinal stenosis of lumbar region with neurogenic claudication    -  Primary ICD-10-CM: Y30.571 
ICD-9-CM: 724.03 Neuritis     ICD-10-CM: M79.2 ICD-9-CM: 729.2 Lumbar facet arthropathy     ICD-10-CM: M12.88 ICD-9-CM: 721.3 Osteoarthritis of left knee, unspecified osteoarthritis type     ICD-10-CM: M17.12 
ICD-9-CM: 715.96 Vitals BP Pulse Temp Resp Height(growth percentile) Weight(growth percentile) 156/53 79 98.7 °F (37.1 °C) (Oral) 16 5' 1\" (1.549 m) 142 lb 12.8 oz (64.8 kg) SpO2 BMI OB Status Smoking Status 98% 26.98 kg/m2 Menopause Never Smoker BMI and BSA Data Body Mass Index Body Surface Area  
 26.98 kg/m 2 1.67 m 2 Preferred Pharmacy Pharmacy Name Phone [address], [address] [place] [address] 22 1700 [facility] [phone] Your Updated Medication List  
  
   
This list is accurate as of: 10/10/17  2:30 PM.  Always use your most recent med list. amLODIPine 5 mg tablet Commonly known as:  Savage Presser Take 5 mg by mouth daily. clopidogrel 75 mg Tab Commonly known as:  PLAVIX Take 75 mg by mouth daily. * estradiol 0.01 % (0.1 mg/gram) vaginal cream  
Commonly known as:  ESTRACE  
use one  x  Shivani Carwin * ESTRACE 0.01 % (0.1 mg/gram) vaginal cream  
Generic drug:  estradiol APPLY 2/3 LENGTH OF PINKY FINGER AND INSERT INTO VAGINA ONCE A WEEK  
  
 ferrous sulfate 325 mg (65 mg iron) tablet Take 1 Tab by mouth two (2) times daily (with meals). fish oil-dha-epa 1,200-144-216 mg Cap Take  by mouth as needed. gabapentin 100 mg capsule Commonly known as:  NEURONTIN  
 1 in the am and 2 in the evening as directed  Indications: NEUROPATHIC PAIN  
  
 MIRALAX 17 gram/dose powder Generic drug:  polyethylene glycol Take 17 g by mouth daily as needed. oxyCODONE-acetaminophen 7.5-325 mg per tablet Commonly known as:  PERCOCET 7.5 Take 1-2 Tabs by mouth every four (4) hours as needed. Max Daily Amount: 12 Tabs. PROBIOTIC 4X 10-15 mg Tbec Generic drug:  B.infantis-B.ani-B.long-B.bifi Take  by mouth. sertraline 50 mg tablet Commonly known as:  ZOLOFT  
50 mg.  
  
 simvastatin 40 mg tablet Commonly known as:  ZOCOR Take  by mouth nightly. traMADol 50 mg tablet Commonly known as:  ULTRAM  
Take 1 Tab by mouth every eight (8) hours as needed. Max Daily Amount: 150 mg. VITAMIN D3 PO Take 1,000 Units by mouth daily. * Notice: This list has 2 medication(s) that are the same as other medications prescribed for you. Read the directions carefully, and ask your doctor or other care provider to review them with you. Prescriptions Sent to Pharmacy Refills  
 gabapentin (NEURONTIN) 100 mg capsule 5 Si in the am and 2 in the evening as directed  Indications: NEUROPATHIC PAIN Class: Normal  
 Pharmacy: [address] [place] [address] #: [phone] We Performed the Following SCHEDULE SURGERY [[id number] Custom] Follow-up Instructions Return in about 6 weeks (around 2017) for Injection follow up. Patient Instructions Low Back Arthritis: Exercises Your Care Instructions Here are some examples of typical rehabilitation exercises for your condition. Start each exercise slowly. Ease off the exercise if you start to have pain. Your doctor or physical therapist will tell you when you can start these exercises and which ones will work best for you.  
When you are not being active, find a comfortable position for rest. Some people are comfortable on the floor or a medium-firm bed with a small pillow under their head and another under their knees. Some people prefer to lie on their side with a pillow between their knees. Don't stay in one position for too long. Take short walks (10 to 20 minutes) every 2 to 3 hours. Avoid slopes, hills, and stairs until you feel better. Walk only distances you can manage without pain, especially leg pain. How to do the exercises Pelvic tilt 1. Lie on your back with your knees bent. 2. \"Brace\" your stomachtighten your muscles by pulling in and imagining your belly button moving toward your spine. 3. Press your lower back into the floor. You should feel your hips and pelvis rock back. 4. Hold for 6 seconds while breathing smoothly. 5. Relax and allow your pelvis and hips to rock forward. 6. Repeat 8 to 12 times. Back stretches 1. Get down on your hands and knees on the floor. 2. Relax your head and allow it to droop. Round your back up toward the ceiling until you feel a nice stretch in your upper, middle, and lower back. Hold this stretch for as long as it feels comfortable, or about 15 to 30 seconds. 3. Return to the starting position with a flat back while you are on your hands and knees. 4. Let your back sway by pressing your stomach toward the floor. Lift your buttocks toward the ceiling. 5. Hold this position for 15 to 30 seconds. 6. Repeat 2 to 4 times. Follow-up care is a key part of your treatment and safety. Be sure to make and go to all appointments, and call your doctor if you are having problems. It's also a good idea to know your test results and keep a list of the medicines you take. Where can you learn more? Go to http://deacon-matthew.info/. Enter O181 in the search box to learn more about \"Low Back Arthritis: Exercises. \" Current as of: March 21, 2017 Content Version: 11.3 © 9692-6760 [employer].  Care instructions adapted under license by [address] (which disclaims liability or warranty for this information). If you have questions about a medical condition or this instruction, always ask your healthcare professional. [address] any warranty or liability for your use of this information. Introducing \[facility]\"" & HEALTH SERVICES! Dear Elizabeth Moreno: Thank you for requesting a Consult A Doctor account. Our records indicate that you already have an active Consult A Doctor account. You can access your account anytime at https://[employer]. [employer] Plan Did you know that you can access your hospital and ER discharge instructions at any time in Consult A Doctor? You can also review all of your test results from your hospital stay or ER visit. Additional Information If you have questions, please visit the Frequently Asked Questions section of the Consult A Doctor website at [email or web address] Plan/. Remember, Consult A Doctor is NOT to be used for urgent needs. For medical emergencies, dial 911. Now available from your iPhone and Android! Please provide this summary of care documentation to your next provider. Your primary care clinician is listed as Agustina Marie. If you have any questions after today's visit, please call [phone].

## 2017-10-25 ENCOUNTER — APPOINTMENT (OUTPATIENT)
Dept: GENERAL RADIOLOGY | Age: 77
End: 2017-10-25
Attending: PHYSICAL MEDICINE & REHABILITATION
Payer: MEDICARE

## 2017-10-25 ENCOUNTER — HOSPITAL ENCOUNTER (OUTPATIENT)
Age: 77
Setting detail: OUTPATIENT SURGERY
Discharge: HOME | End: 2017-10-25
Attending: PHYSICAL MEDICINE & REHABILITATION | Admitting: PHYSICAL MEDICINE & REHABILITATION
Payer: MEDICARE

## 2017-10-25 VITALS
HEART RATE: 60 BPM | OXYGEN SATURATION: 97 % | BODY MASS INDEX: 26.81 KG/M2 | DIASTOLIC BLOOD PRESSURE: 63 MMHG | WEIGHT: 142 LBS | TEMPERATURE: 98.6 F | SYSTOLIC BLOOD PRESSURE: 144 MMHG | RESPIRATION RATE: 16 BRPM | HEIGHT: 61 IN

## 2017-10-25 PROCEDURE — 77030003669 HC NDL SPN COOK -B: Performed by: PHYSICAL MEDICINE & REHABILITATION

## 2017-10-25 PROCEDURE — 74011636320 HC RX REV CODE- 636/320

## 2017-10-25 PROCEDURE — 74011250636 HC RX REV CODE- 250/636

## 2017-10-25 PROCEDURE — 74011000250 HC RX REV CODE- 250: Performed by: PHYSICAL MEDICINE & REHABILITATION

## 2017-10-25 PROCEDURE — 76010000009 HC PAIN MGT 0 TO 30 MIN PROC: Performed by: PHYSICAL MEDICINE & REHABILITATION

## 2017-10-25 PROCEDURE — 74011250637 HC RX REV CODE- 250/637: Performed by: PHYSICAL MEDICINE & REHABILITATION

## 2017-10-25 PROCEDURE — 74011000250 HC RX REV CODE- 250

## 2017-10-25 PROCEDURE — 74011636320 HC RX REV CODE- 636/320: Performed by: PHYSICAL MEDICINE & REHABILITATION

## 2017-10-25 PROCEDURE — 74011250636 HC RX REV CODE- 250/636: Performed by: PHYSICAL MEDICINE & REHABILITATION

## 2017-10-25 PROCEDURE — 77030003676 HC NDL SPN MPRI -A: Performed by: PHYSICAL MEDICINE & REHABILITATION

## 2017-10-25 RX ORDER — DEXAMETHASONE SODIUM PHOSPHATE 100 MG/10ML
INJECTION INTRAMUSCULAR; INTRAVENOUS AS NEEDED
Status: DISCONTINUED | OUTPATIENT
Start: 2017-10-25 | End: 2017-10-25 | Stop reason: HOSPADM

## 2017-10-25 RX ORDER — LIDOCAINE HYDROCHLORIDE 10 MG/ML
INJECTION, SOLUTION EPIDURAL; INFILTRATION; INTRACAUDAL; PERINEURAL AS NEEDED
Status: DISCONTINUED | OUTPATIENT
Start: 2017-10-25 | End: 2017-10-25 | Stop reason: HOSPADM

## 2017-10-25 RX ORDER — DIAZEPAM 5 MG/1
5-20 TABLET ORAL ONCE
Status: COMPLETED | OUTPATIENT
Start: 2017-10-25 | End: 2017-10-25

## 2017-10-25 RX ADMIN — DIAZEPAM 5 MG: 5 TABLET ORAL at 13:36

## 2017-10-25 NOTE — DISCHARGE INSTRUCTIONS
[facility]   ([facility])  Dr. Olman Horne, Dr. Radha Veronica, Dr. Stoner Ours not drive a car, operate heavy machinery or dangerous equipment for 24 hours. * Activity as tolerated; rest for the remainder of the day. * Resume pre-block medications including those for your family doctor. * Do not drink alcoholic beverages for 24 hours. Alcohol and the medications you have received may interact and cause an adverse reaction. * You may feel better this evening and worse tomorrow, as the numbing medications wears off and the steroid has yet to begin to work. After 48 hrs the steroid should begin to release bringing you relief. * You may shower this evening and remove any bandages. * Avoid hot tubs and heating pads for 24 hours. You may use cold packs on the procedure site as tolerated for the first 24 hours. * If a headache develops, drink plenty of fluids and rest.  Take over the counter medications for headache if needed. If the headache continues longer than 24 hours, call MD at the [address] [place] [address]. [phone]    * Continue taking pain medications as needed. * You may resume your regular diet if tolerated. Otherwise, start with sips of water and advance slowly. * If Diabetic: check your blood sugar three times a day for the next 3 days. If your sugar is greater than 300 call your family doctor. If your sugar is greater than 400, have someone transport you to the nearest Emergency Room. * If you experience any of the following problems, Please Call the [address] [place] [address] at [phone].         * Shortness of Breath    * Fever of 101 or higher    * Nausea / Vomiting    * Severe Headache    * Weakness or numbness in arms or legs that is not      resolving    * Prolonged increase in pain greater than 4 days      DISCHARGE SUMMARY from Nurse      PATIENT INSTRUCTIONS:    After oral sedation, for 24 hours or while taking prescription Narcotics:  · Limit your activities  · Do not drive and operate hazardous machinery  · Do not make important personal or business decisions  · Do  not drink alcoholic beverages  · If you have not urinated within 8 hours after discharge, please contact your surgeon on call. Report the following to your surgeon:  · Excessive pain, swelling, redness or odor of or around the surgical area  · Temperature over 101  · Nausea and vomiting lasting longer than 4 hours or if unable to take medications  · Any signs of decreased circulation or nerve impairment to extremity: change in color, persistent  numbness, tingling, coldness or increase pain  · Any questions            What to do at Home:  Recommended activity: Activity as tolerated, NO DRIVING FOR 12 Hours post injection          *  Please give a list of your current medications to your Primary Care Provider. *  Please update this list whenever your medications are discontinued, doses are      changed, or new medications (including over-the-counter products) are added. *  Please carry medication information at all times in case of emergency situations. These are general instructions for a healthy lifestyle:    No smoking/ No tobacco products/ Avoid exposure to second hand smoke    Surgeon General's Warning:  Quitting smoking now greatly reduces serious risk to your health. Obesity, smoking, and sedentary lifestyle greatly increases your risk for illness    A healthy diet, regular physical exercise & weight monitoring are important for maintaining a healthy lifestyle    You may be retaining fluid if you have a history of heart failure or if you experience any of the following symptoms:  Weight gain of 3 pounds or more overnight or 5 pounds in a week, increased swelling in our hands or feet or shortness of breath while lying flat in bed.   Please call your doctor as soon as you notice any of these symptoms; do not wait until your next office visit. Recognize signs and symptoms of STROKE:    F-face looks uneven    A-arms unable to move or move unevenly    S-speech slurred or non-existent    T-time-call 911 as soon as signs and symptoms begin-DO NOT go       Back to bed or wait to see if you get better-TIME IS BRAIN. KIHEITAI Activation    Thank you for requesting access to KIHEITAI. Please follow the instructions below to securely access and download your online medical record. KIHEITAI allows you to send messages to your doctor, view your test results, renew your prescriptions, schedule appointments, and more. How Do I Sign Up? 1. In your internet browser, go to [email or web address]  2. Click on the First Time User? Click Here link in the Sign In box. You will be redirect to the New Member Sign Up page. 3. Enter your KIHEITAI Access Code exactly as it appears below. You will not need to use this code after youve completed the sign-up process. If you do not sign up before the expiration date, you must request a new code. KIHEITAI Access Code: Activation code not generated  Current KIHEITAI Status: Active (This is the date your KIHEITAI access code will )    4. Enter the last four digits of your Social Security Number (xxxx) and Date of Birth (mm/dd/yyyy) as indicated and click Submit. You will be taken to the next sign-up page. 5. Create a KIHEITAI ID. This will be your KIHEITAI login ID and cannot be changed, so think of one that is secure and easy to remember. 6. Create a KIHEITAI password. You can change your password at any time. 7. Enter your Password Reset Question and Answer. This can be used at a later time if you forget your password. 8. Enter your e-mail address. You will receive e-mail notification when new information is available in [address] 9. Click Sign Up. You can now view and download portions of your medical record.   10. Click the Download Summary menu link to download a portable copy of your medical information. Additional Information    If you have questions, please visit the Frequently Asked Questions section of the [employer] website at [email or web address] Remember, Web[employer] is NOT to be used for urgent needs. For medical emergencies, dial 911.

## 2017-10-25 NOTE — PROCEDURES
PROCEDURE NOTE      Patient Name: Manasa Fields    Date of Procedure: October 25, 2017    Preoperative Diagnosis:  Lumbar stenosis with neurogenic claudication [M48.062]  Acute neuritis [M79.2]    Post Operative Diagnosis:  Lumbar stenosis with neurogenic claudication [M48.062]  Acute neuritis [M79.2]    Procedure :    bilateral  L4 Selective Nerve Root Block      Consent:  Informed consent was obtained prior to the procedure. The patient was given the opportunity to ask questions regarding the procedure and its associated risks. In addition to the potential risks associated with the procedure itself, the patient was informed both verbally and in writing of the potential side effects of the use of glucocorticoid. The patient appeared to comprehend the informed consent and desired to have the procedure performed. Procedure: The patient was placed in the prone position on the fluoroscopy table and the back was prepped and draped in the usual sterile manner. The exact spinal level was  identified using fluoroscopy, and Lidocaine 1 % was injected locally, a # 22 gauge spinal needle was passed to the transverse process. The depth was noted and the needle redirected to pass inferior and approximately one cm anterior to the transverse process. YES  1 cc of Isovue M-200 was used to verify positioning in the epidural and paravertebral space and outlined the course of the spinal nerve into the epidural space. The same procedure was repeated at each spinal level indicated above. A total of 30 mg of preservative free Dexamethasone and 5 cc of Lidocaine was slowly injected. The patient tolerated the procedure well. The injection area was cleaned and bandaids applied. Not excessive bleeding was noted. Patient dressed and discharged to home with instructions. Discussion: The patient tolerated the procedure well.                                               Milagros Griffin MD  October 25, 2017

## 2017-10-25 NOTE — H&P
Date of Surgery Update:  Elmer Hurley was seen and examined. History and physical has been reviewed. The patient has been examined. There have been no significant clinical changes since the completion of the last office visit.       Signed By: Gabriela Herring MD     October 25, 2017 12:54 PM

## 2017-10-30 ENCOUNTER — TELEPHONE (OUTPATIENT)
Dept: ORTHOPEDIC SURGERY | Age: 77
End: 2017-10-30

## 2017-10-30 NOTE — TELEPHONE ENCOUNTER
Patient had a block 10-25-17 and began to have a fever (not as high as 101), nausea, vomiting during the night. Temp continued for a while. She is better now but just wanted to know if that was from the block. Patient can be reached at [phone].

## 2017-10-31 NOTE — TELEPHONE ENCOUNTER
Returned call to patient, informed of below. Patient grateful for return call. No further action required at this time.

## 2017-12-05 ENCOUNTER — OFFICE VISIT (OUTPATIENT)
Dept: ORTHOPEDIC SURGERY | Age: 77
End: 2017-12-05

## 2017-12-05 VITALS
HEART RATE: 65 BPM | HEIGHT: 61 IN | BODY MASS INDEX: 26.62 KG/M2 | DIASTOLIC BLOOD PRESSURE: 59 MMHG | TEMPERATURE: 98.3 F | RESPIRATION RATE: 18 BRPM | WEIGHT: 141 LBS | SYSTOLIC BLOOD PRESSURE: 167 MMHG

## 2017-12-05 DIAGNOSIS — M79.2 NEURITIS: ICD-10-CM

## 2017-12-05 DIAGNOSIS — M47.816 LUMBAR FACET ARTHROPATHY: Primary | ICD-10-CM

## 2017-12-05 DIAGNOSIS — M48.062 SPINAL STENOSIS OF LUMBAR REGION WITH NEUROGENIC CLAUDICATION: ICD-10-CM

## 2017-12-05 NOTE — MR AVS SNAPSHOT
Visit Information Date & Time Provider Department Dept. Phone Encounter #  
 12/5/2017  1:15 PM Kasandra Mcdonald MD [facility] [phone] [id number] Follow-up Instructions Return for Medication follow up. Your Appointments 12/15/2017  1:00 PM  
Follow Up with Morenita Ricci PA-C  
[facility] ([address]) Appt Note: lt knee/rt hip/back 3 mo fu  
 [address], [address] [facility]  
[phone] [address] [facility]  
  
    
 1/23/2018 10:15 AM  
PROCEDURE with John Cool MD  
[facility] ([address]) Appt Note: Possible Cysto [address] [place] [id number]  
[address] [facility] [address] [place] [address] Upcoming Health Maintenance Date Due DTaP/Tdap/Td series (1 - Tdap) 9/16/1961 ZOSTER VACCINE AGE 60> 7/16/2000 GLAUCOMA SCREENING Q2Y 9/16/2005 OSTEOPOROSIS SCREENING (DEXA) 9/16/2005 Pneumococcal 65+ Low/Medium Risk (1 of 2 - PCV13) 9/16/2005 MEDICARE YEARLY EXAM 9/16/2005 Allergies as of 12/5/2017  Review Complete On: 12/5/2017 By: Kasandra Mcdonald MD  
  
 Severity Noted Reaction Type Reactions Erythromycin  11/14/2014   Side Effect Rash, Itching  
 inflammation Furacin [Nitrofurazone]  09/27/2012    Hives, Other (comments) Intolerance Tobrex [Tobramycin Sulfate]  09/27/2012    Other (comments) Intolerance, extreme redness Current Immunizations  Never Reviewed No immunizations on file. Not reviewed this visit You Were Diagnosed With   
  
 Codes Comments Lumbar facet arthropathy    -  Primary ICD-10-CM: M12.88 ICD-9-CM: 721.3 Spinal stenosis of lumbar region with neurogenic claudication     ICD-10-CM: M48.062 
ICD-9-CM: 724.03 Neuritis     ICD-10-CM: M79.2 ICD-9-CM: 729.2 Vitals OB Status Smoking Status Menopause Never Smoker Preferred Pharmacy Pharmacy Name Phone [address], [address] [facility] [address] [address] [facility] [phone] Your Updated Medication List  
  
   
This list is accurate as of: 12/5/17  1:49 PM.  Always use your most recent med list. amLODIPine 5 mg tablet Commonly known as:  Izetta Harder Take 5 mg by mouth daily. clopidogrel 75 mg Tab Commonly known as:  PLAVIX Take 75 mg by mouth daily. * estradiol 0.01 % (0.1 mg/gram) vaginal cream  
Commonly known as:  ESTRACE  
use one  x  Osorio Conn * ESTRACE 0.01 % (0.1 mg/gram) vaginal cream  
Generic drug:  estradiol APPLY 2/3 LENGTH OF PINKY FINGER AND INSERT INTO VAGINA ONCE A WEEK  
  
 ferrous sulfate 325 mg (65 mg iron) tablet Take 1 Tab by mouth two (2) times daily (with meals). fish oil-dha-epa 1,200-144-216 mg Cap Take  by mouth as needed. gabapentin 100 mg capsule Commonly known as:  NEURONTIN  
1 in the am and 2 in the evening as directed  Indications: NEUROPATHIC PAIN  
  
 MIRALAX 17 gram/dose powder Generic drug:  polyethylene glycol Take 17 g by mouth daily as needed. oxyCODONE-acetaminophen 7.5-325 mg per tablet Commonly known as:  PERCOCET 7.5 Take 1-2 Tabs by mouth every four (4) hours as needed. Max Daily Amount: 12 Tabs. PROBIOTIC 4X 10-15 mg Tbec Generic drug:  B.infantis-B.ani-B.long-B.bifi Take  by mouth. sertraline 50 mg tablet Commonly known as:  ZOLOFT  
50 mg.  
  
 simvastatin 40 mg tablet Commonly known as:  ZOCOR Take  by mouth nightly. traMADol 50 mg tablet Commonly known as:  ULTRAM  
Take 1 Tab by mouth every eight (8) hours as needed. Max Daily Amount: 150 mg. VITAMIN D3 PO Take 1,000 Units by mouth daily. * Notice:   This list has 2 medication(s) that are the same as other medications prescribed for you. Read the directions carefully, and ask your doctor or other care provider to review them with you. Follow-up Instructions Return for Medication follow up. Patient Instructions Low Back Arthritis: Exercises Your Care Instructions Here are some examples of typical rehabilitation exercises for your condition. Start each exercise slowly. Ease off the exercise if you start to have pain. Your doctor or physical therapist will tell you when you can start these exercises and which ones will work best for you. When you are not being active, find a comfortable position for rest. Some people are comfortable on the floor or a medium-firm bed with a small pillow under their head and another under their knees. Some people prefer to lie on their side with a pillow between their knees. Don't stay in one position for too long. Take short walks (10 to 20 minutes) every 2 to 3 hours. Avoid slopes, hills, and stairs until you feel better. Walk only distances you can manage without pain, especially leg pain. How to do the exercises Pelvic tilt 1. Lie on your back with your knees bent. 2. \"Brace\" your stomach-tighten your muscles by pulling in and imagining your belly button moving toward your spine. 3. Press your lower back into the floor. You should feel your hips and pelvis rock back. 4. Hold for 6 seconds while breathing smoothly. 5. Relax and allow your pelvis and hips to rock forward. 6. Repeat 8 to 12 times. Back stretches 1. Get down on your hands and knees on the floor. 2. Relax your head and allow it to droop. Round your back up toward the ceiling until you feel a nice stretch in your upper, middle, and lower back. Hold this stretch for as long as it feels comfortable, or about 15 to 30 seconds. 3. Return to the starting position with a flat back while you are on your hands and knees. 4. Let your back sway by pressing your stomach toward the floor. Lift your buttocks toward the ceiling. 5. Hold this position for 15 to 30 seconds. 6. Repeat 2 to 4 times. Follow-up care is a key part of your treatment and safety. Be sure to make and go to all appointments, and call your doctor if you are having problems. It's also a good idea to know your test results and keep a list of the medicines you take. Where can you learn more? Go to http://deacon-matthew.info/. Enter K693 in the search box to learn more about \"Low Back Arthritis: Exercises. \" Current as of: March 21, 2017 Content Version: 11.4 © 4557-3973 [employer] Care instructions adapted under license by [employer] (which disclaims liability or warranty for this information). If you have questions about a medical condition or this instruction, always ask your healthcare professional. [address] any warranty or liability for your use of this information. Introducing [facility]! Dear Lisa Luna: Thank you for requesting a [employer] account. Our records indicate that you already have an active [employer] account. You can access your account anytime at [email or web address] Did you know that you can access your hospital and ER discharge instructions at any time in [employer]? You can also review all of your test results from your hospital stay or ER visit. Additional Information If you have questions, please visit the Frequently Asked Questions section of the [employer] website at [email or web address] Remember, [employer] is NOT to be used for urgent needs. For medical emergencies, dial 911. Now available from your iPhone and Android! Please provide this summary of care documentation to your next provider. Your primary care clinician is listed as Jodie Miller.  If you have any questions after today's visit, please call [phone].

## 2017-12-05 NOTE — PROGRESS NOTES
[facility]  [address] [place], [facility] [address]  Phone: [phone]  Fax: [phone]      ASSESSMENT   Diagnoses and all orders for this visit:    1. Lumbar facet arthropathy    2. Spinal stenosis of lumbar region with neurogenic claudication    3. Neuritis         IMPRESSION AND PLAN:  Lyndsey Tavares is a 68 y.o. female with history of lumbar pain. Pt tried a bilateral L4 SNRB on 10/25/2017 with improvement. She discontinued Neurontin 100 mg 2 weeks ago when she was experiencing hematuria during an office visit with Dr. Eladio Hart. 1) Pt was given information on lumbar arthritis exercises. 2) She is not a candidate for NSAID's due to chronic anticoagulation with Plavix. 3) Discussed trying Cymbalta if needed. 4) Pt may restart Neurontin 100 mg 1 tab QAM and 2 tabs QHS after following up with Dr. Eladio Hart. 5) Ms. Yaya Klein has a reminder for a \"due or due soon\" health maintenance. I have asked that she contact her primary care provider, Buddy Armstrong MD, for follow-up on this health maintenance. 6)  demonstrated consistency with prescribing. 7) Pt will follow-up in 1 month or sooner if needed. HISTORY OF PRESENT ILLNESS:  Lyndsey Tavares is a 68 y.o. female with history of lumbar pain. She tried a bilateral L4 SNRB on 10/25/2017 with improvement. Pt continues to experience pain particularly when walking long distances. She repots improvement in her right hip since trying the injections. Pt followed up with Dr. Eladio Hart and reports that she had blood in her urine. She states that she was reading the potential side effects on Neurontin 100 mg which included hematuria so she discontinued the Neurontin 2 weeks ago. Pt reports that he will be rechecked by Dr. Eladio Hart on 01/23/2017 and will wait for these results before restarting the Neurontin. She admits to experiencing some increased pain when discontinuing the Neurontin.  Of note, she is currently on Plavix. She is not currently on antidepressants. Pt at this time desires to continue with current care. Pain Scale: 4/10    PCP: Yanira Coelho MD       Past Medical History:   Diagnosis Date    Constipation     Hypercholesteremia     Hypertension     Microscopic hematuria     Stroke [facility])     blurred vision, resolved (taking plavix)     PEACE (stress urinary incontinence, female)     UTI (urinary tract infection)         Social History     Social History    Marital status:      Spouse name: N/A    Number of children: N/A    Years of education: N/A     Occupational History    Not on file. Social History Main Topics    Smoking status: Never Smoker    Smokeless tobacco: Never Used    Alcohol use No    Drug use: No    Sexual activity: Not on file     Other Topics Concern    Not on file     Social History Narrative       Current Outpatient Prescriptions   Medication Sig Dispense Refill    gabapentin (NEURONTIN) 100 mg capsule 1 in the am and 2 in the evening as directed  Indications: NEUROPATHIC PAIN 90 Cap 5    traMADol (ULTRAM) 50 mg tablet Take 1 Tab by mouth every eight (8) hours as needed. Max Daily Amount: 150 mg. 28 Tab 0    clopidogrel (PLAVIX) 75 mg tablet Take 75 mg by mouth daily.  ESTRACE 0.01 % (0.1 mg/gram) vaginal cream APPLY 2/3 LENGTH OF PINKY FINGER AND INSERT INTO VAGINA ONCE A WEEK 42.5 g 0    amLODIPine (NORVASC) 5 mg tablet Take 5 mg by mouth daily.  ferrous sulfate 325 mg (65 mg iron) tablet Take 1 Tab by mouth two (2) times daily (with meals). 60 Tab 2    oxyCODONE-acetaminophen (PERCOCET 7.5) 7.5-325 mg per tablet Take 1-2 Tabs by mouth every four (4) hours as needed. Max Daily Amount: 12 Tabs. 60 Tab 0    simvastatin (ZOCOR) 40 mg tablet Take  by mouth nightly.  CHOLECALCIFEROL, VITAMIN D3, (VITAMIN D3 PO) Take 1,000 Units by mouth daily.       estradiol (ESTRACE) 0.01 % (0.1 mg/g) vaginal cream use one  x  /wik 42.5 g 3  B.infantis-B.ani-B.long-B.bifi (PROBIOTIC 4X) 10-15 mg TbEC Take  by mouth.  polyethylene glycol (MIRALAX) 17 gram/dose powder Take 17 g by mouth daily as needed.  sertraline (ZOLOFT) 50 mg tablet 50 mg.      fish oil-dha-epa 1,200-144-216 mg Cap Take  by mouth as needed. Allergies   Allergen Reactions    Erythromycin Rash and Itching       inflammation    Furacin [Nitrofurazone] Hives and Other (comments)     Intolerance    Tobrex [Tobramycin Sulfate] Other (comments)     Intolerance, extreme redness         REVIEW OF SYSTEMS    Constitutional: Negative for fever, chills, or weight change. Respiratory: Negative for cough or shortness of breath. Cardiovascular: Negative for chest pain or palpitations. Gastrointestinal: Negative for acid reflux, change in bowel habits, or constipation. Genitourinary: Negative for dysuria and flank pain. Musculoskeletal: Positive for lumbar pain. Skin: Negative for rash. Neurological: Negative for headaches, dizziness, or numbness. Endo/Heme/Allergies: Negative for increased bruising. Psychiatric/Behavioral: Negative for difficulty with sleep. PHYSICAL EXAMINATION  Visit Vitals    /59    Pulse 65    Temp 98.3 °F (36.8 °C) (Oral)    Resp 18    Ht 5' 1\" (1.549 m)    Wt 141 lb (64 kg)    BMI 26.64 kg/m2       Constitutional: Awake, alert, and in no acute distress. Neurological: 1+ symmetrical DTRs in the lower extremities. Sensation to light touch is intact. Skin: warm, dry, and intact. Musculoskeletal: Minimal Tenderness to palpation in the lower lumbar region. No pain with extension, axial loading, or forward flexion. No pain with internal or external rotation of her hips. Negative straight leg raise bilaterally.      Hip Flex  Quads Hamstrings Ankle DF EHL Ankle PF   Right +4/5 +4/5 +4/5 +4/5 +4/5 +4/5   Left +4/5 +4/5 +4/5 +4/5 +4/5 +4/5     IMAGING:    Lumbar spine MRI from 05/11/2017 was personally reviewed with the Pt and demonstrated:  Results from Hospital Encounter on 03/07/17   MRI LUMB SPINE WO CONT     Narrative Procedure: MRI of the lumbar spine without contrast.    CPT code: 08509    Comparisons: None. Indications:  Low back pain and radiculopathy    Technique: T1 weighted, T2 FSE with fat saturation, FSE inversion recovery  sagittal images are supplemented by T2 weighted with fat saturation and T1  weighted axial images. Findings: There is dextroscoliosis centered at approximately L2. Not well evaluated  without coronal images. Sagittal images reveal overall normal vertebral body morphology. No fractures  noted. No suspicious lesions. Alignments are anatomic, no evidence for subluxation.      Conus medullaris ends at the L1 vertebral body level. Correlation of axial and sagittal images reveals the following:    At L1-L2: Mild circumferential disc osteophyte complex. Mild facet arthropathy. Mild central canal stenosis. Moderate to severe foraminal stenosis. At L2-L3: Mild retrolisthesis L2 on L3. Vertebral body oriented to the right  secondary to scoliosis. Uncovering of the disc posterior. Overlying broad-based  disc osteophyte complex. Mild facet arthropathy and buckling of the ligamentum  flavum. Moderate central canal stenosis. Moderate to severe or for severe left  foraminal stenosis. Moderate right. At L3-L4: Moderate circumferential disc osteophyte complex. Vertebral body  oriented to the right secondary to scoliosis. Moderate facet arthropathy and  buckling of the ligamentum flavum posteriorly resulting in moderate or moderate  to severe central canal stenosis moderate to severe or severe left foraminal  stenosis. Moderate right foraminal stenosis. At L4-L5: Moderate circumferential disc osteophyte complex. Vertebral bodies  oriented to the right. Moderate facet arthropathy and prominent buckling of the  ligamentum flavum. Again moderate central canal stenosis.  Moderate left and  moderate to severe right foraminal stenosis. At L5-S1: Grade 1 anterolisthesis of L5 on S1 without evidence for pars defects. Uncovering of the disc posteriorly and overlying broad-based disc protrusion. Moderate facet arthropathy with fluid in the facet joints and buckling of the  ligamentum flavum. Moderate central canal stenosis. Moderate to severe bilateral  foraminal stenosis. Visualized portions of the sacroiliac joints are unremarkable.  Incidentally  imaged retroperitoneal structures are unremarkable as well.                  Impression Impression:    Multilevel multifactorial degenerative changes as above. Written by Oleg Ibarra, as dictated by Ran Dietrich MD.  I, Dr. Ran Dietrich confirm that all documentation is accurate.

## 2017-12-05 NOTE — PATIENT INSTRUCTIONS

## 2017-12-15 ENCOUNTER — OFFICE VISIT (OUTPATIENT)
Dept: ORTHOPEDIC SURGERY | Age: 77
End: 2017-12-15

## 2017-12-15 VITALS
WEIGHT: 141.6 LBS | TEMPERATURE: 98.4 F | HEART RATE: 65 BPM | HEIGHT: 61 IN | RESPIRATION RATE: 15 BRPM | BODY MASS INDEX: 26.73 KG/M2 | DIASTOLIC BLOOD PRESSURE: 67 MMHG | SYSTOLIC BLOOD PRESSURE: 152 MMHG

## 2017-12-15 DIAGNOSIS — M25.551 BILATERAL HIP PAIN: ICD-10-CM

## 2017-12-15 DIAGNOSIS — M17.12 PRIMARY OSTEOARTHRITIS OF LEFT KNEE: ICD-10-CM

## 2017-12-15 DIAGNOSIS — Z96.651 HISTORY OF TOTAL RIGHT KNEE REPLACEMENT: Primary | ICD-10-CM

## 2017-12-15 DIAGNOSIS — M25.552 BILATERAL HIP PAIN: ICD-10-CM

## 2017-12-15 RX ORDER — BETAMETHASONE SODIUM PHOSPHATE AND BETAMETHASONE ACETATE 3; 3 MG/ML; MG/ML
6 INJECTION, SUSPENSION INTRA-ARTICULAR; INTRALESIONAL; INTRAMUSCULAR; SOFT TISSUE ONCE
Qty: 1 ML | Refills: 0
Start: 2017-12-15 | End: 2017-12-15

## 2017-12-15 NOTE — PROGRESS NOTES
[address] [place] [address]  [phone]           Patient: Didi Tuttle                MRN: [id number]       SSN: [id number]  YOB: 1940        AGE: 68 y.o. SEX: female  Body mass index is 26.76 kg/(m^2). PCP: Christa De Paz MD  12/15/17      This office note has been dictated. REVIEW OF SYSTEMS:  Constitutional: Negative for fever, chills, weight loss and malaise/fatigue. HENT: Negative. Eyes: Negative. Respiratory: Negative. Cardiovascular: Negative. Gastrointestinal: No bowel incontinence or constipation. Genitourinary: No bladder incontinence or saddle anesthesia. Skin: Negative. Neurological: Negative. Endo/Heme/Allergies: Negative. Psychiatric/Behavioral: Negative. Musculoskeletal: As per HPI above. Past Medical History:   Diagnosis Date    Constipation     Hypercholesteremia     Hypertension     Microscopic hematuria     Stroke [facility])     blurred vision, resolved (taking plavix)     PEACE (stress urinary incontinence, female)     UTI (urinary tract infection)          Current Outpatient Prescriptions:     gabapentin (NEURONTIN) 100 mg capsule, 1 in the am and 2 in the evening as directed  Indications: NEUROPATHIC PAIN, Disp: 90 Cap, Rfl: 5    traMADol (ULTRAM) 50 mg tablet, Take 1 Tab by mouth every eight (8) hours as needed. Max Daily Amount: 150 mg., Disp: 28 Tab, Rfl: 0    polyethylene glycol (MIRALAX) 17 gram/dose powder, Take 17 g by mouth daily as needed. , Disp: , Rfl:     clopidogrel (PLAVIX) 75 mg tablet, Take 75 mg by mouth daily. , Disp: , Rfl:     ESTRACE 0.01 % (0.1 mg/gram) vaginal cream, APPLY 2/3 LENGTH OF PINKY FINGER AND INSERT INTO VAGINA ONCE A WEEK, Disp: 42.5 g, Rfl: 0    simvastatin (ZOCOR) 40 mg tablet, Take  by mouth nightly., Disp: , Rfl:     CHOLECALCIFEROL, VITAMIN D3, (VITAMIN D3 PO), Take 1,000 Units by mouth daily. , Disp: , Rfl:    estradiol (ESTRACE) 0.01 % (0.1 mg/g) vaginal cream, use one  x  /wik, Disp: 42.5 g, Rfl: 3    B.infantis-B.ani-B.long-B.bifi (PROBIOTIC 4X) 10-15 mg TbEC, Take  by mouth., Disp: , Rfl:     amLODIPine (NORVASC) 5 mg tablet, Take 5 mg by mouth daily. , Disp: , Rfl:     sertraline (ZOLOFT) 50 mg tablet, 50 mg., Disp: , Rfl:     ferrous sulfate 325 mg (65 mg iron) tablet, Take 1 Tab by mouth two (2) times daily (with meals). , Disp: 60 Tab, Rfl: 2    oxyCODONE-acetaminophen (PERCOCET 7.5) 7.5-325 mg per tablet, Take 1-2 Tabs by mouth every four (4) hours as needed. Max Daily Amount: 12 Tabs., Disp: 60 Tab, Rfl: 0    fish oil-dha-epa 1,200-144-216 mg Cap, Take  by mouth as needed. , Disp: , Rfl:     Allergies   Allergen Reactions    Erythromycin Rash and Itching       inflammation    Furacin [Nitrofurazone] Hives and Other (comments)     Intolerance    Tobrex [Tobramycin Sulfate] Other (comments)     Intolerance, extreme redness       Social History     Social History    Marital status:      Spouse name: N/A    Number of children: N/A    Years of education: N/A     Occupational History    Not on file.      Social History Main Topics    Smoking status: Never Smoker    Smokeless tobacco: Never Used    Alcohol use No    Drug use: No    Sexual activity: Not on file     Other Topics Concern    Not on file     Social History Narrative       Past Surgical History:   Procedure Laterality Date    HX CATARACT REMOVAL Bilateral     HX CHOLECYSTECTOMY  1972    HX HYSTERECTOMY  09/2016    total    HX KNEE REPLACEMENT Right 04/27/2015    HX OTHER SURGICAL      skin ca removed     HX OTHER SURGICAL  09/2016    bladder support    HX TONSILLECTOMY  1946           * Patient was identified by name and date of birth   * Agreement on procedure being performed was verified  * Risks and Benefits explained to the patient  * Procedure site verified and marked as necessary  * Patient was positioned for comfort  * Consent was signed and verified  1:43 PM    The patient was instructed on post injection care. We did see Ms. Vickie Coronel for followup with regards to multiple problems. The patient is having low back pain with lower extremity discomfort when she is up and ambulating or standing at the kitchen sink for a long period of time. She does have known stenosis of her back. She has had no change in her bowel or bladder habits. She is also having a little bit of intermittent right hip discomfort. She has had no real groin pain at this point and no laterally-based discomfort. There is more buttocks discomfort. She is status post right knee replacement. She did very well with it and is very happy with the results of the knee replacement. The left knee does have known advanced osteoarthritis. She had an injection three months ago, which gave her good relief. She is requesting another one today. She has had no recent fevers, chills, systemic changes, or injuries to report, and no chest pain or shortness of breath. She takes an occasional Ultram for pain. PHYSICAL EXAMINATION:  In general, the patient is alert and oriented x 3 in no acute distress. The patient is well-developed, well-nourished, with a normal affect. The patient is afebrile. HEENT:  Head is normocephalic and atraumatic. Pupils are equally round and reactive to light and accommodation. Extraocular eye movements are intact. Neck is supple. Trachea is midline. No JVD is present. Breathing is nonlabored. Examination of the back reveals the skin is intact. There is no ecchymosis and no warmth. There is no pain with palpation to the midline lumbar spine, as well as paraspinally. There is no pain to the sciatic notch. The pelvis is stable. She has pretty well-maintained range of motion of the hips. Only forced internal rotation on the right side causes a little discomfort.   There is no pain to palpation to the greater trochanteric bursae. There is negative straight leg raise. There is negative calf tenderness. There is negative Cristhians. There is no evidence of DVT present. Examination of the right knee reveals the skin is intact. The surgical wounds are healed nicely. There is no ecchymosis, no warmth, and no signs for infection or cellulitis present. She has full range of motion, very good stability, and the patella tracks nicely without rubs or crepitus noted. Examination of the left knee reveals the skin is intact. There is no ecchymosis, no warmth, and no signs for infection or cellulitis present. There is pain to palpation tricompartmentally and crepitus arising from the anterior compartment. Findings are consistent with advanced arthritis of the left knee. RADIOGRAPHS:  Radiographs in the office today, including AP, tunnel, lateral, and skyline of each of the knees, shows on the right side, total knee components are well-fixed. No evidence of loosening or fracture is noted. The left knee does confirm end-staged arthritis tricompartmentally without evidence for fracture noted. AP of the pelvis does confirm right hip end-stage osteoarthritis and no fracture or dislocation is seen. The lower lumbar spine shows significant degenerative disc disease and spondylosis. ASSESSMENT:      1. Lumbar degenerative disc disease. 2. Lumbar stenosis. 3. Right hip end-stage osteoarthritis. 4. Right knee replacement. 5. Left knee end-stage osteoarthritis. PLAN:  At this point, we discussed treatment options. This patient is basically having minimal hip discomfort. She is having intermittent back discomfort. Currently, it is pretty quiescent. With regards to the knee, cortisone injections still give her good relief, so we will repeat the cortisone injection for the left knee today.  Under aseptic conditions, after informed and written consent, and appropriate time out performed, with ultrasound-guided assistance, the left knee was prepped with Betadine and 6 mg of Celestone was injected without complications. The patient tolerated the injection well. The patient was instructed on post injection care. We will plan on seeing her back in the office in about three months time for evaluation. We did discuss referral to The [address] [place] [address] if she would like. She will let us know if she would like to move forward with this.                           JR Woodrow DAO, PA-C, ATC

## 2018-01-03 DIAGNOSIS — M17.12 PRIMARY OSTEOARTHRITIS OF LEFT KNEE: ICD-10-CM

## 2018-01-03 NOTE — TELEPHONE ENCOUNTER
Patient is requesting a refill of TRAMADOL. She wants to  her rx today.  Please advise at [phone]

## 2018-01-03 NOTE — TELEPHONE ENCOUNTER
PHONE IN RX    Last Visit: 12/15/2017 with JESSIKA Coto   Next Appointment: 03/16/2018 with JESSIKA Coto   Previous Refill Encounters: 09/15/2017 per JESSIKA Coto #[id number]     Requested Prescriptions     Pending Prescriptions Disp Refills    traMADol (ULTRAM) 50 mg tablet 28 Tab 0     Sig: Take 1 Tab by mouth every eight (8) hours as needed. Max Daily Amount: 150 mg.

## 2018-01-09 RX ORDER — TRAMADOL HYDROCHLORIDE 50 MG/1
50 TABLET ORAL
Qty: 28 TAB | Refills: 0 | OUTPATIENT
Start: 2018-01-09 | End: 2018-03-26 | Stop reason: SDUPTHER

## 2018-02-20 ENCOUNTER — HOSPITAL ENCOUNTER (OUTPATIENT)
Dept: CT IMAGING | Age: 78
Discharge: HOME | End: 2018-02-20
Attending: UROLOGY
Payer: MEDICARE

## 2018-02-20 DIAGNOSIS — N13.30 HYDRONEPHROSIS DETERMINED BY ULTRASOUND: ICD-10-CM

## 2018-02-20 LAB — CREAT UR-MCNC: 0.6 MG/DL (ref 0.6–1.3)

## 2018-02-20 PROCEDURE — 74178 CT ABD&PLV WO CNTR FLWD CNTR: CPT

## 2018-02-20 PROCEDURE — 82565 ASSAY OF CREATININE: CPT

## 2018-02-20 PROCEDURE — 74011636320 HC RX REV CODE- 636/320: Performed by: UROLOGY

## 2018-02-20 RX ADMIN — IOPAMIDOL 80 ML: 755 INJECTION, SOLUTION INTRAVENOUS at 14:31

## 2018-03-16 ENCOUNTER — OFFICE VISIT (OUTPATIENT)
Dept: ORTHOPEDIC SURGERY | Age: 78
End: 2018-03-16

## 2018-03-16 VITALS
OXYGEN SATURATION: 99 % | BODY MASS INDEX: 23.69 KG/M2 | TEMPERATURE: 99.1 F | HEIGHT: 65 IN | HEART RATE: 64 BPM | DIASTOLIC BLOOD PRESSURE: 61 MMHG | WEIGHT: 142.2 LBS | SYSTOLIC BLOOD PRESSURE: 145 MMHG

## 2018-03-16 DIAGNOSIS — M17.12 PRIMARY OSTEOARTHRITIS OF LEFT KNEE: Primary | ICD-10-CM

## 2018-03-16 RX ORDER — BETAMETHASONE SODIUM PHOSPHATE AND BETAMETHASONE ACETATE 3; 3 MG/ML; MG/ML
6 INJECTION, SUSPENSION INTRA-ARTICULAR; INTRALESIONAL; INTRAMUSCULAR; SOFT TISSUE ONCE
Qty: 1 ML | Refills: 0
Start: 2018-03-16 | End: 2018-03-16

## 2018-03-16 NOTE — PROGRESS NOTES
[address] [place] [address]  [phone]           Patient: Cresencio Louise                MRN: [id number]       SSN: [id number]  YOB: 1940        AGE: 68 y.o. SEX: female  Body mass index is 23.66 kg/(m^2). PCP: Bonny Cooks, MD  03/16/18      This office note has been dictated. REVIEW OF SYSTEMS:  Constitutional: Negative for fever, chills, weight loss and malaise/fatigue. HENT: Negative. Eyes: Negative. Respiratory: Negative. Cardiovascular: Negative. Gastrointestinal: No bowel incontinence or constipation. Genitourinary: No bladder incontinence or saddle anesthesia. Skin: Negative. Neurological: Negative. Endo/Heme/Allergies: Negative. Psychiatric/Behavioral: Negative. Musculoskeletal: As per HPI above. Past Medical History:   Diagnosis Date    Constipation     Hypercholesteremia     Hypertension     Microscopic hematuria     Stroke [facility])     blurred vision, resolved (taking plavix)     PEACE (stress urinary incontinence, female)     UTI (urinary tract infection)          Current Outpatient Prescriptions:     traMADol (ULTRAM) 50 mg tablet, Take 1 Tab by mouth every eight (8) hours as needed. Max Daily Amount: 150 mg., Disp: 28 Tab, Rfl: 0    gabapentin (NEURONTIN) 100 mg capsule, 1 in the am and 2 in the evening as directed  Indications: NEUROPATHIC PAIN, Disp: 90 Cap, Rfl: 5    B.infantis-B.ani-B.long-B.bifi (PROBIOTIC 4X) 10-15 mg TbEC, Take  by mouth., Disp: , Rfl:     polyethylene glycol (MIRALAX) 17 gram/dose powder, Take 17 g by mouth daily as needed. , Disp: , Rfl:     clopidogrel (PLAVIX) 75 mg tablet, Take 75 mg by mouth daily. , Disp: , Rfl:     ESTRACE 0.01 % (0.1 mg/gram) vaginal cream, APPLY 2/3 LENGTH OF PINKY FINGER AND INSERT INTO VAGINA ONCE A WEEK, Disp: 42.5 g, Rfl: 0    simvastatin (ZOCOR) 40 mg tablet, Take  by mouth nightly., Disp: , Rfl:    CHOLECALCIFEROL, VITAMIN D3, (VITAMIN D3 PO), Take 1,000 Units by mouth daily. , Disp: , Rfl:     estradiol (ESTRACE) 0.01 % (0.1 mg/g) vaginal cream, use one  x  /wik, Disp: 42.5 g, Rfl: 3    amLODIPine (NORVASC) 5 mg tablet, Take 5 mg by mouth daily. , Disp: , Rfl:     sertraline (ZOLOFT) 50 mg tablet, 50 mg., Disp: , Rfl:     ferrous sulfate 325 mg (65 mg iron) tablet, Take 1 Tab by mouth two (2) times daily (with meals). , Disp: 60 Tab, Rfl: 2    oxyCODONE-acetaminophen (PERCOCET 7.5) 7.5-325 mg per tablet, Take 1-2 Tabs by mouth every four (4) hours as needed. Max Daily Amount: 12 Tabs., Disp: 60 Tab, Rfl: 0    fish oil-dha-epa 1,200-144-216 mg Cap, Take  by mouth as needed. , Disp: , Rfl:     Allergies   Allergen Reactions    Erythromycin Rash and Itching       inflammation    Furacin [Nitrofurazone] Hives and Other (comments)     Intolerance    Tobrex [Tobramycin Sulfate] Other (comments)     Intolerance, extreme redness       Social History     Social History    Marital status:      Spouse name: N/A    Number of children: N/A    Years of education: N/A     Occupational History    Not on file.      Social History Main Topics    Smoking status: Never Smoker    Smokeless tobacco: Never Used    Alcohol use No    Drug use: No    Sexual activity: Not on file     Other Topics Concern    Not on file     Social History Narrative       Past Surgical History:   Procedure Laterality Date    HX CATARACT REMOVAL Bilateral     HX CHOLECYSTECTOMY  1972    HX HYSTERECTOMY  09/2016    total    HX KNEE REPLACEMENT Right 04/27/2015    HX OTHER SURGICAL      skin ca removed     HX OTHER SURGICAL  09/2016    bladder support    HX TONSILLECTOMY  1946           * Patient was identified by name and date of birth   * Agreement on procedure being performed was verified  * Risks and Benefits explained to the patient  * Procedure site verified and marked as necessary  * Patient was positioned for comfort  * Consent was signed and verified  1:10 PM    The patient was instructed on post injection care. We did see Ms. Maynor Khanna for followup with regards to her right hip and left knee. She had a right knee replacement and did very well it. She is getting along pretty well with the hip and the knee, which have known end-staged arthritis of each of the areas. She had an intraarticular injection to the right hip about six months ago. It did not give her much relief. She has intermittent discomfort to the right hip. She is stable and able to walk around. She does have a little trouble putting on shoes and socks and getting in and out of bed. She has occasional groin discomfort and occasional thigh discomfort. With regards to the left knee, she is also getting around pretty well. The injections still give her pretty good relief. She has had no recent fevers, chills, systemic changes, or injuries to report. PHYSICAL EXAMINATION:  In general, the patient is alert and oriented x 3 in no acute distress. The patient is well-developed, well-nourished, with a normal affect. The patient is afebrile. HEENT:  Head is normocephalic and atraumatic. Pupils are equally round and reactive to light and accommodation. Extraocular eye movements are intact. Neck is supple. Trachea is midline. No JVD is present. Breathing is nonlabored. Examination of the lower extremities reveals pretty well maintained range of motion fo the hips. Forced internal rotation does cause groin discomfort on the right side. There is no pain to palpation of the trochanteric bursae. There is negative straight leg raise. There is negative calf tenderness. There is negative Cristhians. There is no evidence of DVT present. Examination of the left knee reveals the skin is intact. There is no ecchymosis, no warmth, and no signs of infection or cellulitis present.   She does have pain to palpation to the medial joint line, as well as patellofemoral grind and crepitus anteriorly with range of motion activities noted. RADIOGRAPHS:  Review of previous radiographs confirms end-staged arthritis of the right hip with bone-to-bone eburnation, as well as the left knee with bone-to-bone eburnation. ASSESSMENT:      1. Right knee replacement doing well. 2. Left knee end-staged osteoarthritis. 3. Right hip end-stage osteoarthritis. PLAN:  At this point, we discussed treatment options. The patient is getting along pretty well. We will continue with conservative treatment. Today, in the office, we are going to move forward with a cortisone injection for the left knee. Under aseptic conditions, after informed and written consent, and appropriate time out performed, with ultrasound-guided assistance, the left knee was prepped with Betadine and 6 mg of Celestone was injected without complications. The patient tolerated the injection well. The patient was instructed on post injection care. We will see her back in the office in about three months time for evaluation. If she would like to have an intraarticular injection of the right hip, I have asked her to give us a call, and we can get this scheduled for her.                     JR Woodrow DAO, PAWolfgangC, ATC

## 2018-03-26 DIAGNOSIS — M17.12 PRIMARY OSTEOARTHRITIS OF LEFT KNEE: ICD-10-CM

## 2018-03-26 NOTE — TELEPHONE ENCOUNTER
PHONE IN RX    Last Visit: 03/16/2018 with JESSIKA Zamora    Next Appointment: 06/01/2018 with JESSIKA Zamora   Previous Refill Encounters: 01/09/2018 per JESSIKA Zamora #[id number]     Requested Prescriptions     Pending Prescriptions Disp Refills    traMADol (ULTRAM) 50 mg tablet 28 Tab 0     Sig: Take 1 Tab by mouth every eight (8) hours as needed. Max Daily Amount: 150 mg.

## 2018-03-26 NOTE — TELEPHONE ENCOUNTER
PATIENT CALLED FOR REFILL ON TRAMADOL MEDICATION FROM DR. Dereck Daniels. Yokasta Cardoza. [phone]. PATIENT TEL. [phone].

## 2018-03-27 ENCOUNTER — OFFICE VISIT (OUTPATIENT)
Dept: ORTHOPEDIC SURGERY | Age: 78
End: 2018-03-27

## 2018-03-27 VITALS
HEART RATE: 70 BPM | SYSTOLIC BLOOD PRESSURE: 153 MMHG | BODY MASS INDEX: 23.32 KG/M2 | TEMPERATURE: 96.8 F | RESPIRATION RATE: 16 BRPM | DIASTOLIC BLOOD PRESSURE: 59 MMHG | HEIGHT: 65 IN | OXYGEN SATURATION: 98 % | WEIGHT: 140 LBS

## 2018-03-27 DIAGNOSIS — M79.2 NEURITIS: Primary | ICD-10-CM

## 2018-03-27 DIAGNOSIS — M62.830 MUSCLE SPASM OF BACK: ICD-10-CM

## 2018-03-27 DIAGNOSIS — Z79.01 CHRONIC ANTICOAGULATION: ICD-10-CM

## 2018-03-27 DIAGNOSIS — M25.551 RIGHT HIP PAIN: ICD-10-CM

## 2018-03-27 DIAGNOSIS — M47.816 LUMBAR FACET ARTHROPATHY: ICD-10-CM

## 2018-03-27 RX ORDER — ACETAMINOPHEN 325 MG/1
650 TABLET ORAL
COMMUNITY

## 2018-03-27 RX ORDER — GABAPENTIN 100 MG/1
CAPSULE ORAL
Qty: 120 CAP | Refills: 5 | Status: SHIPPED | OUTPATIENT
Start: 2018-03-27 | End: 2018-11-13 | Stop reason: SDUPTHER

## 2018-03-27 NOTE — PROGRESS NOTES
[facility]  [address], [address]  Phone: [phone]  Fax: [phone]    Pt's YOB: 1940    ASSESSMENT   Diagnoses and all orders for this visit:    1. Neuritis  -     gabapentin (NEURONTIN) 100 mg capsule; 2 in the am and 2 in the evening as directed  Indications: NEUROPATHIC PAIN  -     REFERRAL TO PHYSICAL THERAPY    2. Lumbar facet arthropathy  -     REFERRAL TO PHYSICAL THERAPY    3. Muscle spasm of back  -     REFERRAL TO PHYSICAL THERAPY    4. Right hip pain  -     REFERRAL TO PHYSICAL THERAPY    5. Chronic anticoagulation         IMPRESSION AND PLAN:  Brenda Flores is a 68 y.o. female with history of lumbar pain. Pt states that she had further workup with Dr. Eleanor Beck for hematuria. Per patient the results were normal so she restarted the Neurontin 100 mg 1 tab QAM and 2 tabs QHS. 1) Pt was given information on lumbar arthritis exercises. 2) She is not a candidate for NSAID's due to chronic anticoagulation with Plavix. 3) Pt will increase her Neurontin 100 mg to 2 tabs QAM and 2 tabs QHS to better manage her daytime symptoms. 4) She was informed of proper lifting mechanics. 5) [facility]. 6) Ms. Onofre Pruitt has a reminder for a \"due or due soon\" health maintenance. I have asked that she contact her primary care provider, Gisell Doyle MD, for follow-up on this health maintenance. 7)  demonstrated consistency with prescribing. 8) Pt will follow-up in 3 months or sooner if needed. HISTORY OF PRESENT ILLNESS:  Brenda Flores is a 68 y.o. female with history of lumbar pain. Pt states that she had further workup with Dr. Eleanor Beck for hematuria. Per patient the results were normal so she restarted the Neurontin 100 mg. Of note, patient discontinued the medication since she was unsure if this was caused by the Neurontin.  She admits to relief when taking the Neurontin at night and states that it allows her to find a comfortable position. Pt denies any sedation with the Neurontin and is unsure if her morning dose of the medication provides relief. She admits that she experienced right hip with the colder weather and activity but states that this has improved over the last couple weeks. Of note, she is currently on Plavix. Pt takes Ultram 50 mg rarely as her pain warrants as prescribed by Raj Ballesteros. She tried aqua physical therapy in the past and wishes to try sessions at this time. Pt desires to proceed with medication evaluation and aqua physical therapy. Pain Scale: 5/10    PCP: Huy Reyes MD     Past Medical History:   Diagnosis Date    Constipation     Hypercholesteremia     Hypertension     Microscopic hematuria     Stroke [facility])     blurred vision, resolved (taking plavix)     PEACE (stress urinary incontinence, female)     UTI (urinary tract infection)         Social History     Social History    Marital status:      Spouse name: N/A    Number of children: N/A    Years of education: N/A     Occupational History    Not on file. Social History Main Topics    Smoking status: Never Smoker    Smokeless tobacco: Never Used    Alcohol use No    Drug use: No    Sexual activity: Not on file     Other Topics Concern    Not on file     Social History Narrative       Current Outpatient Prescriptions   Medication Sig Dispense Refill    acetaminophen (TYLENOL) 325 mg tablet Take 650 mg by mouth every six (6) hours as needed for Pain.  gabapentin (NEURONTIN) 100 mg capsule 2 in the am and 2 in the evening as directed  Indications: NEUROPATHIC PAIN 120 Cap 5    polyethylene glycol (MIRALAX) 17 gram/dose powder Take 17 g by mouth daily as needed.  clopidogrel (PLAVIX) 75 mg tablet Take 75 mg by mouth daily.       ESTRACE 0.01 % (0.1 mg/gram) vaginal cream APPLY 2/3 LENGTH OF PINKY FINGER AND INSERT INTO VAGINA ONCE A WEEK 42.5 g 0    simvastatin (ZOCOR) 40 mg tablet Take  by mouth nightly.  CHOLECALCIFEROL, VITAMIN D3, (VITAMIN D3 PO) Take 1,000 Units by mouth daily.  fish oil-dha-epa 1,200-144-216 mg Cap Take  by mouth as needed.  traMADol (ULTRAM) 50 mg tablet Take 1 Tab by mouth every eight (8) hours as needed. Max Daily Amount: 150 mg. 28 Tab 0    oxyCODONE-acetaminophen (PERCOCET 7.5) 7.5-325 mg per tablet Take 1-2 Tabs by mouth every four (4) hours as needed. Max Daily Amount: 12 Tabs. 60 Tab 0       Allergies   Allergen Reactions    Erythromycin Rash and Itching       inflammation    Furacin [Nitrofurazone] Hives and Other (comments)     Intolerance    Tobrex [Tobramycin Sulfate] Other (comments)     Intolerance, extreme redness         REVIEW OF SYSTEMS    Constitutional: Negative for fever, chills, or weight change. Respiratory: Negative for cough or shortness of breath. Cardiovascular: Negative for chest pain or palpitations. Gastrointestinal: Negative for acid reflux, change in bowel habits, or constipation. Genitourinary: Negative for dysuria and flank pain. Musculoskeletal: Positive for lumbar pain. Skin: Negative for rash. Neurological: Negative for headaches, dizziness, or numbness. Endo/Heme/Allergies: Negative for increased bruising. Psychiatric/Behavioral: Negative for difficulty with sleep. PHYSICAL EXAMINATION  Visit Vitals    /59    Pulse 70    Temp 96.8 °F (36 °C) (Oral)    Resp 16    Ht 5' 5\" (1.651 m)    Wt 140 lb (63.5 kg)    SpO2 98%    BMI 23.3 kg/m2       Constitutional: Awake, alert, and in no acute distress. Neurological: 1+ symmetrical DTRs in the lower extremities. Sensation to light touch is intact. Skin: warm, dry, and intact. Musculoskeletal: Minimal tenderness to palpation in the lower lumbar region. No pain with extension, axial loading, or forward flexion. Mild pain with internal rotation of her right hip; no pain on the left.  Negative straight leg raise bilaterally. Hip Flex  Quads Hamstrings Ankle DF EHL Ankle PF   Right +4/5 +4/5 +4/5 +4/5 +4/5 +4/5   Left +4/5 +4/5 +4/5 +4/5 +4/5 +4/5     IMAGING:    Lumbar spine MRI from 05/11/2017 was personally reviewed with the Pt and demonstrated:  Results from [facility] on 03/07/17   MRI LUMB SPINE WO CONT     Narrative Procedure: MRI of the lumbar spine without contrast.    CPT code: 36737    Comparisons: None. Indications:  Low back pain and radiculopathy    Technique: T1 weighted, T2 FSE with fat saturation, FSE inversion recovery  sagittal images are supplemented by T2 weighted with fat saturation and T1  weighted axial images. Findings: There is dextroscoliosis centered at approximately L2. Not well evaluated  without coronal images. Sagittal images reveal overall normal vertebral body morphology. No fractures  noted. No suspicious lesions. Alignments are anatomic, no evidence for subluxation.      Conus medullaris ends at the L1 vertebral body level. Correlation of axial and sagittal images reveals the following:    At L1-L2: Mild circumferential disc osteophyte complex. Mild facet arthropathy. Mild central canal stenosis. Moderate to severe foraminal stenosis. At L2-L3: Mild retrolisthesis L2 on L3. Vertebral body oriented to the right  secondary to scoliosis. Uncovering of the disc posterior. Overlying broad-based  disc osteophyte complex. Mild facet arthropathy and buckling of the ligamentum  flavum. Moderate central canal stenosis. Moderate to severe or for severe left  foraminal stenosis. Moderate right. At L3-L4: Moderate circumferential disc osteophyte complex. Vertebral body  oriented to the right secondary to scoliosis. Moderate facet arthropathy and  buckling of the ligamentum flavum posteriorly resulting in moderate or moderate  to severe central canal stenosis moderate to severe or severe left foraminal  stenosis. Moderate right foraminal stenosis.     At L4-L5: Moderate circumferential disc osteophyte complex. Vertebral bodies  oriented to the right. Moderate facet arthropathy and prominent buckling of the  ligamentum flavum. Again moderate central canal stenosis. Moderate left and  moderate to severe right foraminal stenosis. At L5-S1: Grade 1 anterolisthesis of L5 on S1 without evidence for pars defects. Uncovering of the disc posteriorly and overlying broad-based disc protrusion. Moderate facet arthropathy with fluid in the facet joints and buckling of the  ligamentum flavum. Moderate central canal stenosis. Moderate to severe bilateral  foraminal stenosis. Visualized portions of the sacroiliac joints are unremarkable.  Incidentally  imaged retroperitoneal structures are unremarkable as well.                  Impression Impression:    Multilevel multifactorial degenerative changes as above. Written by Issa Montejo, as dictated by Seth Bolden MD.  I, Dr. Seth Bolden confirm that all documentation is accurate.

## 2018-03-27 NOTE — MR AVS SNAPSHOT
[address] [facility] [facility] [address] 
[phone] Patient: Michelle Huynh MRN: [id number] Visit Information Date & Time Provider Department Dept. Phone Encounter #  
 3/27/2018 11:00 AM Suzie Gotti, 27 [facility] [id number] [phone] Follow-up Instructions Return in about 3 months (around 6/27/2018) for Medication follow up, PT follow up. Your Appointments 6/1/2018  1:10 PM  
Follow Up with Zena Garza PA-C  
[facility] ([facility] Finch) Appt Note: LT KNEE/RT HIP 3 mo fu  
 [address] [facility], [address] [facility]  
[phone] [address] [place] [address], [facility] [address]  
  
    
  
 8/14/2018 11:00 AM  
Any with Merlin Mayhew, MD  
[facility] ([facility] Finch) Appt Note: Return in about 6 months (around 8/13/2018) for follow up. [address] [facility] [address] [facility] [address] [place] [address]  
  
   
 [id number] [place] [address] Upcoming Health Maintenance Date Due DTaP/Tdap/Td series (1 - Tdap) 9/16/1961 ZOSTER VACCINE AGE 60> 7/16/2000 GLAUCOMA SCREENING Q2Y 9/16/2005 Bone Densitometry (Dexa) Screening 9/16/2005 Pneumococcal 65+ Low/Medium Risk (1 of 2 - PCV13) 9/16/2005 MEDICARE YEARLY EXAM 3/14/2018 Allergies as of 3/27/2018  Review Complete On: 3/27/2018 By: Suzie Gotti MD  
  
 Severity Noted Reaction Type Reactions Erythromycin  11/14/2014   Side Effect Rash, Itching  
 inflammation Furacin [Nitrofurazone]  09/27/2012    Hives, Other (comments) Intolerance Tobrex [Tobramycin Sulfate]  09/27/2012    Other (comments) Intolerance, extreme redness Current Immunizations  Never Reviewed No immunizations on file. Not reviewed this visit You Were Diagnosed With   
  
 Codes Comments Lumbar facet arthropathy    -  Primary ICD-10-CM: M12.88 ICD-9-CM: 721.3 Neuritis     ICD-10-CM: M79.2 ICD-9-CM: 729.2 Muscle spasm of back     ICD-10-CM: K56.019 ICD-9-CM: 724.8 Right hip pain     ICD-10-CM: M25.551 ICD-9-CM: 719.45 Vitals BP Pulse Temp Resp Height(growth percentile) Weight(growth percentile) 153/59 70 96.8 °F (36 °C) (Oral) 16 5' 5\" (1.651 m) 140 lb (63.5 kg) SpO2 BMI OB Status Smoking Status 98% 23.3 kg/m2 Menopause Never Smoker Vitals History BMI and BSA Data Body Mass Index Body Surface Area  
 23.3 kg/m 2 1.71 m 2 Preferred Pharmacy Pharmacy Name Phone [address], [address] [place] [address] 22 [phone] [facility] [phone] Your Updated Medication List  
  
   
This list is accurate as of 3/27/18 12:09 PM.  Always use your most recent med list.  
  
  
  
  
 clopidogrel 75 mg Tab Commonly known as:  PLAVIX Take 75 mg by mouth daily. ESTRACE 0.01 % (0.1 mg/gram) vaginal cream  
Generic drug:  estradiol APPLY 2/3 LENGTH OF PINKY FINGER AND INSERT INTO VAGINA ONCE A WEEK  
  
 fish oil-dha-epa 1,200-144-216 mg Cap Take  by mouth as needed. gabapentin 100 mg capsule Commonly known as:  NEURONTIN  
2 in the am and 2 in the evening as directed  Indications: NEUROPATHIC PAIN  
  
 MIRALAX 17 gram/dose powder Generic drug:  polyethylene glycol Take 17 g by mouth daily as needed. oxyCODONE-acetaminophen 7.5-325 mg per tablet Commonly known as:  PERCOCET 7.5 Take 1-2 Tabs by mouth every four (4) hours as needed. Max Daily Amount: 12 Tabs. simvastatin 40 mg tablet Commonly known as:  ZOCOR Take  by mouth nightly. traMADol 50 mg tablet Commonly known as:  ULTRAM  
Take 1 Tab by mouth every eight (8) hours as needed. Max Daily Amount: 150 mg.  
  
 TYLENOL 325 mg tablet Generic drug:  acetaminophen Take 650 mg by mouth every six (6) hours as needed for Pain. VITAMIN D3 PO Take 1,000 Units by mouth daily. Prescriptions Printed Refills  
 gabapentin (NEURONTIN) 100 mg capsule 5 Si in the am and 2 in the evening as directed  Indications: NEUROPATHIC PAIN Class: Print We Performed the Following REFERRAL TO PHYSICAL THERAPY [OTI00 Custom] Comments:  
 Eval/Treat Aqua Therapy. Follow-up Instructions Return in about 3 months (around 2018) for Medication follow up, PT follow up. Referral Information Referral ID Referred By Referred To  
  
 [id number] Doug [facility] [address] [facility]. [address] Phone: [phone] Fax: [phone] Visits Status Start Date End Date 1 New Request 3/27/18 3/27/19 If your referral has a status of pending review or denied, additional information will be sent to support the outcome of this decision. Patient Instructions Low Back Arthritis: Exercises Your Care Instructions Here are some examples of typical rehabilitation exercises for your condition. Start each exercise slowly. Ease off the exercise if you start to have pain. Your doctor or physical therapist will tell you when you can start these exercises and which ones will work best for you. When you are not being active, find a comfortable position for rest. Some people are comfortable on the floor or a medium-firm bed with a small pillow under their head and another under their knees. Some people prefer to lie on their side with a pillow between their knees. Don't stay in one position for too long. Take short walks (10 to 20 minutes) every 2 to 3 hours. Avoid slopes, hills, and stairs until you feel better. Walk only distances you can manage without pain, especially leg pain. How to do the exercises Pelvic tilt 1. Lie on your back with your knees bent. 2. \"Brace\" your stomach-tighten your muscles by pulling in and imagining your belly button moving toward your spine. 3. Press your lower back into the floor. You should feel your hips and pelvis rock back. 4. Hold for 6 seconds while breathing smoothly. 5. Relax and allow your pelvis and hips to rock forward. 6. Repeat 8 to 12 times. Back stretches 1. Get down on your hands and knees on the floor. 2. Relax your head and allow it to droop. Round your back up toward the ceiling until you feel a nice stretch in your upper, middle, and lower back. Hold this stretch for as long as it feels comfortable, or about 15 to 30 seconds. 3. Return to the starting position with a flat back while you are on your hands and knees. 4. Let your back sway by pressing your stomach toward the floor. Lift your buttocks toward the ceiling. 5. Hold this position for 15 to 30 seconds. 6. Repeat 2 to 4 times. Follow-up care is a key part of your treatment and safety. Be sure to make and go to all appointments, and call your doctor if you are having problems. It's also a good idea to know your test results and keep a list of the medicines you take. Where can you learn more? Go to http://deacon-matthew.info/. Enter N976 in the search box to learn more about \"Low Back Arthritis: Exercises. \" Current as of: March 21, 2017 Content Version: 11.4 © 8107-8660 Healthwise, Incorporated. Care instructions adapted under license by [employer] (which disclaims liability or warranty for this information). If you have questions about a medical condition or this instruction, always ask your healthcare professional. [address] any warranty or liability for your use of this information. Introducing [facility]! Dear Amber Tinsley: Thank you for requesting a Gre[employer] account.   Our records indicate that you already have an active Aductions account. You can access your account anytime at [email or web address] Did you know that you can access your hospital and ER discharge instructions at any time in Aductions? You can also review all of your test results from your hospital stay or ER visit. Additional Information If you have questions, please visit the Frequently Asked Questions section of the Aductions website at [email or web address] Remember, Aductions is NOT to be used for urgent needs. For medical emergencies, dial 911. Now available from your iPhone and Android! Please provide this summary of care documentation to your next provider. Your primary care clinician is listed as Abel Dasilva. If you have any questions after today's visit, please call [phone].

## 2018-03-27 NOTE — PATIENT INSTRUCTIONS

## 2018-03-28 RX ORDER — TRAMADOL HYDROCHLORIDE 50 MG/1
50 TABLET ORAL
Qty: 28 TAB | Refills: 0 | OUTPATIENT
Start: 2018-03-28 | End: 2018-08-28 | Stop reason: SDUPTHER

## 2018-03-29 PROBLEM — Z79.01 CHRONIC ANTICOAGULATION: Status: ACTIVE | Noted: 2018-03-29

## 2018-04-11 ENCOUNTER — HOSPITAL ENCOUNTER (OUTPATIENT)
Dept: PHYSICAL THERAPY | Age: 78
Discharge: HOME | End: 2018-04-11
Payer: MEDICARE

## 2018-04-11 PROCEDURE — 97162 PT EVAL MOD COMPLEX 30 MIN: CPT

## 2018-04-11 PROCEDURE — G8978 MOBILITY CURRENT STATUS: HCPCS

## 2018-04-11 PROCEDURE — G8979 MOBILITY GOAL STATUS: HCPCS

## 2018-04-11 PROCEDURE — 97110 THERAPEUTIC EXERCISES: CPT

## 2018-04-11 NOTE — PROGRESS NOTES
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]  Muscle spasm of back [M62.830]  Pain in right hip [M25.551]    SUBJECTIVE  Pain Level (0-10 scale): 5/10  Any medication changes, allergies to medications, adverse drug reactions, diagnosis change, or new procedure performed?: [x] No    [] Yes (see summary sheet for update)  Subjective functional status/changes:   [] No changes reported  See POC    OBJECTIVE    40 min [x]Eval                  []Re-Eval       15 min Therapeutic Exercise:  [x] See flow sheet : HEP   Rationale: increase ROM and increase strength to improve the patients ability to perform ADLs and functional mobility tasks with improved ease and decreased pain. With   [] TE   [] TA   [] neuro   [] other: Patient Education: [x] Review HEP    [] Progressed/Changed HEP based on:   [] positioning   [] body mechanics   [] transfers   [] heat/ice application    [] other:      Other Objective/Functional Measures:      Pain Level (0-10 scale) post treatment: 10    ASSESSMENT/Changes in Function: see POC    Patient will continue to benefit from skilled PT services to modify and progress therapeutic interventions, address functional mobility deficits, address ROM deficits, address strength deficits, analyze and cue movement patterns, analyze and modify body mechanics/ergonomics and assess and modify postural abnormalities to attain remaining goals.      []  See Plan of Care  []  See progress note/recertification  []  See Discharge Summary         Progress towards goals / Updated goals:  Per POC    PLAN  []  Upgrade activities as tolerated     []  Continue plan of care  []  Update interventions per flow sheet       []  Discharge due to:_  []  Other:_      Katherine Acevedo, PT 4/11/2018  3:34 PM    Future Appointments  Date Time Provider Arthur Maia   4/20/2018 2:15 PM Katherine Acevedo, PT [facility]   4/24/2018 1:30 PM Katherine Acevedo, PT [facility]   4/26/2018 9:45 AM Katherine Acevedo, PT [facility]   4/30/2018 12:45 PM Katherine Acevedo, PT [facility]   5/2/2018 12:45 PM Katherine Acevedo, PT [facility]   5/7/2018 12:45 PM Katherine Caevedo, PT [facility]   5/9/2018 12:45 PM Katherine Acevedo, PT [facility]   6/1/2018 1:10 PM [facility]illo [facility]   6/19/2018 10:45 AM Andrés Mathis  [address]    8/14/2018 11:00 AM Leonor Giles MD [facility]

## 2018-04-20 ENCOUNTER — HOSPITAL ENCOUNTER (OUTPATIENT)
Dept: PHYSICAL THERAPY | Age: 78
Discharge: HOME | End: 2018-04-20
Payer: MEDICARE

## 2018-04-20 PROCEDURE — 97113 AQUATIC THERAPY/EXERCISES: CPT

## 2018-04-20 NOTE — PROGRESS NOTES
PT DAILY TREATMENT NOTE - [facility]     Patient Name: Lisa Piper  Date:2018  : 1940   [x]  Patient  Verified  Payor: VA MEDICARE / Plan: VA MEDICARE PART A & B / Product Type: Medicare /    In time:2:05pm  Out time:2:44pm  Total Treatment Time (min): 39  Total Timed Codes (min): 39  1:1 Treatment Time ([address] only): 44   Visit #: 2 of 8    Treatment Area: Pain in right hip [M25.551]  Low back pain [M54.5]    SUBJECTIVE  Pain Level (0-10 scale): 3/10  Any medication changes, allergies to medications, adverse drug reactions, diagnosis change, or new procedure performed?: [x] No    [] Yes (see summary sheet for update)  Subjective functional status/changes:   [] No changes reported  Pt c/o right LB/right buttocks pain. OBJECTIVE       39 min Therapeutic Exercise:  [x] See flow sheet : Aquatic therapy    Rationale: increase ROM and increase strength to improve the patients ability to perform ADLs and functional mobility tasks with improved ease and decreased pain. With   [] TE    [] TA   [] neuro   [] other: Patient Education: [x] Review HEP    [] Progressed/Changed HEP based on:   [] positioning   [] body mechanics   [] transfers   [] heat/ice application    [] other:      Other Objective/Functional Measures: Initiated aquatic exercises today's session. Pain Level (0-10 scale) post treatment: 210    ASSESSMENT/Changes in Function: Pt tolerated first aquatic session very well with good postural awareness during exercises. Patient will continue to benefit from skilled PT services to modify and progress therapeutic interventions, address functional mobility deficits, address ROM deficits, address strength deficits, analyze and cue movement patterns, analyze and modify body mechanics/ergonomics and assess and modify postural abnormalities to attain remaining goals.      []  See Plan of Care  []  See progress note/recertification  []  See Discharge Summary         Progress towards goals / Updated goals:  Short Term Goals: To be accomplished in 2 weeks:                        1. Pt will be (I) and compliant with HEP to maximize therapeutic benefit. Goal met: Pt reports doing her HEP at least once per day, sometimes twice per day. (4/20/18). 2. Pt will have decrease in average pain level to 3-4/10 on the PAS to improve activity tolerance. Long Term Goals: To be accomplished in 4 weeks:                        1. Pt will have improved FOTO score to 54 or greater, to indicate improvement in functional task ability. 2. Pt will be (I) with aquatic exercises to transition to community pool to continue on her own for self-management of symptoms. 3. Pt will demonstrate increased trunk AROM in all planes void of pain, to improve ability to perform ADLs. 4. Pt will demonstrate increase in B hip flexion strength by 1/2-1 MMT grade. 5. Pt will report no difficulty getting in/out of a car.                         6. Pt will improve standing tolerance to 30 minutes to improve ability to tolerate cooking.         PLAN  [x]  Upgrade activities as tolerated     [x]  Continue plan of care  []  Update interventions per flow sheet       []  Discharge due to:_  []  Other:_      Oni Peace, PT 4/20/2018  2:17 PM    Future Appointments  Date Time Provider Arthur Maia   4/24/2018 1:30 PM Oni Peace, PT [facility]   4/26/2018 9:45 AM Oni Peace, PT [facility]   4/30/2018 12:45 PM Oni Peace, PT [facility]   5/2/2018 12:45 PM Oni Peace, PT [facility]   5/7/2018 10:30 AM Oni Peace, PT [facility]   5/9/2018 12:45 PM Oni Peace, PT [facility]   6/1/2018 1:10 PM Jonne Baumgarten, PA-C [facility]   6/19/2018 10:45 AM Ragena Mcardle,  [address]   8/14/2018 11:00 AM Priscila Sanders MD [facility] [address]

## 2018-04-24 ENCOUNTER — HOSPITAL ENCOUNTER (OUTPATIENT)
Dept: PHYSICAL THERAPY | Age: 78
Discharge: HOME | End: 2018-04-24
Payer: MEDICARE

## 2018-04-24 PROCEDURE — 97113 AQUATIC THERAPY/EXERCISES: CPT

## 2018-04-24 NOTE — PROGRESS NOTES
PT DAILY TREATMENT NOTE - [facility]     Patient Name: Marcus Herrmann  Date:2018   : 1940  [x]  Patient  Verified  Payor: [place] MEDICARE / Plan: [place] MEDICARE PART A & B / Product Type: Medicare /    In time:1:30pm  Out time:2:11pm  Total Treatment Time (min): 41  Total Timed Codes (min): 41  1:1 Treatment Time ( [address] only): 41   Visit #: 3 of 8    Treatment Area: Pain in right hip [M25.551]  Low back pain [M54.5]    SUBJECTIVE  Pain Level (0-10 scale): 10  Any medication changes, allergies to medications, adverse drug reactions, diagnosis change, or new procedure performed?: [x] No    [] Yes (see summary sheet for update)  Subjective functional status/changes:   [] No changes reported  Pt states that she did yardwork yesterday and \"it was hard getting up and down\". OBJECTIVE      41 min Therapeutic Exercise:  [x] See flow sheet : Aquatic therapy    Rationale: increase ROM and increase strength to improve the patients ability to perform ADLs and functional mobility tasks with improved ease and decreased pain. With   [] TE   [] TA   [] neuro   [] other: Patient Education: [x] Review HEP    [] Progressed/Changed HEP based on:   [] positioning   [] body mechanics   [] transfers   [] heat/ice application    [] other:      Other Objective/Functional Measures: Increased toe/heel raises and UE horizontal abd/add to 15 reps each. Pain Level (0-10 scale) post treatment: 1/10    ASSESSMENT/Changes in Function: Pt reports having no pain throughout session. Patient will continue to benefit from skilled PT services to modify and progress therapeutic interventions, address functional mobility deficits, address ROM deficits, address strength deficits, analyze and cue movement patterns, analyze and modify body mechanics/ergonomics and assess and modify postural abnormalities to attain remaining goals.      []  See Plan of Care  []  See progress note/recertification  []  See Discharge Summary         Progress towards goals / Updated goals:  Short Term Goals: To be accomplished in 2 weeks:                        0. Pt will be (I) and compliant with HEP to maximize therapeutic benefit.  Goal met: Pt reports doing her HEP at least once per day, sometimes twice per day. (4/20/18).                                     2. Pt will have decrease in average pain level to 3-4/10 on the PAS to improve activity tolerance. Long Term Goals: To be accomplished in 4 weeks:                        8. Pt will have improved FOTO score to 54 or greater, to indicate improvement in functional task ability.                       2. Pt will be (I) with aquatic exercises to transition to community pool to continue on her own for self-management of symptoms.                       6. Pt will demonstrate increased trunk AROM in all planes void of pain, to improve ability to perform ADLs.                       1. Pt will demonstrate increase in B hip flexion strength by 1/2-1 MMT grade.                        5.  Pt will report no difficulty getting in/out of a car.                        6. Pt will improve standing tolerance to 30 minutes to improve ability to tolerate cooking.         PLAN  [x]  Upgrade activities as tolerated     [x]  Continue plan of care  []  Update interventions per flow sheet       []  Discharge due to:_  []  Other:_      Sharyle Mood, PT 4/24/2018  1:37 PM    Future Appointments  Date Time Provider Arthur Carvalho   4/26/2018 9:45 AM Sharyle Mood, PT [facility]   4/30/2018 12:45 PM Sharyle Mood, PT [facility]   5/2/2018 12:45 PM Sharyle Mood, PT [facility]   5/7/2018 10:30 AM Sharyle Mood, PT [facility]   5/9/2018 12:45 PM Sharyle Mood, PT [facility]   6/1/2018 1:10 PM Dimple Arnold PA-C [facility] SCHED   6/19/2018 10:45 AM Nikos Mi  [address]   8/14/2018 11:00 AM MD Maya Orlando

## 2018-04-26 ENCOUNTER — HOSPITAL ENCOUNTER (OUTPATIENT)
Dept: PHYSICAL THERAPY | Age: 78
Discharge: HOME | End: 2018-04-26
Payer: MEDICARE

## 2018-04-26 PROCEDURE — 97113 AQUATIC THERAPY/EXERCISES: CPT

## 2018-04-26 NOTE — PROGRESS NOTES
PT DAILY TREATMENT NOTE - [facility]     Patient Name: Delilah Lai  Date:2018  : 1940  [x]  Patient  Verified  Payor: VA MEDICARE / Plan: VA MEDICARE PART A & B / Product Type: Medicare /    In time:9:43am  Out time:10:28am  Total Treatment Time (min): 45  Total Timed Codes (min): 45  1:1 Treatment Time ( [address] only): 39   Visit #: 4 of 8    Treatment Area: Pain in right hip [M25.551]  Low back pain [M54.5]    SUBJECTIVE  Pain Level (0-10 scale): 4/10  Any medication changes, allergies to medications, adverse drug reactions, diagnosis change, or new procedure performed?: [x] No    [] Yes (see summary sheet for update)  Subjective functional status/changes:   [] No changes reported  Pt states, \"I was on my feet a lot yesterday\". OBJECTIVE    45 min Therapeutic Exercise:  [x] See flow sheet : Aquatic therapy    Rationale: increase ROM and increase strength to improve the patients ability to perform ADLs and functional mobility tasks with improved ease and decreased pain. With   [] TE   [] TA   [] neuro   [] other: Patient Education: [x] Review HEP    [] Progressed/Changed HEP based on:   [] positioning   [] body mechanics   [] transfers   [] heat/ice application    [] other:      Other Objective/Functional Measures: Added UE Shoulder extension w/open paddles and added open paddles to Shoulder horizontal abd/add. Increased hip flex, ext, abd and mini squats to 15 reps each. Pain Level (0-10 scale) post treatment: 1/10    ASSESSMENT/Changes in Function: Pt reports significant decrease in pain level during and immediately after treatment session. Discussed [facility]Rehab [facility] at [facility] with pt and pt voiced interest in this program after she discharges from PT.      Patient will continue to benefit from skilled PT services to modify and progress therapeutic interventions, address functional mobility deficits, address ROM deficits, address strength deficits, analyze and cue movement patterns, analyze and modify body mechanics/ergonomics and assess and modify postural abnormalities to attain remaining goals. []  See Plan of Care  []  See progress note/recertification  []  See Discharge Summary         Progress towards goals / Updated goals:  Short Term Goals: To be accomplished in 2 weeks:                        2. Pt will be (I) and compliant with HEP to maximize therapeutic benefit.  Goal met: Pt reports doing her HEP at least once per day, sometimes twice per day.  (4/20/18).                                      9. Pt will have decrease in average pain level to 3-4/10 on the PAS to improve activity tolerance. Long Term Goals: To be accomplished in 4 weeks:                        0. Pt will have improved FOTO score to 54 or greater, to indicate improvement in functional task ability.                       8. Pt will be (I) with aquatic exercises to transition to community pool to continue on her own for self-management of symptoms.                       8. Pt will demonstrate increased trunk AROM in all planes void of pain, to improve ability to perform ADLs.                       1. Pt will demonstrate increase in B hip flexion strength by 1/2-1 MMT grade.                        5.  Pt will report no difficulty getting in/out of a car.                        6. Pt will improve standing tolerance to 30 minutes to improve ability to tolerate cooking.  Not met: Pt reports standing tolerance of approx 5 minutes when cooking, before she has back and right hip pain.  (4/26/18).         PLAN  [x]  Upgrade activities as tolerated     [x]  Continue plan of care  []  Update interventions per flow sheet       []  Discharge due to:_  []  Other:_      Gavin Serrano PT 4/26/2018  10:04 AM    Future Appointments  Date Time Provider Arthur Carvalho   4/30/2018 12:45 PM Gavin Serrano PT [facility]   5/2/2018 12:45 PM [facility]   5/7/2018 10:30 AM Cliff Brito, PT [facility]   5/9/2018 12:45 PM Cliff Brito, PT [facility]   6/1/2018 1:10 PM [facility]   6/19/2018 10:45 AM Dale Mullins  [address]   8/14/2018 11:00 AM Gerson Giraldo MD [facility]

## 2018-04-30 ENCOUNTER — HOSPITAL ENCOUNTER (OUTPATIENT)
Dept: PHYSICAL THERAPY | Age: 78
Discharge: HOME | End: 2018-04-30
Payer: MEDICARE

## 2018-04-30 PROCEDURE — 97113 AQUATIC THERAPY/EXERCISES: CPT

## 2018-04-30 NOTE — PROGRESS NOTES
PT DAILY TREATMENT NOTE - [facility]     Patient Name: Manuel Dodd   Date:2018  : 1940  [x]  Patient  Verified  Payor: VA MEDICARE / Plan: VA MEDICARE PART A & B / Product Type: Medicare /    In time:12:45pm  Out time:1:29pm  Total Treatment Time (min): 44  Total Timed Codes (min): 44  1:1 Treatment Time ([address] only): 40   Visit #: 5 of 8    Treatment Area: Pain in right hip [M25.551]  Low back pain [M54.5]    SUBJECTIVE  Pain Level (0-10 scale): 10  Any medication changes, allergies to medications, adverse drug reactions, diagnosis change, or new procedure performed?: [x] No    [] Yes (see summary sheet for update)  Subjective functional status/changes:   [] No changes reported  Pt c/o \"aching\" in her LB and right hip region and states, \"sometimes it goes down to my knees\". OBJECTIVE    44 min Therapeutic Exercise:  [x] See flow sheet : Aquatic therapy    Rationale: increase ROM and increase strength to improve the patients ability to perform ADLs and functional mobility tasks with improved ease and decreased pain. With   [] TE   [] TA   [] neuro   [] other: Patient Education: [x] Review HEP    [] Progressed/Changed HEP based on:   [] positioning   [] body mechanics   [] transfers   [] heat/ice application    [] other:      Other Objective/Functional Measures: Increased toe/heel raises to 20 reps, LE circles to 15 reps and step ups to 12 reps fwd and lat. FOTO score of 44 (lumbar spine) and FOTO score of 55 (hip). Pain Level (0-10 scale) post treatment: .5/10    ASSESSMENT/Changes in Function: Pt reports overall decrease in pain and improvement in functional ability since beginning PT.       Patient will continue to benefit from skilled PT services to modify and progress therapeutic interventions, address functional mobility deficits, address ROM deficits, address strength deficits, analyze and cue movement patterns, analyze and modify body mechanics/ergonomics and assess and modify postural abnormalities to attain remaining goals. []  See Plan of Care  []  See progress note/recertification  []  See Discharge Summary         Progress towards goals / Updated goals:  Short Term Goals: To be accomplished in 2 weeks:                        3. Pt will be (I) and compliant with HEP to maximize therapeutic benefit.  Goal met: Pt reports doing her HEP at least once per day, sometimes twice per day.  (4/20/18).                                      2. Pt will have decrease in average pain level to 3-4/10 on the PAS to improve activity tolerance. Long Term Goals: To be accomplished in 4 weeks:                        9. Pt will have improved FOTO score to 54 or greater, to indicate improvement in functional task ability. Progressing: FOTO score of 44 (lumbar spine) and FOTO score of 55 (hip). (4/30/18).                       4. Pt will be (I) with aquatic exercises to transition to community pool to continue on her own for self-management of symptoms.                       6. Pt will demonstrate increased trunk AROM in all planes void of pain, to improve ability to perform ADLs.                       4. Pt will demonstrate increase in B hip flexion strength by 1/2-1 MMT grade.                        5.  Pt will report no difficulty getting in/out of a car.                        6. Pt will improve standing tolerance to 30 minutes to improve ability to tolerate cooking.  Not met: Pt reports standing tolerance of approx 5 minutes when cooking, before she has back and right hip pain.  (4/26/18).         PLAN  [x]  Upgrade activities as tolerated     [x]  Continue plan of care  []  Update interventions per flow sheet       []  Discharge due to:_  []  Other:_      Mikal Haley PT 4/30/2018  12:49 PM    Future Appointments  Date Time Provider Arthur Carvalho   5/2/2018 12:45 PM Mikal Haley PT [facility]   5/7/2018 10:30 [facility]   5/9/2018 12:45 PM Gladis Buck, PT [facility]   6/1/2018 1:10 PM [facility]   6/19/2018 10:45 AM Manisha Yost  [address]   8/14/2018 11:00 AM Sylvia San MD [facility]

## 2018-05-02 ENCOUNTER — HOSPITAL ENCOUNTER (OUTPATIENT)
Dept: PHYSICAL THERAPY | Age: 78
Discharge: HOME | End: 2018-05-02
Payer: MEDICARE

## 2018-05-02 PROCEDURE — 97113 AQUATIC THERAPY/EXERCISES: CPT

## 2018-05-02 NOTE — PROGRESS NOTES
PT DAILY TREATMENT NOTE - [facility]     Patient Name: Baljit Snider  Date:2018  : 1940  [x]  Patient  Verified  Payor: VA MEDICARE / Plan: VA MEDICARE PART A & B / Product Type: Medicare /    In time:12:48pm  Out time: 1:30pm  Total Treatment Time (min): 42  Total Timed Codes (min): 42  1:1 Treatment Time ( [address] only): 42   Visit #: 6 of 8    Treatment Area: Pain in right hip [M25.551]  Low back pain [M54.5]    SUBJECTIVE  Pain Level (0-10 scale): .5/10  Any medication changes, allergies to medications, adverse drug reactions, diagnosis change, or new procedure performed?: [x] No    [] Yes (see summary sheet for update)  Subjective functional status/changes:   [] No changes reported  Pt states she did \" a little yardwork yesterday\" but nothing too strenuous. She reports having no pain at rest, but continues to report decreased standing tolerance. OBJECTIVE      42 min Therapeutic Exercise:  [x] See flow sheet : Aquatic therapy    Rationale: increase ROM and increase strength to improve the patients ability to perform ADLs and functional mobility tasks with improved ease and decreased pain. With   [] TE   [] TA   [] neuro   [] other: Patient Education: [x] Review HEP    [] Progressed/Changed HEP based on:   [] positioning   [] body mechanics   [] transfers   [] heat/ice application    [] other:      Other Objective/Functional Measures: Increased mini squats to 2x10 reps. Pain Level (0-10 scale) post treatment: .5/10    ASSESSMENT/Changes in Function: Pt has good tolerance of aquatic exercises and states she would like to continue aquatic exercises at community pool after discharge from PT.       Patient will continue to benefit from skilled PT services to modify and progress therapeutic interventions, address functional mobility deficits, address ROM deficits, address strength deficits, analyze and cue movement patterns, analyze and modify body mechanics/ergonomics and assess and modify postural abnormalities to attain remaining goals. []  See Plan of Care  []  See progress note/recertification  []  See Discharge Summary         Progress towards goals / Updated goals:  Short Term Goals: To be accomplished in 2 weeks:                        8. Pt will be (I) and compliant with HEP to maximize therapeutic benefit.  Goal met: Pt reports doing her HEP at least once per day, sometimes twice per day.  (4/20/18).                                      0. Pt will have decrease in average pain level to 3-4/10 on the PAS to improve activity tolerance. Long Term Goals: To be accomplished in 4 weeks:                        9. Pt will have improved FOTO score to 54 or greater, to indicate improvement in functional task ability. Progressing: FOTO score of 44 (lumbar spine) and FOTO score of 55 (hip). (4/30/18).                       1. Pt will be (I) with aquatic exercises to transition to community pool to continue on her own for self-management of symptoms.                       0. Pt will demonstrate increased trunk AROM in all planes void of pain, to improve ability to perform ADLs.                       0. Pt will demonstrate increase in B hip flexion strength by 1/2-1 MMT grade.                        5. Pt will report no difficulty getting in/out of a car.                        6. Pt will improve standing tolerance to 30 minutes to improve ability to tolerate cooking.  Not met: Pt reports standing tolerance of approx 5 minutes when cooking, before she has back and right hip pain.  (4/26/18).          PLAN  [x]  Upgrade activities as tolerated     [x]  Continue plan of care  []  Update interventions per flow sheet       []  Discharge due to:_  []  Other:_      Katherine Acevedo PT 5/2/2018  12:59 PM    Future Appointments  Date Time Provider Arthur Carvalho   5/7/2018 10:30 AM Katherine Acevedo PT [facility]   5/9/2018 12:45 PM Katherine Acevedo PT [facility]   6/1/2018 1:10 PM ROOSEVELT Fitch [address]   6/19/2018 10:45 AM Dale Mullins  [address] 8/14/2018 11:00 AM Gerson Giraldo MD [facility]

## 2018-05-07 ENCOUNTER — HOSPITAL ENCOUNTER (OUTPATIENT)
Dept: PHYSICAL THERAPY | Age: 78
Discharge: HOME | End: 2018-05-07
Payer: MEDICARE

## 2018-05-07 PROCEDURE — 97113 AQUATIC THERAPY/EXERCISES: CPT

## 2018-05-07 NOTE — PROGRESS NOTES
PT DAILY TREATMENT NOTE - [facility]     Patient Name: Delilah Lai  Date:2018  : 1940  [x]  Patient  Verified  Payor: VA MEDICARE / Plan: VA MEDICARE PART A & B / Product Type: Medicare /    In time:10:31am  Out time:11:15am  Total Treatment Time (min): 44  Total Timed Codes (min): 44  1:1 Treatment Time ([address] only): 40   Visit #: 7 of 8    Treatment Area: Pain in right hip [M25.551]  Low back pain [M54.5]    SUBJECTIVE  Pain Level (0-10 scale): 0/10  Any medication changes, allergies to medications, adverse drug reactions, diagnosis change, or new procedure performed?: [x] No    [] Yes (see summary sheet for update)  Subjective functional status/changes:   [x] No changes reported      OBJECTIVE      44 min Therapeutic Exercise:  [x] See flow sheet : Aquatic therapy    Rationale: increase ROM and increase strength to improve the patients ability to perform ADLs and functional mobility tasks with improved ease and decreased pain. With   [] TE   [] TA   [] neuro   [] other: Patient Education: [x] Review HEP    [] Progressed/Changed HEP based on:   [] positioning   [] body mechanics   [] transfers   [] heat/ice application    [] other:      Other Objective/Functional Measures: Improved mobility during and after aquatic exercises. Pain Level (0-10 scale) post treatment: 0/10    ASSESSMENT/Changes in Function: Pt states she plans to attend the Post-Rehab program that is offered at the [facility] to continue with aquatic exercises on her own. Pt reports decreased stiffness following aquatic exercises. Patient will continue to benefit from skilled PT services to modify and progress therapeutic interventions, address functional mobility deficits, address ROM deficits, address strength deficits, analyze and cue movement patterns, analyze and modify body mechanics/ergonomics and assess and modify postural abnormalities to attain remaining goals.      []  See Plan of Care  []  See progress note/recertification  []  See Discharge Summary         Progress towards goals / Updated goals:  Short Term Goals: To be accomplished in 2 weeks:                        1. Pt will be (I) and compliant with HEP to maximize therapeutic benefit.  Goal met: Pt reports doing her HEP at least once per day, sometimes twice per day.  (4/20/18).                                      6. Pt will have decrease in average pain level to 3-4/10 on the PAS to improve activity tolerance. Long Term Goals: To be accomplished in 4 weeks:                        3. Pt will have improved FOTO score to 54 or greater, to indicate improvement in functional task ability. Progressing: FOTO score of 44 (lumbar spine) and FOTO score of 55 (hip). (4/30/18).                          7. Pt will be (I) with aquatic exercises to transition to community pool to continue on her own for self-management of symptoms.                       8. Pt will demonstrate increased trunk AROM in all planes void of pain, to improve ability to perform ADLs.                       0. Pt will demonstrate increase in B hip flexion strength by 1/2-1 MMT grade.                        5. Pt will report no difficulty getting in/out of a car. Pt reports \"this is a little better\", and states \"I haven't had to lift my leg as much lately\". (5/7/18).                       0. Pt will improve standing tolerance to 30 minutes to improve ability to tolerate cooking.  Not met: Pt reports standing tolerance of approx 5 minutes when cooking, before she has back and right hip pain.  (4/26/18).          PLAN  []  Upgrade activities as tolerated     [x]  Continue plan of care  []  Update interventions per flow sheet       []  Discharge due to:_  []  Other:_      Carmencita Gandhi, PT 5/7/2018  10:37 AM    Future Appointments  Date Time Provider Arthur Carvalho   5/9/2018 12:45 PM Carmencita Gandhi, PT [facility]   6/1/2018 1:10 PM Yolanda Restrepo PA-C [address] SCHED   6/19/2018 10:45 AM Andrés Mathis  [address] 8/14/2018 11:00 AM [facility]

## 2018-05-09 ENCOUNTER — APPOINTMENT (OUTPATIENT)
Dept: PHYSICAL THERAPY | Age: 78
End: 2018-05-09
Payer: MEDICARE

## 2018-05-16 ENCOUNTER — APPOINTMENT (OUTPATIENT)
Dept: PHYSICAL THERAPY | Age: 78
End: 2018-05-16
Payer: MEDICARE

## 2018-05-17 ENCOUNTER — APPOINTMENT (OUTPATIENT)
Dept: PHYSICAL THERAPY | Age: 78
End: 2018-05-17
Payer: MEDICARE

## 2018-05-18 ENCOUNTER — HOSPITAL ENCOUNTER (OUTPATIENT)
Dept: PHYSICAL THERAPY | Age: 78
Discharge: HOME | End: 2018-05-18
Payer: MEDICARE

## 2018-05-18 PROCEDURE — G8979 MOBILITY GOAL STATUS: HCPCS

## 2018-05-18 PROCEDURE — 97113 AQUATIC THERAPY/EXERCISES: CPT

## 2018-05-18 PROCEDURE — G8980 MOBILITY D/C STATUS: HCPCS

## 2018-05-18 NOTE — PROGRESS NOTES
[facility]  [address]  [place], [address]  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]  Muscle spasm of back [M62.830]  Pain in right hip [M25.551] Onset Date:2012                         Treatment Diagnosis: LB pain, Right hip pain   Prior Hospitalization: see medical history Provider#: [id number]   Medications: Verified on Patient summary List    Comorbidities: Osteoporosis, arthritis, skin cancer, scoliosis, stroke, right TKR ()   Prior Level of Function: (I) with all ADLs and functional mobility tasks         Visits from Start of Care: 8    Missed Visits: 1  Reporting Period : 18 to 18    Summary of Care: Pt has made good progress with aquatic therapy and is (I) with aquatic exercises to continue on her own at this time. Pt has improvement in trunk AROM and in LE strength and in activity tolerance. Pt stated that she signed up for the Post-Rehab program at the [facility] to continue with aquatic exercises on her own.         Progress towards goals:  Short Term Goals:                        8. Pt will be (I) and compliant with HEP to maximize therapeutic benefit.  Goal met: Pt reports doing her HEP at least once per day, sometimes twice per day.  (18).                                      0. Pt will have decrease in average pain level to 3-4/10 on the PAS to improve activity tolerance. Goal met: Pt reports average pain level of 3/10 when walking.  (18). [address]                        6. Pt will have improved FOTO score to 54 or greater, to indicate improvement in functional task ability.  Progressing: FOTO score of 44 (lumbar spine) and FOTO score of 55 (hip). (4/30/18).  Progressing: FOTO score of 44 (lumbar spine) and FOTO score of 56 (hip). (5/18/18).                       9. Pt will be (I) with aquatic exercises to transition to community pool to continue on her own for self-management of symptoms. Goal met: Pt is (I) with aquatic exercises and will now transition to Post-Rehab program at the [facility]. (5/18/18).                       9. Pt will demonstrate increased trunk AROM in all planes void of pain, to improve ability to perform ADLs. Goal met: Trunk AROM: WNL all planes without pain.  (5/18/18).                                                      4. Pt will demonstrate increase in B hip flexion strength by 1/2-1 MMT grade. Goal met: MMT: B hip flex 4+/5 (5/18/18).                       6. Pt will report no difficulty getting in/out of a car. Pt reports \"this is a little better\", and states \"I haven't had to lift my leg as much lately\".  (5/7/18).  Pt continues to report having \"a little difficulty\" getting in/out of a car. (5/18/18).                       8. Pt will improve standing tolerance to 30 minutes to improve ability to tolerate cooking.  Not met: Pt reports standing tolerance of approx 5 minutes when cooking, before she has back and right hip pain.  (4/26/18).  Goal met: Pt reports tolerating 30 minutes of standing to complete functional tasks. (5/18/18).          G-Codes (GP)  Mobility    Goal  CK= 40-59%  D/C  CK= 40-59%      The severity rating is based on clinical judgment and the FOTO score.     ASSESSMENT/RECOMMENDATIONS:  [x]Discontinue therapy: [x]Patient has reached or is progressing toward set goals      []Patient is non-compliant or has abdicated      []Due to lack of appreciable progress towards set Arthur Phillips PT 5/18/2018 2:53 PM

## 2018-05-18 NOTE — PROGRESS NOTES
PT DAILY TREATMENT NOTE - [facility]     Patient Name: Osman Hernandez  Date:2018  : 1940  [x]  Patient  Verified  Payor: [place] MEDICARE / Plan: VA MEDICARE PART A & B / Product Type: Medicare /    In time:1:30pm  Out time:2:17pm  Total Treatment Time (min): 47  Total Timed Codes (min): 47  1:1 Treatment Time ( [address] only): 52   Visit #: 8 of 8    Treatment Area: Pain in right hip [M25.551]  Low back pain [M54.5]    SUBJECTIVE  Pain Level (0-10 scale): 0-3/10  Any medication changes, allergies to medications, adverse drug reactions, diagnosis change, or new procedure performed?: [x] No    [] Yes (see summary sheet for update)  Subjective functional status/changes:   [] No changes reported  Pt states, \"It only hurts when I'm walking and it's not that bad\". Pt stated that she signed up for the Post-Rehab program at the [place] to continue with aquatic exercises on her own. OBJECTIVE      47 min Therapeutic Exercise:  [x] See flow sheet : Aquatic therapy    Rationale: increase ROM and increase strength to improve the patients ability to perform ADLs and functional mobility tasks with improved ease and decreased pain. With   [] TE   [] TA   [] neuro   [] other: Patient Education: [x] Review HEP    [] Progressed/Changed HEP based on:   [] positioning   [] body mechanics   [] transfers   [] heat/ice application    [] other:      Other Objective/Functional Measures: Trunk AROM: WNL all planes without pain. MMT: B LE hip flex 4+/5, B knee flex/ext 5/5. Pain Level (0-10 scale) post treatment: 0/10    ASSESSMENT/Changes in Function: Pt has made good progress with aquatic therapy and is (I) with aquatic exercises to continue on her own at this time. Pt has improvement in trunk AROM and in LE strength and in activity tolerance.          []  See Plan of Care  []  See progress note/recertification  [x]  See Discharge Summary         Progress towards goals / Updated goals:  Short Term Goals: To be accomplished in 2 weeks:                        3. Pt will be (I) and compliant with HEP to maximize therapeutic benefit.  Goal met: Pt reports doing her HEP at least once per day, sometimes twice per day.  (4/20/18).                                      0. Pt will have decrease in average pain level to 3-4/10 on the PAS to improve activity tolerance. Goal met: Pt reports average pain level of 3/10 when walking.  (5/18/18). Long Term Goals: To be accomplished in 4 weeks:                        9. Pt will have improved FOTO score to 54 or greater, to indicate improvement in functional task ability. Progressing: FOTO score of 44 (lumbar spine) and FOTO score of 55 (hip). (4/30/18).  Progressing: FOTO score of 44 (lumbar spine) and FOTO score of 56 (hip). (5/18/18).                       8. Pt will be (I) with aquatic exercises to transition to community pool to continue on her own for self-management of symptoms. Goal met: Pt is (I) with aquatic exercises and will now transition to Post-Rehab program at the [place]. (5/18/18).                       5. Pt will demonstrate increased trunk AROM in all planes void of pain, to improve ability to perform ADLs. Goal met: Trunk AROM: WNL all planes without pain.  (5/18/18).                                 4. Pt will demonstrate increase in B hip flexion strength by 1/2-1 MMT grade. Goal met: MMT: B hip flex 4+/5 (5/18/18).                       6. Pt will report no difficulty getting in/out of a car. Pt reports \"this is a little better\", and states \"I haven't had to lift my leg as much lately\". (5/7/18). Pt continues to report having \"a little difficulty\" getting in/out of a car. (5/18/18).                       2. Pt will improve standing tolerance to 30 minutes to improve ability to tolerate cooking.  Not met: Pt reports standing tolerance of approx 5 minutes when cooking, before she has back and right hip pain.  (4/26/18).   Goal met: Pt reports tolerating 30 minutes of standing to complete functional tasks.  (5/18/18).          PLAN  []  Upgrade activities as tolerated     []  Continue plan of care  []  Update interventions per flow sheet       [x]  Discharge due to:_  []  Other:_      Chasidy Prince, PT 5/18/2018  1:52 PM    Future Appointments  Date Time Provider Arthur Carvalho   6/1/2018 1:10 PM ROOSEVELT Gardner Dani 69   6/19/2018 10:45 AM Quang Gutierrez  [address]   8/14/2018 11:00 AM Jarett Key MD [facility]

## 2018-07-06 DIAGNOSIS — M17.12 PRIMARY OSTEOARTHRITIS OF LEFT KNEE: ICD-10-CM

## 2018-07-06 RX ORDER — TRAMADOL HYDROCHLORIDE 50 MG/1
50 TABLET ORAL
Qty: 28 TAB | Refills: 0 | OUTPATIENT
Start: 2018-07-06

## 2018-07-06 NOTE — TELEPHONE ENCOUNTER
PHONE IN RX    Last Visit: 03/16/2018 with JESSIKA Poe    Next Appointment: 07/12/2018 with JESSIKA Poe   Previous Refill Encounters: 03/28/2018 per JESSIKA Poe #[id number]    Requested Prescriptions     Pending Prescriptions Disp Refills    traMADol (ULTRAM) 50 mg tablet 28 Tab 0     Sig: Take 1 Tab by mouth every eight (8) hours as needed. Max Daily Amount: 150 mg.

## 2018-07-06 NOTE — TELEPHONE ENCOUNTER
Patient called requesting a refill of the prescription traMADol (ULTRAM) 50 mg tablet. Please advise patient regarding this at [phone].

## 2018-07-06 NOTE — TELEPHONE ENCOUNTER
Patient called back to check on refill for Ultram 50mg and I explained to her she needed to come in for an appointment. She states she is doing better and wanted to cancel her appointment on 7/12/18 and  R/S for 8/2/18.

## 2018-07-18 ENCOUNTER — HOSPITAL ENCOUNTER (OUTPATIENT)
Dept: MAMMOGRAPHY | Age: 78
Discharge: HOME | End: 2018-07-18
Attending: FAMILY MEDICINE
Payer: MEDICARE

## 2018-07-18 DIAGNOSIS — Z12.31 VISIT FOR SCREENING MAMMOGRAM: ICD-10-CM

## 2018-07-18 PROCEDURE — 77063 BREAST TOMOSYNTHESIS BI: CPT

## 2018-08-01 ENCOUNTER — OFFICE VISIT (OUTPATIENT)
Dept: ORTHOPEDIC SURGERY | Age: 78
End: 2018-08-01

## 2018-08-01 VITALS
BODY MASS INDEX: 27.52 KG/M2 | DIASTOLIC BLOOD PRESSURE: 63 MMHG | TEMPERATURE: 98 F | RESPIRATION RATE: 14 BRPM | HEART RATE: 60 BPM | OXYGEN SATURATION: 98 % | HEIGHT: 60 IN | SYSTOLIC BLOOD PRESSURE: 151 MMHG | WEIGHT: 140.2 LBS

## 2018-08-01 DIAGNOSIS — M48.062 SPINAL STENOSIS OF LUMBAR REGION WITH NEUROGENIC CLAUDICATION: ICD-10-CM

## 2018-08-01 DIAGNOSIS — M47.816 LUMBAR FACET ARTHROPATHY: Primary | ICD-10-CM

## 2018-08-01 DIAGNOSIS — M16.11 OSTEOARTHRITIS OF RIGHT HIP, UNSPECIFIED OSTEOARTHRITIS TYPE: ICD-10-CM

## 2018-08-01 RX ORDER — TRIAMCINOLONE ACETONIDE 1 MG/G
CREAM TOPICAL
Refills: 0 | COMMUNITY
Start: 2018-07-06 | End: 2019-04-24

## 2018-08-01 RX ORDER — 1.1% SODIUM FLUORIDE PRESCRIPTION DENTAL CREAM 5 MG/G
CREAM DENTAL
Refills: 99 | COMMUNITY
Start: 2018-07-30

## 2018-08-01 RX ORDER — MUPIROCIN 20 MG/G
OINTMENT TOPICAL
COMMUNITY
Start: 2018-06-21

## 2018-08-01 NOTE — MR AVS SNAPSHOT
[address] [facility] [place] [address] 
[phone] Patient: Keshia Patiño MRN: [id number] OIM:[id number]/[id number] Visit Information Date & Time Provider Department Dept. Phone Encounter #  
 8/1/2018 10:30 AM Catherine Jackson MD [facility] [phone] [id number] Follow-up Instructions Return if symptoms worsen or fail to improve. Your Appointments 8/2/2018  2:10 PM  
Follow Up with Alexsandra Butler PA-C  
[facility] \[facility] [place] [facility]\"" ([address] [facility]) Appt Note: LT KNEE/RT HIP 3 mo fu; LT KNEE/RT HIP 3 mo fu r/s from 06/01/18 pt feeling better req this date and time; pt [facility]12[facility] [address] [facility]  
[phone] [address]  
  
    
  
 8/14/2018 11:00 AM  
Any with Alejo Drummond MD  
[facility] ([address] [facility]) Appt Note: Return in about 6 months (around 8/13/2018) for follow up. [address] [facility] [address] [address] [address] [place] [address]  
  
   
 [address] [facility] [address] [facility] [address] [facility] Upcoming Health Maintenance Date Due DTaP/Tdap/Td series (1 - Tdap) 9/16/1961 ZOSTER VACCINE AGE 60> 7/16/2000 GLAUCOMA SCREENING Q2Y 9/16/2005 Bone Densitometry (Dexa) Screening 9/16/2005 Pneumococcal 65+ Low/Medium Risk (1 of 2 - PCV13) 9/16/2005 MEDICARE YEARLY EXAM 3/14/2018 Influenza Age 5 to Adult 8/1/2018 Allergies as of 8/1/2018  Review Complete On: 8/1/2018 By: Catherine Jackson MD  
  
 Severity Noted Reaction Type Reactions Erythromycin  11/14/2014   Side Effect Rash, Itching  
 inflammation Furacin [Nitrofurazone]  09/27/2012    Hives, Other (comments) Intolerance Tobrex [Tobramycin Sulfate]  09/27/2012    Other (comments) Intolerance, extreme redness Current Immunizations  Never Reviewed No immunizations on file. Not reviewed this visit You Were Diagnosed With   
  
 Codes Comments Lumbar facet arthropathy (HCC)    -  Primary ICD-10-CM: M46.96 
ICD-9-CM: 721.3 Spinal stenosis of lumbar region with neurogenic claudication     ICD-10-CM: M48.062 
ICD-9-CM: 724.03 Osteoarthritis of right hip, unspecified osteoarthritis type     ICD-10-CM: M16.11 
ICD-9-CM: 715.95 Vitals BP Pulse Temp Resp Height(growth percentile) Weight(growth percentile) 151/63 60 98 °F (36.7 °C) (Oral) 14 5' (1.524 m) 140 lb 3.2 oz (63.6 kg) SpO2 BMI OB Status Smoking Status 98% 27.38 kg/m2 Menopause Never Smoker BMI and BSA Data Body Mass Index Body Surface Area  
 27.38 kg/m 2 1.64 m 2 Preferred Pharmacy Pharmacy Name Phone [address], [address] [place] [address] 22 [address]00 [facility] [phone] Your Updated Medication List  
  
   
This list is accurate as of 8/1/18 11:32 AM.  Always use your most recent med list.  
  
  
  
  
 clopidogrel 75 mg Tab Commonly known as:  PLAVIX Take 75 mg by mouth daily. ESTRACE 0.01 % (0.1 mg/gram) vaginal cream  
Generic drug:  estradiol APPLY 2/3 LENGTH OF PINKY FINGER AND INSERT INTO VAGINA ONCE A WEEK  
  
 fish oil-dha-epa 1,200-144-216 mg Cap Take  by mouth as needed. gabapentin 100 mg capsule Commonly known as:  NEURONTIN  
2 in the am and 2 in the evening as directed  Indications: NEUROPATHIC PAIN  
  
 MIRALAX 17 gram/dose powder Generic drug:  polyethylene glycol Take 17 g by mouth daily as needed. mupirocin 2 % ointment Commonly known as:  [facility] Use 1 Application to affected area 3 Times Daily. SF 5000 PLUS 1.1 % Crea Generic drug:  fluoride (sodium) BRUSH ON PASTE AS DIRECTED EVERY DAY  
  
 simvastatin 40 mg tablet Commonly known as:  ZOCOR  
 Take  by mouth nightly. traMADol 50 mg tablet Commonly known as:  ULTRAM  
Take 1 Tab by mouth every eight (8) hours as needed. Max Daily Amount: 150 mg.  
  
 triamcinolone acetonide 0.1 % topical cream  
Commonly known as:  KENALOG  
APPLY TO AFFECTED AREA BID TYLENOL 325 mg tablet Generic drug:  acetaminophen Take 650 mg by mouth every six (6) hours as needed for Pain. VITAMIN D3 PO Take 1,000 Units by mouth daily. Follow-up Instructions Return if symptoms worsen or fail to improve. Patient Instructions Preventing Falls: Care Instructions Your Care Instructions Getting around your home safely can be a challenge if you have injuries or health problems that make it easy for you to fall. Loose rugs and furniture in walkways are among the dangers for many older people who have problems walking or who have poor eyesight. People who have conditions such as arthritis, osteoporosis, or dementia also have to be careful not to fall. You can make your home safer with a few simple measures. Follow-up care is a key part of your treatment and safety. Be sure to make and go to all appointments, and call your doctor if you are having problems. It's also a good idea to know your test results and keep a list of the medicines you take. How can you care for yourself at home? Taking care of yourself · You may get dizzy if you do not drink enough water. To prevent dehydration, drink plenty of fluids, enough so that your urine is light yellow or clear like water. Choose water and other caffeine-free clear liquids. If you have kidney, heart, or liver disease and have to limit fluids, talk with your doctor before you increase the amount of fluids you drink. · Exercise regularly to improve your strength, muscle tone, and balance. Walk if you can. Swimming may be a good choice if you cannot walk easily. · Have your vision and hearing checked each year or any time you notice a change. If you have trouble seeing and hearing, you might not be able to avoid objects and could lose your balance. · Know the side effects of the medicines you take. Ask your doctor or pharmacist whether the medicines you take can affect your balance. Sleeping pills or sedatives can affect your balance. · Limit the amount of alcohol you drink. Alcohol can impair your balance and other senses. · Ask your doctor whether calluses or corns on your feet need to be removed. If you wear loose-fitting shoes because of calluses or corns, you can lose your balance and fall. · Talk to your doctor if you have numbness in your feet. Preventing falls at home · Remove raised doorway thresholds, throw rugs, and clutter. Repair loose carpet or raised areas in the floor. · Move furniture and electrical cords to keep them out of walking paths. · Use nonskid floor wax, and wipe up spills right away, especially on ceramic tile floors. · If you use a walker or cane, put rubber tips on it. If you use crutches, clean the bottoms of them regularly with an abrasive pad, such as steel wool. · Keep your house well lit, especially Donell Fake, and outside walkways. Use night-lights in areas such as hallways and bathrooms. Add extra light switches or use remote switches (such as switches that go on or off when you clap your hands) to make it easier to turn lights on if you have to get up during the night. · Install sturdy handrails on stairways. · Move items in your cabinets so that the things you use a lot are on the lower shelves (about waist level). · Keep a cordless phone and a flashlight with new batteries by your bed. If possible, put a phone in each of the main rooms of your house, or carry a cell phone in case you fall and cannot reach a phone. Or, you can wear a device around your neck or wrist. You push a button that sends a signal for help. · Wear low-heeled shoes that fit well and give your feet good support. Use footwear with nonskid soles. Check the heels and soles of your shoes for wear. Repair or replace worn heels or soles. · Do not wear socks without shoes on wood floors. · Walk on the grass when the sidewalks are slippery. If you live in an area that gets snow and ice in the winter, sprinkle salt on slippery steps and sidewalks. Preventing falls in the bath · Install grab bars and nonskid mats inside and outside your shower or tub and near the toilet and sinks. · Use shower chairs and bath benches. · Use a hand-held shower head that will allow you to sit while showering. · Get into a tub or shower by putting the weaker leg in first. Get out of a tub or shower with your strong side first. 
· Repair loose toilet seats and consider installing a raised toilet seat to make getting on and off the toilet easier. · Keep your bathroom door unlocked while you are in the shower. Where can you learn more? Go to http://deaconSendside Networksmatthew.info/. Enter [id number] [id number] in the search box to learn more about \"Preventing Falls: Care Instructions. \" Current as of: May 12, 2017 Content Version: 11.7 © 7366-1286 [employer]. Care instructions adapted under license by [employer] (which disclaims liability or warranty for this information). If you have questions about a medical condition or this instruction, always ask your healthcare professional. [place] [address] any warranty or liability for your use of this information. Low Back Arthritis: Exercises Your Care Instructions Here are some examples of typical rehabilitation exercises for your condition. Start each exercise slowly. Ease off the exercise if you start to have pain. Your doctor or physical therapist will tell you when you can start these exercises and which ones will work best for you. When you are not being active, find a comfortable position for rest. Some people are comfortable on the floor or a medium-firm bed with a small pillow under their head and another under their knees. Some people prefer to lie on their side with a pillow between their knees. Don't stay in one position for too long. Take short walks (10 to 20 minutes) every 2 to 3 hours. Avoid slopes, hills, and stairs until you feel better. Walk only distances you can manage without pain, especially leg pain. How to do the exercises Pelvic tilt 1. Lie on your back with your knees bent. 2. \"Brace\" your stomach-tighten your muscles by pulling in and imagining your belly button moving toward your spine. 3. Press your lower back into the floor. You should feel your hips and pelvis rock back. 4. Hold for 6 seconds while breathing smoothly. 5. Relax and allow your pelvis and hips to rock forward. 6. Repeat 8 to 12 times. Back stretches 1. Get down on your hands and knees on the floor. 2. Relax your head and allow it to droop. Round your back up toward the ceiling until you feel a nice stretch in your upper, middle, and lower back. Hold this stretch for as long as it feels comfortable, or about 15 to 30 seconds. 3. Return to the starting position with a flat back while you are on your hands and knees. 4. Let your back sway by pressing your stomach toward the floor. Lift your buttocks toward the ceiling. 5. Hold this position for 15 to 30 seconds. 6. Repeat 2 to 4 times. Follow-up care is a key part of your treatment and safety. Be sure to make and go to all appointments, and call your doctor if you are having problems. It's also a good idea to know your test results and keep a list of the medicines you take. Where can you learn more? Go to http://deacon-matthew.info/. Enter D084 in the search box to learn more about \"Low Back Arthritis: Exercises. \" Current as of: November 29, 2017 Content Version: 11.7 © 2876-1432 [employer]. Care instructions adapted under license by [employer] (which disclaims liability or warranty for this information). If you have questions about a medical condition or this instruction, always ask your healthcare professional. [address] any warranty or liability for your use of this information. Introducing [facility]! Dear Julien Fuentes: Thank you for requesting a [employer] account. Our records indicate that you already have an active [employer] account. You can access your account anytime at [email or web address] Did you know that you can access your hospital and ER discharge instructions at any time in [employer]? You can also review all of your test results from your hospital stay or ER visit. Additional Information If you have questions, please visit the Frequently Asked Questions section of the [employer] website at [email or web address] Remember, [employer] is NOT to be used for urgent needs. For medical emergencies, dial 911. Now available from your iPhone and Android! Please provide this summary of care documentation to your next provider. Your primary care clinician is listed as Jules Underwood. If you have any questions after today's visit, please call [phone].

## 2018-08-01 NOTE — PROGRESS NOTES
[facility]  [address] [place], [address]  Phone: [phone]  Fax: [phone]    Pt's YOB: 1940    ASSESSMENT   Diagnoses and all orders for this visit:    1. Lumbar facet arthropathy ([address]    2. Spinal stenosis of lumbar region with neurogenic claudication    3. Osteoarthritis of right hip, unspecified osteoarthritis type         IMPRESSION AND PLAN:  Nico Nolasco is a 68 y.o.  female with history of lumbar pain. Pt states that she has more pain in her right leg than than in the left. Pt states that she went to a post-therapy aqua program and developed a skin condition under her swimsuit after two visits. However, she reports significant benefit with the aqua therapy. . Pt takes Neurontin 100 mg 2 tabs BID. She states that the evening dose seems to better relieve her pain. 1) Pt was given information on fall prevention and lumbar arthritis exercises. 2) Discussed tapering off gabapentin, pt does not need a refill at this time. 3) She is not a candidate for NSAID's due to chronic anticoagulation with Plavix. 4) Pt was given a note to extend post-therapy aqua program.  4) Ms. Reema Bradley has a reminder for a \"due or due soon\" health maintenance. I have asked that she contact her primary care provider, Alayna Abel MD, for follow-up on this health maintenance. 5)  demonstrated consistency with prescribing. 6) Pt will follow-up as needed. HISTORY OF PRESENT ILLNESS:  Nico Nolasco is a 68 y.o.  female with history of lumbar pain. Pt states that she has more pain in her right leg than than in the left. She admits that she experienced right hip with the colder weather and activity but states that this has improved. She participated in physical therapy at [facility]. Pt states that she went to a post-therapy aqua program and developed a skin condition under her swimsuit after two visits. The skin condition concerned her a bit and she decided to discontinue the program. However, she reports significant benefit with the aqua therapy and would like a note requesting to extend the program. Pt takes Neurontin 100 mg 2 tabs in the morning and 2 tabs in the evening. She states that the evening dose seems to better relieve her pain. Pt denies any sedation with the Neurontin. Pt states that she has had a right knee replacement four years ago. Pt has an appointment with JESSIKA Washburn tomorrow for her hip. Pt at this time desires to continue with current care. Pain Scale: 3/10    PCP: Yun Richard MD     Past Medical History:   Diagnosis Date    Constipation     Hypercholesteremia     Hypertension     Microscopic hematuria     Stroke [facility])     blurred vision, resolved (taking plavix)     PEACE (stress urinary incontinence, female)     UTI (urinary tract infection)         Social History     Social History    Marital status:      Spouse name: N/A    Number of children: N/A    Years of education: N/A     Occupational History    Not on file. Social History Main Topics    Smoking status: Never Smoker    Smokeless tobacco: Never Used    Alcohol use No    Drug use: No    Sexual activity: Not on file     Other Topics Concern    Not on file     Social History Narrative       Current Outpatient Prescriptions   Medication Sig Dispense Refill    mupirocin (BACTROBAN) 2 % ointment Use 1 Application to affected area 3 Times Daily.  traMADol (ULTRAM) 50 mg tablet Take 1 Tab by mouth every eight (8) hours as needed. Max Daily Amount: 150 mg. 28 Tab 0    acetaminophen (TYLENOL) 325 mg tablet Take 650 mg by mouth every six (6) hours as needed for Pain.  gabapentin (NEURONTIN) 100 mg capsule 2 in the am and 2 in the evening as directed  Indications: NEUROPATHIC PAIN 120 Cap 5    polyethylene glycol (MIRALAX) 17 gram/dose powder Take 17 g by mouth daily as needed.       clopidogrel (PLAVIX) 75 mg tablet Take 75 mg by mouth daily.  ESTRACE 0.01 % (0.1 mg/gram) vaginal cream APPLY 2/3 LENGTH OF PINKY FINGER AND INSERT INTO VAGINA ONCE A WEEK 42.5 g 0    simvastatin (ZOCOR) 40 mg tablet Take  by mouth nightly.  CHOLECALCIFEROL, VITAMIN D3, (VITAMIN D3 PO) Take 1,000 Units by mouth daily.  fish oil-dha-epa 1,200-144-216 mg Cap Take  by mouth as needed.  SF 5000 PLUS 1.1 % crea BRUSH ON PASTE AS DIRECTED EVERY DAY  99    triamcinolone acetonide (KENALOG) 0.1 % topical cream APPLY TO AFFECTED AREA BID  0       Allergies   Allergen Reactions    Erythromycin Rash and Itching       inflammation    Furacin [Nitrofurazone] Hives and Other (comments)     Intolerance    Tobrex [Tobramycin Sulfate] Other (comments)     Intolerance, extreme redness         REVIEW OF SYSTEMS    Constitutional: Negative for fever, chills, or weight change. Respiratory: Negative for cough or shortness of breath. Cardiovascular: Negative for chest pain or palpitations. Gastrointestinal: Negative for acid reflux, change in bowel habits, or constipation. Genitourinary: Negative for dysuria and flank pain. Musculoskeletal: Positive for lumbar pain. Skin: Negative for rash. Neurological: Negative for headaches, dizziness, or numbness. Endo/Heme/Allergies: Negative for increased bruising. Psychiatric/Behavioral: Negative for difficulty with sleep. PHYSICAL EXAMINATION  Visit Vitals    /63    Pulse 60    Temp 98 °F (36.7 °C) (Oral)    Resp 14    Ht 5' (1.524 m)    Wt 140 lb 3.2 oz (63.6 kg)    SpO2 98%    BMI 27.38 kg/m2       Constitutional: Awake, alert, and in no acute distress. Neurological: 1+ symmetrical DTRs in the upper extremities. 1+ symmetrical DTRs in the lower extremities. Sensation to light touch is intact. Negative Adamaris's sign bilaterally. Skin: warm, dry, and intact.    Musculoskeletal: Minimal tenderness to palpation in the lower lumbar region.  No pain with extension, axial loading, or forward flexion. Mild pain with internal rotation of her right hip, no pain on the left. Negative straight leg raise bilaterally. Hip Flex  Quads Hamstrings Ankle DF EHL Ankle PF   Right +4/5 +4/5 +4/5 +4/5 +4/5 +4/5   Left +4/5 +4/5 +4/5 +4/5 +4/5 +4/5     IMAGING:    Lumbar spine MRI from 05/11/2017 was personally reviewed with the Pt and demonstrated:    Results from [facility] on 03/07/17   MRI LUMB SPINE WO CONT     Narrative Procedure: MRI of the lumbar spine without contrast.    CPT code: 30835    Comparisons: None. Indications:  Low back pain and radiculopathy    Technique: T1 weighted, T2 FSE with fat saturation, FSE inversion recovery  sagittal images are supplemented by T2 weighted with fat saturation and T1  weighted axial images. Findings: There is dextroscoliosis centered at approximately L2. Not well evaluated  without coronal images. Sagittal images reveal overall normal vertebral body morphology. No fractures  noted. No suspicious lesions. Alignments are anatomic, no evidence for subluxation.      Conus medullaris ends at the L1 vertebral body level. Correlation of axial and sagittal images reveals the following:    At L1-L2: Mild circumferential disc osteophyte complex. Mild facet arthropathy. Mild central canal stenosis. Moderate to severe foraminal stenosis. At L2-L3: Mild retrolisthesis L2 on L3. Vertebral body oriented to the right  secondary to scoliosis. Uncovering of the disc posterior. Overlying broad-based  disc osteophyte complex. Mild facet arthropathy and buckling of the ligamentum  flavum. Moderate central canal stenosis. Moderate to severe or for severe left  foraminal stenosis. Moderate right. At L3-L4: Moderate circumferential disc osteophyte complex. Vertebral body  oriented to the right secondary to scoliosis.  Moderate facet arthropathy and  buckling of the ligamentum flavum posteriorly resulting in moderate or moderate  to severe central canal stenosis moderate to severe or severe left foraminal  stenosis. Moderate right foraminal stenosis. At L4-L5: Moderate circumferential disc osteophyte complex. Vertebral bodies  oriented to the right. Moderate facet arthropathy and prominent buckling of the  ligamentum flavum. Again moderate central canal stenosis. Moderate left and  moderate to severe right foraminal stenosis. At L5-S1: Grade 1 anterolisthesis of L5 on S1 without evidence for pars defects. Uncovering of the disc posteriorly and overlying broad-based disc protrusion. Moderate facet arthropathy with fluid in the facet joints and buckling of the  ligamentum flavum. Moderate central canal stenosis. Moderate to severe bilateral  foraminal stenosis. Visualized portions of the sacroiliac joints are unremarkable.  Incidentally  imaged retroperitoneal structures are unremarkable as well.                  Impression Impression:    Multilevel multifactorial degenerative changes as above. Written by Katherine Ocampo, as dictated by Anne Torrez MD.  I, Dr. Anne Torrez confirm that all documentation is accurate.

## 2018-08-01 NOTE — LETTER
[facility] Program Extension 8/1/2018 11:27 AM 
 
Ms. Gamal Perrin [place] [address] To Whom It May Concern: 
 
Gamal Perrin is currently under the care of [address] [place] [address]. Please extend Ms. Craig Clemente post therapy aqua program. She stopped the program due to a skin condition which occurred after the first few visits. She has benefited significantly from the program and would very much like to continue. If there are questions or concerns please have the patient contact our office. Sincerely, Lisset Feliz MD

## 2018-08-01 NOTE — PATIENT INSTRUCTIONS
Preventing Falls: Care Instructions  Your Care Instructions    Getting around your home safely can be a challenge if you have injuries or health problems that make it easy for you to fall. Loose rugs and furniture in walkways are among the dangers for many older people who have problems walking or who have poor eyesight. People who have conditions such as arthritis, osteoporosis, or dementia also have to be careful not to fall. You can make your home safer with a few simple measures. Follow-up care is a key part of your treatment and safety. Be sure to make and go to all appointments, and call your doctor if you are having problems. It's also a good idea to know your test results and keep a list of the medicines you take. How can you care for yourself at home? Taking care of yourself  · You may get dizzy if you do not drink enough water. To prevent dehydration, drink plenty of fluids, enough so that your urine is light yellow or clear like water. Choose water and other caffeine-free clear liquids. If you have kidney, heart, or liver disease and have to limit fluids, talk with your doctor before you increase the amount of fluids you drink. · Exercise regularly to improve your strength, muscle tone, and balance. Walk if you can. Swimming may be a good choice if you cannot walk easily. · Have your vision and hearing checked each year or any time you notice a change. If you have trouble seeing and hearing, you might not be able to avoid objects and could lose your balance. · Know the side effects of the medicines you take. Ask your doctor or pharmacist whether the medicines you take can affect your balance. Sleeping pills or sedatives can affect your balance. · Limit the amount of alcohol you drink. Alcohol can impair your balance and other senses. · Ask your doctor whether calluses or corns on your feet need to be removed.  If you wear loose-fitting shoes because of calluses or corns, you can lose your balance and fall. · Talk to your doctor if you have numbness in your feet. Preventing falls at home  · Remove raised doorway thresholds, throw rugs, and clutter. Repair loose carpet or raised areas in the floor. · Move furniture and electrical cords to keep them out of walking paths. · Use nonskid floor wax, and wipe up spills right away, especially on ceramic tile floors. · If you use a walker or cane, put rubber tips on it. If you use crutches, clean the bottoms of them regularly with an abrasive pad, such as steel wool. · Keep your house well lit, especially [facility], and outside walkways. Use night-lights in areas such as hallways and bathrooms. Add extra light switches or use remote switches (such as switches that go on or off when you clap your hands) to make it easier to turn lights on if you have to get up during the night. · Install sturdy handrails on stairways. · Move items in your cabinets so that the things you use a lot are on the lower shelves (about waist level). · Keep a cordless phone and a flashlight with new batteries by your bed. If possible, put a phone in each of the main rooms of your house, or carry a cell phone in case you fall and cannot reach a phone. Or, you can wear a device around your neck or wrist. You push a button that sends a signal for help. · Wear low-heeled shoes that fit well and give your feet good support. Use footwear with nonskid soles. Check the heels and soles of your shoes for wear. Repair or replace worn heels or soles. · Do not wear socks without shoes on wood floors. · Walk on the grass when the sidewalks are slippery. If you live in an area that gets snow and ice in the winter, sprinkle salt on slippery steps and sidewalks. Preventing falls in the bath  · Install grab bars and nonskid mats inside and outside your shower or tub and near the toilet and sinks. · Use shower chairs and bath benches.   · Use a hand-held shower head that will allow you to sit while showering. · Get into a tub or shower by putting the weaker leg in first. Get out of a tub or shower with your strong side first.  · Repair loose toilet seats and consider installing a raised toilet seat to make getting on and off the toilet easier. · Keep your bathroom door unlocked while you are in the shower. Where can you learn more? Go to http://deacon-matthew.info/. Enter 0476 [id number] in the search box to learn more about \"Preventing Falls: Care Instructions. \"  Current as of: May 12, 2017  Content Version: 11.7  © 1650-4207 [employer]. Care instructions adapted under license by [employer] (which disclaims liability or warranty for this information). If you have questions about a medical condition or this instruction, always ask your healthcare professional. [place] [address] any warranty or liability for your use of this information. Low Back Arthritis: Exercises  Your Care Instructions  Here are some examples of typical rehabilitation exercises for your condition. Start each exercise slowly. Ease off the exercise if you start to have pain. Your doctor or physical therapist will tell you when you can start these exercises and which ones will work best for you. When you are not being active, find a comfortable position for rest. Some people are comfortable on the floor or a medium-firm bed with a small pillow under their head and another under their knees. Some people prefer to lie on their side with a pillow between their knees. Don't stay in one position for too long. Take short walks (10 to 20 minutes) every 2 to 3 hours. Avoid slopes, hills, and stairs until you feel better. Walk only distances you can manage without pain, especially leg pain. How to do the exercises  Pelvic tilt    1. Lie on your back with your knees bent. 2.  \"Brace\" your stomach-tighten your muscles by pulling in and imagining your belly button moving toward your spine. 3. Press your lower back into the floor. You should feel your hips and pelvis rock back. 4. Hold for 6 seconds while breathing smoothly. 5. Relax and allow your pelvis and hips to rock forward. 6. Repeat 8 to 12 times. Back stretches    1. Get down on your hands and knees on the floor. 2. Relax your head and allow it to droop. Round your back up toward the ceiling until you feel a nice stretch in your upper, middle, and lower back. Hold this stretch for as long as it feels comfortable, or about 15 to 30 seconds. 3. Return to the starting position with a flat back while you are on your hands and knees. 4. Let your back sway by pressing your stomach toward the floor. Lift your buttocks toward the ceiling. 5. Hold this position for 15 to 30 seconds. 6. Repeat 2 to 4 times. Follow-up care is a key part of your treatment and safety. Be sure to make and go to all appointments, and call your doctor if you are having problems. It's also a good idea to know your test results and keep a list of the medicines you take. Where can you learn more? Go to http://deacon-matthew.info/. Enter O139 in the search box to learn more about \"Low Back Arthritis: Exercises. \"  Current as of: November 29, 2017  Content Version: 11.7  © 2158-2173 [employer]. Care instructions adapted under license by [employer] (which disclaims liability or warranty for this information). If you have questions about a medical condition or this instruction, always ask your healthcare professional. [place] [address] any warranty or liability for your use of this information.

## 2018-08-02 ENCOUNTER — OFFICE VISIT (OUTPATIENT)
Dept: ORTHOPEDIC SURGERY | Age: 78
End: 2018-08-02

## 2018-08-02 VITALS
SYSTOLIC BLOOD PRESSURE: 144 MMHG | WEIGHT: 141.2 LBS | TEMPERATURE: 97.1 F | DIASTOLIC BLOOD PRESSURE: 69 MMHG | RESPIRATION RATE: 16 BRPM | HEIGHT: 60 IN | HEART RATE: 63 BPM | OXYGEN SATURATION: 99 % | BODY MASS INDEX: 27.72 KG/M2

## 2018-08-02 DIAGNOSIS — M16.11 PRIMARY OSTEOARTHRITIS OF RIGHT HIP: ICD-10-CM

## 2018-08-02 DIAGNOSIS — M48.061 SPINAL STENOSIS OF LUMBAR REGION, UNSPECIFIED WHETHER NEUROGENIC CLAUDICATION PRESENT: ICD-10-CM

## 2018-08-02 DIAGNOSIS — M25.551 RIGHT HIP PAIN: Primary | ICD-10-CM

## 2018-08-02 NOTE — PROGRESS NOTES
[address] [place] [address]  [phone]           Patient: Rosaria Oppenheim                MRN: [id number]       SSN: [id number]  YOB: 1940        AGE: 68 y.o. SEX: female  Body mass index is 27.58 kg/(m^2). PCP: Yanira Coelho MD  08/02/18      This office note has been dictated. REVIEW OF SYSTEMS:  Constitutional: Negative for fever, chills, weight loss and malaise/fatigue. HENT: Negative. Eyes: Negative. Respiratory: Negative. Cardiovascular: Negative. Gastrointestinal: No bowel incontinence or constipation. Genitourinary: No bladder incontinence or saddle anesthesia. Skin: Negative. Neurological: Negative. Endo/Heme/Allergies: Negative. Psychiatric/Behavioral: Negative. Musculoskeletal: As per HPI above. Past Medical History:   Diagnosis Date    Constipation     Hypercholesteremia     Hypertension     Microscopic hematuria     Stroke [facility])     blurred vision, resolved (taking plavix)     PEACE (stress urinary incontinence, female)     UTI (urinary tract infection)          Current Outpatient Prescriptions:     SF 5000 PLUS 1.1 % crea, BRUSH ON PASTE AS DIRECTED EVERY DAY, Disp: , Rfl: 99    triamcinolone acetonide (KENALOG) 0.1 % topical cream, APPLY TO AFFECTED AREA BID, Disp: , Rfl: 0    traMADol (ULTRAM) 50 mg tablet, Take 1 Tab by mouth every eight (8) hours as needed. Max Daily Amount: 150 mg., Disp: 28 Tab, Rfl: 0    acetaminophen (TYLENOL) 325 mg tablet, Take 650 mg by mouth every six (6) hours as needed for Pain., Disp: , Rfl:     gabapentin (NEURONTIN) 100 mg capsule, 2 in the am and 2 in the evening as directed  Indications: NEUROPATHIC PAIN, Disp: 120 Cap, Rfl: 5    polyethylene glycol (MIRALAX) 17 gram/dose powder, Take 17 g by mouth daily as needed. , Disp: , Rfl:     clopidogrel (PLAVIX) 75 mg tablet, Take 75 mg by mouth daily. , Disp: , Rfl:     ESTRACE 0.01 % (0.1 mg/gram) vaginal cream, APPLY 2/3 LENGTH OF PINKY FINGER AND INSERT INTO VAGINA ONCE A WEEK, Disp: 42.5 g, Rfl: 0    simvastatin (ZOCOR) 40 mg tablet, Take  by mouth nightly., Disp: , Rfl:     CHOLECALCIFEROL, VITAMIN D3, (VITAMIN D3 PO), Take 1,000 Units by mouth daily. , Disp: , Rfl:     fish oil-dha-epa 1,200-144-216 mg Cap, Take  by mouth as needed. , Disp: , Rfl:     mupirocin (BACTROBAN) 2 % ointment, Use 1 Application to affected area 3 Times Daily. , Disp: , Rfl:     Allergies   Allergen Reactions    Erythromycin Rash and Itching       inflammation    Furacin [Nitrofurazone] Hives and Other (comments)     Intolerance    Tobrex [Tobramycin Sulfate] Other (comments)     Intolerance, extreme redness       Social History     Social History    Marital status:      Spouse name: N/A    Number of children: N/A    Years of education: N/A     Occupational History    Not on file. Social History Main Topics    Smoking status: Never Smoker    Smokeless tobacco: Never Used    Alcohol use No    Drug use: No    Sexual activity: Not on file     Other Topics Concern    Not on file     Social History Narrative       Past Surgical History:   Procedure Laterality Date    HX CATARACT REMOVAL Bilateral     HX CHOLECYSTECTOMY  1972    HX HYSTERECTOMY  09/2016    total    HX KNEE REPLACEMENT Right 04/27/2015    HX OTHER SURGICAL      skin ca removed     HX OTHER SURGICAL  09/2016    bladder support    HX TONSILLECTOMY  1946           We did see Ms. Andria Donnelly for followup with regards to mainly her right hip. The patient does have known advancing arthritis of the right hip. She has had an intraarticular injection in the past, which gave her some relief. She is having some increased groin discomfort with certain activities. However, she is still able to get around pretty good. She denies any radiating pain or numbness down the lower extremities.   There are no fevers, chills, systemic changes, or injuries to report. PHYSICAL EXAMINATION:  In general, the patient is alert and oriented x 3 in no acute distress. The patient is well-developed, well-nourished, with a normal affect. The patient is afebrile. HEENT:  Head is normocephalic and atraumatic. Pupils are equally round and reactive to light and accommodation. Extraocular eye movements are intact. Neck is supple. Trachea is midline. No JVD is present. Breathing is nonlabored. Examination of the lower extremities reveals decreased range of motion of the right hip with internal and external rotation reproducing some groin discomfort. There is negative straight leg raise. There is negative calf tenderness. There is negative Cristhians. There is no evidence of DVT present. RADIOGRAPHS:  Radiographs in the office today, including AP of the pelvis and AP and cross table of the right hip, does show end-staged arthritis of the right hip with bone-to-bone eburnation. It does also show significant degenerative changes and degenerative disc disease of the lower lumbar spine. ASSESSMENT:      1. Lumbar degenerative disc disease. 2. Right hip end-stage osteoarthritis. PLAN:  At this point, we discussed treatment options. We will get her set up for a repeat intraarticular injection of the right hip. We will see her back about in about four weeks after the injection to  the efficacy. She will call with any questions or concerns that shall arise. She will continue with excellent care from Dr. Taylor Hayden at [facility].                  JR Woodrow DAO PA-C, ATC

## 2018-08-17 ENCOUNTER — HOSPITAL ENCOUNTER (OUTPATIENT)
Dept: GENERAL RADIOLOGY | Age: 78
Discharge: HOME | End: 2018-08-17
Attending: PHYSICIAN ASSISTANT
Payer: MEDICARE

## 2018-08-17 DIAGNOSIS — M16.11 PRIMARY OSTEOARTHRITIS OF RIGHT HIP: ICD-10-CM

## 2018-08-17 PROCEDURE — 77030014113 XR FLUORO GUIDE ASP/BX/INJ/LOC

## 2018-08-17 PROCEDURE — 74011250636 HC RX REV CODE- 250/636: Performed by: PHYSICIAN ASSISTANT

## 2018-08-17 PROCEDURE — 74011636320 HC RX REV CODE- 636/320: Performed by: PHYSICIAN ASSISTANT

## 2018-08-17 RX ORDER — LIDOCAINE HYDROCHLORIDE 10 MG/ML
30 INJECTION, SOLUTION EPIDURAL; INFILTRATION; INTRACAUDAL; PERINEURAL
Status: COMPLETED | OUTPATIENT
Start: 2018-08-17 | End: 2018-08-17

## 2018-08-17 RX ORDER — METHYLPREDNISOLONE ACETATE 40 MG/ML
40 INJECTION, SUSPENSION INTRA-ARTICULAR; INTRALESIONAL; INTRAMUSCULAR; SOFT TISSUE
Status: COMPLETED | OUTPATIENT
Start: 2018-08-17 | End: 2018-08-17

## 2018-08-17 RX ADMIN — LIDOCAINE HYDROCHLORIDE 10 ML: 10 INJECTION, SOLUTION EPIDURAL; INFILTRATION; INTRACAUDAL; PERINEURAL at 13:00

## 2018-08-17 RX ADMIN — METHYLPREDNISOLONE ACETATE 40 MG: 40 INJECTION, SUSPENSION INTRA-ARTICULAR; INTRALESIONAL; INTRAMUSCULAR; SOFT TISSUE at 13:00

## 2018-08-17 RX ADMIN — IOPAMIDOL 2 ML: 408 INJECTION, SOLUTION INTRATHECAL at 13:00

## 2018-08-28 DIAGNOSIS — M17.12 PRIMARY OSTEOARTHRITIS OF LEFT KNEE: ICD-10-CM

## 2018-08-28 NOTE — TELEPHONE ENCOUNTER
PHONE IN RX    Last Visit: 08/02/2018 with JESSIKA Wallace    Next Appointment: 09/14/2018 with JESSIKA Wallace   Previous Refill Encounters: 03/28/2018 per JESSIKA Wallace #[id number]     Requested Prescriptions     Pending Prescriptions Disp Refills    traMADol (ULTRAM) 50 mg tablet 28 Tab 0     Sig: Take 1 Tab by mouth every eight (8) hours as needed. Max Daily Amount: 150 mg.

## 2018-08-29 RX ORDER — TRAMADOL HYDROCHLORIDE 50 MG/1
50 TABLET ORAL
Qty: 28 TAB | Refills: 0 | OUTPATIENT
Start: 2018-08-29 | End: 2018-09-21 | Stop reason: SDUPTHER

## 2018-08-29 NOTE — TELEPHONE ENCOUNTER
rx called into patients pharmacy. Attempted to make patient aware,but had to leave generic message as there was no voicemail greeting.

## 2018-09-21 ENCOUNTER — OFFICE VISIT (OUTPATIENT)
Dept: ORTHOPEDIC SURGERY | Age: 78
End: 2018-09-21

## 2018-09-21 VITALS
BODY MASS INDEX: 27.48 KG/M2 | RESPIRATION RATE: 16 BRPM | DIASTOLIC BLOOD PRESSURE: 68 MMHG | WEIGHT: 140 LBS | HEIGHT: 60 IN | TEMPERATURE: 98.3 F | SYSTOLIC BLOOD PRESSURE: 130 MMHG | HEART RATE: 75 BPM | OXYGEN SATURATION: 98 %

## 2018-09-21 DIAGNOSIS — M17.12 PRIMARY OSTEOARTHRITIS OF LEFT KNEE: ICD-10-CM

## 2018-09-21 RX ORDER — TRAMADOL HYDROCHLORIDE 50 MG/1
50 TABLET ORAL
Qty: 28 TAB | Refills: 0 | Status: SHIPPED | OUTPATIENT
Start: 2018-09-21 | End: 2019-05-09

## 2018-09-21 NOTE — PROGRESS NOTES
Chief Complaint   Patient presents with   • Physical     Patient had LEEP in 2018, this is follow up and is able to get a physical also. Patient is doing well at this time.     HISTORY OF PRESENT ILLNESS:  Patient is a 28 year old  alert female who presents today for an annual exam.    No concerns.  Doing well on OCPs.  Desires to continue.  Does not need refills today.  Will call when she does.    LEEP in January. TATE II.  Negative margins    Gynecological History  Menses are 28 days apart, lasting 5 days and moderate in consistency.   Aminta Araujo  Has + history of Sexually transmitted disease, Human papillomavirus (HPV).  Aminta Araujo has positive history of abnormal Pap smears.   Last Pap result was high-grade squamous intraepithelial lesion (HGSIL) and was performed 2017  Patient's last menstrual period was 2018 (approximate).    ALLERGIES:   Allergen Reactions   • Cefzil HIVES     Outpatient Prescriptions Marked as Taking for the 18 encounter (Office Visit) with Krista Rodarte MD   Medication Sig Dispense Refill   • omeprazole (PRILOSEC) 20 MG capsule TAKE 1 CAPSULE BY MOUTH DAILY AS NEEDED (GERD). 90 capsule 1   • amphetamine-dextroamphetamine (ADDERALL XR) 20 MG 24 hr capsule Take 1 capsule by mouth daily. 30 capsule 0   • desogestrel-ethinyl estradiol (MIRCETTE) 0.15-0.02/0.01 MG () per tablet Take 1 tablet by mouth daily. 3 packet 4   • DISPENSE Controlled substance letter printed 1 Units 0     Past Medical History:   Diagnosis Date   • Abnormal Pap smear of cervix 9/23/15     On pap (1st abnormal)    • ADHD (attention deficit hyperactivity disorder), inattentive type 2014    Positive neuropsychiatric testing for ADHD.   • TATE II (cervical intraepithelial neoplasia II) 10/13/2017    Cervical Bx at 11= Mild dysplasia (TATE I) and koilocytosis, ECC= CIN2 . LEEP recommended.    • Dysplasia of cervix, high grade TATE 2 2018    LEEP Bx= CIN11 , repeat pap smear  [address], [address] [facility]  [phone]           Patient: Rosaria Oppenheim                MRN: [id number]       SSN: [id number]  YOB: 1940        AGE: 66 y.o. SEX: female  Body mass index is 27.34 kg/(m^2). PCP: Yanira Coelho MD  09/21/18      This office note has been dictated. REVIEW OF SYSTEMS:  Constitutional: Negative for fever, chills, weight loss and malaise/fatigue. HENT: Negative. Eyes: Negative. Respiratory: Negative. Cardiovascular: Negative. Gastrointestinal: No bowel incontinence or constipation. Genitourinary: No bladder incontinence or saddle anesthesia. Skin: Negative. Neurological: Negative. Endo/Heme/Allergies: Negative. Psychiatric/Behavioral: Negative. Musculoskeletal: As per HPI above. Past Medical History:   Diagnosis Date    Constipation     Hypercholesteremia     Hypertension     Microscopic hematuria     Stroke [facility])     blurred vision, resolved (taking plavix)     PEACE (stress urinary incontinence, female)     UTI (urinary tract infection)          Current Outpatient Prescriptions:     traMADol (ULTRAM) 50 mg tablet, Take 1 Tab by mouth every eight (8) hours as needed. Max Daily Amount: 150 mg., Disp: 28 Tab, Rfl: 0    mupirocin (BACTROBAN) 2 % ointment, Use 1 Application to affected area 3 Times Daily. , Disp: , Rfl:     SF 5000 PLUS 1.1 % crea, BRUSH ON PASTE AS DIRECTED EVERY DAY, Disp: , Rfl: 99    triamcinolone acetonide (KENALOG) 0.1 % topical cream, APPLY TO AFFECTED AREA BID, Disp: , Rfl: 0    acetaminophen (TYLENOL) 325 mg tablet, Take 650 mg by mouth every six (6) hours as needed for Pain., Disp: , Rfl:     gabapentin (NEURONTIN) 100 mg capsule, 2 in the am and 2 in the evening as directed  Indications: NEUROPATHIC PAIN, Disp: 120 Cap, Rfl: 5    polyethylene glycol (MIRALAX) 17 gram/dose powder, Take 17 g by mouth daily as needed. , Disp: , Rfl:     clopidogrel (PLAVIX) 75 mg tablet, Take 75 mg by mouth daily. , Disp: , Rfl:     ESTRACE 0.01 % (0.1 mg/gram) vaginal cream, APPLY 2/3 LENGTH OF PINKY FINGER AND INSERT INTO VAGINA ONCE A WEEK, Disp: 42.5 g, Rfl: 0    simvastatin (ZOCOR) 40 mg tablet, Take  by mouth nightly., Disp: , Rfl:     CHOLECALCIFEROL, VITAMIN D3, (VITAMIN D3 PO), Take 1,000 Units by mouth daily. , Disp: , Rfl:     fish oil-dha-epa 1,200-144-216 mg Cap, Take  by mouth as needed. , Disp: , Rfl:     Allergies   Allergen Reactions    Erythromycin Rash and Itching       inflammation    Furacin [Nitrofurazone] Hives and Other (comments)     Intolerance    Tobrex [Tobramycin Sulfate] Other (comments)     Intolerance, extreme redness       Social History     Social History    Marital status:      Spouse name: N/A    Number of children: N/A    Years of education: N/A     Occupational History    Not on file. Social History Main Topics    Smoking status: Never Smoker    Smokeless tobacco: Never Used    Alcohol use No    Drug use: No    Sexual activity: Not on file     Other Topics Concern    Not on file     Social History Narrative       Past Surgical History:   Procedure Laterality Date    HX CATARACT REMOVAL Bilateral     HX CHOLECYSTECTOMY  1972    HX HYSTERECTOMY  09/2016    total    HX KNEE REPLACEMENT Right 04/27/2015    HX OTHER SURGICAL      skin ca removed     HX OTHER SURGICAL  09/2016    bladder support    HX TONSILLECTOMY  1946           We did see Ms. Vickie Coronel for followup with regards to her right hip. The patient does have known end-staged arthritis of the right hip. She still remains pretty active. I set her up for an intraarticular injection, which was done on August 17, 2018, and it helped a lot. The hip is not causing as much discomfort at this point in time.   She is able to do most of her normal daily activities, but she still gets an occasional discomfort in her buttocks, in 6 months    • LGSIL on Pap smear of cervix 2017    Needs Colpo      Past Surgical History:   Procedure Laterality Date   • Colposcopy  10/7/15     Bx@12-CIN1, Bx@9- Neg   • Colposcopy  10/13/2017    Cervical Bx at 11= Mild dysplasia (TATE I) and koilocytosis, ECC= CIN2    • Colposcopy,loop electrd cervix excis  2018    CIN11-- repeat pap in 6 months    • Insert intrauterine device  2015    Tish   • Tonsillectomy and adenoidectomy     • [place] tooth extraction  2008      Social History     Social History   • Marital status: Single     Spouse name: N/A.    • Number of children: 0   • Years of education: 16     Occupational History   •       [employer]-- full time.      Social History Main Topics   • Smoking status: Never Smoker   • Smokeless tobacco: Never Used   • Alcohol use 1.2 oz/week     2 Standard drinks or equivalent per week      Comment: 6 drinks per month   • Drug use: No   • Sexual activity: Yes     Partners: Male     Birth control/ protection: Pill, Condom     Other Topics Concern   • Hobby Hazards Yes   • Exercise Yes     weights, and cardio 6days a week     Social History Narrative    Originally from [facility].  [employer] with [employer].      Family History   Problem Relation Age of Onset   • Migraine Mother    • Hypertension Mother    • Diabetes Maternal Grandfather    • Hypertension Maternal Grandfather    • Thyroid Maternal Grandfather    • Stroke Maternal Grandmother    • Hypertension Maternal Grandmother    • Cancer Maternal Grandmother         unsure type     Obstetric History       T0      L0     SAB0   TAB0   Ectopic0   Molar0   Multiple0   Live Births0          PHYSICAL EXAM  Vitals:    18 1307   BP: 112/78   Weight: 102.8 kg   Height: 5' 4\" (1.626 m)     GENERAL APEARANCE:  The patient is a pleasant, normal appearing female with normal affect and in no distress.  NECK: Supple. No cervical lymphadenopathy. No  laterally-based and into her groin but not severe. She has had no recent fevers, chills, systemic changes, or injuries to report, and no chest pain or shortness of breath. She is requesting a refill of her pain medicine. PHYSICAL EXAMINATION:  In general, the patient is alert and oriented x 3 in no acute distress. The patient is well-developed, well-nourished, with a normal affect. The patient is afebrile. HEENT:  Head is normocephalic and atraumatic. Pupils are equally round and reactive to light and accommodation. Extraocular eye movements are intact. Neck is supple. Trachea is midline. No JVD is present. Breathing is nonlabored. Examination of the lower extremities reveals pretty well maintained range of motion of the right hip. Forced internal and external rotation does cause some discomfort. There is no pain to palpation to the trochanteric bursa. There is negative straight leg raise. There is negative calf tenderness. There is negative Cristhians. There is no evidence of DVT present. ASSESSMENT:  Right hip end-stage osteoarthritis. PLAN:  At this point, the patient did well with the intraarticular injection. She understands we can do this every three to four months. She will call with any questions or concerns that shall arise when the hip begins to bother her again. She was given a refill of her pain medicine, a one-week supply. She will call with any questions or concerns that shall arise.                    JR Woodrow DAO PA-C, ATC thyromegaly, no nodules palpated, trachea midline.  LUNGS: Clear bilaterally with normal respiratory effort.  HEART:  Regular rate and rhythm. No murmurs noted.    BREASTS: Breast exam performed supine.  No masses, nontender, no nipple discharge or lymphadenopathy.  ABDOMEN: Soft, nontender, nondistended. No hepatosplenomegaly.    SKIN:  Warm and dry to touch.  No lesions or rashes noted.    PSYCHIATRIC/NEUROLOGIC: Appropriate mood and affect, alert and oriented x 3.  EXTREMITIES:  Warm and well perfused. No edema noted.    GENITOURINARY:  Vulva: Inspection of her external genitalia reveals normal mons pubis, labia minora and labia majora.  Normal appearing clitoris, urethral meatus and Edgeley's glands.    Bladder:  No evidence of urethral or bladder tenderness.    Vagina:  Speculum exam reveals pink and moist vaginal mucosa.  Bartholin gland is normal to palpation.  Cervix:  Cervix is normal in appearance with no lesions.  There is no cervical motion tenderness.  Uterus:  Uterus is normal size, mobile and non-tender. No adnexal masses are palpated. Adnexae are non-tender to palpation.  Perineum:  Perineum appears normal.  Anus:  Normal with no apparent lesions.     ASSESSMENT:  Annual Exam.  Contraceptive Counseling.      PLAN:  Self breast awareness reinforced.  Contraception reviewed- OCPs   Continue.  Refill when needed.    HPV testing discussed and new Pap smear recommendations reviewed with patient- Pap was performed today.

## 2018-11-13 DIAGNOSIS — M79.2 NEURITIS: ICD-10-CM

## 2018-11-13 RX ORDER — GABAPENTIN 100 MG/1
CAPSULE ORAL
Qty: 120 CAP | Refills: 0 | Status: SHIPPED | OUTPATIENT
Start: 2018-11-13 | End: 2019-04-24

## 2018-11-13 NOTE — TELEPHONE ENCOUNTER
One month signed, patient should obtian from PCP in the future as she was to FU PRN.  Or can make med fu with Spine

## 2019-02-21 ENCOUNTER — OFFICE VISIT (OUTPATIENT)
Dept: ORTHOPEDIC SURGERY | Age: 79
End: 2019-02-21

## 2019-02-21 VITALS
RESPIRATION RATE: 16 BRPM | BODY MASS INDEX: 26.62 KG/M2 | OXYGEN SATURATION: 99 % | TEMPERATURE: 98.2 F | DIASTOLIC BLOOD PRESSURE: 71 MMHG | HEART RATE: 67 BPM | SYSTOLIC BLOOD PRESSURE: 160 MMHG | WEIGHT: 135.6 LBS | HEIGHT: 60 IN

## 2019-02-21 DIAGNOSIS — M16.11 PRIMARY OSTEOARTHRITIS OF RIGHT HIP: Primary | ICD-10-CM

## 2019-02-21 DIAGNOSIS — Z86.14 HISTORY OF MRSA INFECTION: ICD-10-CM

## 2019-02-21 NOTE — PROGRESS NOTES
Patient: Warner Lara                MRN: [id number]       SSN: [id number]  YOB: 1940        AGE: 66 y.o. SEX: female  Body mass index is 26.48 kg/m². PCP: Stephanie Araujo MD  02/21/19    HISTORY:  I had the pleasure of reviewing Ms. Guzman. She is a tough lady. She has vary bad arthritis of her back and end-staged arthritis of her right hip. She is starting to consider surgery. She had an intraarticular injection in the fall, and it lasted about a month to a month and a half or so. She has been limping more. Her  has noticed. She has pain at night and pain with activities of daily living. She has difficulty putting shoes and socks on and has some low back pain without radiculopathy that she has noticed as well. PHYSICAL EXAMINATION:  On examination today, she is a very nice lady. She walks with a distinctly antalgic and mildly Trendelenburg gait owing to the right side. The left hip rotates nicely. The right hip is quite stiff. It reproduces her groin pain. The low back is mildly tender as well. Straight leg raise is equivocal.  There is just a little bit of neuropathy at L5 but not severely so. There is no foot drop. EHL and tib/ant are 5/5. 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 There is a cyst in the femoral head as well, which appears to be benign. PLAN:  I think she would do very well with a less-invasive, direct anterior, i.e. Jiffy Hip, that we do at the hospital.  Well-developed risks and benefits associated with this. She tells me that she had a MRSA infection in the spring as a result of the pool water and a bathing suit and was treated. With a known history of this, we will plan on Mupirocin preoperatively and also Vancomycin I.V. when she decides to have surgery.   I would be very happy to send her to one of our partners for the spine. She is going to think about things. It is really up to her as to the timing of surgery, and I think she will do extremely well with it. It has been an absolute pleasure to share in her care. cc: Emerald Rutherford M.D. REVIEW OF SYSTEMS:      CON: negative for weight loss, fever  EYE: negative for double vision  ENT: negative for hoarseness  RS:   negative for Tb  GI:    negative for blood in stool  :  negative for blood in urine  Other systems reviewed and noted below. Past Medical History:   Diagnosis Date    Constipation     Hypercholesteremia     Hypertension     Microscopic hematuria     Stroke [facility])     blurred vision, resolved (taking plavix)     PEACE (stress urinary incontinence, female)     UTI (urinary tract infection)        Family History   Problem Relation Age of Onset    Pancreatic Cancer Father 35    Cancer Maternal Aunt        Current Outpatient Medications   Medication Sig Dispense Refill    gabapentin (NEURONTIN) 100 mg capsule TAKE 2 CAPSULES BY MOUTH EVERY MORNING AND TAKE 2 CAPSULES BY MOUTH EVERY EVENING AS DIRECTED 120 Cap 0    traMADol (ULTRAM) 50 mg tablet Take 1 Tab by mouth every eight (8) hours as needed. Max Daily Amount: 150 mg. 28 Tab 0    mupirocin (BACTROBAN) 2 % ointment Use 1 Application to affected area 3 Times Daily.  SF 5000 PLUS 1.1 % crea BRUSH ON PASTE AS DIRECTED EVERY DAY  99    triamcinolone acetonide (KENALOG) 0.1 % topical cream APPLY TO AFFECTED AREA BID  0    acetaminophen (TYLENOL) 325 mg tablet Take 650 mg by mouth every six (6) hours as needed for Pain.  polyethylene glycol (MIRALAX) 17 gram/dose powder Take 17 g by mouth daily as needed.  clopidogrel (PLAVIX) 75 mg tablet Take 75 mg by mouth daily.       ESTRACE 0.01 % (0.1 mg/gram) vaginal cream APPLY 2/3 LENGTH OF PINKY FINGER AND INSERT INTO VAGINA ONCE A WEEK 42.5 g 0    simvastatin (ZOCOR) 40 mg tablet Take  by mouth nightly.  CHOLECALCIFEROL, VITAMIN D3, (VITAMIN D3 PO) Take 1,000 Units by mouth daily.  fish oil-dha-epa 1,200-144-216 mg Cap Take  by mouth as needed. Allergies   Allergen Reactions    Erythromycin Rash and Itching       inflammation    Furacin [Nitrofurazone] Hives and Other (comments)     Intolerance    Tobrex [Tobramycin Sulfate] Other (comments)     Intolerance, extreme redness       Past Surgical History:   Procedure Laterality Date    HX CATARACT REMOVAL Bilateral     HX CHOLECYSTECTOMY  1972    HX HYSTERECTOMY  09/2016    total    HX KNEE REPLACEMENT Right 04/27/2015    HX OTHER SURGICAL      skin ca removed     HX OTHER SURGICAL  09/2016    bladder support    HX TONSILLECTOMY  1946       Social History     Socioeconomic History    Marital status:      Spouse name: Not on file    Number of children: Not on file    Years of education: Not on file    Highest education level: Not on file   Social Needs    Financial resource strain: Not on file    Food insecurity - worry: Not on file    Food insecurity - inability: Not on file   [employer] needs - medical: Not on file   [employer] needs - non-medical: Not on file   Occupational History    Not on file   Tobacco Use    Smoking status: Never Smoker    Smokeless tobacco: Never Used   Substance and Sexual Activity    Alcohol use: No    Drug use: No    Sexual activity: Not on file   Other Topics Concern    Not on file   Social History Narrative    Not on file       Visit Vitals  /71   Pulse 67   Temp 98.2 °F (36.8 °C) (Oral)   Resp 16   Ht 5' (1.524 m)   Wt 135 lb 9.6 oz (61.5 kg)   SpO2 99%   BMI 26.48 kg/m²         PHYSICAL EXAMINATION:  GENERAL: Alert and oriented x3, in no acute distress, well-developed, well-nourished, afebrile. HEART: No JVD.   EYES: No scleral icterus   NECK: No significant lymphadenopathy   LUNGS: No respiratory compromise or indrawing  ABDOMEN: Soft, non-tender, non-distended. Electronically signed by:  Reena Bernal MD

## 2019-04-08 DIAGNOSIS — M16.11 PRIMARY OSTEOARTHRITIS OF RIGHT HIP: ICD-10-CM

## 2019-04-08 DIAGNOSIS — Z01.818 PREOP EXAMINATION: Primary | ICD-10-CM

## 2019-04-24 ENCOUNTER — HOSPITAL ENCOUNTER (OUTPATIENT)
Dept: PREADMISSION TESTING | Age: 79
Discharge: HOME | End: 2019-04-24
Payer: MEDICARE

## 2019-04-24 ENCOUNTER — HOSPITAL ENCOUNTER (OUTPATIENT)
Dept: GENERAL RADIOLOGY | Age: 79
Discharge: HOME | End: 2019-04-24
Payer: MEDICARE

## 2019-04-24 DIAGNOSIS — Z01.818 PREOP EXAMINATION: ICD-10-CM

## 2019-04-24 DIAGNOSIS — M16.11 PRIMARY OSTEOARTHRITIS OF RIGHT HIP: ICD-10-CM

## 2019-04-24 LAB
ABO + RH BLD: NORMAL
ALBUMIN SERPL-MCNC: 4.1 G/DL (ref 3.4–5)
ANION GAP SERPL CALC-SCNC: 5 MMOL/L (ref 3–18)
APPEARANCE UR: CLEAR
APTT PPP: 25.5 SEC (ref 23–36.4)
ATRIAL RATE: 65 BPM
BACTERIA URNS QL MICRO: ABNORMAL /HPF
BASOPHILS # BLD: 0 K/UL (ref 0–0.1)
BASOPHILS NFR BLD: 0 % (ref 0–2)
BILIRUB UR QL: NEGATIVE
BLOOD GROUP ANTIBODIES SERPL: NORMAL
BUN SERPL-MCNC: 14 MG/DL (ref 7–18)
BUN/CREAT SERPL: 26 (ref 12–20)
CALCIUM SERPL-MCNC: 9.7 MG/DL (ref 8.5–10.1)
CALCULATED P AXIS, ECG09: 74 DEGREES
CALCULATED R AXIS, ECG10: -34 DEGREES
CALCULATED T AXIS, ECG11: 36 DEGREES
CHLORIDE SERPL-SCNC: 96 MMOL/L (ref 100–108)
CO2 SERPL-SCNC: 30 MMOL/L (ref 21–32)
COLOR UR: YELLOW
CREAT SERPL-MCNC: 0.53 MG/DL (ref 0.6–1.3)
DIAGNOSIS, 93000: NORMAL
DIFFERENTIAL METHOD BLD: NORMAL
EOSINOPHIL # BLD: 0 K/UL (ref 0–0.4)
EOSINOPHIL NFR BLD: 0 % (ref 0–5)
EPITH CASTS URNS QL MICRO: ABNORMAL /LPF (ref 0–5)
ERYTHROCYTE [DISTWIDTH] IN BLOOD BY AUTOMATED COUNT: 13.2 % (ref 11.6–14.5)
EST. AVERAGE GLUCOSE BLD GHB EST-MCNC: 117 MG/DL
GLUCOSE SERPL-MCNC: 73 MG/DL (ref 74–99)
GLUCOSE UR STRIP.AUTO-MCNC: NEGATIVE MG/DL
HBA1C MFR BLD: 5.7 % (ref 4.2–5.6)
HCT VFR BLD AUTO: 39.7 % (ref 35–45)
HGB BLD-MCNC: 13.5 G/DL (ref 12–16)
HGB UR QL STRIP: ABNORMAL
INR PPP: 1 (ref 0.8–1.2)
KETONES UR QL STRIP.AUTO: NEGATIVE MG/DL
LEUKOCYTE ESTERASE UR QL STRIP.AUTO: ABNORMAL
LYMPHOCYTES # BLD: 2.1 K/UL (ref 0.9–3.6)
LYMPHOCYTES NFR BLD: 23 % (ref 21–52)
MCH RBC QN AUTO: 30.7 PG (ref 24–34)
MCHC RBC AUTO-ENTMCNC: 34 G/DL (ref 31–37)
MCV RBC AUTO: 90.2 FL (ref 74–97)
MONOCYTES # BLD: 0.4 K/UL (ref 0.05–1.2)
MONOCYTES NFR BLD: 4 % (ref 3–10)
NEUTS SEG # BLD: 6.7 K/UL (ref 1.8–8)
NEUTS SEG NFR BLD: 73 % (ref 40–73)
NITRITE UR QL STRIP.AUTO: NEGATIVE
P-R INTERVAL, ECG05: 140 MS
PH UR STRIP: 7 [PH] (ref 5–8)
PLATELET # BLD AUTO: 368 K/UL (ref 135–420)
PMV BLD AUTO: 9.4 FL (ref 9.2–11.8)
POTASSIUM SERPL-SCNC: 3.9 MMOL/L (ref 3.5–5.5)
PROT UR STRIP-MCNC: NEGATIVE MG/DL
PROTHROMBIN TIME: 13 SEC (ref 11.5–15.2)
Q-T INTERVAL, ECG07: 412 MS
QRS DURATION, ECG06: 78 MS
QTC CALCULATION (BEZET), ECG08: 428 MS
RBC # BLD AUTO: 4.4 M/UL (ref 4.2–5.3)
RBC #/AREA URNS HPF: ABNORMAL /HPF (ref 0–5)
SODIUM SERPL-SCNC: 131 MMOL/L (ref 136–145)
SP GR UR REFRACTOMETRY: 1.01 (ref 1–1.03)
SPECIMEN EXP DATE BLD: NORMAL
UROBILINOGEN UR QL STRIP.AUTO: 0.2 EU/DL (ref 0.2–1)
VENTRICULAR RATE, ECG03: 65 BPM
WBC # BLD AUTO: 9.2 K/UL (ref 4.6–13.2)
WBC URNS QL MICRO: ABNORMAL /HPF (ref 0–4)

## 2019-04-24 PROCEDURE — 82040 ASSAY OF SERUM ALBUMIN: CPT

## 2019-04-24 PROCEDURE — 85025 COMPLETE CBC W/AUTO DIFF WBC: CPT

## 2019-04-24 PROCEDURE — 36415 COLL VENOUS BLD VENIPUNCTURE: CPT

## 2019-04-24 PROCEDURE — 85610 PROTHROMBIN TIME: CPT

## 2019-04-24 PROCEDURE — 93005 ELECTROCARDIOGRAM TRACING: CPT

## 2019-04-24 PROCEDURE — 80048 BASIC METABOLIC PNL TOTAL CA: CPT

## 2019-04-24 PROCEDURE — 85730 THROMBOPLASTIN TIME PARTIAL: CPT

## 2019-04-24 PROCEDURE — 86900 BLOOD TYPING SEROLOGIC ABO: CPT

## 2019-04-24 PROCEDURE — 83036 HEMOGLOBIN GLYCOSYLATED A1C: CPT

## 2019-04-24 PROCEDURE — 81001 URINALYSIS AUTO W/SCOPE: CPT

## 2019-04-24 PROCEDURE — 87086 URINE CULTURE/COLONY COUNT: CPT

## 2019-04-24 PROCEDURE — 71046 X-RAY EXAM CHEST 2 VIEWS: CPT

## 2019-04-24 NOTE — PROGRESS NOTES
Nico Nolasco has decided with their surgeon to have a joint replacement to improve mobility and decrease pain. Joint Replacement Pre-Operative class was attended today. Topics discussed included surgery preparation, what to expect the day of surgery, medications (to include a multimodal approach to pain control and limiting narcotics), nutrition, glycemic control, respiratory therapy, physical and occupational therapy, and discharge planning. Discussed the importance of using these alternative pain management methods with the goal of using less opioid use after surgery and at home. A patient education notebook was provided and the opportunity was given to ask questions. The phone number of the Orthopaedic  was provided for any future questions or concerns.

## 2019-04-24 NOTE — PERIOP NOTES
Ms. Marcelino Barnes was here today for her PAT appointment. Health assessment was completed and instructions given regarding NPO status, medications, Hibiclens washes, and removal of all jewelry and/or body piercing. Instructed patient not to remove the red Blood Bank armband that was placed on their arm when the Type and Screen was drawn. Instructed to bring walker to the hospital on the day of surgery so it can be properly adjusted by the physical therapist.  Morenita Coleolph was given to ask questions and all questions were answered. Understanding of instructions was verbalized. /78, asymptomatic, patient stated she had stopped her BP medication more than two years ago. She also stated she has been anxious lately about the surgery. She will check her BP again when she reach home and she will also call her PCP. Her PCP appointtment is on April 29, 2019 for a clearance.

## 2019-04-25 LAB
BACTERIA SPEC CULT: NORMAL
SERVICE CMNT-IMP: NORMAL

## 2019-04-30 ENCOUNTER — OFFICE VISIT (OUTPATIENT)
Dept: ORTHOPEDIC SURGERY | Facility: CLINIC | Age: 79
End: 2019-04-30

## 2019-04-30 DIAGNOSIS — M16.11 PRIMARY OSTEOARTHRITIS OF RIGHT HIP: Primary | ICD-10-CM

## 2019-04-30 DIAGNOSIS — Z01.818 ENCOUNTER FOR PREOPERATIVE EXAMINATION FOR GENERAL SURGICAL PROCEDURE: ICD-10-CM

## 2019-05-01 ENCOUNTER — OFFICE VISIT (OUTPATIENT)
Dept: ORTHOPEDIC SURGERY | Facility: CLINIC | Age: 79
End: 2019-05-01

## 2019-05-01 VITALS
TEMPERATURE: 99.1 F | HEART RATE: 92 BPM | DIASTOLIC BLOOD PRESSURE: 88 MMHG | WEIGHT: 132.8 LBS | OXYGEN SATURATION: 97 % | BODY MASS INDEX: 26.77 KG/M2 | RESPIRATION RATE: 16 BRPM | SYSTOLIC BLOOD PRESSURE: 191 MMHG | HEIGHT: 59 IN

## 2019-05-01 DIAGNOSIS — M16.11 PRIMARY OSTEOARTHRITIS OF RIGHT HIP: ICD-10-CM

## 2019-05-01 DIAGNOSIS — Z01.818 ENCOUNTER FOR PREOPERATIVE EXAMINATION FOR GENERAL SURGICAL PROCEDURE: Primary | ICD-10-CM

## 2019-05-01 RX ORDER — WARFARIN 1 MG/1
10 TABLET ORAL ONCE
Status: CANCELLED | OUTPATIENT
Start: 2019-05-01 | End: 2019-05-01

## 2019-05-01 RX ORDER — PREGABALIN 25 MG/1
50 CAPSULE ORAL ONCE
Status: CANCELLED | OUTPATIENT
Start: 2019-05-01 | End: 2019-05-01

## 2019-05-01 RX ORDER — SULFAMETHOXAZOLE AND TRIMETHOPRIM 800; 160 MG/1; MG/1
1 TABLET ORAL 2 TIMES DAILY
Qty: 10 TAB | Refills: 0 | Status: SHIPPED | OUTPATIENT
Start: 2019-05-01

## 2019-05-01 RX ORDER — DEXAMETHASONE SODIUM PHOSPHATE 4 MG/ML
8 INJECTION, SOLUTION INTRA-ARTICULAR; INTRALESIONAL; INTRAMUSCULAR; INTRAVENOUS; SOFT TISSUE
Status: CANCELLED | OUTPATIENT
Start: 2019-05-01 | End: 2019-05-01

## 2019-05-01 RX ORDER — CELECOXIB 100 MG/1
200 CAPSULE ORAL ONCE
Status: CANCELLED | OUTPATIENT
Start: 2019-05-01 | End: 2019-05-01

## 2019-05-01 RX ORDER — AMLODIPINE BESYLATE 5 MG/1
5 TABLET ORAL DAILY
COMMUNITY
End: 2019-05-06

## 2019-05-01 RX ORDER — ACETAMINOPHEN 325 MG/1
1000 TABLET ORAL ONCE
Status: CANCELLED | OUTPATIENT
Start: 2019-05-01 | End: 2019-05-01

## 2019-05-01 NOTE — H&P (VIEW-ONLY)
[address], [address] [facility] [address] 
[phone] HISTORY & PHYSICAL Patient: Nico Nolasco                MRN: [id number]       SSN: [id number] YOB: 1940        AGE: 66 y.o. SEX: female Body mass index is 26.82 kg/m². PCP: Rai Strauss MD 
05/01/19 CC: right hip end stage OA Problem List Items Addressed This Visit None Visit Diagnoses Encounter for preoperative examination for general surgical procedure    -  Primary Primary osteoarthritis of right hip HPI:  The patient is a pleasant 66 y.o. whom has end stage OA of their Right hip and has failed conservative treatment including but not limited to NSAIDS, cortisone injections, viscosupplementation, PT, and pain medicine. Due to the current findings and affected activities of daily living, surgical intervention is indicated. The alternatives, risks, complications, as well as expected outcome were discussed. These include but are not limited to infection, blood loss, need for blood transfusion, neurovascular damage, DVT, PE,  post-op stiffness and pain, leg length discrepancy, dislocation, anesthetic complications, prothesis longevity, need for more surgery, MI, stroke, and even death. The patient understands and wishes to proceed with surgery. Past Medical History:  
Diagnosis Date  Constipation  Hypercholesteremia  Hypertension   
 no meds now  Microscopic hematuria  MRSA (methicillin resistant staph aureus) culture positive 06/2018 On abdominal wall- tx with Vibramycin  (care everywhere)  Stroke [facility]) not sure when  
 blurred vision, resolved (taking plavix)  PEACE (stress urinary incontinence, female)  UTI (urinary tract infection) Current Outpatient Medications:  
  amLODIPine (NORVASC) 5 mg tablet, Take 5 mg by mouth daily. , Disp: , Rfl:  
   trimethoprim-sulfamethoxazole (BACTRIM DS) 160-800 mg per tablet, Take 1 Tab by mouth two (2) times a day., Disp: 10 Tab, Rfl: 0 
  traMADol (ULTRAM) 50 mg tablet, Take 1 Tab by mouth every eight (8) hours as needed. Max Daily Amount: 150 mg., Disp: 28 Tab, Rfl: 0 
  mupirocin (BACTROBAN) 2 % ointment, Use 1 Application to affected area 3 Times Daily. , Disp: , Rfl:  
  SF 5000 PLUS 1.1 % crea, BRUSH ON PASTE AS DIRECTED EVERY DAY, Disp: , Rfl: 99 
  acetaminophen (TYLENOL) 325 mg tablet, Take 650 mg by mouth every six (6) hours as needed for Pain., Disp: , Rfl:  
  polyethylene glycol (MIRALAX) 17 gram/dose powder, Take 17 g by mouth daily as needed. , Disp: , Rfl:  
  clopidogrel (PLAVIX) 75 mg tablet, Take 75 mg by mouth daily. , Disp: , Rfl:  
  ESTRACE 0.01 % (0.1 mg/gram) vaginal cream, APPLY 2/3 LENGTH OF PINKY FINGER AND INSERT INTO VAGINA ONCE A WEEK, Disp: 42.5 g, Rfl: 0 
  simvastatin (ZOCOR) 40 mg tablet, Take  by mouth nightly., Disp: , Rfl:  
  CHOLECALCIFEROL, VITAMIN D3, (VITAMIN D3 PO), Take 1,000 Units by mouth daily. , Disp: , Rfl:  
 
Allergies Allergen Reactions  Erythromycin Rash and Itching  
   
inflammation  Furacin [Nitrofurazone] Hives and Other (comments) Intolerance  Tobrex [Tobramycin Sulfate] Other (comments) Intolerance, extreme redness Social History Socioeconomic History  Marital status:  Spouse name: Not on file  Number of children: Not on file  Years of education: Not on file  Highest education level: Not on file Occupational History  Not on file Social Needs  Financial resource strain: Not on file  Food insecurity:  
  Worry: Not on file Inability: Not on file  Transportation needs:  
  Medical: Not on file Non-medical: Not on file Tobacco Use  Smoking status: Never Smoker  Smokeless tobacco: Never Used Substance and Sexual Activity  Alcohol use: No  
 Drug use:  No  
  Sexual activity: Not on file Lifestyle  Physical activity:  
  Days per week: Not on file Minutes per session: Not on file  Stress: Not on file Relationships  Social connections:  
  Talks on phone: Not on file Gets together: Not on file Attends Latter-day service: Not on file Active member of club or organization: Not on file Attends meetings of clubs or organizations: Not on file Relationship status: Not on file  Intimate partner violence:  
  Fear of current or ex partner: Not on file Emotionally abused: Not on file Physically abused: Not on file Forced sexual activity: Not on file Other Topics Concern  Not on file Social History Narrative  Not on file Past Surgical History:  
Procedure Laterality Date  HX CATARACT REMOVAL Bilateral   
 HX CHOLECYSTECTOMY  1972  HX HYSTERECTOMY  09/2016  
 total  
 HX KNEE REPLACEMENT Right 04/27/2015  HX OTHER SURGICAL  2005, 2015  
 skin ca removed  HX OTHER SURGICAL  09/2016  
 bladder support  HX OTHER SURGICAL  01/2019  
 basal skin ca removed [address] Family History:  Non-contributory. PE: 
Visit Vitals /88 Pulse 92 Temp 99.1 °F (37.3 °C) (Oral) Resp 16 Ht 4' 11\" (1.499 m) Wt 132 lb 12.8 oz (60.2 kg) SpO2 97% BMI 26.82 kg/m² A&O X3, NAD, well develop, well nourished Heart: S1-S2, rrr 
Lungs: CTA bilat Abd: soft, nt, nt, + bs in all quadrants Ext:  Pos distal pulses to DP, PT Leg lengths show the right LE to be slightly shorter grossly sitting in the chair X-ray: right hip shows end stage OA Labs: labs were reviewed and wnl.  ua pos, treated with levaquin A:  Right  hip end stage OA 
 
P:  At this point we will move forward with surgery. Again, the alternatives, risks, complications, as well as expected outcome were discussed and the patient wishes to proceed with surgery.   Pt has been instructed to stop aspirin, nsaids, rheumatologic medications and blood thinners. They have also been instructed to continue on any heart and bp meds and to take them the morning of surgery with sips of water. Direct anterior approach. The patient will require in-patient admission. Admission as an in-patient is reasonable and necessary due to increased risk of surgery due to the factors indicated as well as the possible need for prolonged in-hospital or skilled post-acute care in order to improve this patient's functional ability. Sandy Pereira

## 2019-05-01 NOTE — H&P
[address] [place] [address]  [phone]           HISTORY & PHYSICAL      Patient: Lori Urena                MRN: [id number]       SSN: [id number]  YOB: 1940        AGE: 66 y.o. SEX: female  Body mass index is 26.82 kg/m². PCP: Anuradha Patel MD  05/01/19      CC: right hip end stage OA  Problem List Items Addressed This Visit     None      Visit Diagnoses     Encounter for preoperative examination for general surgical procedure    -  Primary    Primary osteoarthritis of right hip                HPI:  The patient is a pleasant 66 y.o. whom has end stage OA of their Right hip and has failed conservative treatment including but not limited to NSAIDS, cortisone injections, viscosupplementation, PT, and pain medicine. Due to the current findings and affected activities of daily living, surgical intervention is indicated. The alternatives, risks, complications, as well as expected outcome were discussed. These include but are not limited to infection, blood loss, need for blood transfusion, neurovascular damage, DVT, PE,  post-op stiffness and pain, leg length discrepancy, dislocation, anesthetic complications, prothesis longevity, need for more surgery, MI, stroke, and even death. The patient understands and wishes to proceed with surgery. Past Medical History:   Diagnosis Date    Constipation     Hypercholesteremia     Hypertension     no meds now    Microscopic hematuria     MRSA (methicillin resistant staph aureus) culture positive 06/2018    On abdominal wall- tx with Vibramycin  (care everywhere)    Stroke [facility]) not sure when    blurred vision, resolved (taking plavix)     PEACE (stress urinary incontinence, female)     UTI (urinary tract infection)          Current Outpatient Medications:     amLODIPine (NORVASC) 5 mg tablet, Take 5 mg by mouth daily. , Disp: , Rfl:    trimethoprim-sulfamethoxazole (BACTRIM DS) 160-800 mg per tablet, Take 1 Tab by mouth two (2) times a day., Disp: 10 Tab, Rfl: 0    traMADol (ULTRAM) 50 mg tablet, Take 1 Tab by mouth every eight (8) hours as needed. Max Daily Amount: 150 mg., Disp: 28 Tab, Rfl: 0    mupirocin (BACTROBAN) 2 % ointment, Use 1 Application to affected area 3 Times Daily. , Disp: , Rfl:     SF 5000 PLUS 1.1 % crea, BRUSH ON PASTE AS DIRECTED EVERY DAY, Disp: , Rfl: 99    acetaminophen (TYLENOL) 325 mg tablet, Take 650 mg by mouth every six (6) hours as needed for Pain., Disp: , Rfl:     polyethylene glycol (MIRALAX) 17 gram/dose powder, Take 17 g by mouth daily as needed. , Disp: , Rfl:     clopidogrel (PLAVIX) 75 mg tablet, Take 75 mg by mouth daily. , Disp: , Rfl:     ESTRACE 0.01 % (0.1 mg/gram) vaginal cream, APPLY 2/3 LENGTH OF PINKY FINGER AND INSERT INTO VAGINA ONCE A WEEK, Disp: 42.5 g, Rfl: 0    simvastatin (ZOCOR) 40 mg tablet, Take  by mouth nightly., Disp: , Rfl:     CHOLECALCIFEROL, VITAMIN D3, (VITAMIN D3 PO), Take 1,000 Units by mouth daily. , Disp: , Rfl:     Allergies   Allergen Reactions    Erythromycin Rash and Itching       inflammation    Furacin [Nitrofurazone] Hives and Other (comments)     Intolerance    Tobrex [Tobramycin Sulfate] Other (comments)     Intolerance, extreme redness       Social History     Socioeconomic History    Marital status:      Spouse name: Not on file    Number of children: Not on file    Years of education: Not on file    Highest education level: Not on file   Occupational History    Not on file   Social Needs    Financial resource strain: Not on file    Food insecurity:     Worry: Not on file     Inability: Not on file    Transportation needs:     Medical: Not on file     Non-medical: Not on file   Tobacco Use    Smoking status: Never Smoker    Smokeless tobacco: Never Used   Substance and Sexual Activity    Alcohol use: No    Drug use: No    Sexual activity: Not on file   Lifestyle    Physical activity:     Days per week: Not on file     Minutes per session: Not on file    Stress: Not on file   Relationships    Social connections:     Talks on phone: Not on file     Gets together: Not on file     Attends Spiritism service: Not on file     Active member of club or organization: Not on file     Attends meetings of clubs or organizations: Not on file     Relationship status: Not on file    Intimate partner violence:     Fear of current or ex partner: Not on file     Emotionally abused: Not on file     Physically abused: Not on file     Forced sexual activity: Not on file   Other Topics Concern    Not on file   Social History Narrative    Not on file       Past Surgical History:   Procedure Laterality Date    HX CATARACT REMOVAL Bilateral     HX CHOLECYSTECTOMY  1972    HX HYSTERECTOMY  09/2016    total    HX KNEE REPLACEMENT Right 04/27/2015    HX OTHER SURGICAL  2005, 2015    skin ca removed     HX OTHER SURGICAL  09/2016    bladder support    HX OTHER SURGICAL  01/2019    basal skin ca removed     HX TONSILLECTOMY  1946       Family History:  Non-contributory. PE:  Visit Vitals  /88   Pulse 92   Temp 99.1 °F (37.3 °C) (Oral)   Resp 16   Ht 4' 11\" (1.499 m)   Wt 132 lb 12.8 oz (60.2 kg)   SpO2 97%   BMI 26.82 kg/m²     A&O X3, NAD, well develop, well nourished  Heart: S1-S2, rrr  Lungs: CTA bilat  Abd: soft, nt, nt, + bs in all quadrants  Ext:  Pos distal pulses to DP, PT  Leg lengths show the right LE to be slightly shorter grossly sitting in the chair    X-ray: right hip shows end stage OA    Labs: labs were reviewed and wnl.  ua pos, treated with levaquin    A:  Right  hip end stage OA    P:  At this point we will move forward with surgery. Again, the alternatives, risks, complications, as well as expected outcome were discussed and the patient wishes to proceed with surgery.   Pt has been instructed to stop aspirin, nsaids, rheumatologic medications and blood thinners. They have also been instructed to continue on any heart and bp meds and to take them the morning of surgery with sips of water. Direct anterior approach. The patient will require in-patient admission. Admission as an in-patient is reasonable and necessary due to increased risk of surgery due to the factors indicated as well as the possible need for prolonged in-hospital or skilled post-acute care in order to improve this patient's functional ability.         Dany Irvin

## 2019-05-06 RX ORDER — AMLODIPINE BESYLATE 5 MG/1
5 TABLET ORAL DAILY
COMMUNITY
Start: 2019-04-29

## 2019-05-07 ENCOUNTER — ANESTHESIA EVENT (OUTPATIENT)
Dept: SURGERY | Age: 79
DRG: 470 | End: 2019-05-07
Payer: MEDICARE

## 2019-05-08 ENCOUNTER — APPOINTMENT (OUTPATIENT)
Dept: GENERAL RADIOLOGY | Age: 79
DRG: 470 | End: 2019-05-08
Attending: ORTHOPAEDIC SURGERY
Payer: MEDICARE

## 2019-05-08 ENCOUNTER — APPOINTMENT (OUTPATIENT)
Dept: GENERAL RADIOLOGY | Age: 79
DRG: 470 | End: 2019-05-08
Attending: PHYSICIAN ASSISTANT
Payer: MEDICARE

## 2019-05-08 ENCOUNTER — HOSPITAL ENCOUNTER (INPATIENT)
Age: 79
LOS: 1 days | Discharge: HOME HEALTH | DRG: 470 | End: 2019-05-09
Attending: ORTHOPAEDIC SURGERY | Admitting: ORTHOPAEDIC SURGERY
Payer: MEDICARE

## 2019-05-08 ENCOUNTER — ANESTHESIA (OUTPATIENT)
Dept: SURGERY | Age: 79
DRG: 470 | End: 2019-05-08
Payer: MEDICARE

## 2019-05-08 DIAGNOSIS — M16.10 HIP ARTHRITIS: Primary | ICD-10-CM

## 2019-05-08 LAB — GLUCOSE BLD STRIP.AUTO-MCNC: 166 MG/DL (ref 70–110)

## 2019-05-08 PROCEDURE — 77030019557 HC ELECTRD VES SEAL MEDT -F: Performed by: ORTHOPAEDIC SURGERY

## 2019-05-08 PROCEDURE — 74011250637 HC RX REV CODE- 250/637: Performed by: PHYSICIAN ASSISTANT

## 2019-05-08 PROCEDURE — 77030020782 HC GWN BAIR PAWS FLX 3M -B: Performed by: ORTHOPAEDIC SURGERY

## 2019-05-08 PROCEDURE — 77030020294 HC ABD PLLW HIP DERY -B: Performed by: ORTHOPAEDIC SURGERY

## 2019-05-08 PROCEDURE — 87641 MR-STAPH DNA AMP PROBE: CPT

## 2019-05-08 PROCEDURE — 74011000250 HC RX REV CODE- 250: Performed by: ORTHOPAEDIC SURGERY

## 2019-05-08 PROCEDURE — 74011000250 HC RX REV CODE- 250: Performed by: PHYSICIAN ASSISTANT

## 2019-05-08 PROCEDURE — 77030026438 HC STYL ET INTUB CARD -A: Performed by: ANESTHESIOLOGY

## 2019-05-08 PROCEDURE — 65270000029 HC RM PRIVATE

## 2019-05-08 PROCEDURE — 88311 DECALCIFY TISSUE: CPT

## 2019-05-08 PROCEDURE — 74011000258 HC RX REV CODE- 258: Performed by: PHYSICIAN ASSISTANT

## 2019-05-08 PROCEDURE — 74011000258 HC RX REV CODE- 258: Performed by: ORTHOPAEDIC SURGERY

## 2019-05-08 PROCEDURE — 77030020263 HC SOL INJ SOD CL0.9% LFCR 1000ML: Performed by: ORTHOPAEDIC SURGERY

## 2019-05-08 PROCEDURE — C9290 INJ, BUPIVACAINE LIPOSOME: HCPCS | Performed by: ORTHOPAEDIC SURGERY

## 2019-05-08 PROCEDURE — 77030013079 HC BLNKT BAIR HGGR 3M -A: Performed by: ANESTHESIOLOGY

## 2019-05-08 PROCEDURE — 73502 X-RAY EXAM HIP UNI 2-3 VIEWS: CPT

## 2019-05-08 PROCEDURE — 74011000250 HC RX REV CODE- 250

## 2019-05-08 PROCEDURE — 77030012935 HC DRSG AQUACEL BMS -B: Performed by: ORTHOPAEDIC SURGERY

## 2019-05-08 PROCEDURE — 77030031139 HC SUT VCRL2 J&J -A: Performed by: ORTHOPAEDIC SURGERY

## 2019-05-08 PROCEDURE — C1776 JOINT DEVICE (IMPLANTABLE): HCPCS | Performed by: ORTHOPAEDIC SURGERY

## 2019-05-08 PROCEDURE — 74011250636 HC RX REV CODE- 250/636

## 2019-05-08 PROCEDURE — 74011250636 HC RX REV CODE- 250/636: Performed by: PHYSICIAN ASSISTANT

## 2019-05-08 PROCEDURE — 76010000132 HC OR TIME 2.5 TO 3 HR: Performed by: ORTHOPAEDIC SURGERY

## 2019-05-08 PROCEDURE — 0SR90JA REPLACEMENT OF RIGHT HIP JOINT WITH SYNTHETIC SUBSTITUTE, UNCEMENTED, OPEN APPROACH: ICD-10-PCS | Performed by: ORTHOPAEDIC SURGERY

## 2019-05-08 PROCEDURE — 88304 TISSUE EXAM BY PATHOLOGIST: CPT

## 2019-05-08 PROCEDURE — 77030036660: Performed by: ORTHOPAEDIC SURGERY

## 2019-05-08 PROCEDURE — 77030008683 HC TU ET CUF COVD -A: Performed by: ANESTHESIOLOGY

## 2019-05-08 PROCEDURE — 77030006835 HC BLD SAW SAG STRY -B: Performed by: ORTHOPAEDIC SURGERY

## 2019-05-08 PROCEDURE — 77030011264 HC ELECTRD BLD EXT COVD -A: Performed by: ORTHOPAEDIC SURGERY

## 2019-05-08 PROCEDURE — 77030003029 HC SUT VCRL J&J -B: Performed by: ORTHOPAEDIC SURGERY

## 2019-05-08 PROCEDURE — 74011250637 HC RX REV CODE- 250/637: Performed by: NURSE ANESTHETIST, CERTIFIED REGISTERED

## 2019-05-08 PROCEDURE — 76210000006 HC OR PH I REC 0.5 TO 1 HR: Performed by: ORTHOPAEDIC SURGERY

## 2019-05-08 PROCEDURE — 77030018836 HC SOL IRR NACL ICUM -A: Performed by: ORTHOPAEDIC SURGERY

## 2019-05-08 PROCEDURE — 77030013708 HC HNDPC SUC IRR PULS STRY –B: Performed by: ORTHOPAEDIC SURGERY

## 2019-05-08 PROCEDURE — 77030039267 HC ADH SKN EXOFIN S2SG -B: Performed by: ORTHOPAEDIC SURGERY

## 2019-05-08 PROCEDURE — 77030032490 HC SLV COMPR SCD KNE COVD -B: Performed by: ORTHOPAEDIC SURGERY

## 2019-05-08 PROCEDURE — 77030012890: Performed by: ORTHOPAEDIC SURGERY

## 2019-05-08 PROCEDURE — 76060000036 HC ANESTHESIA 2.5 TO 3 HR: Performed by: ORTHOPAEDIC SURGERY

## 2019-05-08 PROCEDURE — 74011636637 HC RX REV CODE- 636/637: Performed by: ORTHOPAEDIC SURGERY

## 2019-05-08 PROCEDURE — 77030002933 HC SUT MCRYL J&J -A: Performed by: ORTHOPAEDIC SURGERY

## 2019-05-08 PROCEDURE — 74011250636 HC RX REV CODE- 250/636: Performed by: NURSE ANESTHETIST, CERTIFIED REGISTERED

## 2019-05-08 PROCEDURE — 82962 GLUCOSE BLOOD TEST: CPT

## 2019-05-08 PROCEDURE — 77030027138 HC INCENT SPIROMETER -A: Performed by: ORTHOPAEDIC SURGERY

## 2019-05-08 PROCEDURE — 74011250636 HC RX REV CODE- 250/636: Performed by: ORTHOPAEDIC SURGERY

## 2019-05-08 PROCEDURE — 77030011265 HC ELECTRD BLD HEX COVD -A: Performed by: ORTHOPAEDIC SURGERY

## 2019-05-08 PROCEDURE — 74011250637 HC RX REV CODE- 250/637: Performed by: ORTHOPAEDIC SURGERY

## 2019-05-08 PROCEDURE — 77030003028 HC SUT VCRL J&J -A: Performed by: ORTHOPAEDIC SURGERY

## 2019-05-08 DEVICE — COMPONENT HIP PRSS FT MTL ON CERM POLYETH X3: Type: IMPLANTABLE DEVICE | Site: HIP | Status: FUNCTIONAL

## 2019-05-08 DEVICE — STEM FEM SZ 5 127D 35X108X44MM -- ACCOLADE II V40: Type: IMPLANTABLE DEVICE | Site: HIP | Status: FUNCTIONAL

## 2019-05-08 DEVICE — INSERT ACET 0DEG 36MM E X3 -- TRIDENT: Type: IMPLANTABLE DEVICE | Site: HIP | Status: FUNCTIONAL

## 2019-05-08 DEVICE — HEAD FEM DELT V40 -5MM NK 36MM -- V40 BIOLOX: Type: IMPLANTABLE DEVICE | Site: HIP | Status: FUNCTIONAL

## 2019-05-08 RX ORDER — LIDOCAINE HYDROCHLORIDE 10 MG/ML
3 INJECTION, SOLUTION EPIDURAL; INFILTRATION; INTRACAUDAL; PERINEURAL ONCE
Status: DISCONTINUED | OUTPATIENT
Start: 2019-05-08 | End: 2019-05-08 | Stop reason: HOSPADM

## 2019-05-08 RX ORDER — ROCURONIUM BROMIDE 10 MG/ML
INJECTION, SOLUTION INTRAVENOUS AS NEEDED
Status: DISCONTINUED | OUTPATIENT
Start: 2019-05-08 | End: 2019-05-08 | Stop reason: HOSPADM

## 2019-05-08 RX ORDER — CELECOXIB 100 MG/1
200 CAPSULE ORAL 2 TIMES DAILY
Status: DISCONTINUED | OUTPATIENT
Start: 2019-05-08 | End: 2019-05-09 | Stop reason: HOSPADM

## 2019-05-08 RX ORDER — DEXTROSE MONOHYDRATE AND SODIUM CHLORIDE 5; .45 G/100ML; G/100ML
75 INJECTION, SOLUTION INTRAVENOUS CONTINUOUS
Status: DISCONTINUED | OUTPATIENT
Start: 2019-05-08 | End: 2019-05-09 | Stop reason: HOSPADM

## 2019-05-08 RX ORDER — DEXTROSE 50 % IN WATER (D50W) INTRAVENOUS SYRINGE
25-50 AS NEEDED
Status: DISCONTINUED | OUTPATIENT
Start: 2019-05-08 | End: 2019-05-08 | Stop reason: HOSPADM

## 2019-05-08 RX ORDER — SODIUM CHLORIDE, SODIUM LACTATE, POTASSIUM CHLORIDE, CALCIUM CHLORIDE 600; 310; 30; 20 MG/100ML; MG/100ML; MG/100ML; MG/100ML
75 INJECTION, SOLUTION INTRAVENOUS CONTINUOUS
Status: DISCONTINUED | OUTPATIENT
Start: 2019-05-08 | End: 2019-05-08 | Stop reason: HOSPADM

## 2019-05-08 RX ORDER — VANCOMYCIN/0.9 % SOD CHLORIDE 1 G/100 ML
1000 PLASTIC BAG, INJECTION (ML) INTRAVENOUS EVERY 12 HOURS
Status: DISCONTINUED | OUTPATIENT
Start: 2019-05-08 | End: 2019-05-08 | Stop reason: SDUPTHER

## 2019-05-08 RX ORDER — INSULIN LISPRO 100 [IU]/ML
INJECTION, SOLUTION INTRAVENOUS; SUBCUTANEOUS
Status: DISCONTINUED | OUTPATIENT
Start: 2019-05-08 | End: 2019-05-09 | Stop reason: HOSPADM

## 2019-05-08 RX ORDER — OXYCODONE HYDROCHLORIDE 5 MG/1
5-10 TABLET ORAL
Status: DISCONTINUED | OUTPATIENT
Start: 2019-05-08 | End: 2019-05-09 | Stop reason: HOSPADM

## 2019-05-08 RX ORDER — ACETAMINOPHEN 325 MG/1
975 TABLET ORAL EVERY 6 HOURS
Status: DISCONTINUED | OUTPATIENT
Start: 2019-05-08 | End: 2019-05-09 | Stop reason: HOSPADM

## 2019-05-08 RX ORDER — SULFAMETHOXAZOLE AND TRIMETHOPRIM 800; 160 MG/1; MG/1
1 TABLET ORAL 2 TIMES DAILY
Status: DISCONTINUED | OUTPATIENT
Start: 2019-05-08 | End: 2019-05-09 | Stop reason: HOSPADM

## 2019-05-08 RX ORDER — DEXAMETHASONE SODIUM PHOSPHATE 10 MG/ML
8 INJECTION INTRAMUSCULAR; INTRAVENOUS
Status: DISCONTINUED | OUTPATIENT
Start: 2019-05-08 | End: 2019-05-08 | Stop reason: HOSPADM

## 2019-05-08 RX ORDER — SODIUM CHLORIDE 0.9 % (FLUSH) 0.9 %
5-40 SYRINGE (ML) INJECTION AS NEEDED
Status: DISCONTINUED | OUTPATIENT
Start: 2019-05-08 | End: 2019-05-08 | Stop reason: HOSPADM

## 2019-05-08 RX ORDER — POLYETHYLENE GLYCOL 3350 17 G/17G
17 POWDER, FOR SOLUTION ORAL
Status: DISCONTINUED | OUTPATIENT
Start: 2019-05-08 | End: 2019-05-08 | Stop reason: RX

## 2019-05-08 RX ORDER — WARFARIN 10 MG/1
10 TABLET ORAL ONCE
Status: COMPLETED | OUTPATIENT
Start: 2019-05-08 | End: 2019-05-08

## 2019-05-08 RX ORDER — CELECOXIB 100 MG/1
200 CAPSULE ORAL ONCE
Status: COMPLETED | OUTPATIENT
Start: 2019-05-08 | End: 2019-05-08

## 2019-05-08 RX ORDER — VANCOMYCIN/0.9 % SOD CHLORIDE 1 G/100 ML
1000 PLASTIC BAG, INJECTION (ML) INTRAVENOUS EVERY 12 HOURS
Status: DISCONTINUED | OUTPATIENT
Start: 2019-05-08 | End: 2019-05-08

## 2019-05-08 RX ORDER — POLYETHYLENE GLYCOL 3350 17 G/17G
17 POWDER, FOR SOLUTION ORAL DAILY PRN
Status: DISCONTINUED | OUTPATIENT
Start: 2019-05-08 | End: 2019-05-09 | Stop reason: HOSPADM

## 2019-05-08 RX ORDER — SUCCINYLCHOLINE CHLORIDE 20 MG/ML
INJECTION INTRAMUSCULAR; INTRAVENOUS AS NEEDED
Status: DISCONTINUED | OUTPATIENT
Start: 2019-05-08 | End: 2019-05-08 | Stop reason: HOSPADM

## 2019-05-08 RX ORDER — FAMOTIDINE 20 MG/1
20 TABLET, FILM COATED ORAL ONCE
Status: COMPLETED | OUTPATIENT
Start: 2019-05-08 | End: 2019-05-08

## 2019-05-08 RX ORDER — VANCOMYCIN/0.9 % SOD CHLORIDE 1 G/100 ML
1000 PLASTIC BAG, INJECTION (ML) INTRAVENOUS EVERY 12 HOURS
Status: COMPLETED | OUTPATIENT
Start: 2019-05-08 | End: 2019-05-09

## 2019-05-08 RX ORDER — PROPOFOL 10 MG/ML
INJECTION, EMULSION INTRAVENOUS AS NEEDED
Status: DISCONTINUED | OUTPATIENT
Start: 2019-05-08 | End: 2019-05-08 | Stop reason: HOSPADM

## 2019-05-08 RX ORDER — SODIUM CHLORIDE 0.9 % (FLUSH) 0.9 %
5-40 SYRINGE (ML) INJECTION EVERY 8 HOURS
Status: DISCONTINUED | OUTPATIENT
Start: 2019-05-08 | End: 2019-05-08 | Stop reason: HOSPADM

## 2019-05-08 RX ORDER — LANOLIN ALCOHOL/MO/W.PET/CERES
1 CREAM (GRAM) TOPICAL 2 TIMES DAILY WITH MEALS
Status: DISCONTINUED | OUTPATIENT
Start: 2019-05-08 | End: 2019-05-09 | Stop reason: HOSPADM

## 2019-05-08 RX ORDER — ROPIVACAINE HYDROCHLORIDE 2 MG/ML
30 INJECTION, SOLUTION EPIDURAL; INFILTRATION; PERINEURAL
Status: DISCONTINUED | OUTPATIENT
Start: 2019-05-08 | End: 2019-05-08

## 2019-05-08 RX ORDER — POVIDONE-IODINE 10 %
SOLUTION, NON-ORAL TOPICAL AS NEEDED
Status: DISCONTINUED | OUTPATIENT
Start: 2019-05-08 | End: 2019-05-08 | Stop reason: HOSPADM

## 2019-05-08 RX ORDER — ASPIRIN 325 MG
325 TABLET, DELAYED RELEASE (ENTERIC COATED) ORAL 2 TIMES DAILY
Status: DISCONTINUED | OUTPATIENT
Start: 2019-05-09 | End: 2019-05-09 | Stop reason: HOSPADM

## 2019-05-08 RX ORDER — EPHEDRINE SULFATE/0.9% NACL/PF 25 MG/5 ML
SYRINGE (ML) INTRAVENOUS AS NEEDED
Status: DISCONTINUED | OUTPATIENT
Start: 2019-05-08 | End: 2019-05-08 | Stop reason: HOSPADM

## 2019-05-08 RX ORDER — NEOSTIGMINE METHYLSULFATE 5 MG/5 ML
SYRINGE (ML) INTRAVENOUS AS NEEDED
Status: DISCONTINUED | OUTPATIENT
Start: 2019-05-08 | End: 2019-05-08 | Stop reason: HOSPADM

## 2019-05-08 RX ORDER — SODIUM CHLORIDE 0.9 % (FLUSH) 0.9 %
5-40 SYRINGE (ML) INJECTION EVERY 8 HOURS
Status: DISCONTINUED | OUTPATIENT
Start: 2019-05-08 | End: 2019-05-09 | Stop reason: HOSPADM

## 2019-05-08 RX ORDER — MAGNESIUM SULFATE 100 %
4 CRYSTALS MISCELLANEOUS AS NEEDED
Status: DISCONTINUED | OUTPATIENT
Start: 2019-05-08 | End: 2019-05-09 | Stop reason: HOSPADM

## 2019-05-08 RX ORDER — INSULIN LISPRO 100 [IU]/ML
INJECTION, SOLUTION INTRAVENOUS; SUBCUTANEOUS ONCE
Status: DISCONTINUED | OUTPATIENT
Start: 2019-05-08 | End: 2019-05-08 | Stop reason: HOSPADM

## 2019-05-08 RX ORDER — ONDANSETRON 2 MG/ML
4 INJECTION INTRAMUSCULAR; INTRAVENOUS
Status: DISCONTINUED | OUTPATIENT
Start: 2019-05-08 | End: 2019-05-09 | Stop reason: HOSPADM

## 2019-05-08 RX ORDER — GLYCOPYRROLATE 0.2 MG/ML
INJECTION INTRAMUSCULAR; INTRAVENOUS AS NEEDED
Status: DISCONTINUED | OUTPATIENT
Start: 2019-05-08 | End: 2019-05-08 | Stop reason: HOSPADM

## 2019-05-08 RX ORDER — POLYMYXIN B 500000 [USP'U]/1
INJECTION, POWDER, LYOPHILIZED, FOR SOLUTION INTRAMUSCULAR; INTRATHECAL; INTRAVENOUS; OPHTHALMIC AS NEEDED
Status: DISCONTINUED | OUTPATIENT
Start: 2019-05-08 | End: 2019-05-08 | Stop reason: HOSPADM

## 2019-05-08 RX ORDER — PREGABALIN 25 MG/1
25 CAPSULE ORAL 2 TIMES DAILY
Status: DISCONTINUED | OUTPATIENT
Start: 2019-05-08 | End: 2019-05-09 | Stop reason: HOSPADM

## 2019-05-08 RX ORDER — VANCOMYCIN/0.9 % SOD CHLORIDE 1 G/100 ML
1000 PLASTIC BAG, INJECTION (ML) INTRAVENOUS ONCE
Status: COMPLETED | OUTPATIENT
Start: 2019-05-08 | End: 2019-05-08

## 2019-05-08 RX ORDER — MIDAZOLAM HYDROCHLORIDE 1 MG/ML
2 INJECTION, SOLUTION INTRAMUSCULAR; INTRAVENOUS ONCE
Status: DISCONTINUED | OUTPATIENT
Start: 2019-05-08 | End: 2019-05-08

## 2019-05-08 RX ORDER — FENTANYL CITRATE 50 UG/ML
100 INJECTION, SOLUTION INTRAMUSCULAR; INTRAVENOUS ONCE
Status: DISCONTINUED | OUTPATIENT
Start: 2019-05-08 | End: 2019-05-08

## 2019-05-08 RX ORDER — AMLODIPINE BESYLATE 5 MG/1
5 TABLET ORAL DAILY
Status: DISCONTINUED | OUTPATIENT
Start: 2019-05-09 | End: 2019-05-09 | Stop reason: HOSPADM

## 2019-05-08 RX ORDER — PREGABALIN 50 MG/1
50 CAPSULE ORAL ONCE
Status: COMPLETED | OUTPATIENT
Start: 2019-05-08 | End: 2019-05-08

## 2019-05-08 RX ORDER — DEXAMETHASONE SODIUM PHOSPHATE 4 MG/ML
INJECTION, SOLUTION INTRA-ARTICULAR; INTRALESIONAL; INTRAMUSCULAR; INTRAVENOUS; SOFT TISSUE AS NEEDED
Status: DISCONTINUED | OUTPATIENT
Start: 2019-05-08 | End: 2019-05-08 | Stop reason: HOSPADM

## 2019-05-08 RX ORDER — LIDOCAINE HYDROCHLORIDE 20 MG/ML
INJECTION, SOLUTION EPIDURAL; INFILTRATION; INTRACAUDAL; PERINEURAL AS NEEDED
Status: DISCONTINUED | OUTPATIENT
Start: 2019-05-08 | End: 2019-05-08 | Stop reason: HOSPADM

## 2019-05-08 RX ORDER — DEXTROSE 50 % IN WATER (D50W) INTRAVENOUS SYRINGE
25-50 AS NEEDED
Status: DISCONTINUED | OUTPATIENT
Start: 2019-05-08 | End: 2019-05-09 | Stop reason: HOSPADM

## 2019-05-08 RX ORDER — MIDAZOLAM HYDROCHLORIDE 1 MG/ML
INJECTION, SOLUTION INTRAMUSCULAR; INTRAVENOUS AS NEEDED
Status: DISCONTINUED | OUTPATIENT
Start: 2019-05-08 | End: 2019-05-08 | Stop reason: HOSPADM

## 2019-05-08 RX ORDER — ACETAMINOPHEN 500 MG
1000 TABLET ORAL ONCE
Status: COMPLETED | OUTPATIENT
Start: 2019-05-08 | End: 2019-05-08

## 2019-05-08 RX ORDER — SODIUM CHLORIDE 0.9 % (FLUSH) 0.9 %
5-40 SYRINGE (ML) INJECTION AS NEEDED
Status: DISCONTINUED | OUTPATIENT
Start: 2019-05-08 | End: 2019-05-09 | Stop reason: HOSPADM

## 2019-05-08 RX ORDER — HYDROMORPHONE HYDROCHLORIDE 1 MG/ML
0.5 INJECTION, SOLUTION INTRAMUSCULAR; INTRAVENOUS; SUBCUTANEOUS AS NEEDED
Status: DISCONTINUED | OUTPATIENT
Start: 2019-05-08 | End: 2019-05-08 | Stop reason: HOSPADM

## 2019-05-08 RX ORDER — CEFAZOLIN SODIUM 2 G/50ML
2 SOLUTION INTRAVENOUS
Status: COMPLETED | OUTPATIENT
Start: 2019-05-08 | End: 2019-05-08

## 2019-05-08 RX ORDER — ONDANSETRON 2 MG/ML
4 INJECTION INTRAMUSCULAR; INTRAVENOUS ONCE
Status: DISCONTINUED | OUTPATIENT
Start: 2019-05-08 | End: 2019-05-08 | Stop reason: HOSPADM

## 2019-05-08 RX ORDER — AMOXICILLIN 250 MG
1 CAPSULE ORAL 2 TIMES DAILY
Status: DISCONTINUED | OUTPATIENT
Start: 2019-05-08 | End: 2019-05-09 | Stop reason: HOSPADM

## 2019-05-08 RX ORDER — NALOXONE HYDROCHLORIDE 0.4 MG/ML
0.4 INJECTION, SOLUTION INTRAMUSCULAR; INTRAVENOUS; SUBCUTANEOUS AS NEEDED
Status: DISCONTINUED | OUTPATIENT
Start: 2019-05-08 | End: 2019-05-09 | Stop reason: HOSPADM

## 2019-05-08 RX ORDER — MAGNESIUM SULFATE 100 %
4 CRYSTALS MISCELLANEOUS AS NEEDED
Status: DISCONTINUED | OUTPATIENT
Start: 2019-05-08 | End: 2019-05-08 | Stop reason: HOSPADM

## 2019-05-08 RX ORDER — VANCOMYCIN HYDROCHLORIDE 1 G/20ML
INJECTION, POWDER, LYOPHILIZED, FOR SOLUTION INTRAVENOUS AS NEEDED
Status: DISCONTINUED | OUTPATIENT
Start: 2019-05-08 | End: 2019-05-08 | Stop reason: HOSPADM

## 2019-05-08 RX ORDER — FLUMAZENIL 0.1 MG/ML
0.2 INJECTION INTRAVENOUS AS NEEDED
Status: DISCONTINUED | OUTPATIENT
Start: 2019-05-08 | End: 2019-05-09 | Stop reason: HOSPADM

## 2019-05-08 RX ORDER — ONDANSETRON 2 MG/ML
INJECTION INTRAMUSCULAR; INTRAVENOUS AS NEEDED
Status: DISCONTINUED | OUTPATIENT
Start: 2019-05-08 | End: 2019-05-08 | Stop reason: HOSPADM

## 2019-05-08 RX ORDER — ZOLPIDEM TARTRATE 5 MG/1
5 TABLET ORAL
Status: DISCONTINUED | OUTPATIENT
Start: 2019-05-08 | End: 2019-05-09 | Stop reason: HOSPADM

## 2019-05-08 RX ORDER — DEXAMETHASONE SODIUM PHOSPHATE 100 MG/10ML
8 INJECTION INTRAMUSCULAR; INTRAVENOUS
Status: DISCONTINUED | OUTPATIENT
Start: 2019-05-08 | End: 2019-05-08 | Stop reason: RX

## 2019-05-08 RX ORDER — FENTANYL CITRATE 50 UG/ML
INJECTION, SOLUTION INTRAMUSCULAR; INTRAVENOUS AS NEEDED
Status: DISCONTINUED | OUTPATIENT
Start: 2019-05-08 | End: 2019-05-08 | Stop reason: HOSPADM

## 2019-05-08 RX ADMIN — Medication 10 ML: at 23:02

## 2019-05-08 RX ADMIN — SULFAMETHOXAZOLE AND TRIMETHOPRIM 1 TABLET: 800; 160 TABLET ORAL at 18:18

## 2019-05-08 RX ADMIN — FENTANYL CITRATE 50 MCG: 50 INJECTION, SOLUTION INTRAMUSCULAR; INTRAVENOUS at 11:43

## 2019-05-08 RX ADMIN — PROPOFOL 100 MG: 10 INJECTION, EMULSION INTRAVENOUS at 11:43

## 2019-05-08 RX ADMIN — ACETAMINOPHEN 975 MG: 325 TABLET ORAL at 23:01

## 2019-05-08 RX ADMIN — WARFARIN SODIUM 10 MG: 10 TABLET ORAL at 10:05

## 2019-05-08 RX ADMIN — DEXTROSE MONOHYDRATE AND SODIUM CHLORIDE 75 ML/HR: 5; .45 INJECTION, SOLUTION INTRAVENOUS at 18:27

## 2019-05-08 RX ADMIN — SODIUM CHLORIDE, SODIUM LACTATE, POTASSIUM CHLORIDE, AND CALCIUM CHLORIDE 75 ML/HR: 600; 310; 30; 20 INJECTION, SOLUTION INTRAVENOUS at 10:05

## 2019-05-08 RX ADMIN — ONDANSETRON 4 MG: 2 INJECTION INTRAMUSCULAR; INTRAVENOUS at 12:49

## 2019-05-08 RX ADMIN — ROCURONIUM BROMIDE 5 MG: 10 INJECTION, SOLUTION INTRAVENOUS at 11:43

## 2019-05-08 RX ADMIN — TRANEXAMIC ACID 1 G: 100 INJECTION, SOLUTION INTRAVENOUS at 11:50

## 2019-05-08 RX ADMIN — Medication 10 ML: at 15:00

## 2019-05-08 RX ADMIN — LIDOCAINE HYDROCHLORIDE 60 MG: 20 INJECTION, SOLUTION EPIDURAL; INFILTRATION; INTRACAUDAL; PERINEURAL at 11:43

## 2019-05-08 RX ADMIN — ACETAMINOPHEN 1000 MG: 500 TABLET ORAL at 10:05

## 2019-05-08 RX ADMIN — CELECOXIB 200 MG: 100 CAPSULE ORAL at 23:00

## 2019-05-08 RX ADMIN — DEXAMETHASONE SODIUM PHOSPHATE 8 MG: 4 INJECTION, SOLUTION INTRA-ARTICULAR; INTRALESIONAL; INTRAMUSCULAR; INTRAVENOUS; SOFT TISSUE at 13:20

## 2019-05-08 RX ADMIN — TRANEXAMIC ACID 1 G: 100 INJECTION, SOLUTION INTRAVENOUS at 13:30

## 2019-05-08 RX ADMIN — CELECOXIB 200 MG: 100 CAPSULE ORAL at 10:14

## 2019-05-08 RX ADMIN — FERROUS SULFATE TAB 325 MG (65 MG ELEMENTAL FE) 325 MG: 325 (65 FE) TAB at 18:18

## 2019-05-08 RX ADMIN — SENNOSIDES, DOCUSATE SODIUM 1 TABLET: 50; 8.6 TABLET, FILM COATED ORAL at 18:18

## 2019-05-08 RX ADMIN — VANCOMYCIN HYDROCHLORIDE 1000 MG: 10 INJECTION, POWDER, LYOPHILIZED, FOR SOLUTION INTRAVENOUS at 23:00

## 2019-05-08 RX ADMIN — FENTANYL CITRATE 50 MCG: 50 INJECTION, SOLUTION INTRAMUSCULAR; INTRAVENOUS at 12:05

## 2019-05-08 RX ADMIN — Medication 3 MG: at 13:36

## 2019-05-08 RX ADMIN — MIDAZOLAM HYDROCHLORIDE 1 MG: 1 INJECTION, SOLUTION INTRAMUSCULAR; INTRAVENOUS at 11:37

## 2019-05-08 RX ADMIN — ROCURONIUM BROMIDE 10 MG: 10 INJECTION, SOLUTION INTRAVENOUS at 12:44

## 2019-05-08 RX ADMIN — SUCCINYLCHOLINE CHLORIDE 120 MG: 20 INJECTION INTRAMUSCULAR; INTRAVENOUS at 11:43

## 2019-05-08 RX ADMIN — Medication 5 MG: at 12:15

## 2019-05-08 RX ADMIN — ROCURONIUM BROMIDE 10 MG: 10 INJECTION, SOLUTION INTRAVENOUS at 13:21

## 2019-05-08 RX ADMIN — PREGABALIN 25 MG: 25 CAPSULE ORAL at 23:01

## 2019-05-08 RX ADMIN — MIDAZOLAM HYDROCHLORIDE 1 MG: 1 INJECTION, SOLUTION INTRAMUSCULAR; INTRAVENOUS at 11:43

## 2019-05-08 RX ADMIN — PREGABALIN 50 MG: 50 CAPSULE ORAL at 10:05

## 2019-05-08 RX ADMIN — VANCOMYCIN HYDROCHLORIDE 1000 MG: 10 INJECTION, POWDER, LYOPHILIZED, FOR SOLUTION INTRAVENOUS at 10:36

## 2019-05-08 RX ADMIN — Medication 10 ML: at 18:22

## 2019-05-08 RX ADMIN — GLYCOPYRROLATE 0.6 MG: 0.2 INJECTION INTRAMUSCULAR; INTRAVENOUS at 13:36

## 2019-05-08 RX ADMIN — FENTANYL CITRATE 50 MCG: 50 INJECTION, SOLUTION INTRAMUSCULAR; INTRAVENOUS at 13:39

## 2019-05-08 RX ADMIN — DEXTROSE MONOHYDRATE AND SODIUM CHLORIDE 75 ML/HR: 5; .45 INJECTION, SOLUTION INTRAVENOUS at 18:30

## 2019-05-08 RX ADMIN — ACETAMINOPHEN 975 MG: 325 TABLET ORAL at 18:18

## 2019-05-08 RX ADMIN — CEFAZOLIN 2 G: 10 INJECTION, POWDER, FOR SOLUTION INTRAVENOUS at 11:47

## 2019-05-08 RX ADMIN — Medication 10 MG: at 11:52

## 2019-05-08 RX ADMIN — SODIUM CHLORIDE, SODIUM LACTATE, POTASSIUM CHLORIDE, AND CALCIUM CHLORIDE: 600; 310; 30; 20 INJECTION, SOLUTION INTRAVENOUS at 13:21

## 2019-05-08 RX ADMIN — ROCURONIUM BROMIDE 35 MG: 10 INJECTION, SOLUTION INTRAVENOUS at 11:49

## 2019-05-08 RX ADMIN — FAMOTIDINE 20 MG: 20 TABLET ORAL at 10:05

## 2019-05-08 RX ADMIN — FENTANYL CITRATE 50 MCG: 50 INJECTION, SOLUTION INTRAMUSCULAR; INTRAVENOUS at 13:42

## 2019-05-08 NOTE — BRIEF OP NOTE
BRIEF OPERATIVE NOTE Date of Procedure: 5/8/2019 Preoperative Diagnosis: Osteoarthritis of right hip, unspecified osteoarthritis type [M16.11] Postoperative Diagnosis: Osteoarthritis of right hip, unspecified osteoarthritis type [M16.11] Procedure(s): RIGHT TOTAL HIP ARTHROPLASTY DIRECT ANTERIOR APPROACH Surgeon(s) and Role: Kareen Suh MD - Primary Surgical Assistant: Melvina Omalley Surgical Staff: 
Circ-1: Wilder Rodrigues Circ-Relief: Rachel Kennedy RN Physician Assistant: Adina Alatorre PA-C Radiology Technician: Kristina Crenshaw 
Scrub Tech-1: Bernardo Barnett Surg Asst-1: Debora Fitzgeraldigham Event Time In Time Out Incision Start 1210 Incision Close Anesthesia: General  
Estimated Blood Loss: 300ml Specimens:  
ID Type Source Tests Collected by Time Destination 1 : Right Femoral Head Preservative Hip, right  Gvain Coelho MD 5/8/2019 1236 Pathology Findings: same Complications: none Implants:  
Implant Name Type Inv. Item Serial No.  Lot No. LRB No. Used Action TRIDENT II TRITANIUM CLUSTERHOLE ACETABULAR SHELL    [employer] [id number] Right 1 Implanted 6.5mm LOW PROFILE HEX SCREW    [employer] [id number] Right 1 Implanted 6.5mm LOW PROFILE HEX SCREW    [employer] 674 Right 1 Implanted INSERT ACET 0DEG 36MM E X3 -- TRIDENT - [id number]  INSERT ACET 0DEG 36MM E X3 -- TRIDENT  [employer] HOW [id number] Right 1 Implanted STEM FEM SZ 5 127D [id number] -- Artie [place] - [id number]  STEM FEM SZ 5 127D [id number] -- [employer] HOW [id number] Right 1 Implanted HEAD FEM DELT V40 -5MM NK 36MM -- V40 BIOLOX - [id number]  HEAD FEM DELT V40 -5MM NK 36MM -- V40 BIOLOX  [employer] HOW [id number] Right 1 Implanted

## 2019-05-08 NOTE — ANESTHESIA PREPROCEDURE EVALUATION
Relevant Problems No relevant active problems Anesthetic History No history of anesthetic complications Review of Systems / Medical History Patient summary reviewed and pertinent labs reviewed Pulmonary Within defined limits Neuro/Psych CVA: no residual symptoms TIA Cardiovascular Hypertension: well controlled Hyperlipidemia Comments: Seen by medicine preop GI/Hepatic/Renal 
  
 
 
 
 
 
 Endo/Other Arthritis Other Findings Physical Exam 
 
Airway Mallampati: I 
TM Distance: 4 - 6 cm Neck ROM: normal range of motion Mouth opening: Normal 
 
 Cardiovascular Regular rate and rhythm,  S1 and S2 normal,  no murmur, click, rub, or gallop Dental 
No notable dental hx Pulmonary Breath sounds clear to auscultation Abdominal 
GI exam deferred Other Findings Anesthetic Plan ASA: 3 Anesthesia type: general 
 
 
 
 
Induction: Intravenous Anesthetic plan and risks discussed with: Patient

## 2019-05-08 NOTE — ANESTHESIA POSTPROCEDURE EVALUATION
Procedure(s): RIGHT TOTAL HIP ARTHROPLASTY DIRECT ANTERIOR APPROACH. general 
 
Anesthesia Post Evaluation Multimodal analgesia: multimodal analgesia used between 6 hours prior to anesthesia start to PACU discharge Patient location during evaluation: PACU Patient participation: complete - patient participated Level of consciousness: awake Pain management: adequate Airway patency: patent Anesthetic complications: no 
Cardiovascular status: acceptable Respiratory status: acceptable Hydration status: acceptable Post anesthesia nausea and vomiting:  controlled Vitals Value Taken Time /55 5/8/2019  2:42 PM  
Temp 36.4 °C (97.5 °F) 5/8/2019  2:15 PM  
Pulse 54 5/8/2019  2:46 PM  
Resp 14 5/8/2019  2:46 PM  
SpO2 100 % 5/8/2019  2:46 PM  
Vitals shown include unvalidated device data.

## 2019-05-08 NOTE — PROGRESS NOTES
Patients arrives via bed from PACU alert awake and oriented, family members at bedside area to Rhip dry and intact. CMS+ no s/s of distress or sob.

## 2019-05-08 NOTE — PERIOP NOTES
TRANSFER - OUT REPORT: 
 
Verbal report given to Vitaliy Salvador RN(name) on [employer]  being transferred to [address] [place] [address](unit) for routine post - op Report consisted of patients Situation, Background, Assessment and  
Recommendations(SBAR). Information from the following report(s) SBAR, Kardex, OR Summary, Procedure Summary, Intake/Output, MAR, Recent Results and Med Rec Status was reviewed with the receiving nurse. Lines:  
Peripheral IV 05/08/19 Left Wrist (Active) Site Assessment Clean, dry, & intact 5/8/2019  2:12 PM  
Phlebitis Assessment 0 5/8/2019  2:12 PM  
Infiltration Assessment 0 5/8/2019  2:12 PM  
Dressing Status Clean, dry, & intact 5/8/2019  2:12 PM  
Dressing Type Transparent 5/8/2019  2:12 PM  
Hub Color/Line Status Pink; Infusing 5/8/2019  2:12 PM  
Alcohol Cap Used No 5/8/2019  9:59 AM  
  
 
Opportunity for questions and clarification was provided. Patient transported with: 
 O2 @ 2 liters Registered Nurse

## 2019-05-08 NOTE — CONSULTS
Hospitalist Consult Note    NAME: Sherryle Counter   :     MRN:  [id number]     Date/Time of admission:  2019 2:28 PM    Patient PCP: Stacey Ashraf MD  ________________________________________________________________________    My assessment of this patient's clinical condition and my plan of care is as follows. Assessment / Plan:  1. Severe R hip OA s/p R WIL  2. HTN    1. Admit to surgical floor from PACU. 2. Routine postoperative surgical management per orthopedic surgery. 3. DVT proph per ortho. 4. Resume home meds as previously prescribed, monitor BPs.  5. PT/OT, mobilize per protocol. 6. Disposition pending therapy recommendations. 7. Following. Subjective:   Reason for admission: Elective R hip surgery    HISTORY OF PRESENT ILLNESS:     Naveed Remy is a 66 y.o.  female who presented to [facility] this AM for elective hip surgery. Patient has a history of severe OA of joint that has failed conservative treatments and has elected to proceed with surgical intervention. She has a medical history that is significant for HTN but is otherwise relative healthy overall. The hospitalist service was asked to see patient in consultation for assistance with management in the postoperative setting. Patient is seen in PACU and is still somewhat sleepy from anesthesia but is alert and appropriate. She specifically denies chest pain and SOB and is quite comfortable currently.       Past Medical History:   Diagnosis Date    Constipation     Hypercholesteremia     Hypertension     no meds now    Microscopic hematuria     MRSA (methicillin resistant staph aureus) culture positive 2018    On abdominal wall- tx with Vibramycin  (care everywhere)    Stroke [facility]) not sure when    blurred vision, resolved (taking plavix)     PEACE (stress urinary incontinence, female)     UTI (urinary tract infection)         Past Surgical History:   Procedure Laterality Date    HX CATARACT REMOVAL Bilateral     HX CHOLECYSTECTOMY  1972    HX HYSTERECTOMY  09/2016    total    HX KNEE REPLACEMENT Right 04/27/2015    HX OTHER SURGICAL  2005, 2015    skin ca removed     HX OTHER SURGICAL  09/2016    bladder support    HX OTHER SURGICAL  01/2019    basal skin ca removed     HX TONSILLECTOMY  1946       Social History     Tobacco Use    Smoking status: Never Smoker    Smokeless tobacco: Never Used   Substance Use Topics    Alcohol use: No        Family History   Problem Relation Age of Onset    Pancreatic Cancer Father 35    Cancer Maternal Aunt      Allergies   Allergen Reactions    Erythromycin Rash and Itching       inflammation    Furacin [Nitrofurazone] Hives and Other (comments)     Intolerance    Tobrex [Tobramycin Sulfate] Other (comments)     Intolerance, extreme redness        Prior to Admission medications    Medication Sig Start Date End Date Taking? Authorizing Provider   amLODIPine (NORVASC) 5 mg tablet Take 5 mg by mouth daily. 4/29/19  Yes Provider, Historical   traMADol (ULTRAM) 50 mg tablet Take 1 Tab by mouth every eight (8) hours as needed. Max Daily Amount: 150 mg. 9/21/18  Yes Blank DAVIS PA-C   mupirocin (BACTROBAN) 2 % ointment Use 1 Application to affected area 3 Times Daily. 6/21/18  Yes Provider, Historical   SF 5000 PLUS 1.1 % crea BRUSH ON PASTE AS DIRECTED EVERY DAY 7/30/18  Yes Provider, Historical   simvastatin (ZOCOR) 40 mg tablet Take  by mouth nightly. Yes Provider, Historical   CHOLECALCIFEROL, VITAMIN D3, (VITAMIN D3 PO) Take 1,000 Units by mouth daily. Yes Provider, Historical   trimethoprim-sulfamethoxazole (BACTRIM DS) 160-800 mg per tablet Take 1 Tab by mouth two (2) times a day. 5/1/19   Charo Reyes PA-C   acetaminophen (TYLENOL) 325 mg tablet Take 650 mg by mouth every six (6) hours as needed for Pain. Provider, Historical   polyethylene glycol (MIRALAX) 17 gram/dose powder Take 17 g by mouth daily as needed.     Provider, Historical   clopidogrel (PLAVIX) 75 mg tablet Take 75 mg by mouth daily. 5/27/15   Provider, Historical   ESTRACE 0.01 % (0.1 mg/gram) vaginal cream APPLY 2/3 LENGTH OF PINKY FINGER AND INSERT INTO VAGINA ONCE A WEEK 5/31/16   Bryanna Wei MD       REVIEW OF SYSTEMS:     Total of 12 systems reviewed as follows:       POSITIVE= bolded text  Negative = text not underlined  General:  fever, chills, sweats, generalized weakness, weight loss/gain,      loss of appetite   Eyes:    blurred vision, eye pain, loss of vision, double vision  ENT:    rhinorrhea, pharyngitis   Respiratory:   cough, sputum production, SOB, REED, wheezing, pleuritic pain   Cardiology:   chest pain, palpitations, orthopnea, PND, edema, syncope   Gastrointestinal:  abdominal pain , N/V, diarrhea, dysphagia, constipation, bleeding   Genitourinary:  frequency, urgency, dysuria, hematuria, incontinence   Muskuloskeletal :  arthralgia, myalgia, back pain  Hematology:  easy bruising, nose or gum bleeding, lymphadenopathy   Dermatological: rash, ulceration, pruritis, color change / jaundice  Endocrine:   hot flashes or polydipsia   Neurological:  headache, dizziness, confusion, focal weakness, paresthesia,     Speech difficulties, memory loss, gait difficulty  Psychological: Feelings of anxiety, depression, agitation    Objective:   VITALS:    Visit Vitals  /56   Pulse 77   Temp 97.5 °F (36.4 °C)   Resp 16   Ht 4' 11\" (1.499 m)   Wt 58.8 kg (129 lb 9.6 oz)   SpO2 100%   BMI 26.18 kg/m²       PHYSICAL EXAM:    General:    Appears comfortable and in NAD. HEENT: NCAT. Sclerae anicteric. EOMI. Neck:  Supple, trachea ML. Lungs:   Clear to auscultation bilaterally. No Wheezing or Rhonchi. No rales. Chest wall:  No tenderness  No Accessory muscle use. Heart:   RRR. Abdomen:   Soft, NTTP. Extremities: Warm, no ischemia. Skin:     Not pale.   Not Jaundiced  No rashes   Psych:  Mood normal..  Neurologic: Somewhat sleepy but alert/appropriate and oriented. Moves extremities spontaneously. _______________________________________________________________________  Care Plan discussed with:    Comments   Patient X    Family      RN X    Care Manager                    Consultant:      _______________________________________________________________________  Expected  Disposition:   Home     HH/PT/OT/RN X   ARU/SNF X   [facility]            Procedures:  R Hip XR:  IMPRESSION:     Satisfactory changes of right total hip arthroplasty. Felix [facility] LAB DATA REVIEWED:    No results found for this or any previous visit (from the past 24 hour(s)).     Marilee Bailey MD   [facility]

## 2019-05-08 NOTE — INTERVAL H&P NOTE
H&P Update: 
Liane Pineda was seen and examined. History and physical has been reviewed. The patient has been examined.  There have been no significant clinical changes since the completion of the originally dated History and Physical.

## 2019-05-08 NOTE — PROGRESS NOTES
Mobility Intervention:  
 
  [x] Pt dangled at edge of bed 
  [] Pt assisted OOB to bedside commode 
  [] Pt assisted OOB to chair 
  [x] Pt ambulated to bathroom 
  [] Patient was ambulated in room/hallway Assistive Device Utilized:  
  
 [x] Rolling walker 
 [] Crutches 
 [] Straight Cane 
 [] Knee immobilizer [] IV pole After Mobilization:  
 
[] Patient left in no apparent distress sitting up in chair 
[x] Patient left in no apparent distress in bed 
[x] Call bell left within reach [x] SCDs on & machine turned on 
[x] Ice applied 
[x] RN notified 
[] Caregiver present 
[] Bed alarm activated Reason patient not mobilized:  
 
 [] Patient refused 
 [] Nausea/vomiting 
 [] Low blood pressure 
 [] Drowsy/lethargic Pain Rating:  
 
[] 0 [] 1 Assistive Device:       
[] 2 [x] 3 [] 4 
[] 5 
[] 6 Assistive Device:       
[] 7 
[] 8 
[] 9 [] 10 Comments:

## 2019-05-09 ENCOUNTER — HOME HEALTH ADMISSION (OUTPATIENT)
Dept: HOME HEALTH SERVICES | Facility: HOME HEALTH | Age: 79
End: 2019-05-09
Payer: MEDICARE

## 2019-05-09 VITALS
HEIGHT: 59 IN | OXYGEN SATURATION: 99 % | TEMPERATURE: 98.2 F | BODY MASS INDEX: 26.13 KG/M2 | RESPIRATION RATE: 19 BRPM | HEART RATE: 74 BPM | SYSTOLIC BLOOD PRESSURE: 157 MMHG | WEIGHT: 129.6 LBS | DIASTOLIC BLOOD PRESSURE: 59 MMHG

## 2019-05-09 LAB
ANION GAP SERPL CALC-SCNC: 4 MMOL/L (ref 3–18)
BACTERIA SPEC CULT: ABNORMAL
BACTERIA SPEC CULT: ABNORMAL
BUN SERPL-MCNC: 11 MG/DL (ref 7–18)
BUN/CREAT SERPL: 19 (ref 12–20)
CALCIUM SERPL-MCNC: 8.8 MG/DL (ref 8.5–10.1)
CHLORIDE SERPL-SCNC: 99 MMOL/L (ref 100–108)
CO2 SERPL-SCNC: 28 MMOL/L (ref 21–32)
CREAT SERPL-MCNC: 0.58 MG/DL (ref 0.6–1.3)
GLUCOSE BLD STRIP.AUTO-MCNC: 138 MG/DL (ref 70–110)
GLUCOSE BLD STRIP.AUTO-MCNC: 147 MG/DL (ref 70–110)
GLUCOSE SERPL-MCNC: 156 MG/DL (ref 74–99)
POTASSIUM SERPL-SCNC: 5.3 MMOL/L (ref 3.5–5.5)
SERVICE CMNT-IMP: ABNORMAL
SODIUM SERPL-SCNC: 131 MMOL/L (ref 136–145)

## 2019-05-09 PROCEDURE — 97161 PT EVAL LOW COMPLEX 20 MIN: CPT

## 2019-05-09 PROCEDURE — 74011250636 HC RX REV CODE- 250/636: Performed by: ORTHOPAEDIC SURGERY

## 2019-05-09 PROCEDURE — 74011250637 HC RX REV CODE- 250/637: Performed by: ORTHOPAEDIC SURGERY

## 2019-05-09 PROCEDURE — 36415 COLL VENOUS BLD VENIPUNCTURE: CPT

## 2019-05-09 PROCEDURE — 82962 GLUCOSE BLOOD TEST: CPT

## 2019-05-09 PROCEDURE — 97116 GAIT TRAINING THERAPY: CPT

## 2019-05-09 PROCEDURE — 97165 OT EVAL LOW COMPLEX 30 MIN: CPT

## 2019-05-09 PROCEDURE — 97530 THERAPEUTIC ACTIVITIES: CPT

## 2019-05-09 PROCEDURE — 80048 BASIC METABOLIC PNL TOTAL CA: CPT

## 2019-05-09 PROCEDURE — 97535 SELF CARE MNGMENT TRAINING: CPT

## 2019-05-09 RX ORDER — LANOLIN ALCOHOL/MO/W.PET/CERES
325 CREAM (GRAM) TOPICAL 2 TIMES DAILY WITH MEALS
Qty: 60 TAB | Refills: 1 | Status: SHIPPED | OUTPATIENT
Start: 2019-05-09

## 2019-05-09 RX ORDER — DOCUSATE SODIUM 100 MG/1
100 CAPSULE, LIQUID FILLED ORAL 2 TIMES DAILY
Qty: 60 CAP | Refills: 2 | Status: SHIPPED | OUTPATIENT
Start: 2019-05-09 | End: 2019-08-07

## 2019-05-09 RX ORDER — ASPIRIN 325 MG
325 TABLET, DELAYED RELEASE (ENTERIC COATED) ORAL 2 TIMES DAILY
Qty: 60 TAB | Refills: 1 | Status: SHIPPED | OUTPATIENT
Start: 2019-05-09

## 2019-05-09 RX ORDER — OXYCODONE AND ACETAMINOPHEN 7.5; 325 MG/1; MG/1
1-2 TABLET ORAL
Qty: 56 TAB | Refills: 0 | Status: SHIPPED | OUTPATIENT
Start: 2019-05-09 | End: 2019-05-16

## 2019-05-09 RX ORDER — SULFAMETHOXAZOLE AND TRIMETHOPRIM 800; 160 MG/1; MG/1
1 TABLET ORAL 2 TIMES DAILY
Qty: 14 TAB | Refills: 0 | Status: SHIPPED | OUTPATIENT
Start: 2019-05-09 | End: 2019-05-16

## 2019-05-09 RX ORDER — CELECOXIB 200 MG/1
200 CAPSULE ORAL 2 TIMES DAILY
Qty: 60 CAP | Refills: 2 | Status: SHIPPED | OUTPATIENT
Start: 2019-05-09 | End: 2019-08-07

## 2019-05-09 RX ADMIN — CELECOXIB 200 MG: 100 CAPSULE ORAL at 08:39

## 2019-05-09 RX ADMIN — ASPIRIN 325 MG: 325 TABLET, COATED ORAL at 08:39

## 2019-05-09 RX ADMIN — Medication 10 ML: at 06:25

## 2019-05-09 RX ADMIN — SULFAMETHOXAZOLE AND TRIMETHOPRIM 1 TABLET: 800; 160 TABLET ORAL at 08:39

## 2019-05-09 RX ADMIN — ACETAMINOPHEN 975 MG: 325 TABLET ORAL at 06:24

## 2019-05-09 RX ADMIN — AMLODIPINE BESYLATE 5 MG: 5 TABLET ORAL at 08:39

## 2019-05-09 RX ADMIN — VANCOMYCIN HYDROCHLORIDE 1000 MG: 10 INJECTION, POWDER, LYOPHILIZED, FOR SOLUTION INTRAVENOUS at 08:40

## 2019-05-09 RX ADMIN — SENNOSIDES, DOCUSATE SODIUM 1 TABLET: 50; 8.6 TABLET, FILM COATED ORAL at 08:39

## 2019-05-09 RX ADMIN — ACETAMINOPHEN 975 MG: 325 TABLET ORAL at 11:52

## 2019-05-09 RX ADMIN — FERROUS SULFATE TAB 325 MG (65 MG ELEMENTAL FE) 325 MG: 325 (65 FE) TAB at 08:39

## 2019-05-09 RX ADMIN — PREGABALIN 25 MG: 25 CAPSULE ORAL at 08:39

## 2019-05-09 NOTE — ROUTINE PROCESS
Pt given discharge instructions. Pt verbalized understanding.    IV d/cd no signs of infection noted. Each discharge page verified that it matched pts name. Pt d/cd to home. Pt stable

## 2019-05-09 NOTE — PROGRESS NOTES
Problem: Mobility Impaired (Adult and Pediatric) Goal: *Acute Goals and Plan of Care (Insert Text) Description Physical Therapy Goals Initiated 5/9/2019 and to be accomplished within 7 day(s) 1. Patient will move from supine to sit and sit to supine , scoot up and down and roll side to side in bed with modified independence. 2.  Patient will transfer from bed to chair and chair to bed with modified independence using the least restrictive device. 3.  Patient will perform sit to stand with modified independence. 4.  Patient will ambulate with modified independence for 150 feet with the least restrictive device. 5.  Patient will ascend/descend 3 stairs with 1 handrail(s) with supervision/set-up. PLOF: Patient lives in 2 story home with 8 steps to a den, but has [address] Patient has 1 WALLACE her home and was ambulatory PTA. Outcome: Progressing Towards Goal 
 PHYSICAL THERAPY EVALUATION Patient: Zeinab Daily (89 y.o. female) Date: 5/9/2019 Primary Diagnosis: Osteoarthritis of right hip, unspecified osteoarthritis type [M16.11] Hip arthritis [M16.10] Procedure(s) (LRB): 
RIGHT TOTAL HIP ARTHROPLASTY DIRECT ANTERIOR APPROACH (Right) 1 Day Post-Op Precautions: Total Hip, WBAT RLE    
 
ASSESSMENT : 
Patient is 65 yo F admitted to hospital for WIL and presents today alert and agreeable to therapy. Patient was educated on weight bearing status, hip precautions, and role of therapy. Patient transferred to sitting EOB for objective assessment. Patient was given demo with instruction on sit <> stand transfer and gait training. Patient transferred to standing with Supervision and ambulated total 270ft with transport in WC to stairs. Patient negotiated 12 steps with patient verbally cueing herself.  Demonstrated fair carryover during first 2 trials and good carryover for final trial. Patient demonstrated good compliance with precautions throughout session. At conclusion of session patient transferred to sitting in recliner and was left resting with call bell by the side and SCD?s donned. Patient instructed to call for assistance if they needed to get up for any reason and denied need for further assistance. Patient demonstrates decreased strength, mobility, and endurance and will benefit from skilled intervention to address the above impairments. Patient's rehabilitation potential is considered to be Good Factors which may influence rehabilitation potential include:  
? None noted ? Mental ability/status ? Medical condition ? Home/family situation and support systems ? Safety awareness 
? Pain tolerance/management 
? Other: PLAN : 
Recommendations and Planned Interventions:  
?           Bed Mobility Training             ? Neuromuscular Re-Education ? Transfer Training                   ? Orthotic/Prosthetic Training 
? Gait Training                          ? Modalities ? Therapeutic Exercises           ? Edema Management/Control ? Therapeutic Activities            ? Family Training/Education ? Patient Education ? Other (comment): Frequency/Duration: Patient will be followed by physical therapy 1-2 times per day to address goals. Discharge Recommendations: Home Health Further Equipment Recommendations for Discharge: rolling walker SUBJECTIVE:  
Patient stated \"I want to do everything I need to get better. ? OBJECTIVE DATA SUMMARY:  
 
Past Medical History:  
Diagnosis Date Constipation Hypercholesteremia Hypertension   
 no meds now Microscopic hematuria MRSA (methicillin resistant staph aureus) culture positive 06/2018 On abdominal wall- tx with Vibramycin  (care everywhere) Stroke [facility]) not sure when  
 blurred vision, resolved (taking plavix) PEACE (stress urinary incontinence, female) UTI (urinary tract infection) Past Surgical History:  
Procedure Laterality Date HX CATARACT REMOVAL Bilateral   
 HX CHOLECYSTECTOMY  1972 HX HYSTERECTOMY  09/2016  
 total  
 HX KNEE REPLACEMENT Right 04/27/2015 HX OTHER SURGICAL  2005, 2015  
 skin ca removed HX OTHER SURGICAL  09/2016  
 bladder support HX OTHER SURGICAL  01/2019  
 basal skin ca removed Iliana Livecherellejose david [address] 85 Barriers to Learning/Limitations: None Compensate with: N/A Home Situation: 
Home Situation Home Environment: Private residence # Steps to Enter: 4 One/Two Story Residence: One story Living Alone: No 
Support Systems: Family member(s), Spouse/Significant Other/Partner Patient Expects to be Discharged to[de-identified] Private residence Current DME Used/Available at Home: None Critical Behavior: A&Ox4 Strength:   
Strength: Generally decreased, functional(BLE) Tone & Sensation:  
Tone: Normal(BLE) Sensation: Intact(BLE) Range Of Motion: 
AROM: Generally decreased, functional(BLE) Functional Mobility: 
Bed Mobility: 
 Supine to Sit: Supervision Scooting: Supervision Transfers: 
Sit to Stand: Supervision Stand to Sit: Supervision Bed to Chair: Stand-by assistance Balance:  
Sitting: Intact Standing: Impaired; With support Standing - Static: Good Standing - Dynamic : Fair Ambulation/Gait Training: 
Distance (ft): 270 Feet (ft)(120 + 150) Assistive Device: Walker, rolling Ambulation - Level of Assistance: Supervision Gait Abnormalities: Decreased step clearance Speed/Jessica: Slow Step Length: Left shortened;Right shortened Interventions: Manual cues; Safety awareness training; Tactile cues; Verbal cues; Visual/Demos Stairs: 
Number of Stairs Trained: 12 
Stairs - Level of Assistance: Supervision Rail Use: Right Pain: 
Pain level pre-treatment: 2/10 Pain level post-treatment: 2/10 Activity Tolerance: Patient tolerated activity well; she required additional time and preferred to teach back to therapist several times during session and demos good motivation to participate in therapy. Please refer to the flowsheet for vital signs taken during this treatment. After treatment:  
?         Patient left in no apparent distress sitting up in chair ? Patient left in no apparent distress in bed 
? Call bell left within reach ? Nursing notified ? Caregiver present ? Bed alarm activated ? SCDs applied COMMUNICATION/EDUCATION:  
?         Role of Physical Therapy in the acute care setting. ?         Fall prevention education was provided and the patient/caregiver indicated understanding. ? Patient/family have participated as able in goal setting and plan of care. ?         Patient/family agree to work toward stated goals and plan of care. ?         Patient understands intent and goals of therapy, but is neutral about his/her participation. ? Patient is unable to participate in goal setting/plan of care: ongoing with therapy staff. ?         Other: Thank you for this referral. 
Delaney Nath, PT Time Calculation: 54 mins Eval Complexity: History: MEDIUM  Complexity : 1-2 comorbidities / personal factors will impact the outcome/ POC Exam:LOW Complexity : 1-2 Standardized tests and measures addressing body structure, function, activity limitation and / or participation in recreation  Presentation: LOW Complexity : Stable, uncomplicated  Clinical Decision Making:Low Complexity    Overall Complexity:LOW

## 2019-05-09 NOTE — OP NOTES
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 PROCEDURE PERFORMED:  Direct anterior right hip replacement using Accolade II system, Claudia, size 5, high-offset 127 femoral component, 52 Tritanium II cup, screw augmentation, neutral 36 liner, and a 36, -5 ceramic femoral head. SURGEON:  Shahram Giron MD    FIRST ASSISTANT:  Claudio Whelan    SECOND ASSISTANT:  Aileen Frye    ANESTHESIA:  Dr. Brenda Liang; light general.    COMPLICATIONS:  None. SPECIMENS REMOVED:  Femoral head. IMPLANTS:  As above mentioned. ESTIMATED BLOOD LOSS:  275 mL    Claudio Whelan, first assistant, assisted with all phases of the surgery, commencing with patient positioning, patient prep, patient drape, leg positioning during surgery, retracting, assisting with the surgery itself, closure, dressing placement, and transfer. PROCEDURE:  Anesthetic was successfully induced, leg lengths determined, AP pelvis was obtained. Standard prep and drape and a timeout performed. Vancomycin confirmed given, has a previous history of MRSA skin infection. The neurovascular bundle protected at all times, standard direct anterior approach, protecting neurovascular bundle, good hemostasis, found the leash of vessels, tied them, then used the Aquamantys. Resected the precapsular fat and repositioned our retractors, protecting neurovascular bundle at all times. Did our 3 standard releases, and with the aid of the image intensifier, confirmed a relatively low level of osteotomy as per the preoperative templating. Extracted the head and instrumented around the cup, identified the medial wall for the foveal notch.     With the aid of the image intensifier, medialized appropriately in the appropriate inclination and anteversion, and reamed up to accommodate the cup. Line to line reaming and trialed it, I was very happy with it, implanted the definitive shell, very stable, augmented with a screw. Removed posterior osteophyte, used the Aquamantys and Exparel cocktail, excised some of the posterior capsule, and then with the traction off, leg in down position, did our standard release, gave us good proximal exposure, protected the soft tissue structures, lateralized with the Le[place]ell and box osteotome, and broached our way up to accommodate the size 5 calcar planar, reduced the hip. I ensured specifically that it was a very low neck cut as per the preoperative templating. We were very stable with the implant, I backed off to the 4, went with the 5 again to the make sure it was fully seated, calcar planar, reduced the hip. Shot another AP pelvis showing anatomic restoration of offset leg lengths. Liberally irrigated out and there was no impingement, combined anteversion was excellent. I implanted definitive components, re-trialed, I was happy with the -5, cleaned the trunnion, impacted on, again reducing the hip. Final x-ray was obtained, again confirming excellent anatomic reproduction of the hip and reconstruction in the anatomic leg lengths as well. The patient awakened and taken to the recovery room after routine closure. No complications. The patient tolerated the procedure well. Blood loss 275 mL.       Kaleb Jon MD [id number]  D:  05/08/2019 14:07  T:  05/08/2019 20:32  JOB #:  [id number]

## 2019-05-09 NOTE — PROGRESS NOTES
Ortho Pt. Seen and evaluated. Doing well, progressing with PT 
Pain well controlled Denies cp, sob, abd pain Visit Vitals /66 (BP 1 Location: Right arm, BP Patient Position: At rest) Pulse 81 Temp 96.7 °F (35.9 °C) Resp 18 Ht 4' 11\" (1.499 m) Wt 129 lb 9.6 oz (58.8 kg) SpO2 100% BMI 26.18 kg/m²  
   
right total hip replacement 
right hip Woundclean, dry, no drainage Sensory intact to LT Motor intact 
nv intact Neg calf tenderness. Labs. CBC 
@ 
CBC:  
Lab Results Component Value Date/Time WBC 9.2 04/24/2019 10:39 AM  
 RBC 4.40 04/24/2019 10:39 AM  
 HGB 13.5 04/24/2019 10:39 AM  
 HCT 39.7 04/24/2019 10:39 AM  
 PLATELET 995 80/27/7546 10:39 AM  
 and BMP:  
Lab Results Component Value Date/Time Glucose 156 (H) 05/09/2019 04:51 AM  
 Sodium 131 (L) 05/09/2019 04:51 AM  
 Potassium 5.3 05/09/2019 04:51 AM  
 Chloride 99 (L) 05/09/2019 04:51 AM  
 CO2 28 05/09/2019 04:51 AM  
 BUN 11 05/09/2019 04:51 AM  
 Creatinine 0.58 (L) 05/09/2019 04:51 AM  
 Calcium 8.8 05/09/2019 04:51 AM  
@ Coagulation Lab Results Component Value Date INR 1.0 04/24/2019 APTT 25.5 04/24/2019 Basic Metabolic Profile Lab Results Component Value Date  (L) 05/09/2019 CO2 28 05/09/2019 BUN 11 05/09/2019 Assesment:rightOrthopedic / Rheumatologic: Total Hip Replacement Past Medical History:  
Diagnosis Date  Constipation  Hypercholesteremia  Hypertension   
 no meds now  Microscopic hematuria  MRSA (methicillin resistant staph aureus) culture positive 06/2018 On abdominal wall- tx with Vibramycin  (care everywhere)  Stroke [facility]) not sure when  
 blurred vision, resolved (taking plavix)  PEACE (stress urinary incontinence, female)  UTI (urinary tract infection) ASA: 3 Pt is status post joint replacement and at risk for bleeding, blood clots, and infection. Plan:  aspirin, PT, DC to home if cleared by PT and ok with medicine.

## 2019-05-09 NOTE — DISCHARGE SUMMARY
5/8/2019  8:58 AM    5/9/2019, 8:23 AM    Primary Dx:right Orthopedic / Rheumatologic: Total Hip Replacement  Secondary Dx: Etiological Diagnoses: none    HPI:  Pt has end stage OA and had failed conservative treatment. Due to the current findings and affected activity of daily living surgical intervention is indicated.   The alternatives, risks, complications as well as expected outcome were discussed, the patient understands and wishes to proceed with surgery    Past Medical History:   Diagnosis Date    Constipation     Hypercholesteremia     Hypertension     no meds now    Microscopic hematuria     MRSA (methicillin resistant staph aureus) culture positive 06/2018    On abdominal wall- tx with Vibramycin  (care everywhere)    Stroke [facility]) not sure when    blurred vision, resolved (taking plavix)     PEACE (stress urinary incontinence, female)     UTI (urinary tract infection)          Current Facility-Administered Medications:     amLODIPine (NORVASC) tablet 5 mg, 5 mg, Oral, DAILY, Chitra Pelayo MD    trimethoprim-sulfamethoxazole (BACTRIM DS, SEPTRA DS) 160-800 mg per tablet 1 Tab, 1 Tab, Oral, BID, Odessa Goodpasture, MD, 1 Tab at 05/08/19 1818    dextrose 5 % - 0.45% NaCl infusion, 75 mL/hr, IntraVENous, CONTINUOUS, Odessa Goodpasture, MD, Last Rate: 75 mL/hr at 05/08/19 1830, 75 mL/hr at 05/08/19 1830    sodium chloride (NS) flush 5-40 mL, 5-40 mL, IntraVENous, Q8H, Odessa Goodpasture, MD, 10 mL at 05/09/19 9485    sodium chloride (NS) flush 5-40 mL, 5-40 mL, IntraVENous, PRN, Odessa Goodpasture, MD    ferrous sulfate tablet 325 mg, 1 Tab, Oral, BID WITH MEALS, Odessa Goodpasture, MD, 325 mg at 05/08/19 1818    zolpidem (AMBIEN) tablet 5 mg, 5 mg, Oral, QHS PRN, Odessa Goodpasture, MD    insulin lispro (HUMALOG) injection, , SubCUTAneous, AC&HS, Odessa Goodpasture, MD    glucose chewable tablet 16 g, 4 Tab, Oral, PRN, Odessa Goodpasture, MD    glucagon (GLUCAGEN) injection 1 mg, 1 mg, IntraMUSCular, PRN, Mac El, Silvana Farley MD    dextrose (D50W) injection syrg 12.5-25 g, 25-50 mL, IntraVENous, PRN, Jorge Luis Richardson MD    acetaminophen (TYLENOL) tablet 975 mg, 975 mg, Oral, Q6H, Jorge Luis Richardson MD, 975 mg at 05/09/19 3619    oxyCODONE IR (ROXICODONE) tablet 5-10 mg, 5-10 mg, Oral, Q3H PRN, Jorge Luis Richardson MD    celecoxib (CELEBREX) capsule 200 mg, 200 mg, Oral, BID, Jorge Luis Richardson MD, 200 mg at 05/08/19 2300    naloxone (NARCAN) injection 0.4 mg, 0.4 mg, IntraVENous, PRN, Jorge Luis Richardson MD    flumazenil (ROMAZICON) 0.1 mg/mL injection 0.2 mg, 0.2 mg, IntraVENous, PRN, Jorge Luis Richardson MD    aspirin delayed-release tablet 325 mg, 325 mg, Oral, BID, Jorge Luis Richardson MD    ondansetron [facility]) injection 4 mg, 4 mg, IntraVENous, Q4H PRN, Jorge Luis Richardson MD    senna-docusate (PERICOLACE) 8.6-50 mg per tablet 1 Tab, 1 Tab, Oral, BID, Jorge Luis Richardson MD, 1 Tab at 05/08/19 1818    pregabalin (LYRICA) capsule 25 mg, 25 mg, Oral, BID, Jorge Luis Richardson MD, 25 mg at 05/08/19 2301    vancomycin (VANCOCIN) 1000 mg in  ml infusion, 1,000 mg, IntraVENous, Q12H, Jorge Luis Richardson MD, Last Rate: 250 mL/hr at 05/08/19 2300, 1,000 mg at 05/08/19 2300    polyethylene glycol (MIRALAX) packet 17 g, 17 g, Oral, DAILY PRN, Jorge Luis Richardson MD    Erythromycin; Furacin [nitrofurazone]; and Tobrex [tobramycin sulfate]    Physical Exam:  General A&O x3 NAD, well developed, well nourished, normal affect  Heart: S1-S2, RRR  Lungs: CTA Bilat  Abd: soft NT, ND  Ext: n/v intact    Hospital Course:    Pt. Had rightOrthopedic / Rheumatologic: Total Hip Replacement    Post -op Course: The patient tolerated the procedure well. They were followed by internal medicine for help with medical management. Pt. Was place on Abx pre and post-op for prophylaxis against infection as well as coumadin pre and post-op for prophylaxis against DVT. Vitals signs remained stable, remained af. The wound was cdi. Pain was well controlled. Pt.  Had negative calf tenderness or swelling, no evidence for DVT. Patient had PT/OT consult for evaluation and treatment. CBC  Lab Results   Component Value Date/Time    WBC 9.2 04/24/2019 10:39 AM    RBC 4.40 04/24/2019 10:39 AM    HCT 39.7 04/24/2019 10:39 AM    MCV 90.2 04/24/2019 10:39 AM    MCH 30.7 04/24/2019 10:39 AM    MCHC 34.0 04/24/2019 10:39 AM    RDW 13.2 04/24/2019 10:39 AM     Coagulation  Lab Results   Component Value Date    INR 1.0 04/24/2019    APTT 25.5 04/24/2019      Basic Metabolic Profile  Lab Results   Component Value Date     (L) 05/09/2019    CO2 28 05/09/2019    BUN 11 05/09/2019       Discharge Meds:  Current Discharge Medication List      START taking these medications    Details   aspirin delayed-release 325 mg tablet Take 1 Tab by mouth two (2) times a day. Qty: 60 Tab, Refills: 1    Associated Diagnoses: Hip arthritis      celecoxib (CELEBREX) 200 mg capsule Take 1 Cap by mouth two (2) times a day for 90 days. Qty: 60 Cap, Refills: 2    Associated Diagnoses: Hip arthritis      ferrous sulfate 325 mg (65 mg iron) tablet Take 1 Tab by mouth two (2) times daily (with meals). Qty: 60 Tab, Refills: 1    Associated Diagnoses: Hip arthritis      !! trimethoprim-sulfamethoxazole (BACTRIM DS) 160-800 mg per tablet Take 1 Tab by mouth two (2) times a day for 7 days. Qty: 14 Tab, Refills: 0    Associated Diagnoses: Hip arthritis      oxyCODONE-acetaminophen (PERCOCET) 7.5-325 mg per tablet Take 1-2 Tabs by mouth every six (6) hours as needed for Pain for up to 7 days. Max Daily Amount: 8 Tabs. Qty: 56 Tab, Refills: 0    Associated Diagnoses: Hip arthritis      docusate sodium (COLACE) 100 mg capsule Take 1 Cap by mouth two (2) times a day for 90 days. Qty: 60 Cap, Refills: 2    Associated Diagnoses: Hip arthritis       ! ! - Potential duplicate medications found. Please discuss with provider.       CONTINUE these medications which have NOT CHANGED    Details   amLODIPine (NORVASC) 5 mg tablet Take 5 mg by mouth daily. mupirocin (BACTROBAN) 2 % ointment Use 1 Application to affected area 3 Times Daily. SF 5000 PLUS 1.1 % crea BRUSH ON PASTE AS DIRECTED EVERY DAY  Refills: 99      simvastatin (ZOCOR) 40 mg tablet Take  by mouth nightly. CHOLECALCIFEROL, VITAMIN D3, (VITAMIN D3 PO) Take 1,000 Units by mouth daily. !! trimethoprim-sulfamethoxazole (BACTRIM DS) 160-800 mg per tablet Take 1 Tab by mouth two (2) times a day. Qty: 10 Tab, Refills: 0      acetaminophen (TYLENOL) 325 mg tablet Take 650 mg by mouth every six (6) hours as needed for Pain.      polyethylene glycol (MIRALAX) 17 gram/dose powder Take 17 g by mouth daily as needed. ESTRACE 0.01 % (0.1 mg/gram) vaginal cream APPLY 2/3 LENGTH OF PINKY FINGER AND INSERT INTO VAGINA ONCE A WEEK  Qty: 42.5 g, Refills: 0       !! - Potential duplicate medications found. Please discuss with provider. STOP taking these medications       traMADol (ULTRAM) 50 mg tablet Comments:   Reason for Stopping:         clopidogrel (PLAVIX) 75 mg tablet Comments:   Reason for Stopping:               Discharge Plan:  The patient will be d/c'd to home, total hip protocol, WBAT. [facility]. Total joint protocol. Pt safe for homebound transfer, sp Total joint replacement. A walker, bedside commode, and shower chair will be utilized for ADL's. Follow up with Dr. Aleksandra Fox in 10-12 days. Call with any questions or concerns.

## 2019-05-09 NOTE — DISCHARGE INSTRUCTIONS
DISCHARGE SUMMARY from Nurse    PATIENT INSTRUCTIONS:    After general anesthesia or intravenous sedation, for 24 hours or while taking prescription Narcotics:  · Limit your activities  · Do not drive and operate hazardous machinery  · Do not make important personal or business decisions  · Do  not drink alcoholic beverages  · If you have not urinated within 8 hours after discharge, please contact your surgeon on call. Report the following to your surgeon:  · Excessive pain, swelling, redness or odor of or around the surgical area  · Temperature over 100.5  · Nausea and vomiting lasting longer than 4 hours or if unable to take medications  · Any signs of decreased circulation or nerve impairment to extremity: change in color, persistent  numbness, tingling, coldness or increase pain  · Any questions    What to do at Home:  Recommended activity: Activity as tolerated,     If you experience any of the following symptoms  Call for fever chills, shortness of breath, please follow up with md.    *  Please give a list of your current medications to your Primary Care Provider. *  Please update this list whenever your medications are discontinued, doses are      changed, or new medications (including over-the-counter products) are added. *  Please carry medication information at all times in case of emergency situations. These are general instructions for a healthy lifestyle:    No smoking/ No tobacco products/ Avoid exposure to second hand smoke  Surgeon General's Warning:  Quitting smoking now greatly reduces serious risk to your health.     Obesity, smoking, and sedentary lifestyle greatly increases your risk for illness    A healthy diet, regular physical exercise & weight monitoring are important for maintaining a healthy lifestyle    You may be retaining fluid if you have a history of heart failure or if you experience any of the following symptoms:  Weight gain of 3 pounds or more overnight or 5 pounds in a week, increased swelling in our hands or feet or shortness of breath while lying flat in bed. Please call your doctor as soon as you notice any of these symptoms; do not wait until your next office visit. Recognize signs and symptoms of STROKE:    F-face looks uneven    A-arms unable to move or move unevenly    S-speech slurred or non-existent    T-time-call 911 as soon as signs and symptoms begin-DO NOT go       Back to bed or wait to see if you get better-TIME IS BRAIN. Warning Signs of HEART ATTACK     Call 911 if you have these symptoms:   Chest discomfort. Most heart attacks involve discomfort in the center of the chest that lasts more than a few minutes, or that goes away and comes back. It can feel like uncomfortable pressure, squeezing, fullness, or pain.  Discomfort in other areas of the upper body. Symptoms can include pain or discomfort in one or both arms, the back, neck, jaw, or stomach.  Shortness of breath with or without chest discomfort.  Other signs may include breaking out in a cold sweat, nausea, or lightheadedness. Don't wait more than five minutes to call 911 - MINUTES MATTER! Fast action can save your life. Calling 911 is almost always the fastest way to get lifesaving treatment. Emergency Medical Services staff can begin treatment when they arrive -- up to an hour sooner than if someone gets to the hospital by car. The discharge information has been reviewed with the patient. The patient verbalized understanding. Discharge medications reviewed with the patient and appropriate educational materials and side effects teaching were provided.   ___________________________________________________________________________________________________________________________________

## 2019-05-09 NOTE — ROUTINE PROCESS
Bedside and Verbal shift change report given to Julien Barrientos RN (oncoming nurse) by US Morocho RN (offgoing nurse). Report included the following information SBAR, Kardex, Intake/Output and MAR.

## 2019-05-09 NOTE — PROGRESS NOTES
Problem: Self Care Deficits Care Plan (Adult) Goal: *Acute Goals and Plan of Care (Insert Text) Outcome: Resolved/Met OCCUPATIONAL THERAPY EVALUATION/DISCHARGE Patient: Maria Thompson (34 y.o. female) Date: 5/9/2019 Primary Diagnosis: Osteoarthritis of right hip, unspecified osteoarthritis type [M16.11] Hip arthritis [M16.10] Procedure(s) (LRB): 
RIGHT TOTAL HIP ARTHROPLASTY DIRECT ANTERIOR APPROACH (Right) 1 Day Post-Op Precautions:   Fall, Total hip, WBAT(RLE) PLOF: Pt is MI to I in ADLs and IADLs living with spouse ASSESSMENT AND RECOMMENDATIONS: 
Based on the objective data described below, the patient presents with new hip precautions following WIL of RLE. Pt sitting in arm chair in room stating minimal soreness in R hip joint. Pt provided with education on hip precautions with pt verbalizing understanding. Pt educated on and demonstrated use of reacher, sock aide, [address] shoe horn, and LH sponge for dressing and bathing. Pt educated on safe transfer method of shower and commode and home modification for assess to everyday items. Pt stated and demonstrated understanding. Pt has support at home. Skilled occupational therapy is not indicated at this time. Discharge Recommendations: None Further Equipment Recommendations for Discharge: N/A   
 
SUBJECTIVE:  
Patient stated ? I'm not sure how I will remember not to bend over. ? OBJECTIVE DATA SUMMARY:  
 
Past Medical History:  
Diagnosis Date Constipation Hypercholesteremia Hypertension   
 no meds now Microscopic hematuria MRSA (methicillin resistant staph aureus) culture positive 06/2018 On abdominal wall- tx with Vibramycin  (care everywhere) Stroke [facility]) not sure when  
 blurred vision, resolved (taking plavix) PEACE (stress urinary incontinence, female) UTI (urinary tract infection) Past Surgical History:  
Procedure Laterality Date HX CATARACT REMOVAL Bilateral   
 HX CHOLECYSTECTOMY  1972 HX HYSTERECTOMY  09/2016  
 total  
 HX KNEE REPLACEMENT Right 04/27/2015 HX OTHER SURGICAL  2005, 2015  
 skin ca removed HX OTHER SURGICAL  09/2016  
 bladder support HX OTHER SURGICAL  01/2019  
 basal skin ca removed [facility] Barriers to Learning/Limitations: None Compensate with: visual, verbal, tactile, kinesthetic cues/model Home Situation:  
Home Situation Home Environment: Private residence # Steps to Enter: 4 One/Two Story Residence: One story Living Alone: No 
Support Systems: Family member(s), Spouse/Significant Other/Partner Patient Expects to be Discharged to[de-identified] Private residence Current DME Used/Available at Home: Shower chair, Commode, bedside Tub or Shower Type: Shower ? Right hand dominant   ? Left hand dominant Cognitive/Behavioral Status: 
Neurologic State: Alert Orientation Level: Oriented X4 Cognition: Appropriate decision making; Appropriate for age attention/concentration; Follows commands Safety/Judgement: Fall prevention;Home safety Skin: intact BUEs Edema: none noted BUEs Vision/Perceptual:   
   Appears intact Coordination: BUE Coordination: Within functional limits Fine Motor Skills-Upper: Left Intact; Right Intact Gross Motor Skills-Upper: Left Intact; Right Intact Balance: 
Sitting: Intact Standing: Impaired; With support Standing - Static: Good Standing - Dynamic : Fair Strength: BUE Strength: Within functional limits Tone & Sensation: BUE Tone: Normal 
Sensation: Intact Range of Motion: BUE 
AROM: Within functional limits Functional Mobility and Transfers for ADLs: 
Bed Mobility: 
Supine to Sit: Supervision Scooting: Supervision Transfers: 
Sit to Stand: Supervision Bed to Chair: Stand-by assistance ADL Assessment: 
Feeding: Independent Oral Facial Hygiene/Grooming: Independent Bathing: Stand-by assistance Upper Body Dressing: Independent Lower Body Dressing: Supervision Toileting: Stand by assistance ADL Intervention: 
 Cognitive Retraining Safety/Judgement: Fall prevention;Home safety Pain: 
Pain level pre-treatment: 2/10 Pain level post-treatment: 2/10 Pain Intervention(s): Medication (see MAR); Rest, Ice, Repositioning Response to intervention:  See doc flow Activity Tolerance:  
good Please refer to the flowsheet for vital signs taken during this treatment. After treatment:  
?  Patient left in no apparent distress sitting up in chair ? Patient left in no apparent distress in bed 
? Call bell left within reach ? Nursing notified ? Caregiver present ? Bed alarm activated COMMUNICATION/EDUCATION:  
?      Role of Occupational Therapy in the acute care setting 
? Home safety education was provided and the patient/caregiver indicated understanding. ? Patient/family have participated as able and agree with findings and recommendations. ?      Patient is unable to participate in plan of care at this time. Thank you for this referral. 
Fadumo Morris, OT Time Calculation: 38 mins Eval Complexity: History: LOW Complexity : Brief history review ; Examination: LOW Complexity : 1-3 performance deficits relating to physical, cognitive , or psychosocial skils that result in activity limitations and / or participation restrictions ; Decision Making:MEDIUM Complexity : Patient may present with comorbidities that affect occupational performnce. Miniml to moderate modification of tasks or assistance (eg, physical or verbal ) with assesment(s) is necessary to enable patient to complete evaluation

## 2019-05-09 NOTE — PROGRESS NOTES
conducted an initial consultation and Spiritual Assessment for JANA Hurtado, who is a 66 y.o.,female. Patients Primary Language is: [place]. According to the patients EMR Catholic Affiliation is: Presyb[employer]ian. The reason the Patient came to the hospital is:  
Patient Active Problem List  
 Diagnosis Date Noted  Hip arthritis 05/08/2019  Chronic anticoagulation 03/29/2018  Lumbar spinal stenosis 03/27/2017  Lumbar facet arthropathy 03/27/2017  Neuritis 03/27/2017  Status post total right knee replacement 04/27/2015  Microscopic hematuria 10/02/2013  PEACE (stress urinary incontinence, female) 10/03/2012  Hypercholesteremia 09/27/2012  Constipation 09/27/2012  Stroke ([facility] [address] 09/27/2012  UTI (urinary tract infection) 09/27/2012 The  provided the following Interventions: 
Initiated a relationship of care and support. Listened empathically. Provided information about Spiritual Care Services. Offered prayer and assurance of continued prayers on patient's behalf. Chart reviewed. The following outcomes where achieved: 
 confirmed Patient's Catholic Affiliation. Patient expressed gratitude for 's visit. Assessment: 
Patient does not have any Yarsanism/cultural needs that will affect patients preferences in health care. There are no spiritual or Yarsanism issues which require intervention at this time. Plan: 
Chaplains will continue to follow and will provide pastoral care on an as needed/requested basis.  recommends bedside caregivers page  on duty if patient shows signs of acute spiritual or emotional distress. [phone] Spiritual Care 
([phone])

## 2019-05-09 NOTE — PROGRESS NOTES
Reason for Admission:  Osteoarthritis of right hip, unspecified osteoarthritis type [M16.11] Hip arthritis [M16.10] RRAT Score:    14 Plan for utilizing home health: [facility] Likelihood of Readmission:   Moderate Do you (patient/family) have any concerns for transition/discharge?  no 
 
Transition of Care Plan:   Home with [address] Initial assessment completed with patient. Cognitive status of patient: oriented to time, place, person and situation. Face sheet information confirmed:  yes. The patient designates Erik Bueno, spouse to participate in her discharge plan and to receive any needed information. This patient lives in a single family home with spouse. Patient was able to navigate steps as needed. Prior to hospitalization, patient was considered to be independent with ADLs/IADLS : yes . Patient has a current ACP document on file: no The patient's spouse will be available to transport patient home upon discharge. The patient already has [facility], [facility], and Shower chair medical equipment available in the home. Patient is not currently active with home health. Patient has not stayed in a skilled nursing facility or rehab. This patient is on dialysis :no 
 
List of available Home Health agencies were provided and reviewed with the patient prior to discharge. Freedom of choice signed: yes, for [facility] and referral sent. Currently, the discharge plan is Home with [facility]. The patient states that she can obtain her medications from the pharmacy, and take her medications as directed. Patient's current insurance is Medicare Part A & B and [facility] Care Management Interventions PCP Verified by CM: Yes Last Visit to PCP: 04/29/19 Mode of Transport at Discharge: Self Transition of Care Consult (CM Consult): Discharge Planning Carlhart Signup: No 
 Discharge Durable Medical Equipment: No 
Physical Therapy Consult: Yes Occupational Therapy Consult: Yes Speech Therapy Consult: No 
Current Support Network: Lives with Spouse Confirm Follow Up Transport: Self Plan discussed with Pt/Family/Caregiver: Yes Freedom of Choice Offered: Yes Discharge Location Discharge Placement: Home with home health NEO Layne, RN Pager # [phone] Care Manager

## 2019-05-09 NOTE — DIABETES MGMT
GLYCEMIC CONTROL SCREENING INITIATED: 
 
Lab Results Component Value Date/Time Hemoglobin A1c 5.7 (H) 04/24/2019 10:39 AM  
  
Lab Results Component Value Date/Time Glucose 156 (H) 05/09/2019 04:51 AM  
 
Seen patient this morning prior to disch and she's interested about learning diabetes. [x]  Patient meets criteria for pre-diabetes   HbA1c = 5.7-6.4% [x]  Diabetes Self-Management class schedule provided and patient encouraged to attend. Bharat Mg RN Pager: [phone]

## 2019-05-09 NOTE — HOME CARE
Discharge noted for today. Received home health referral for [facility] for SN and PT post surgical - [place] protocol. Order processed and called to [facility] in intake. Patient has bath chair, front wheeled walker, bedside commode, and reacher.  Mohsen Giron LPN

## 2019-05-09 NOTE — PROGRESS NOTES
Discharge planning Discharge order noted for today. Referral sent to [facility] agency. Met with patient and  agreeable to the transition plan today. Transport has been arranged with spouse Patient's discharge summary and home health  orders have been forwarded to [address] [place] [address] via [employer]. Updated bedside RN, Fátima,to the transition plan. Discharge information has been documented on the AVS. NEO Lux, RN Pager # [phone] Care Manager

## 2019-05-09 NOTE — PROGRESS NOTES
Megan Antony rounded on post hip replacement. Patient educated: Activity: OOB for all meals, walk every hour to prevent blood clots & help with stiffness Follow hip precautions. Put pillow under whole leg to help with swelling. VTE prophylaxis:  
Use SCD pumps except when walking. Ankle pumps 10 times an hour at hospital & home. Take blood thinner medication as ordered by surgeon. Do not skip a dose. Pain Control: 
Pain medications side effects discussed. Wean off narcotics ASAP. Use Tylenol ( 3000 mg/24 hours) , ice, distraction, moving, & change position to help with pain. Rest between activity. Raise leg up on pillows. Don't get nauseated. Eat a snack before taking pain medication Do not get constipated: take stool softener/mild laxative daily while on narcotics. Incentive Spirometry:   
Use of incentive spirometer 10 x/hr. Demonstration  1000 ml x 3 Wound Care: Dressing intact and dry. Do not to take dressing off at home. Bathe daily with dial soap & wear clean clothes/clean towel. No lotions, powders, creams to surgical leg. Deandre Keel Diet:  
Eat for healing. Protein heals bone/muscle. Drink 8 glasses of water a day. Patient Safety:  
Call light & belongings in reach. Call for help when want to walk or get OOB. Educational material given. Patient agreed to continue doing everything at home to prevent complications and have a successful recovery. Patient  verbalized understand. Given the opportunity for asking questions.

## 2019-05-10 ENCOUNTER — HOME CARE VISIT (OUTPATIENT)
Dept: SCHEDULING | Facility: HOME HEALTH | Age: 79
End: 2019-05-10
Payer: MEDICARE

## 2019-05-10 ENCOUNTER — HOME CARE VISIT (OUTPATIENT)
Dept: HOME HEALTH SERVICES | Facility: HOME HEALTH | Age: 79
End: 2019-05-10

## 2019-05-10 PROCEDURE — G0151 HHCP-SERV OF PT,EA 15 MIN: HCPCS

## 2019-05-10 PROCEDURE — A6204 COMPOSITE DRSG >16<=48 SQ IN: HCPCS

## 2019-05-10 PROCEDURE — 3331090001 HH PPS REVENUE CREDIT

## 2019-05-10 PROCEDURE — G0299 HHS/HOSPICE OF RN EA 15 MIN: HCPCS

## 2019-05-10 PROCEDURE — 400013 HH SOC

## 2019-05-10 PROCEDURE — 3331090002 HH PPS REVENUE DEBIT

## 2019-05-11 ENCOUNTER — HOME CARE VISIT (OUTPATIENT)
Dept: HOME HEALTH SERVICES | Facility: HOME HEALTH | Age: 79
End: 2019-05-11
Payer: MEDICARE

## 2019-05-11 VITALS
HEART RATE: 84 BPM | RESPIRATION RATE: 17 BRPM | DIASTOLIC BLOOD PRESSURE: 72 MMHG | TEMPERATURE: 97 F | SYSTOLIC BLOOD PRESSURE: 138 MMHG

## 2019-05-11 PROCEDURE — G0157 HHC PT ASSISTANT EA 15: HCPCS

## 2019-05-11 PROCEDURE — 3331090001 HH PPS REVENUE CREDIT

## 2019-05-11 PROCEDURE — 3331090002 HH PPS REVENUE DEBIT

## 2019-05-12 ENCOUNTER — HOME CARE VISIT (OUTPATIENT)
Dept: SCHEDULING | Facility: HOME HEALTH | Age: 79
End: 2019-05-12
Payer: MEDICARE

## 2019-05-12 VITALS
OXYGEN SATURATION: 97 % | TEMPERATURE: 97.6 F | SYSTOLIC BLOOD PRESSURE: 132 MMHG | RESPIRATION RATE: 20 BRPM | DIASTOLIC BLOOD PRESSURE: 82 MMHG | HEART RATE: 84 BPM

## 2019-05-12 VITALS
OXYGEN SATURATION: 98 % | HEART RATE: 92 BPM | SYSTOLIC BLOOD PRESSURE: 120 MMHG | TEMPERATURE: 97.3 F | DIASTOLIC BLOOD PRESSURE: 60 MMHG

## 2019-05-12 PROCEDURE — 3331090002 HH PPS REVENUE DEBIT

## 2019-05-12 PROCEDURE — G0157 HHC PT ASSISTANT EA 15: HCPCS

## 2019-05-12 PROCEDURE — 3331090001 HH PPS REVENUE CREDIT

## 2019-05-13 ENCOUNTER — HOME CARE VISIT (OUTPATIENT)
Dept: HOME HEALTH SERVICES | Facility: HOME HEALTH | Age: 79
End: 2019-05-13
Payer: MEDICARE

## 2019-05-13 ENCOUNTER — HOME CARE VISIT (OUTPATIENT)
Dept: SCHEDULING | Facility: HOME HEALTH | Age: 79
End: 2019-05-13
Payer: MEDICARE

## 2019-05-13 VITALS
OXYGEN SATURATION: 98 % | SYSTOLIC BLOOD PRESSURE: 170 MMHG | HEART RATE: 86 BPM | TEMPERATURE: 98.3 F | DIASTOLIC BLOOD PRESSURE: 70 MMHG

## 2019-05-13 VITALS
OXYGEN SATURATION: 99 % | TEMPERATURE: 98.1 F | SYSTOLIC BLOOD PRESSURE: 118 MMHG | DIASTOLIC BLOOD PRESSURE: 64 MMHG | HEART RATE: 66 BPM

## 2019-05-13 PROCEDURE — 3331090002 HH PPS REVENUE DEBIT

## 2019-05-13 PROCEDURE — G0157 HHC PT ASSISTANT EA 15: HCPCS

## 2019-05-13 PROCEDURE — 3331090001 HH PPS REVENUE CREDIT

## 2019-05-13 PROCEDURE — A6213 FOAM DRG >16<=48 SQ IN W/BDR: HCPCS

## 2019-05-13 PROCEDURE — A6204 COMPOSITE DRSG >16<=48 SQ IN: HCPCS

## 2019-05-14 ENCOUNTER — HOME CARE VISIT (OUTPATIENT)
Dept: SCHEDULING | Facility: HOME HEALTH | Age: 79
End: 2019-05-14
Payer: MEDICARE

## 2019-05-14 VITALS
TEMPERATURE: 98.4 F | HEART RATE: 78 BPM | SYSTOLIC BLOOD PRESSURE: 180 MMHG | DIASTOLIC BLOOD PRESSURE: 66 MMHG | OXYGEN SATURATION: 97 %

## 2019-05-14 PROCEDURE — 3331090002 HH PPS REVENUE DEBIT

## 2019-05-14 PROCEDURE — 3331090001 HH PPS REVENUE CREDIT

## 2019-05-14 PROCEDURE — G0157 HHC PT ASSISTANT EA 15: HCPCS

## 2019-05-15 ENCOUNTER — HOME CARE VISIT (OUTPATIENT)
Dept: SCHEDULING | Facility: HOME HEALTH | Age: 79
End: 2019-05-15
Payer: MEDICARE

## 2019-05-15 VITALS
SYSTOLIC BLOOD PRESSURE: 150 MMHG | OXYGEN SATURATION: 100 % | DIASTOLIC BLOOD PRESSURE: 60 MMHG | HEART RATE: 70 BPM | TEMPERATURE: 97.9 F

## 2019-05-15 PROCEDURE — 3331090001 HH PPS REVENUE CREDIT

## 2019-05-15 PROCEDURE — A6213 FOAM DRG >16<=48 SQ IN W/BDR: HCPCS

## 2019-05-15 PROCEDURE — 3331090002 HH PPS REVENUE DEBIT

## 2019-05-15 PROCEDURE — G0157 HHC PT ASSISTANT EA 15: HCPCS

## 2019-05-16 ENCOUNTER — HOME CARE VISIT (OUTPATIENT)
Dept: SCHEDULING | Facility: HOME HEALTH | Age: 79
End: 2019-05-16
Payer: MEDICARE

## 2019-05-16 VITALS
HEART RATE: 67 BPM | OXYGEN SATURATION: 97 % | SYSTOLIC BLOOD PRESSURE: 150 MMHG | TEMPERATURE: 97.6 F | DIASTOLIC BLOOD PRESSURE: 60 MMHG

## 2019-05-16 PROCEDURE — 3331090002 HH PPS REVENUE DEBIT

## 2019-05-16 PROCEDURE — G0157 HHC PT ASSISTANT EA 15: HCPCS

## 2019-05-16 PROCEDURE — G0300 HHS/HOSPICE OF LPN EA 15 MIN: HCPCS

## 2019-05-16 PROCEDURE — 3331090001 HH PPS REVENUE CREDIT

## 2019-05-17 PROCEDURE — 3331090002 HH PPS REVENUE DEBIT

## 2019-05-17 PROCEDURE — 3331090001 HH PPS REVENUE CREDIT

## 2019-05-18 ENCOUNTER — HOME CARE VISIT (OUTPATIENT)
Dept: HOME HEALTH SERVICES | Facility: HOME HEALTH | Age: 79
End: 2019-05-18
Payer: MEDICARE

## 2019-05-18 PROCEDURE — 3331090001 HH PPS REVENUE CREDIT

## 2019-05-18 PROCEDURE — 3331090002 HH PPS REVENUE DEBIT

## 2019-05-19 PROCEDURE — 3331090002 HH PPS REVENUE DEBIT

## 2019-05-19 PROCEDURE — 3331090001 HH PPS REVENUE CREDIT

## 2019-05-20 ENCOUNTER — HOME CARE VISIT (OUTPATIENT)
Dept: HOME HEALTH SERVICES | Facility: HOME HEALTH | Age: 79
End: 2019-05-20
Payer: MEDICARE

## 2019-05-20 ENCOUNTER — HOME CARE VISIT (OUTPATIENT)
Dept: SCHEDULING | Facility: HOME HEALTH | Age: 79
End: 2019-05-20
Payer: MEDICARE

## 2019-05-20 VITALS
DIASTOLIC BLOOD PRESSURE: 70 MMHG | HEART RATE: 78 BPM | SYSTOLIC BLOOD PRESSURE: 150 MMHG | OXYGEN SATURATION: 98 % | TEMPERATURE: 98.5 F

## 2019-05-20 PROCEDURE — 3331090002 HH PPS REVENUE DEBIT

## 2019-05-20 PROCEDURE — G0300 HHS/HOSPICE OF LPN EA 15 MIN: HCPCS

## 2019-05-20 PROCEDURE — G0151 HHCP-SERV OF PT,EA 15 MIN: HCPCS

## 2019-05-20 PROCEDURE — 3331090001 HH PPS REVENUE CREDIT

## 2019-05-21 VITALS
OXYGEN SATURATION: 97 % | HEART RATE: 63 BPM | SYSTOLIC BLOOD PRESSURE: 152 MMHG | RESPIRATION RATE: 18 BRPM | DIASTOLIC BLOOD PRESSURE: 64 MMHG

## 2019-05-21 PROCEDURE — 3331090001 HH PPS REVENUE CREDIT

## 2019-05-21 PROCEDURE — 3331090002 HH PPS REVENUE DEBIT

## 2019-05-22 ENCOUNTER — HOME CARE VISIT (OUTPATIENT)
Dept: SCHEDULING | Facility: HOME HEALTH | Age: 79
End: 2019-05-22
Payer: MEDICARE

## 2019-05-22 VITALS
OXYGEN SATURATION: 98 % | DIASTOLIC BLOOD PRESSURE: 70 MMHG | HEART RATE: 88 BPM | TEMPERATURE: 99 F | SYSTOLIC BLOOD PRESSURE: 160 MMHG

## 2019-05-22 PROCEDURE — G0157 HHC PT ASSISTANT EA 15: HCPCS

## 2019-05-22 PROCEDURE — 3331090002 HH PPS REVENUE DEBIT

## 2019-05-22 PROCEDURE — 3331090001 HH PPS REVENUE CREDIT

## 2019-05-23 PROCEDURE — 3331090002 HH PPS REVENUE DEBIT

## 2019-05-23 PROCEDURE — 3331090001 HH PPS REVENUE CREDIT

## 2019-05-24 ENCOUNTER — OFFICE VISIT (OUTPATIENT)
Dept: ORTHOPEDIC SURGERY | Age: 79
End: 2019-05-24

## 2019-05-24 ENCOUNTER — HOME CARE VISIT (OUTPATIENT)
Dept: SCHEDULING | Facility: HOME HEALTH | Age: 79
End: 2019-05-24
Payer: MEDICARE

## 2019-05-24 VITALS
DIASTOLIC BLOOD PRESSURE: 75 MMHG | TEMPERATURE: 98.2 F | HEIGHT: 59 IN | HEART RATE: 86 BPM | WEIGHT: 126 LBS | OXYGEN SATURATION: 100 % | SYSTOLIC BLOOD PRESSURE: 152 MMHG | RESPIRATION RATE: 16 BRPM | BODY MASS INDEX: 25.4 KG/M2

## 2019-05-24 VITALS
OXYGEN SATURATION: 98 % | DIASTOLIC BLOOD PRESSURE: 76 MMHG | SYSTOLIC BLOOD PRESSURE: 158 MMHG | TEMPERATURE: 98.9 F | HEART RATE: 81 BPM

## 2019-05-24 DIAGNOSIS — Z96.641 STATUS POST HIP REPLACEMENT, RIGHT: Primary | ICD-10-CM

## 2019-05-24 PROCEDURE — 3331090001 HH PPS REVENUE CREDIT

## 2019-05-24 PROCEDURE — 3331090002 HH PPS REVENUE DEBIT

## 2019-05-24 PROCEDURE — G0151 HHCP-SERV OF PT,EA 15 MIN: HCPCS

## 2019-05-24 NOTE — PROGRESS NOTES
1. Have you been to the ER, urgent care clinic since your last visit? Hospitalized since your last visit? NO    2. Have you seen or consulted any other health care providers outside of the [address] [place] [address] since your last visit? Include any pap smears or colon screening.  NO

## 2019-05-24 NOTE — PROGRESS NOTES
[address] [place] [address]  [phone]           Patient: Leatha Gaviria                MRN: [id number]       SSN: [id number]  YOB: 1940        AGE: 66 y.o. SEX: female  Body mass index is 25.45 kg/m². PCP: Evan Tyler MD  05/24/19      This office note has been dictated. REVIEW OF SYSTEMS:  Constitutional: Negative for fever, chills, weight loss and malaise/fatigue. HENT: Negative. Eyes: Negative. Respiratory: Negative. Cardiovascular: Negative. Gastrointestinal: No bowel incontinence or constipation. Genitourinary: No bladder incontinence or saddle anesthesia. Skin: Negative. Neurological: Negative. Endo/Heme/Allergies: Negative. Psychiatric/Behavioral: Negative. Musculoskeletal: As per HPI above. Past Medical History:   Diagnosis Date    Constipation     Hypercholesteremia     Hypertension     no meds now    Microscopic hematuria     MRSA (methicillin resistant staph aureus) culture positive 06/2018    On abdominal wall- tx with Vibramycin  (care everywhere)    Stroke [facility]) not sure when    blurred vision, resolved (taking plavix)     PEACE (stress urinary incontinence, female)     UTI (urinary tract infection)          Current Outpatient Medications:     aspirin delayed-release 325 mg tablet, Take 1 Tab by mouth two (2) times a day. (Patient taking differently: Take 325 mg by mouth daily.), Disp: 60 Tab, Rfl: 1    ferrous sulfate 325 mg (65 mg iron) tablet, Take 1 Tab by mouth two (2) times daily (with meals). , Disp: 60 Tab, Rfl: 1    docusate sodium (COLACE) 100 mg capsule, Take 1 Cap by mouth two (2) times a day for 90 days. , Disp: 60 Cap, Rfl: 2    amLODIPine (NORVASC) 5 mg tablet, Take 5 mg by mouth daily. , Disp: , Rfl:     SF 5000 PLUS 1.1 % crea, BRUSH ON PASTE AS DIRECTED EVERY DAY, Disp: , Rfl: 99    acetaminophen (TYLENOL) 325 mg tablet, Take 650 mg by mouth every six (6) hours as needed for Pain., Disp: , Rfl:     polyethylene glycol (MIRALAX) 17 gram/dose powder, Take 17 g by mouth daily as needed (Constipation). , Disp: , Rfl:     ESTRACE 0.01 % (0.1 mg/gram) vaginal cream, APPLY 2/3 LENGTH OF PINKY FINGER AND INSERT INTO VAGINA ONCE A WEEK, Disp: 42.5 g, Rfl: 0    simvastatin (ZOCOR) 40 mg tablet, Take 40 mg by mouth nightly., Disp: , Rfl:     CHOLECALCIFEROL, VITAMIN D3, (VITAMIN D3 PO), Take 1,000 Units by mouth daily. , Disp: , Rfl:     celecoxib (CELEBREX) 200 mg capsule, Take 1 Cap by mouth two (2) times a day for 90 days. (Patient not taking: Reported on 5/20/2019), Disp: 60 Cap, Rfl: 2    trimethoprim-sulfamethoxazole (BACTRIM DS) 160-800 mg per tablet, Take 1 Tab by mouth two (2) times a day., Disp: 10 Tab, Rfl: 0    mupirocin (BACTROBAN) 2 % ointment, Use 1 Application to affected area 3 Times Daily. , Disp: , Rfl:     Allergies   Allergen Reactions    Erythromycin Rash and Itching       inflammation    Furacin [Nitrofurazone] Hives and Other (comments)     Intolerance    Tobrex [Tobramycin Sulfate] Other (comments)     Intolerance, extreme redness       Social History     Socioeconomic History    Marital status:      Spouse name: Not on file    Number of children: Not on file    Years of education: Not on file    Highest education level: Not on file   Occupational History    Not on file   Social Needs    Financial resource strain: Not on file    Food insecurity:     Worry: Not on file     Inability: Not on file    Transportation needs:     Medical: Not on file     Non-medical: Not on file   Tobacco Use    Smoking status: Never Smoker    Smokeless tobacco: Never Used   Substance and Sexual Activity    Alcohol use: No    Drug use: No    Sexual activity: Not on file   Lifestyle    Physical activity:     Days per week: Not on file     Minutes per session: Not on file    Stress: Not on file   Relationships    Social connections:     Talks on phone: Not on file     Gets together: Not on file     Attends Adventism service: Not on file     Active member of club or organization: Not on file     Attends meetings of clubs or organizations: Not on file     Relationship status: Not on file    Intimate partner violence:     Fear of current or ex partner: Not on file     Emotionally abused: Not on file     Physically abused: Not on file     Forced sexual activity: Not on file   Other Topics Concern    Not on file   Social History Narrative    Not on file       Past Surgical History:   Procedure Laterality Date    HX CATARACT REMOVAL Bilateral     HX CHOLECYSTECTOMY  1972    HX HYSTERECTOMY  09/2016    total    HX KNEE REPLACEMENT Right 04/27/2015    HX OTHER SURGICAL  2005, 2015    skin ca removed     HX OTHER SURGICAL  09/2016    bladder support    HX OTHER SURGICAL  01/2019    basal skin ca removed     HX TONSILLECTOMY  1946               We did see Ms. Daren Butler for followup with regards to her right direct anterior hip replacement. The patient is now 16 days status post surgery and is doing quite well. She is quite happy with the results of the hip replacement. She has had no troubles with the wound and no fever, chills, systemic changes, or injuries to report, and no chest pain or shortness of breath. PHYSICAL EXAMINATION:  In general, the patient is alert and oriented x 3 in no acute distress. The patient is well-developed, well-nourished, with a normal affect. The patient is afebrile. Examination of the right hip reveals the skin is intact. The surgical wound is healing nicely. There is no erythema or ecchymosis to the hip. She does have a little bit which has traveled distally to the knee and lower extremity, which is resolving. There is negative calf tenderness. There is a negative Cristhian's sign. There is no sign of DVT present. Leg lengths look perfect.   Sensory and motor are intact to the lower extremity. ASSESSMENT:  Status post right hip replacement, direct anterior. PLAN:  At this point, the patient has done extremely well. She is very happy with the results. She will continue with physical therapy. We will send her to outpatient physical therapy. She can stop her aspirin. She will get back on her Plavix. We will plan on seeing her back in about three weeks' time for evaluation and x-ray of the right hip upon her return. She will call with any questions or concerns that shall arise.                JR Woodrow DAO, PA-C, ATC

## 2019-05-25 PROCEDURE — 3331090001 HH PPS REVENUE CREDIT

## 2019-05-25 PROCEDURE — 3331090002 HH PPS REVENUE DEBIT

## 2019-05-26 PROCEDURE — 3331090002 HH PPS REVENUE DEBIT

## 2019-05-26 PROCEDURE — 3331090001 HH PPS REVENUE CREDIT

## 2019-05-27 PROCEDURE — 3331090002 HH PPS REVENUE DEBIT

## 2019-05-27 PROCEDURE — 3331090001 HH PPS REVENUE CREDIT

## 2019-05-28 VITALS
OXYGEN SATURATION: 99 % | DIASTOLIC BLOOD PRESSURE: 72 MMHG | TEMPERATURE: 98 F | RESPIRATION RATE: 18 BRPM | HEART RATE: 61 BPM | SYSTOLIC BLOOD PRESSURE: 124 MMHG

## 2019-06-03 ENCOUNTER — HOSPITAL ENCOUNTER (OUTPATIENT)
Dept: PHYSICAL THERAPY | Age: 79
Discharge: HOME | End: 2019-06-03
Payer: MEDICARE

## 2019-06-03 PROCEDURE — 97116 GAIT TRAINING THERAPY: CPT | Performed by: PHYSICAL THERAPIST

## 2019-06-03 PROCEDURE — 97161 PT EVAL LOW COMPLEX 20 MIN: CPT | Performed by: PHYSICAL THERAPIST

## 2019-06-03 PROCEDURE — 97530 THERAPEUTIC ACTIVITIES: CPT | Performed by: PHYSICAL THERAPIST

## 2019-06-03 NOTE — PROGRESS NOTES
[facility]  [address] [facility], [address] [place] [address], [facility] [address] [address]  [phone] fax      Plan of Care/ Statement of Necessity for Physical Therapy Services    Patient name: Rudy Mcginnis Start of Care: 6/3/2019   Referral source: Natanael Up MD : 1940    Medical Diagnosis: Right hip pain [M25.551]  Presence of right artificial hip joint [Z96.641]  Payor: [place] MEDICARE / Plan: [place] MEDICARE PART A & B / Product Type: Medicare /  Onset Date:19    Treatment Diagnosis: R hip pain, gait abnormalities, muscle weakness, joint stiffness   Prior Hospitalization: see medical history Provider#: [id number]   Medications: Verified on Patient summary List    Comorbidities: Arthritis, Cancer, HTN, stroke, R TKA   Prior Level of Function: Was driving and independent with housework and cooking prior to surgery. The Plan of Care and following information is based on the information from the initial evaluation. Assessment/ key information: Patient is a pleasant older adult female with sub-acute onset of R hip pain following WIL with anterior approach with expected limitations in range of motion, strength and localized swelling around incision. Case is complicated by chronicity of pain prior to surgery. Treatment will focus on strengthening and stabilization post op per protocol with discussion of lifestyle behaviors that affect health and recovery to improve overall quality of life and facilitate return to prior level of function.     Evaluation Complexity History LOW Complexity : Zero comorbidities / personal factors that will impact the outcome / POC; Examination LOW Complexity : 1-2 Standardized tests and measures addressing body structure, function, activity limitation and / or participation in recreation  ;Presentation LOW Complexity : Stable, uncomplicated  ;Clinical Decision Making MEDIUM Complexity : FOTO score of 26-74  Overall Complexity Rating: LOW   Problem List: pain affecting function, decrease ROM, decrease strength, edema affecting function, impaired gait/ balance, decrease ADL/ functional abilitiies, decrease activity tolerance, decrease flexibility/ joint mobility and decrease transfer abilities   Treatment Plan may include any combination of the following: Therapeutic exercise, Therapeutic activities, Neuromuscular re-education, Physical agent/modality, Gait/balance training, Manual therapy, Patient education, Self Care training, Functional mobility training, Home safety training, Stair training and Other: home exercise program  Patient / Family readiness to learn indicated by: asking questions, trying to perform skills and interest  Persons(s) to be included in education: patient (P)  Barriers to Learning/Limitations: None  Patient Goal (s): I want to regain full movement of the R hip and knee.   Patient Self Reported Health Status: good  Rehabilitation Potential: good  Short Term Goals: To be accomplished in 3  weeks:  1. Patient will decrease pain during aggravating activities by 2 points on Verbal Analog Scale (Eval 5/10)to increase participation in work and recreational activities such as walking long distances. 2. Patient will demonstrate increased strength by ½ grade on MMT (eval 3/5) to improve functional participation in such tasks as prolonged standing and sitting.         3.   Patient will decrease interruptions to sleep second to pain by 1 occurrence (Eval: >2).    Long Term Goals: To be accomplished in 6 weeks:  1. Patient will decrease pain during aggravating activities by 4 points on Verbal Analog Scale (5/10) to increase participation in recreational and work-related activities such as bike riding, long walking, housework, cooking. 2. Patient will demonstrate increased strength by 1 grade on MMT (Eval:3/5 hip flexion, 3+ knee) to improve functional participation in such tasks as standing and sitting for greater than 30 min.    3. Patient will decrease interruptions to sleep second to pain to < or = to 1 occurrence/night (Eval: >2). 4. Patient will achieve predicted FOTO scores (54, eval 41) to demonstrate decreased functional impairment to facilitate improved participation in activities of daily living, improve overall quality of life. Frequency / Duration: Patient to be seen 2-3 times per week for 6 weeks for total of 18 visits. Patient/ Caregiver education and instruction: Diagnosis, prognosis, activity modification, exercises and other home exercise program   [x]  Plan of care has been reviewed with PTA    Certification Period: 6/3/19 - 8/2/19  Jeannie Denise, PT 6/3/2019 2:26 PM    ________________________________________________________________________    I certify that the above Therapy Services are being furnished while the patient is under my care. I agree with the treatment plan and certify that this therapy is necessary.     Physician's Signature:____________Date:_________TIME:________    [employer], Date and Time must be completed for valid certification **    Please sign and return to In . Shaniqua [address], [address] [place] [address], [address] [place] [address],[facility] [address]  [phone] fax

## 2019-06-03 NOTE — PROGRESS NOTES
PT DAILY TREATMENT NOTE/HIP [id number]    Patient Name: Shane Sales  Date:6/3/2019  : 1940  [x]  Patient  Verified  Payor: VA MEDICARE / Plan: VA MEDICARE PART A & B / Product Type: Medicare /    In time:2:14P  Out time:3:00P  Total Treatment Time (min): 46min  Visit #: 1 of 18    Medicare/BCBS Only   Total Timed Codes (min):  46 1:1 Treatment Time:  55     Treatment Area: Right hip pain [M25.551]  Presence of right artificial hip joint [Z96.641]    SUBJECTIVE  Pain Level (0-10 scale): 2/10  []constant [x]intermittent [x]improving []worsening []no change since onset    Any medication changes, allergies to medications, adverse drug reactions, diagnosis change, or new procedure performed?: [x] No    [] Yes (see summary sheet for update)  Subjective functional status/changes:     PLOF: Was driving and independent with housework and cooking prior to surgery. Limitations to PLOF: Pain and fatigues easily; currently unable to reach her bottom cabinet drawers. Mechanism of Injury: Progressive Hip pain    Current symptoms/Complaints: Aggravating factors: 1) Walking without the cane >5 min causes about 5/10, 2) Rolling onto her R hip - fearful of causing damage; Eases   Previous Teatment/Compliance: Was receiving HHPT for 3 weeks and stopped 19  PMHx/Surgical Hx: Arthritis, Cancer, HTN, stroke, R TKA  Work Hx: Retired but currently  Living Situation: Lives in one story with room over the garage with 8 steps up to it with one rail on the R; 5 WALLACE in the front, 4 WALLACE from garage. Pt Goals: I want to regain full movement of the R hip and knee  Barriers: []pain []financial [x]time []transportation []other  Motivation: Good  Substance use: []Alcohol []Tobacco [x]other:   FABQ Score: [x]low []elevate  Cognition: A & O x 4    Other:    OBJECTIVE/EXAMINATION  Domestic Life: see above  Activity/Recreational Limitations: Pain and fatigues easily  Mobility: See below gait pattern.    Self Care: I with all ADLs    20 min [x]Eval                  []Re-Eval     13 min Therapeutic Activity:  [x]  See flow sheet :   Rationale: increase ROM, increase strength and improve coordination  to improve the patients ability to Tolerate basic ADLs and job-related tasks without pain. 13 min Gait Training:  ___ feet with ___ device on level surfaces with ___ level of assist   Rationale: With   [] TE   [] TA   [] neuro   [] other: Patient Education: [x] Review HEP    [] Progressed/Changed HEP based on:   [] positioning   [] body mechanics   [] transfers   [] heat/ice application    [] other:      Other Objective/Functional Measures:    BP: 138/65 mmHg  HR: 93 bpm  SpO2: 95%    Physical Therapy Evaluation- Hip    Posture:    Gait:  [] Normal    [x] Abnormal    [] Antalgic    [] NWB    Device: SPC    Describe: Antalgic, Trendelenburg on the R         ROM/Strength        AROM                     PROM        Strength (1-5)  Hip Left Right Left Right Left Right   Flexion 120 90 125 110 4- 3   Extension 15 10   3 3   Abduction 55 45 55 45 4 4   Adduction 20 10 20 20 4 4   ER         IR         Knee Left Right Left Right Left Right   Extension 0 +5 +3 +10 4- 3+   Flexion 130 125 135 130 4- 3+        Flexibility: [] Unable to assess at this time  Hamstrings:    (L) Tightness= [] WNL   [] Min   [x] Mod   [] Severe    (R) Tightness= [] WNL   [] Min   [x] Mod   [] Severe  Quadriceps:    (L) Tightness= [] WNL   [] Min   [x] Mod   [] Severe    (R) Tightness= [] WNL   [] Min   [x] Mod   [] Severe  Gastroc:    (L) Tightness= [] WNL   [] Min   [] Mod   [] Severe    (R) Tightness= [] WNL   [] Min   [] Mod   [] Severe                                  Palpation  [] Min  [x] Mod  [] Severe    Location: Incision to R hip - some scabbing and redness continues on both distal and proximal ends of scar; otherwise clean dry and intact.    [] Min  [] Mod  [] Severe    Location:  [] Min  [] Mod  [] Severe    Location:    Optional Tests - NOT TESTED due to presence of WIL, anterior approach. Serge/ Julio Cesar Rafael Test: [] Neg    [] Pos  Veto Test:  [] Neg    [] Pos  Scouring Test: [] Neg    [] Pos  Trendelenberg:  [] Neg    [] Pos [] Left    [] Right  OberTest:   [] Neg    [] Pos  Ely's Test:  [] Neg    [] Pos  Piriformis Test:  [] Neg    [] Pos  Sub-talor alignment: [] Neurtral [] Pronation [] Supination  Forefoot alignment: [] Neutral [] Varus [] Valgus  Functional Leg Length (cm):   Right:  Left:  Discrepancy:    Pain Level (0-10 scale) post treatment: 1/10    ASSESSMENT/Changes in Function: Patient is a pleasant older adult female with sub-acute onset of R hip pain following WIL with anterior approach with expected limitations in range of motion, strength and localized swelling around incision. Case is complicated by chronicity of pain prior to surgery. Treatment will focus on strengthening and stabilization post op per protocol with discussion of lifestyle behaviors that affect health and recovery to improve overall quality of life and facilitate return to prior level of function. Patient will continue to benefit from skilled PT services to modify and progress therapeutic interventions, address functional mobility deficits, address ROM deficits, address strength deficits, analyze and address soft tissue restrictions, analyze and cue movement patterns, analyze and modify body mechanics/ergonomics and assess and modify postural abnormalities to attain remaining goals. [x]  See Plan of Care  []  See progress note/recertification  []  See Discharge Summary         Progress towards goals / Updated goals:  Short Term Goals: To be accomplished in 3  weeks:  1. Patient will decrease pain during aggravating activities by 2 points on Verbal Analog Scale (Eval 5/10)to increase participation in work and recreational activities such as walking long distances.    2. Patient will demonstrate increased strength by ½ grade on MMT (eval 3/5) to improve functional participation in such tasks as prolonged standing and sitting.         3.   Patient will decrease interruptions to sleep second to pain by 1 occurrence (Eval: >2).    Long Term Goals: To be accomplished in 6 weeks:  1. Patient will decrease pain during aggravating activities by 4 points on Verbal Analog Scale (5/10) to increase participation in recreational and work-related activities such as bike riding, long walking, housework, cooking. 2. Patient will demonstrate increased strength by 1 grade on MMT (Eval:3/5 hip flexion, 3+ knee) to improve functional participation in such tasks as standing and sitting for greater than 30 min. 3. Patient will decrease interruptions to sleep second to pain to < or = to 1 occurrence/night (Eval: >2). 4. Patient will achieve predicted FOTO scores (54, eval 41) to demonstrate decreased functional impairment to facilitate improved participation in activities of daily living, improve overall quality of life.    PLAN  [x]  Upgrade activities as tolerated     []  Continue plan of care  []  Update interventions per flow sheet       []  Discharge due to:_  []  Other:_      Jeannie Denise, PT 6/3/2019  2:27 PM

## 2019-06-05 ENCOUNTER — HOSPITAL ENCOUNTER (OUTPATIENT)
Dept: PHYSICAL THERAPY | Age: 79
Discharge: HOME | End: 2019-06-05
Payer: MEDICARE

## 2019-06-05 PROCEDURE — 97110 THERAPEUTIC EXERCISES: CPT

## 2019-06-05 PROCEDURE — 97140 MANUAL THERAPY 1/> REGIONS: CPT

## 2019-06-05 NOTE — PROGRESS NOTES
PT DAILY TREATMENT NOTE 10-18    Patient Name: Elmer Hurley  Date:2019  : 1940  [x]  Patient  Verified  Payor: VA MEDICARE / Plan: VA MEDICARE PART A & B / Product Type: Medicare /    In time:3:35  Out time:4:19  Total Treatment Time (min): 41  Visit #:  2 of 18    Medicare/BCBS Only   Total Timed Codes (min):  31 1:1 Treatment Time:  31       Treatment Area: Right hip pain [M25.551]  Presence of right artificial hip joint [Z96.641]    SUBJECTIVE  Pain Level (0-10 scale): 2  Any medication changes, allergies to medications, adverse drug reactions, diagnosis change, or new procedure performed?: [x] No    [] Yes (see summary sheet for update)  Subjective functional status/changes:   [] No changes reported  \"Still  The  Same  Amount  Of  Pain. \"    OBJECTIVE    Modality rationale: decrease edema, decrease inflammation, decrease pain and increase tissue extensibility to improve the patients ability to perform ADL    Min Type Additional Details    [] Estim:  []Unatt       []IFC  []Premod                        []Other:  []w/ice   []w/heat  Position:  Location:    [] Estim: []Att    []TENS instruct  []NMES                    []Other:  []w/US   []w/ice   []w/heat  Position:  Location:    []  Traction: [] Cervical       []Lumbar                       [] Prone          []Supine                       []Intermittent   []Continuous Lbs:  [] before manual  [] after manual    []  Ultrasound: []Continuous   [] Pulsed                           []1MHz   []3MHz W/cm2:  Location:    []  Iontophoresis with dexamethasone         Location: [] Take home patch   [] In clinic   10 [x]  Ice   post  []  heat  []  Ice massage  []  Laser   []  Anodyne Position:long  sit  Location:right  hip    []  Laser with stim  []  Other:  Position:  Location:    []  Vasopneumatic Device Pressure:       [] lo [] med [] hi   Temperature: [] lo [] med [] hi   [x] Skin assessment post-treatment:  [x]intact []redness- no adverse reaction []redness  adverse reaction:      min []Eval                  []Re-Eval       23 min Therapeutic Exercise:  [x] See flow sheet :   Rationale: increase ROM and increase strength to improve the patients ability to perform ADL      min Therapeutic Activity:  []  See flow sheet :   Rationale:   to improve the patients ability to       min Neuromuscular Re-education:  []  See flow sheet :   Rationale:   to improve the patients ability to     8 min Manual Therapy:  Massage:  Gentle  Massage  With stick   Rationale: decrease pain, increase ROM, increase tissue extensibility and decrease edema  to perform ADL      min Gait Training:  ___ feet with ___ device on level surfaces with ___ level of assist   Rationale: With   [x] TE   [] TA   [] neuro   [] other: Patient Education: [x] Review HEP    [] Progressed/Changed HEP based on:   [] positioning   [] body mechanics   [] transfers   [] heat/ice application    [] other:      Other Objective/Functional Measures:  Tightness  At  Right  hip    Pain Level (0-10 scale) post treatment: 1  At  Right knee    ASSESSMENT/Changes in Function: Responded   Fairly  Well  To each there ex. Patient will continue to benefit from skilled PT services to address functional mobility deficits, address ROM deficits, address strength deficits, analyze and address soft tissue restrictions, analyze and cue movement patterns and instruct in home and community integration to attain remaining goals. [x]  See Plan of Care  []  See progress note/recertification  []  See Discharge Summary         Progress towards goals / Updated goals:  Short Term Goals: To be accomplished in 3  weeks:  1. Patient will decrease pain during aggravating activities by 2 points on Verbal Analog Scale (Eval 5/10)to increase participation in work and recreational activities such as walking long distances. Progressing  6/5/19  2.  Patient will demonstrate increased strength by ½ grade on MMT (eval 3/5) to improve functional participation in such tasks as prolonged standing and sitting.         3.   Patient will decrease interruptions to sleep second to pain by 1 occurrence (Eval: >2).    Long Term Goals: To be accomplished in 6 weeks:  1. Patient will decrease pain during aggravating activities by 4 points on Verbal Analog Scale (5/10) to increase participation in recreational and work-related activities such as bike riding, long walking, housework, cooking. 2. Patient will demonstrate increased strength by 1 grade on MMT (Eval:3/5 hip flexion, 3+ knee) to improve functional participation in such tasks as standing and sitting for greater than 30 min. 3. Patient will decrease interruptions to sleep second to pain to < or = to 1 occurrence/night (Eval: >2). 4. Patient will achieve predicted FOTO scores (54, eval 41) to demonstrate decreased functional impairment to facilitate improved participation in activities of daily living, improve overall quality of life.          PLAN  []  Upgrade activities as tolerated     [x]  Continue plan of care  []  Update interventions per flow sheet       []  Discharge due to:_  []  Other:_      Beena Mckenzie PTA 6/5/2019  3:37 PM    Future Appointments   Date Time Provider Arthur Carvalho   6/7/2019  2:30 PM [facility] Monday, PT [facility]   6/10/2019  3:00 PM Jhoan Crisostomo, PTA [facility]   6/12/2019 11:00 AM Imani Isabel [facility]   6/13/2019  1:15 PM JESSIKA Stone 69   6/14/2019 11:00 AM Shanique Monday, PT [facility]   6/17/2019 11:00 AM Shanique Monday, PT [facility]   6/19/2019 11:00 AM Shane Roche, PTA [facility]   6/21/2019 11:00 AM Shanique Monday, PT [facility]   6/21/2019  1:10 PM ROOSEVELT Robertsa Dani 69   6/24/2019  1:00 PM Cimarron Heading, PTA [facility]   6/26/2019 12:00 PM Cimarron Heading, PTA [facility]   6/28/2019  1:30 PM Chandler Gonzales, PT [facility]

## 2019-06-07 ENCOUNTER — HOSPITAL ENCOUNTER (OUTPATIENT)
Dept: PHYSICAL THERAPY | Age: 79
Discharge: HOME | End: 2019-06-07
Payer: MEDICARE

## 2019-06-07 PROCEDURE — 97110 THERAPEUTIC EXERCISES: CPT

## 2019-06-07 NOTE — PROGRESS NOTES
PT DAILY TREATMENT NOTE 10-18    Patient Name: Gisell Mcfarland  Date:2019  : 1940  [x]  Patient  Verified  Payor: [place] MEDICARE / Plan: [place] MEDICARE PART A & B / Product Type: Medicare /    In time:2:24  Out time:2:57  Total Treatment Time (min): 33  Visit #: 3 of 18    Medicare/BCBS Only   Total Timed Codes (min):  23 1:1 Treatment Time:  23       Treatment Area: Right hip pain [M25.551]  Presence of right artificial hip joint [Z96.641]    SUBJECTIVE  Pain Level (0-10 scale):  2  Any medication changes, allergies to medications, adverse drug reactions, diagnosis change, or new procedure performed?: [x] No    [] Yes (see summary sheet for update)  Subjective functional status/changes:   [] No changes reported  States she is doing well, trying to get off of using the cane     OBJECTIVE    Modality rationale: decrease edema, decrease inflammation and decrease pain to improve the patients ability to ease with tolerance to ADLs   Min Type Additional Details    [] Estim:  []Unatt       []IFC  []Premod                        []Other:  []w/ice   []w/heat  Position:  Location:    [] Estim: []Att    []TENS instruct  []NMES                    []Other:  []w/US   []w/ice   []w/heat  Position:  Location:    []  Traction: [] Cervical       []Lumbar                       [] Prone          []Supine                       []Intermittent   []Continuous Lbs:  [] before manual  [] after manual    []  Ultrasound: []Continuous   [] Pulsed                           []1MHz   []3MHz W/cm2:  Location:    []  Iontophoresis with dexamethasone         Location: [] Take home patch   [] In clinic   10 [x]  Ice     []  heat  []  Ice massage  []  Laser   []  Anodyne Position:incline  Location: right hip    []  Laser with stim  []  Other:  Position:  Location:    []  Vasopneumatic Device Pressure:       [] lo [] med [] hi   Temperature: [] lo [] med [] hi   [] Skin assessment post-treatment:  []intact []redness- no adverse reaction []redness  adverse reaction:         23 min Therapeutic Exercise:  [x] See flow sheet :   Rationale: increase strength, improve coordination and increase proprioception to improve the patients ability to perform daily household chores. With   [] TE   [] TA   [] neuro   [] other: Patient Education: [x] Review HEP    [] Progressed/Changed HEP based on:   [] positioning   [] body mechanics   [] transfers   [] heat/ice application    [] other:      Other Objective/Functional Measures:   Able to perform all therex per flow sheet  Increased reps as able this session with no change in symptoms      Pain Level (0-10 scale) post treatment: 0    ASSESSMENT/Changes in Function: Patient continues to show improvements with signs/ symptoms however still demonstrates a decrease in strength, impaired ROM, deficits with balance and an increase in pain with functional activities. Patient will continue to benefit from skilled PT services to modify and progress therapeutic interventions, address functional mobility deficits, address strength deficits, analyze and cue movement patterns and analyze and modify body mechanics/ergonomics to attain remaining goals. [x]  See Plan of Care  []  See progress note/recertification  []  See Discharge Summary         Progress towards goals / Updated goals:  Short Term Goals: To be accomplished in 3  weeks:  1. Patient will decrease pain during aggravating activities by 2 points on Verbal Analog Scale (Eval 5/10)to increase participation in work and recreational activities such as walking long distances. Progressing  6/5/19  2. Patient will demonstrate increased strength by ½ grade on MMT (eval 3/5) to improve functional participation in such tasks as prolonged standing and sitting.         3.   Patient will decrease interruptions to sleep second to pain by 1 occurrence (Eval: >2).    Long Term Goals: To be accomplished in 6 weeks:  1.  Patient will decrease pain during aggravating activities by 4 points on Verbal Analog Scale (5/10) to increase participation in recreational and work-related activities such as bike riding, long walking, housework, cooking. 2. Patient will demonstrate increased strength by 1 grade on MMT (Eval:3/5 hip flexion, 3+ knee) to improve functional participation in such tasks as standing and sitting for greater than 30 min. 3. Patient will decrease interruptions to sleep second to pain to < or = to 1 occurrence/night (Eval: >2).   4. Patient will achieve predicted FOTO scores (54, eval 41) to demonstrate decreased functional impairment to facilitate improved participation in activities of daily living, improve overall quality of life.         PLAN  [x]  Upgrade activities as tolerated     [x]  Continue plan of care  []  Update interventions per flow sheet       []  Discharge due to:_  []  Other:_      Camryn Juarez, MER 6/7/2019  7:47 AM    Future Appointments   Date Time Provider Arthur Montalvoi   6/7/2019  2:30 PM Tristin Prude, PT [facility]   6/10/2019  3:00 PM Pritesh Smith, SHELIA [facility]   6/12/2019 11:00 AM Tali Barnett [facility]   6/13/2019  1:15 PM JESSIKA Cabezas [address]   6/14/2019 11:00 AM Tristin Prude, PT [facility]   6/17/2019 11:00 AM Tristin Prude, PT [facility]   6/19/2019 11:00 AM Renzo Laureano PTA [facility]   6/21/2019 11:00 AM Tristin Prude, PT [facility]   6/21/2019  1:10 PM ROOSEVELT Art [address]   6/24/2019  1:00 PM Pritesh Smith PTA [facility]   6/26/2019 12:00 PM Pritesh Smith, PTA [facility]   6/28/2019  1:30 PM Shannan Rizo PT [facility]

## 2019-06-10 ENCOUNTER — HOSPITAL ENCOUNTER (OUTPATIENT)
Dept: PHYSICAL THERAPY | Age: 79
Discharge: HOME | End: 2019-06-10
Payer: MEDICARE

## 2019-06-10 PROCEDURE — 97110 THERAPEUTIC EXERCISES: CPT

## 2019-06-10 PROCEDURE — 97140 MANUAL THERAPY 1/> REGIONS: CPT

## 2019-06-10 NOTE — PROGRESS NOTES
PT DAILY TREATMENT NOTE 10-18    Patient Name: Gamal Perrin  Date:6/10/2019  : 1940  [x]  Patient  Verified  Payor: [place] MEDICARE / Plan: [place] MEDICARE PART A & B / Product Type: Medicare /    In time:3:25  Out time:3:58  Total Treatment Time (min): 43  Visit #: 4 of 18    Medicare/BCBS Only   Total Timed Codes (min):  33 1:1 Treatment Time:  33       Treatment Area: Right hip pain [M25.551]  Presence of right artificial hip joint [Z96.641]    SUBJECTIVE  Pain Level (0-10 scale): 2  Any medication changes, allergies to medications, adverse drug reactions, diagnosis change, or new procedure performed?: [x] No    [] Yes (see summary sheet for update)  Subjective functional status/changes:   [] No changes reported  \"Camryn  Been  Doing  A lot  Of  Walking. \"  OBJECTIVE    Modality rationale: decrease edema, decrease inflammation, decrease pain and increase tissue extensibility to improve the patients ability to perform ADL   Min Type Additional Details    [] Estim:  []Unatt       []IFC  []Premod                        []Other:  []w/ice   []w/heat  Position:  Location:    [] Estim: []Att    []TENS instruct  []NMES                    []Other:  []w/US   []w/ice   []w/heat  Position:  Location:    []  Traction: [] Cervical       []Lumbar                       [] Prone          []Supine                       []Intermittent   []Continuous Lbs:  [] before manual  [] after manual    []  Ultrasound: []Continuous   [] Pulsed                           []1MHz   []3MHz W/cm2:  Location:    []  Iontophoresis with dexamethasone         Location: [] Take home patch   [] In clinic   10 [x]  Ice post    []  heat  []  Ice massage  []  Laser   []  Anodyne Position: left  SL  Location:right  Hip/glute  region    []  Laser with stim  []  Other:  Position:  Location:    []  Vasopneumatic Device Pressure:       [] lo [] med [] hi   Temperature: [] lo [] med [] hi   [x] Skin assessment post-treatment:  [x]intact []redness- no adverse reaction    []redness  adverse reaction:      min []Eval                  []Re-Eval       25 min Therapeutic Exercise:  [x] See flow sheet :   Rationale: increase ROM, increase strength and improve coordination to improve the patients ability to perform ADL      min Therapeutic Activity:  []  See flow sheet :   Rationale:   to improve the patients ability to       min Neuromuscular Re-education:  []  See flow sheet :   Rationale:   to improve the patients ability to     8 min Manual Therapy:  Massage  Stick  Right hip/piriformis   Rationale:  to      min Gait Training:  ___ feet with ___ device on level surfaces with ___ level of assist   Rationale: With   [x] TE   [] TA   [] neuro   [] other: Patient Education: [x] Review HEP    [] Progressed/Changed HEP based on:   [] positioning   [] body mechanics   [] transfers   [] heat/ice application    [] other:      Other Objective/Functional Measures:  Tightness  Right  Piriformis   Pain Level (0-10 scale) post treatment: <2    ASSESSMENT/Changes in Function: completed   Each there ex  Fairly  Well. Patient will continue to benefit from skilled PT services to address functional mobility deficits, address ROM deficits, address strength deficits, analyze and address soft tissue restrictions, analyze and cue movement patterns and instruct in home and community integration to attain remaining goals. [x]  See Plan of Care  []  See progress note/recertification  []  See Discharge Summary         Progress towards goals / Updated goals:  Short Term Goals: To be accomplished in 3  weeks:  1. Patient will decrease pain during aggravating activities by 2 points on Verbal Analog Scale (Eval 5/10)to increase participation in work and recreational activities such as walking long [place]  6/5/19  2.  Patient will demonstrate increased strength by ½ grade on MMT (eval 3/5) to improve functional participation in such tasks as prolonged standing and sitting.         3.   Patient will decrease interruptions to sleep second to pain by 1 occurrence (Eval: >2).    Long Term Goals: To be accomplished in 6 weeks:  1. Patient will decrease pain during aggravating activities by 4 points on Verbal Analog Scale (5/10) to increase participation in recreational and work-related activities such as bike riding, long walking, housework, cooking. 2. Patient will demonstrate increased strength by 1 grade on MMT (Eval:3/5 hip flexion, 3+ knee) to improve functional participation in such tasks as standing and sitting for greater than 30 min. 3. Patient will decrease interruptions to sleep second to pain to < or = to 1 occurrence/night (Eval: >2).   4. Patient will achieve predicted FOTO scores (54, eval 41) to demonstrate decreased functional impairment to facilitate improved participation in activities of daily living, improve overall quality of life.         PLAN  []  Upgrade activities as tolerated     [x]  Continue plan of care  []  Update interventions per flow sheet       []  Discharge due to:_  []  Other:_      [facility] 6/10/2019  4:00 PM    Future Appointments   Date Time Provider Arthur Carvalho   6/12/2019 11:00 AM Alondra Khan [facility] [address]   6/13/2019  1:15 PM [address]   6/14/2019 11:00 AM Marlon Alvarado PT [facility] [address]   6/17/2019 11:00 AM Marlon Alvarado PT [facility] [address]   6/19/2019 11:00 AM Patric Harrison PTA [facility] [address]   6/21/2019 11:00 AM Marlon Alvarado PT [facility] [address]   6/21/2019  1:10 PM ROOSEVELT Preston [address]   6/24/2019  1:00 PM Jasmina Mata PTA [facility] [address]   6/26/2019 12:00 PM Jasmina Mata PTA [facility] [address]   6/28/2019  1:30 PM Wallis Epley, PT [facility] [address]

## 2019-06-12 ENCOUNTER — HOSPITAL ENCOUNTER (OUTPATIENT)
Dept: PHYSICAL THERAPY | Age: 79
Discharge: HOME | End: 2019-06-12
Payer: MEDICARE

## 2019-06-12 PROCEDURE — 97110 THERAPEUTIC EXERCISES: CPT

## 2019-06-12 NOTE — PROGRESS NOTES
PT DAILY TREATMENT NOTE 10-18    Patient Name: Reinier Owen  Date:2019  : 1940  [x]  Patient  Verified  Payor: [place] MEDICARE / Plan: [place] MEDICARE PART A & B / Product Type: Medicare /    In time:11:00  Out time:12:00  Total Treatment Time (min): 60  Visit #: 5 of 18    Medicare/BCBS Only   Total Timed Codes (min):  60 1:1 Treatment Time:  30       Treatment Area: Right hip pain [M25.551]  Presence of right artificial hip joint [Z96.641]    SUBJECTIVE  Pain Level (0-10 scale): 0/10  Any medication changes, allergies to medications, adverse drug reactions, diagnosis change, or new procedure performed?: [x] No    [] Yes (see summary sheet for update)  Subjective functional status/changes:   [] No changes reported  No changes with the pain medication. OBJECTIVE    Modality rationale: decrease edema, decrease inflammation and decrease pain to improve the patients ability to return to normal activities.    Min Type Additional Details    [] Estim:  []Unatt       []IFC  []Premod                        []Other:  []w/ice   []w/heat  Position:  Location:    [] Estim: []Att    []TENS instruct  []NMES                    []Other:  []w/US   []w/ice   []w/heat  Position:  Location:    []  Traction: [] Cervical       []Lumbar                       [] Prone          []Supine                       []Intermittent   []Continuous Lbs:  [] before manual  [] after manual    []  Ultrasound: []Continuous   [] Pulsed                           []1MHz   []3MHz W/cm2:  Location:    []  Iontophoresis with dexamethasone         Location: [] Take home patch   [] In clinic   10 [x]  Ice     []  heat  []  Ice massage  []  Laser   []  Anodyne Position: supine with head elevated slightly and wedge under LEs  Location right hip    []  Laser with stim  []  Other:  Position:  Location:    []  Vasopneumatic Device Pressure:       [] lo [] med [] hi   Temperature: [] lo [] med [] hi   [x] Skin assessment post-treatment:  [x]intact [x]redness- no adverse reaction    []redness  adverse reaction:       30 min Therapeutic Exercise:  [x] See flow sheet :   Rationale: increase ROM, increase strength, improve coordination and improve balance to improve the patients ability to return to normal activities. With   [] TE   [] TA   [] neuro   [] other: Patient Education: [x] Review HEP    [] Progressed/Changed HEP based on:   [] positioning   [] body mechanics   [] transfers   [] heat/ice application    [x] other: icing     Other Objective/Functional Measures:  Patient was able to tolerate all exercises without difficulty, will increase resistance next visit, but still no SLR per protocol. Pain Level (0-10 scale) post treatment: 0/10  ASSESSMENT/Changes in Function: Progressing towards goals appropriately. Patient will continue to benefit from skilled PT services to address functional mobility deficits, address ROM deficits and address strength deficits to attain remaining goals. [x]  See Plan of Care  []  See progress note/recertification  []  See Discharge Summary         Progress towards goals / Updated goals:  Short Term Goals: To be accomplished in 3  weeks:  1. Patient will decrease pain during aggravating activities by 2 points on Verbal Analog Scale (Eval 5/10)to increase participation in work and recreational activities such as walking long [place]  6/5/19  2. Patient will demonstrate increased strength by ½ grade on MMT (eval 3/5) to improve functional participation in such tasks as prolonged standing and sitting.         3.   Patient will decrease interruptions to sleep second to pain by 1 occurrence (Eval: >2).    Long Term Goals: To be accomplished in 6 weeks:  1. Patient will decrease pain during aggravating activities by 4 points on Verbal Analog Scale (5/10) to increase participation in recreational and work-related activities such as bike riding, long walking, housework, cooking.    2. Patient will demonstrate increased strength by 1 grade on MMT (Eval:3/5 hip flexion, 3+ knee) to improve functional participation in such tasks as standing and sitting for greater than 30 min. 3. Patient will decrease interruptions to sleep second to pain to < or = to 1 occurrence/night (Eval: >2).   4. Patient will achieve predicted FOTO scores (54, eval 41) to demonstrate decreased functional impairment to facilitate improved participation in activities of daily living, improve overall quality of life.            PLAN  [x]  Upgrade activities as tolerated     [x]  Continue plan of care  []  Update interventions per flow sheet       []  Discharge due to:_  []  Other:_      Dayron Diane 6/12/2019  11:11 AM    Future Appointments   Date Time Provider Arthur Carvalho   6/13/2019  1:15 PM JESSIKA [address]   6/14/2019 11:00 AM Dorothye Sicard, PT [facility]   6/17/2019 11:00 AM Dorothye Sicard, PT [facility]   6/19/2019 11:00 AM Irineo Galeazzi, PTA [facility]   6/21/2019 11:00 AM Dorothye Sicard, PT [facility]   6/24/2019  1:00 PM Nyla Vargas PTA [facility]   6/26/2019 12:00 PM Nyla Vargas PTA [facility]   6/28/2019  1:30 PM Charito Wilkerson PT [facility]

## 2019-06-13 ENCOUNTER — OFFICE VISIT (OUTPATIENT)
Dept: ORTHOPEDIC SURGERY | Age: 79
End: 2019-06-13

## 2019-06-13 VITALS
HEART RATE: 84 BPM | SYSTOLIC BLOOD PRESSURE: 146 MMHG | TEMPERATURE: 97.5 F | DIASTOLIC BLOOD PRESSURE: 63 MMHG | HEIGHT: 59 IN | OXYGEN SATURATION: 100 % | BODY MASS INDEX: 26.69 KG/M2 | WEIGHT: 132.4 LBS | RESPIRATION RATE: 16 BRPM

## 2019-06-13 DIAGNOSIS — Z96.641 STATUS POST HIP REPLACEMENT, RIGHT: Primary | ICD-10-CM

## 2019-06-13 NOTE — PROGRESS NOTES
Patient: Pepper Diego  YOB: 1940       HISTORY:  The patient presents for reevaluation of her s/p right total hip arthroplasty on 5/8/19. Patient is improved, states pain is a 0 out of 10.  she has participated in outpt physical therapy. has been doing hip exercises at home. She is doing very well and happy with the results from surgery. Patient denies any fever, chills, chest pain, shortness of breath or calf pain. There are no new illness or injuries to report since last seen in the office. PHYSICAL EXAMINATION:    Visit Vitals  /63   Pulse 84   Temp 97.5 °F (36.4 °C) (Oral)   Resp 16   Ht 4' 11\" (1.499 m)   Wt 132 lb 6.4 oz (60.1 kg)   SpO2 100%   BMI 26.74 kg/m²     The patient is a well-developed, well-nourished female in no acute distress. The patient is alert and oriented times three. The patient appears to be well groomed. Mood and affect are normal.   ORTHOPEDIC EXAM of Right Hip:  Inspection: Effusion not present,  Incision well healed  Walk: with no assistive device today  TTP: none  Range of motion: hip rotates without pain  Stability: Stable   Strength: 5/5, forward flexion   2+ distal pulses    XR: 3 views of the Right Hip show total hip components in good placement. No evidence for loosening or fracture. IMPRESSION:  Status post Right total hip arthroplasty. Anterior Approach    PLAN:  Pt doing well post operatively  Stressed to patient that nothing causes an increase in pain or swelling. Patient is weight bearing as tolerated. Will continue outpt physical therapy. Patient not given a refill of pain medication. Patient will follow up in 4 weeks.     Deena Nuno [facility]

## 2019-06-14 ENCOUNTER — HOSPITAL ENCOUNTER (OUTPATIENT)
Dept: PHYSICAL THERAPY | Age: 79
Discharge: HOME | End: 2019-06-14
Payer: MEDICARE

## 2019-06-14 PROCEDURE — 97110 THERAPEUTIC EXERCISES: CPT

## 2019-06-14 PROCEDURE — 97530 THERAPEUTIC ACTIVITIES: CPT

## 2019-06-14 NOTE — PROGRESS NOTES
PT DAILY TREATMENT NOTE 10-18    Patient Name: Amy Crocker  Date:2019  : 1940  [x]  Patient  Verified  Payor: Gail Nava / Plan: [place] MED[place] & B / Product Type: Medicare /    In time:10:54  Out time:11:37  Total Treatment Time (min): 43  Visit #: 6 of 18    Medicare/BCBS Only   Total Timed Codes (min):  33 1:1 Treatment Time:  33       Treatment Area: Right hip pain [M25.551]  Presence of right artificial hip joint [Z96.641]    SUBJECTIVE  Pain Level (0-10 scale):  2  Any medication changes, allergies to medications, adverse drug reactions, diagnosis change, or new procedure performed?: [x] No    [] Yes (see summary sheet for update)  Subjective functional status/changes:   [] No changes reported  She drove here for the first time today, no difficulties/ pain, went to the doctor who took x-rays and said everything was looking fine    OBJECTIVE    Modality rationale: decrease edema, decrease inflammation and decrease pain to improve the patients ability to ease with tolerance to ADLs   Min Type Additional Details    [] Estim:  []Unatt       []IFC  []Premod                        []Other:  []w/ice   []w/heat  Position:  Location:    [] Estim: []Att    []TENS instruct  []NMES                    []Other:  []w/US   []w/ice   []w/heat  Position:  Location:    []  Traction: [] Cervical       []Lumbar                       [] Prone          []Supine                       []Intermittent   []Continuous Lbs:  [] before manual  [] after manual    []  Ultrasound: []Continuous   [] Pulsed                           []1MHz   []3MHz W/cm2:  Location:    []  Iontophoresis with dexamethasone         Location: [] Take home patch   [] In clinic   10 [x]  Ice     []  heat  []  Ice massage  []  Laser   []  Anodyne Position:left sidelying  Location: right hip    []  Laser with stim  []  Other:  Position:  Location:    []  Vasopneumatic Device Pressure:       [] lo [] med [] hi   Temperature: [] lo [] med [] hi   [] Skin assessment post-treatment:  []intact []redness- no adverse reaction    []redness  adverse reaction:         23 min Therapeutic Exercise:  [x] See flow sheet :   Rationale: increase ROM, increase strength, improve coordination, improve balance and increase proprioception to improve the patients ability to perform daily household chores. 10 min Therapeutic Activity:  [x]  See flow sheet :   Rationale: increase strength, improve coordination, improve balance and increase proprioception  to improve the patients ability to assist with community ambulation        With   [] TE   [] TA   [] neuro   [] other: Patient Education: [x] Review HEP    [] Progressed/Changed HEP based on:   [] positioning   [] body mechanics   [] transfers   [] heat/ice application    [] other:      Other Objective/Functional Measures: Added 3 way hip with proper form and sit <> stand with good tolerance   Review of shoe tying and how to get into lower cabinets in her home by kneeling onto a pillow/ cushion- patient demonstrated safety with techniques      Pain Level (0-10 scale) post treatment: 1    ASSESSMENT/Changes in Function: Patient continues to show improvements with signs/ symptoms however still demonstrates a decrease in strength, impaired ROM, deficits with balance and an increase in pain with functional activities. Patient will continue to benefit from skilled PT services to modify and progress therapeutic interventions, address functional mobility deficits, address strength deficits, analyze and cue movement patterns and analyze and modify body mechanics/ergonomics to attain remaining goals. [x]  See Plan of Care  []  See progress note/recertification  []  See Discharge Summary         Progress towards goals / Updated goals:  Short Term Goals: To be accomplished in 3  weeks:  1.  Patient will decrease pain during aggravating activities by 2 points on Verbal Analog Scale (Eval 5/10)to increase participation in work and recreational activities such [facility]  2. Patient will demonstrate increased strength by ½ grade on MMT (eval 3/5) to improve functional participation in such tasks as prolonged standing and sitting.         3.   Patient will decrease interruptions to sleep second to pain by 1 occurrence (Eval: >2).    Long Term Goals: To be accomplished in 6 weeks:  1. Patient will decrease pain during aggravating activities by 4 points on Verbal Analog Scale (5/10) to increase participation in recreational and work-related activities such as bike riding, long walking, housework, cooking. 2. Patient will demonstrate increased strength by 1 grade on MMT (Eval:3/5 hip flexion, 3+ knee) to improve functional participation in such tasks as standing and sitting for greater than 30 min. 3. Patient will decrease interruptions to sleep second to pain to < or = to 1 occurrence/night (Eval: >2).   4. Patient will achieve predicted FOTO scores (54, eval 41) to demonstrate decreased functional impairment to facilitate improved participation in activities of daily living, improve overall quality of life.         PLAN  [x]  Upgrade activities as tolerated     [x]  Continue plan of care  []  Update interventions per flow sheet       []  Discharge due to:_  []  Other:_      Wali Law, PT 6/14/2019  7:47 AM    Future Appointments   Date Time Provider Arthur Carvalho   6/14/2019 11:00 AM Beatriz Richey, PT [facility]   6/17/2019 11:00 AM Beatriz Richey PT [facility]   6/19/2019 11:00 AM Carlos Frye, PTA [facility]   6/21/2019 11:00 AM Beatriz Richey PT [facility]   6/24/2019  1:00 PM Andrew Godoy PTA [facility]   6/26/2019 12:00 PM Andrew Godoy, PTA [facility]   6/28/2019  1:30 PM Neeraj Nath, PT [facility]   7/19/2019  1:20 PM Charmayne Hammers, PA-C [address]

## 2019-06-17 ENCOUNTER — HOSPITAL ENCOUNTER (OUTPATIENT)
Dept: PHYSICAL THERAPY | Age: 79
Discharge: HOME | End: 2019-06-17
Payer: MEDICARE

## 2019-06-17 PROCEDURE — 97110 THERAPEUTIC EXERCISES: CPT

## 2019-06-17 PROCEDURE — 97530 THERAPEUTIC ACTIVITIES: CPT

## 2019-06-17 NOTE — PROGRESS NOTES
PT DAILY TREATMENT NOTE 10-18    Patient Name: Alin Whyte  Date:2019  : 1940  [x]  Patient  Verified  Payor: [place] MEDICARE / Plan: [place] MEDICARE PART A & B / Product Type: Medicare /    In time:11:00  Out time:11:36  Total Treatment Time (min): 26  Visit #: 7 of 18    Medicare/BCBS Only   Total Timed Codes (min):  26 1:1 Treatment Time:         Treatment Area: Right hip pain [M25.551]  Presence of right artificial hip joint [Z96.641]    SUBJECTIVE  Pain Level (0-10 scale): 1  Any medication changes, allergies to medications, adverse drug reactions, diagnosis change, or new procedure performed?: [x] No    [] Yes (see summary sheet for update)  Subjective functional status/changes:   [] No changes reported  States she is doing well, has been having less trouble tying her shoes and reaching into cabinets that are low to the ground    OBJECTIVE    Modality rationale: decrease edema, decrease inflammation and decrease pain to improve the patients ability to ease with tolerance to ADLs   Min Type Additional Details    [] Estim:  []Unatt       []IFC  []Premod                        []Other:  []w/ice   []w/heat  Position:  Location:    [] Estim: []Att    []TENS instruct  []NMES                    []Other:  []w/US   []w/ice   []w/heat  Position:  Location:    []  Traction: [] Cervical       []Lumbar                       [] Prone          []Supine                       []Intermittent   []Continuous Lbs:  [] before manual  [] after manual    []  Ultrasound: []Continuous   [] Pulsed                           []1MHz   []3MHz W/cm2:  Location:    []  Iontophoresis with dexamethasone         Location: [] Take home patch   [] In clinic   10 [x]  Ice     []  heat  []  Ice massage  []  Laser   []  Anodyne Position:left sidelying   Location:right hip    []  Laser with stim  []  Other:  Position:  Location:    []  Vasopneumatic Device Pressure:       [] lo [] med [] hi   Temperature: [] lo [] med [] hi   [] Skin assessment post-treatment:  []intact []redness- no adverse reaction    []redness  adverse reaction:       16 min Therapeutic Exercise:  [] See flow sheet :   Rationale: increase strength, improve coordination, improve balance and increase proprioception to improve the patients ability to perform daily household chores. 10 min Therapeutic Activity:  [x]  See flow sheet :   Rationale: increase strength, improve coordination and increase proprioception  to improve the patients ability to assist with tolerance to community ambulation        With   [] TE   [] TA   [] neuro   [] other: Patient Education: [x] Review HEP    [] Progressed/Changed HEP based on:   [] positioning   [] body mechanics   [] transfers   [] heat/ice application    [] other:      Other Objective/Functional Measures: Added step ups/ squats with good form and tolerance   Cueing required for form with squats        Pain Level (0-10 scale) post treatment: 1    ASSESSMENT/Changes in Function: Patient continues to show improvements with signs/ symptoms however still demonstrates a decrease in strength, impaired ROM, deficits with balance and an increase in pain with functional activities. Patient will continue to benefit from skilled PT services to modify and progress therapeutic interventions, address functional mobility deficits, address strength deficits, analyze and cue movement patterns and analyze and modify body mechanics/ergonomics to attain remaining goals. [x]  See Plan of Care  []  See progress note/recertification  []  See Discharge Summary         Progress towards goals / Updated goals:  Short Term Goals: To be accomplished in 3  weeks:  1. Patient will decrease pain during aggravating activities by 2 points on Verbal Analog Scale (Eval 5/10)to increase participation in work and recreational activities such as walking long [place]  6/5/19  2.  Patient will demonstrate increased strength by ½ grade on MMT (eval 3/5) to improve functional participation in such tasks as prolonged standing and sitting.         3.   Patient will decrease interruptions to sleep second to pain by 1 occurrence (Eval: >2).    Long Term Goals: To be accomplished in 6 weeks:  1. Patient will decrease pain during aggravating activities by 4 points on Verbal Analog Scale (5/10) to increase participation in recreational and work-related activities such as bike riding, long walking, housework, cooking. 2. Patient will demonstrate increased strength by 1 grade on MMT (Eval:3/5 hip flexion, 3+ knee) to improve functional participation in such tasks as standing and sitting for greater than 30 min. 3. Patient will decrease interruptions to sleep second to pain to < or = to 1 occurrence/night (Eval: >2).   4. Patient will achieve predicted FOTO scores (54, eval 41) to demonstrate decreased functional impairment to facilitate improved participation in activities of daily living, improve overall quality of life.         PLAN  [x]  Upgrade activities as tolerated     [x]  Continue plan of care  []  Update interventions per flow sheet       []  Discharge due to:_  []  Other:_      Chucho Timmons, PT 6/17/2019  7:45 AM    Future Appointments   Date Time Provider Arthur Carvalho   6/17/2019 11:00 AM Kem Haddad, PT [facility]   6/19/2019 11:00 AM Reche Kehr, PTA [facility]   6/21/2019 11:00 AM Kem Haddad, PT [facility]   6/24/2019  1:00 PM Bel Rowe PTA [facility]   6/26/2019 12:00 PM Bel Rwoe PTA [facility]   6/28/2019  1:30 PM Zuleika Crowder PT [facility]   7/19/2019  1:20 PM ROOSEVELT Ta Dani 69

## 2019-06-19 ENCOUNTER — HOSPITAL ENCOUNTER (OUTPATIENT)
Dept: PHYSICAL THERAPY | Age: 79
Discharge: HOME | End: 2019-06-19
Payer: MEDICARE

## 2019-06-19 PROCEDURE — 97110 THERAPEUTIC EXERCISES: CPT

## 2019-06-19 PROCEDURE — 97112 NEUROMUSCULAR REEDUCATION: CPT

## 2019-06-19 NOTE — PROGRESS NOTES
PT DAILY TREATMENT NOTE 10-18    Patient Name: Viviane Singh  Date:2019  : 1940  [x]  Patient  Verified  Payor: [place] MEDICARE / Plan: [place] MEDICARE PART A & B / Product Type: Medicare /    In time:11:00  Out time:11:45  Total Treatment Time (min): 45  Visit #: 8 of 18    Medicare/BCBS Only   Total Timed Codes (min):  35 1:1 Treatment Time:  27       Treatment Area: Right hip pain [M25.551]  Presence of right artificial hip joint [Z96.641]    SUBJECTIVE  Pain Level (0-10 scale): 1  Any medication changes, allergies to medications, adverse drug reactions, diagnosis change, or new procedure performed?: [x] No    [] Yes (see summary sheet for update)  Subjective functional status/changes:   [] No changes reported  Pt reports feeling really good today    OBJECTIVE    Modality rationale: decrease pain to improve the patients ability to perform daily tasks   Min Type Additional Details    [] Estim:  []Unatt       []IFC  []Premod                        []Other:  []w/ice   []w/heat  Position:  Location:    [] Estim: []Att    []TENS instruct  []NMES                    []Other:  []w/US   []w/ice   []w/heat  Position:  Location:    []  Traction: [] Cervical       []Lumbar                       [] Prone          []Supine                       []Intermittent   []Continuous Lbs:  [] before manual  [] after manual    []  Ultrasound: []Continuous   [] Pulsed                           []1MHz   []3MHz W/cm2:  Location:    []  Iontophoresis with dexamethasone         Location: [] Take home patch   [] In clinic   10 [x]  Ice     []  heat  []  Ice massage  []  Laser   []  Anodyne Position: supine  Location: right hip    []  Laser with stim  []  Other:  Position:  Location:    []  Vasopneumatic Device Pressure:       [] lo [] med [] hi   Temperature: [] lo [] med [] hi   [] Skin assessment post-treatment:  []intact []redness- no adverse reaction    []redness  adverse reaction:     25 min Therapeutic Exercise:  [x] See flow sheet :   Rationale: increase ROM and increase strength to improve the patients ability to perform ADLs     10 min Neuromuscular Re-education:  []  See flow sheet :   Rationale: increase strength and increase proprioception  to improve the patients ability to perform functional tasks          With   [] TE   [] TA   [] neuro   [] other: Patient Education: [x] Review HEP    [] Progressed/Changed HEP based on:   [] positioning   [] body mechanics   [] transfers   [] heat/ice application    [] other:      Other Objective/Functional Measures: Added lateral step ups and standing marches per flow sheet     Pain Level (0-10 scale) post treatment: 1    ASSESSMENT/Changes in Function: Pt performed well with therex void of pain. Added supine hip flexor str to improve mobility and improve standing posture. Patient will continue to benefit from skilled PT services to modify and progress therapeutic interventions, address functional mobility deficits, address ROM deficits, address strength deficits, analyze and address soft tissue restrictions, analyze and cue movement patterns and analyze and modify body mechanics/ergonomics to attain remaining goals. []  See Plan of Care  []  See progress note/recertification  []  See Discharge Summary         Progress towards goals / Updated goals:  Short Term Goals: To be accomplished in 3  weeks:  1. Patient will decrease pain during aggravating activities by 2 points on Verbal Analog Scale (Eval 5/10)to increase participation in work and recreational activities such as walking long [place]  6/5/19  2. Patient will demonstrate increased strength by ½ grade on MMT (eval 3/5) to improve functional participation in such tasks as prolonged standing and sitting.         3.   Patient will decrease interruptions to sleep second to pain by 1 occurrence (Eval: >2).    Long Term Goals: To be accomplished in 6 weeks:  1.  Patient will decrease pain during aggravating activities by 4 points on Verbal Analog Scale (5/10) to increase participation in recreational and work-related activities such as bike riding, long walking, housework, cooking. 2. Patient will demonstrate increased strength by 1 grade on MMT (Eval:3/5 hip flexion, 3+ knee) to improve functional participation in such tasks as standing and sitting for greater than 30 min. 3. Patient will decrease interruptions to sleep second to pain to < or = to 1 occurrence/night (Eval: >2).   4. Patient will achieve predicted FOTO scores (54, eval 41) to demonstrate decreased functional impairment to facilitate improved participation in activities of daily living, improve overall quality of life.       PLAN  []  Upgrade activities as tolerated     [x]  Continue plan of care  []  Update interventions per flow sheet       []  Discharge due to:_  []  Other:_      Maci Brasher PTA 6/19/2019  11:02 AM    Future Appointments   Date Time Provider Arthur Carvalho   6/21/2019 11:00 AM Katherine Leung PT [facility]   6/24/2019  1:00 PM Josh Durham PTA [facility]   6/26/2019 12:00 PM Josh Durham PTA [facility]   6/28/2019  1:30 PM Eladia Sams PT [facility]   7/19/2019  1:20 PM ROOSEVELT [address]

## 2019-06-21 ENCOUNTER — HOSPITAL ENCOUNTER (OUTPATIENT)
Dept: PHYSICAL THERAPY | Age: 79
Discharge: HOME | End: 2019-06-21
Payer: MEDICARE

## 2019-06-21 PROCEDURE — 97110 THERAPEUTIC EXERCISES: CPT

## 2019-06-21 PROCEDURE — 97530 THERAPEUTIC ACTIVITIES: CPT

## 2019-06-21 NOTE — PROGRESS NOTES
PT DAILY TREATMENT NOTE 10-18    Patient Name: Gamal Perrin  Date:2019  : 1940  [x]  Patient  Verified  Payor: [place] MEDICARE / Plan: [place] MEDICARE PART A & B / Product Type: Medicare /    In time:11:00  Out time:11:41  Total Treatment Time (min): 41  Visit #: 9 of 18    Medicare/BCBS Only   Total Timed Codes (min):  31 1:1 Treatment Time:  31       Treatment Area: Right hip pain [M25.551]  Presence of right artificial hip joint [Z96.641]    SUBJECTIVE  Pain Level (0-10 scale): 1  Any medication changes, allergies to medications, adverse drug reactions, diagnosis change, or new procedure performed?: [x] No    [] Yes (see summary sheet for update)  Subjective functional status/changes:   [] No changes reported  States that she has been doing well, getting around her house better    OBJECTIVE    Modality rationale: decrease edema, decrease inflammation and decrease pain to improve the patients ability to ease with tolerance to ADLs   Min Type Additional Details    [] Estim:  []Unatt       []IFC  []Premod                        []Other:  []w/ice   []w/heat  Position:  Location:    [] Estim: []Att    []TENS instruct  []NMES                    []Other:  []w/US   []w/ice   []w/heat  Position:  Location:    []  Traction: [] Cervical       []Lumbar                       [] Prone          []Supine                       []Intermittent   []Continuous Lbs:  [] before manual  [] after manual    []  Ultrasound: []Continuous   [] Pulsed                           []1MHz   []3MHz W/cm2:  Location:    []  Iontophoresis with dexamethasone         Location: [] Take home patch   [] In clinic   10 [x]  Ice     []  heat  []  Ice massage  []  Laser   []  Anodyne Position:left sidelying  Location: right hip    []  Laser with stim  []  Other:  Position:  Location:    []  Vasopneumatic Device Pressure:       [] lo [] med [] hi   Temperature: [] lo [] med [] hi   [] Skin assessment post-treatment:  []intact []redness- no adverse reaction    []redness  adverse reaction:       21 min Therapeutic Exercise:  [x] See flow sheet :   Rationale: increase strength, improve coordination and increase proprioception to improve the patients ability to perform daily household chores. 10 min Therapeutic Activity:  [x]  See flow sheet :   Rationale: increase strength, improve coordination, improve balance and increase proprioception  to improve the patients ability to assist with community ambulation        With   [] TE   [] TA   [] neuro   [] other: Patient Education: [x] Review HEP    [] Progressed/Changed HEP based on:   [] positioning   [] body mechanics   [] transfers   [] heat/ice application    [] other:      Other Objective/Functional Measures:   Trial of stair negotiation with step over step gait better with proper form  Reports with fatigue descending is more difficult  Requires two handrails for safety     Pain Level (0-10 scale) post treatment: 1    ASSESSMENT/Changes in Function: Patient continues to show improvements with signs/ symptoms however still demonstrates a decrease in strength, impaired ROM, deficits with balance and an increase in pain with functional activities. Patient will continue to benefit from skilled PT services to modify and progress therapeutic interventions, address functional mobility deficits, address strength deficits, analyze and cue movement patterns and analyze and modify body mechanics/ergonomics to attain remaining goals. [x]  See Plan of Care  []  See progress note/recertification  []  See Discharge Summary         Progress towards goals / Updated goals:  Short Term Goals: To be accomplished in 3  weeks:  1. Patient will decrease pain during aggravating activities by 2 points on Verbal Analog Scale (Eval 5/10)to increase participation in work and recreational activities such as walking long [place]  6/5/19  2.  Patient will demonstrate increased strength by ½ grade on MMT (eval 3/5) to improve functional participation in such tasks as prolonged standing and sitting.         3.   Patient will decrease interruptions to sleep second to pain by 1 occurrence (Eval: >2).    Long Term Goals: To be accomplished in 6 weeks:  1. Patient will decrease pain during aggravating activities by 4 points on Verbal Analog Scale (5/10) to increase participation in recreational and work-related activities such as bike riding, long walking, housework, cooking. 2. Patient will demonstrate increased strength by 1 grade on MMT (Eval:3/5 hip flexion, 3+ knee) to improve functional participation in such tasks as standing and sitting for greater than 30 min. 3. Patient will decrease interruptions to sleep second to pain to < or = to 1 occurrence/night (Eval: >2).   4. Patient will achieve predicted FOTO scores (54, eval 41) to demonstrate decreased functional impairment to facilitate improved participation in activities of daily living, improve overall quality of life.         PLAN  [x]  Upgrade activities as tolerated     [x]  Continue plan of care  []  Update interventions per flow sheet       []  Discharge due to:_  []  Other:_      Yinka Reagan, PT 6/21/2019  7:44 AM    Future Appointments   Date Time Provider Arthur Carvalho   6/21/2019 11:00 AM Ngozi Frederick, PT [facility]   6/24/2019  1:00 PM Herve Pires, SHELIA [facility]   6/26/2019 12:00 PM Herve Pires PTA [facility]   6/28/2019  1:30 PM Reyes Greaser, PT [facility]   7/19/2019  1:20 PM ROOSEVELT Flores [address] 69

## 2019-06-24 ENCOUNTER — HOSPITAL ENCOUNTER (OUTPATIENT)
Dept: PHYSICAL THERAPY | Age: 79
Discharge: HOME | End: 2019-06-24
Payer: MEDICARE

## 2019-06-24 PROCEDURE — 97110 THERAPEUTIC EXERCISES: CPT

## 2019-06-24 NOTE — PROGRESS NOTES
[facility]  [address] [facility], [address] [place] [address], [address] [place], [place] [address]y [address]  [phone] fax      Medicare Progress Report    Patient name: Nico Nolasco Start of Care: 19   Referral source: Rosemarie Harvey MD : 1940   Medical/Treatment Diagnosis: Right hip pain [M25.551]  Presence of right artificial hip joint [Z96.641]  Payor: [place] MEDICARE / Plan: [place] MEDICARE PART A & B / Product Type: Medicare /  Onset Date:19     Prior Hospitalization: see medical history Provider#: [id number]   Medications: Verified on Patient Summary List    Comorbidities: Arthritis, Cancer, HTN, stroke, R TKA   Prior Level of Function: Was driving and independent with housework and cooking prior to surgery. Visits from Start of Care: 10    Missed Visits: 0    Reporting Period: 19 to 19    Subjective Reports: Patient states she is making significant progress- walking around the house better for up to 5 min, 2-3x/day without difficulty  1. Patient will decrease pain during aggravating activities by 2 points on Verbal Analog Scale (Eval 5/10)to increase participation in work and recreational activities such as walking long distances. met    2/10  19  2. Patient will demonstrate increased strength by ½ grade on MMT (eval 3/5) to improve functional participation in such tasks as prolonged standing and sitting. MMT  Knee  Flex 4+  Ext 4   Hip  Flex  4  19         3.   Patient will decrease interruptions to sleep second to pain by 1 occurrence (Eval: >2).    met 19  Long Term Goals: To be accomplished in 6 weeks:  1. Patient will decrease pain during aggravating activities by 4 points on Verbal Analog Scale (5/10) to increase participation in recreational and work-related activities such as bike riding, long walking, housework, cooking. MET 2/10  14  2.  Patient will demonstrate increased strength by 1 grade on MMT (Eval:3/5 hip flexion, 3+ knee) to improve functional participation in such tasks as standing and sitting for greater than 30 min. MMT  Knee  Flex 4+  Ext 4   Hip  Flex  4  6/24/19   2. Patient will decrease interruptions to sleep second to pain to < or = to 1 occurrence/night (Eval: >2). 3. Patient will achieve predicted FOTO scores (54, eval 41) to demonstrate decreased functional impairment to facilitate improved participation in activities of daily living, improve overall quality of life.                FOTO:  progressing at 53    6/24/19    Key functional changes: See MMT increase to 4 and 4+/5       Problems/ barriers to goal attainment: limited activity tolerance and decreased SLS balance ability on the R. Assessment / Recommendations: Continues to need VC and TC for proper positioning and maintenance of positioning. Recommend attempting to progress intensity of exercises with increased focus on balance and floor transfer techniques. Problem List: pain affecting function, decrease ROM, decrease strength, edema affecting function, impaired gait/ balance, decrease ADL/ functional abilitiies, decrease activity tolerance, decrease flexibility/ joint mobility and decrease transfer abilities   Treatment Plan: Therapeutic exercise, Therapeutic activities, Neuromuscular re-education, Gait/balance training, Manual therapy, Patient education, Self Care training, Functional mobility training, Home safety training, Stair training and Other: home exercise program    Patient Goal (s) has been updated and includes: See Long Term Goals not met above. Updated Goals to be accomplished in 3 weeks:  3. Patient will demonstrate increased strength by 1 grade on MMT (Eval:3/5 hip flexion, 3+ knee) to improve functional participation in such tasks as standing and sitting for greater than 30 min. MMT  Knee  Flex 4+  Ext 4   Hip  Flex  4  6/24/19   4.  Patient will decrease interruptions to sleep second to pain to < or = to 1 occurrence/night (Eval: >2).   5. Patient will achieve predicted FOTO scores (54, eval 41) to demonstrate decreased functional impairment to facilitate improved participation in activities of daily living, improve overall quality of life.                FOTO:  progressing at 53    6/24/19    Frequency / Duration: Patient to be seen 2-3 times per week for 3 weeks:        Shikha Denise, PT 6/24/2019 5:21 PM

## 2019-06-24 NOTE — PROGRESS NOTES
PT DAILY TREATMENT NOTE 10-18    Patient Name: Selena Vázquez  Date:2019  : 1940  [x]  Patient  Verified  Payor: Luis Daniel Donaldson / Plan: VA MEDICARE PART A & B / Product Type: Medicare /    In time:1:01 Out time:1:50  Total Treatment Time (min): 45  Visit #: 10 of 18    Medicare/BCBS Only   Total Timed Codes (min):  35 1:1 Treatment Time:  35       Treatment Area: Right hip pain [M25.551]  Presence of right artificial hip joint [Z96.641]    SUBJECTIVE  Pain Level (0-10 scale): 0  Any medication changes, allergies to medications, adverse drug reactions, diagnosis change, or new procedure performed?: [x] No    [] Yes (see summary sheet for update)  Subjective functional status/changes:   [] No changes reported   \" [place],  Feeling  Ok. \"    OBJECTIVE    Modality rationale: decrease edema, decrease inflammation, decrease pain and increase tissue extensibility to improve the patients ability to perform ADL    Min Type Additional Details    [] Estim:  []Unatt       []IFC  []Premod                        []Other:  []w/ice   []w/heat  Position:  Location:    [] Estim: []Att    []TENS instruct  []NMES                    []Other:  []w/US   []w/ice   []w/heat  Position:  Location:    []  Traction: [] Cervical       []Lumbar                       [] Prone          []Supine                       []Intermittent   []Continuous Lbs:  [] before manual  [] after manual    []  Ultrasound: []Continuous   [] Pulsed                           []1MHz   []3MHz W/cm2:  Location:    []  Iontophoresis with dexamethasone         Location: [] Take home patch   [] In clinic   10 [x]  Ice  post   []  heat  []  Ice massage  []  Laser   []  Anodyne Position:left  S/L  Location:right  hip    []  Laser with stim  []  Other:  Position:  Location:    []  Vasopneumatic Device Pressure:       [] lo [] med [] hi   Temperature: [] lo [] med [] hi   [x] Skin assessment post-treatment:  [x]intact []redness- no adverse reaction    []redness  adverse reaction:      min []Eval                  []Re-Eval       35 min Therapeutic Exercise:  [x] See flow sheet :   Rationale: increase ROM and increase strength to improve the patients ability to perform ADL      min Therapeutic Activity:  []  See flow sheet :   Rationale:   to improve the patients ability to       min Neuromuscular Re-education:  []  See flow sheet :   Rationale:  to improve the patients ability to      min Manual Therapy:     Rationale: decrease pain, increase ROM and increase tissue extensibility to perform ADL      min Gait Training:  ___ feet with ___ device on level surfaces with ___ level of assist   Rationale: With   [x] TE   [] TA   [] neuro   [] other: Patient Education: [x] Review HEP    [] Progressed/Changed HEP based on:   [] positioning   [] body mechanics   [] transfers   [] heat/ice application    [x] other: REASSESS GOALS     Other Objective/Functional Measures: FOTO:  53%   MMT  Knee  Flex 4+  Ext 4   Hip  Flex  4    Pain Level (0-10 scale) post treatment:     ASSESSMENT/Changes in Function: Mrs. Antony  85%   Improvement. Pain level  Is  A  2/10  Depending  On  How  Long  She  Does  actviity. Patient will continue to benefit from skilled PT services to address functional mobility deficits, address ROM deficits, address strength deficits, analyze and address soft tissue restrictions and analyze and cue movement patterns to attain remaining goals. [x]  See Plan of Care  []  See progress note/recertification  []  See Discharge Summary         Progress towards goals / Updated goals:  1.  Patient will decrease pain during aggravating activities by 2 points on Verbal Analog Scale (Eval 5/10)to increase participation in work and recreational activities such as walking [address]    2/10  6/24/19  Patient will demonstrate increased strength by ½ grade on MMT (eval 3/5) to improve functional participation in such tasks as prolonged standing and sitting. MMT  Knee  Flex 4+  Ext 4   Hip  Flex  4  6/24/19    2.          3.   Patient will decrease interruptions to sleep second to pain by 1 occurrence (Eval: >2).    met 6/24/19  Long Term Goals: To be accomplished in 6 weeks:  1. Patient will decrease pain during aggravating activities by 4 points on Verbal Analog Scale (5/10) to increase participation in recreational and work-related activities such as bike riding, long walking, housework, cooking. 2/10  6/24/14  Patient will demonstrate increased strength by 1 grade on MMT (Eval:3/5 hip flexion, 3+ knee) to improve functional participation in such tasks as standing and sitting for greater than 30 min. MMT  Knee  Flex 4+  Ext 4   Hip  Flex  4  6/24/19  2.   3. Patient will decrease interruptions to sleep second to pain to < or = to 1 occurrence/night (Eval: >2).   4. Patient will achieve predicted FOTO scores (54, eval 41) to demonstrate decreased functional impairment to facilitate improved participation in activities of daily living, improve overall quality of life.                FOTO:  53    6/24/19      PLAN  []  Upgrade activities as tolerated     []  Continue plan of care  []  Update interventions per flow sheet       []  Discharge due to:_  [x]  Other:_  FAX  MD JACOBO Strauss PTA 6/24/2019  1:09 PM    Future Appointments   Date Time Provider Arthur Carvalho   6/26/2019 12:00 PM Abdiel Bruno PTA [facility]   6/28/2019  1:30 PM Rehana Colón PT [facility]   7/19/2019  1:20 PM Eder Ahn PA-C [id number]

## 2019-06-26 ENCOUNTER — HOSPITAL ENCOUNTER (OUTPATIENT)
Dept: PHYSICAL THERAPY | Age: 79
Discharge: HOME | End: 2019-06-26
Payer: MEDICARE

## 2019-06-26 PROCEDURE — 97140 MANUAL THERAPY 1/> REGIONS: CPT

## 2019-06-26 NOTE — PROGRESS NOTES
PT DAILY TREATMENT NOTE 10-18    Patient Name: Liane Pineda  Date:2019  : 1940  [x]  Patient  Verified  Payor: Phillip Zhou / Plan: VA MEDICARE PART A & B / Product Type: Medicare /    In time: 12:00  Out time:12:50  Total Treatment Time (min): 48  Visit #: 11 of 18    Medicare/BCBS Only   Total Timed Codes (min):  43 1:1 Treatment Time:  15       Treatment Area: Right hip pain [M25.551]  Presence of right artificial hip joint [Z96.641]    SUBJECTIVE  Pain Level (0-10 scale): 0  Any medication changes, allergies to medications, adverse drug reactions, diagnosis change, or new procedure performed?: [x] No    [] Yes (see summary sheet for update)  Subjective functional status/changes:   [] No changes reported  \" No pain. \"    OBJECTIVE    Modality rationale: decrease edema, decrease inflammation, decrease pain and increase tissue extensibility to improve the patients ability to perform ADL    Min Type Additional Details    [] Estim:  []Unatt       []IFC  []Premod                        []Other:  []w/ice   []w/heat  Position:  Location:    [] Estim: []Att    []TENS instruct  []NMES                    []Other:  []w/US   []w/ice   []w/heat  Position:  Location:    []  Traction: [] Cervical       []Lumbar                       [] Prone          []Supine                       []Intermittent   []Continuous Lbs:  [] before manual  [] after manual    []  Ultrasound: []Continuous   [] Pulsed                           []1MHz   []3MHz W/cm2:  Location:    []  Iontophoresis with dexamethasone         Location: [] Take home patch   [] In clinic   5 [x]  Ice  post   []  heat  []  Ice massage  []  Laser   []  Anodyne Position:left  SL  Location:right  hip    []  Laser with stim  []  Other:  Position:  Location:    []  Vasopneumatic Device Pressure:       [] lo [] med [] hi   Temperature: [] lo [] med [] hi   [x] Skin assessment post-treatment:  [x]intact []redness- no adverse reaction    []redness  adverse reaction:      min []Eval                  []Re-Eval       35 min Therapeutic Exercise:  [] See flow sheet :   Rationale: increase ROM and increase strength to improve the patients ability to perform ADL      min Therapeutic Activity:  []  See flow sheet :   Rationale:   to improve the patients ability to       min Neuromuscular Re-education:  []  See flow sheet :   Rationale:   to improve the patients ability to     8 min Manual Therapy:  Massage  Stick :  Right  hip   Rationale: decrease pain, increase ROM, increase tissue extensibility and decrease edema  to perform ADL      min Gait Training:  ___ feet with ___ device on level surfaces with ___ level of assist   Rationale: With   [x] TE   [] TA   [] neuro   [] other: Patient Education: [x] Review HEP    [] Progressed/Changed HEP based on:   [] positioning   [] body mechanics   [] transfers   [] heat/ice application    [] other:      Other Objective/Functional Measures:  Increased  Time  On bike    Pain Level (0-10 scale) post treatment:     ASSESSMENT/Changes in Function: completed   Each there ex  Fairly  Well. Patient will continue to benefit from skilled PT services to address functional mobility deficits, address ROM deficits, address strength deficits, analyze and address soft tissue restrictions and analyze and cue movement patterns to attain remaining goals. [x]  See Plan of Care  []  See progress note/recertification  []  See Discharge Summary         Progress towards goals / Updated goals:  1. Patient will decrease pain during aggravating activities by 2 points on Verbal Analog Scale (Eval 5/10)to increase participation in work and recreational activities such as walking [address]    2/10  6/24/19  Patient will demonstrate increased strength by ½ grade on MMT (eval 3/5) to improve functional participation in such tasks as prolonged standing and sitting. MMT  Knee  Flex 4+  Ext 4   Hip  Flex  4 6/24/19     2.          3.   Patient will decrease interruptions to sleep second to pain by 1 occurrence (Eval: >2).    met 6/24/19  Long Term Goals: To be accomplished in 6 weeks:  1. Patient will decrease pain during aggravating activities by 4 points on Verbal Analog Scale (5/10) to increase participation in recreational and work-related activities such as bike riding, long walking, housework, cooking. 2/10  6/24/14  Patient will demonstrate increased strength by 1 grade on MMT (Eval:3/5 hip flexion, 3+ knee) to improve functional participation in such tasks as standing and sitting for greater than 30 min. MMT  Knee  Flex 4+  Ext 4   Hip  Flex  4  6/24/19  2.    3. Patient will decrease interruptions to sleep second to pain to < or = to 1 occurrence/night (Eval: >2).   4. Patient will achieve predicted FOTO scores (54, eval 41) to demonstrate decreased functional impairment to facilitate improved participation in activities of daily living, improve overall quality of life.                FOTO:  53    6/24/19        PLAN  []  Upgrade activities as tolerated     []  Continue plan of care  []  Update interventions per flow sheet       []  Discharge due to:_  []  Other:_      Beena Mckenzie PTA 6/26/2019  11:51 AM    Future Appointments   Date Time Provider Arthur Carvalho   6/26/2019 12:00 PM Andrew Godoy PTA [facility]   6/28/2019  1:30 PM Neeraj Nath PT [facility]   7/19/2019  1:20 PM Charmayne Hammers, PA-C [address]

## 2019-06-28 ENCOUNTER — HOSPITAL ENCOUNTER (OUTPATIENT)
Dept: PHYSICAL THERAPY | Age: 79
Discharge: HOME | End: 2019-06-28
Payer: MEDICARE

## 2019-06-28 PROCEDURE — 97530 THERAPEUTIC ACTIVITIES: CPT

## 2019-06-28 PROCEDURE — 97110 THERAPEUTIC EXERCISES: CPT

## 2019-06-28 NOTE — PROGRESS NOTES
PT DAILY TREATMENT NOTE 10-18    Patient Name: Wilfredo Westbrook  Date:2019  : 1940  [x]  Patient  Verified  Payor: VA MEDICARE / Plan: VA MEDICARE PART A & B / Product Type: Medicare /    In time:132  Out time:220  Total Treatment Time (min): 48  Visit #: 12 of 18    Medicare/BCBS Only   Total Timed Codes (min):  38 1:1 Treatment Time:  38       Treatment Area: Right hip pain [M25.551]  Presence of right artificial hip joint [Z96.641]    SUBJECTIVE  Pain Level (0-10 scale): 0  Any medication changes, allergies to medications, adverse drug reactions, diagnosis change, or new procedure performed?: [x] No    [] Yes (see summary sheet for update)  Subjective functional status/changes:   [] No changes reported  \" I haven been up on my feet a lot today. I got tired out and had to sit down to rest some.  \"    OBJECTIVE    Modality rationale: increase tissue extensibility and post exercise recovery to improve the patients ability to aid with increase tolerance to ADLs and activities   Min Type Additional Details    [] Estim:  []Unatt       []IFC  []Premod                        []Other:  []w/ice   []w/heat  Position:  Location:    [] Estim: []Att    []TENS instruct  []NMES                    []Other:  []w/US   []w/ice   []w/heat  Position:  Location:    []  Traction: [] Cervical       []Lumbar                       [] Prone          []Supine                       []Intermittent   []Continuous Lbs:  [] before manual  [] after manual    []  Ultrasound: []Continuous   [] Pulsed                           []1MHz   []3MHz W/cm2:  Location:    []  Iontophoresis with dexamethasone         Location: [] Take home patch   [] In clinic   10 [x]  Ice post     []  heat  []  Ice massage  []  Laser   []  Anodyne Position:reclined  Location:right hip    []  Laser with stim  []  Other:  Position:  Location:    []  Vasopneumatic Device Pressure:       [] lo [] med [] hi   Temperature: [] lo [] med [] hi   [] Skin assessment post-treatment:  []intact []redness- no adverse reaction    []redness  adverse reaction:       min []Eval                  []Re-Eval       28 min Therapeutic Exercise:  [x] See flow sheet :   Rationale: increase ROM, increase strength, improve coordination, improve balance and increase proprioception to improve the patients ability to aid with increase tolerance to ADLS and activities    10 min Therapeutic Activity:  [x]  See flow sheet :   Rationale: increase ROM, increase strength, improve coordination, improve balance and increase proprioception  to improve the patients ability to aid with increase tolerance       min Neuromuscular Re-education:  []  See flow sheet :   Rationale:   to improve the patients ability to      min Manual Therapy:     Rationale:  to      min Gait Training:  ___ feet with ___ device on level surfaces with ___ level of assist   Rationale: With   [x] TE   [x] TA   [] neuro   [] other: Patient Education: [x] Review HEP    [x] Progressed/Changed HEP based on:   [] positioning   [] body mechanics   [] transfers   [] heat/ice application    [] other:      Other Objective/Functional Measures: VC exercises and technique. Increased PRES to 2 1/2 pounds. Increased bike to level 3    Pain Level (0-10 scale) post treatment: 0    ASSESSMENT/Changes in Function: tolerated well with reports of decreased endurance/activity tolerance with ambulation . Patient will continue to benefit from skilled PT services to modify and progress therapeutic interventions, address functional mobility deficits, address ROM deficits, address strength deficits, analyze and address soft tissue restrictions, analyze and cue movement patterns, analyze and modify body mechanics/ergonomics, assess and modify postural abnormalities, address imbalance/dizziness and instruct in home and community integration to attain remaining goals.      [x]  See Plan of Care  [x]  See progress note/recertification  []  See Discharge Summary         Progress towards goals / Updated goals:  Updated Goals to be accomplished in 3 weeks:  3. Patient will demonstrate increased strength by 1 grade on MMT (Eval:3/5 hip flexion, 3+ knee) to improve functional participation in such tasks as standing and sitting for greater than 30 min.  MMT  Knee  Flex 4+  Ext 4   Hip  Flex  4  6/24/19   4.  Patient will decrease interruptions to sleep second to pain to < or = to 1 occurrence/night (Eval: >2)  CURRENT no longer waking at night due to right hip 6/28/19.  5. Patient will achieve predicted FOTO scores (54, eval 41) to demonstrate decreased functional impairment to facilitate improved participation in activities of daily living, improve overall quality of life.                FOTO:  progressing at 53    6/24/19           PLAN  [x]  Upgrade activities as tolerated     [x]  Continue plan of care  []  Update interventions per flow sheet       []  Discharge due to:_  []  Other:_      Jose Escalera, PT 6/28/2019  1:42 PM    Future Appointments   Date Time Provider Arthur Carvalho   7/2/2019 11:30 AM Megan Donovan PTA [facility]   7/3/2019 10:30 AM Melba Scott, PT [facility]   7/19/2019  1:20 PM ROOSEVELT Olson   7/24/2019 11:15 AM [facility] PAREDES [facility]

## 2019-07-02 ENCOUNTER — HOSPITAL ENCOUNTER (OUTPATIENT)
Dept: PHYSICAL THERAPY | Age: 79
Discharge: HOME | End: 2019-07-02
Payer: MEDICARE

## 2019-07-02 PROCEDURE — 97530 THERAPEUTIC ACTIVITIES: CPT

## 2019-07-02 PROCEDURE — 97110 THERAPEUTIC EXERCISES: CPT

## 2019-07-02 NOTE — PROGRESS NOTES
PT DAILY TREATMENT NOTE 10-18    Patient Name: Caity Jasso  Date:2019  : 1940  [x]  Patient  Verified  Payor: [place] MED[place]ARE / Plan: [place] MEDICARE PART A & B / Product Type: Medicare /    In time: 11:23  Out time:12:08  Total Treatment Time (min): 43  Visit #: 13 of 18    Medicare/BCBS Only   Total Timed Codes (min):  33 1:1 Treatment Time:  33       Treatment Area: Right hip pain [M25.551]  Presence of right artificial hip joint [Z96.641]    SUBJECTIVE  Pain Level (0-10 scale): 1  Any medication changes, allergies to medications, adverse drug reactions, diagnosis change, or new procedure performed?: [x] No    [] Yes (see summary sheet for update)  Subjective functional status/changes:   [] No changes reported  \" Getting  Better. \"    OBJECTIVE    Modality rationale: decrease edema, decrease inflammation, decrease pain and increase tissue extensibility to improve the patients ability to perform ADL    Min Type Additional Details    [] Estim:  []Unatt       []IFC  []Premod                        []Other:  []w/ice   []w/heat  Position:  Location:    [] Estim: []Att    []TENS instruct  []NMES                    []Other:  []w/US   []w/ice   []w/heat  Position:  Location:    []  Traction: [] Cervical       []Lumbar                       [] Prone          []Supine                       []Intermittent   []Continuous Lbs:  [] before manual  [] after manual    []  Ultrasound: []Continuous   [] Pulsed                           []1MHz   []3MHz W/cm2:  Location:    []  Iontophoresis with dexamethasone         Location: [] Take home patch   [] In clinic   10 [x]  Ice post    []  heat  []  Ice massage  []  Laser   []  Anodyne Position:Left  SL   Location:right  hip    []  Laser with stim  []  Other:  Position:  Location:    []  Vasopneumatic Device Pressure:       [] lo [] med [] hi   Temperature: [] lo [] med [] hi   [x] Skin assessment post-treatment:  [x]intact []redness- no adverse reaction    []redness  adverse reaction:      min []Eval                  []Re-Eval       25 min Therapeutic Exercise:  [x] See flow sheet :   Rationale: increase ROM and increase strength to improve the patients ability to perform  ADL     min Therapeutic Activity:  []  See flow sheet :   Rationale:   to improve the patients ability to      8 min Neuromuscular Re-education:  []  See flow sheet :   Rationale:   to improve the patients ability to perform ADL      min Manual Therapy:     Rationale: decrease pain, increase ROM, increase tissue extensibility and decrease edema  to perform ADL      min Gait Training:  ___ feet with ___ device on level surfaces with ___ level of assist   Rationale: With   [x] TE   [] TA   [] neuro   [] other: Patient Education: [x] Review HEP    [] Progressed/Changed HEP based on:   [] positioning   [] body mechanics   [] transfers   [] heat/ice application    [] other:      Other Objective/Functional Measures: Added  BTB   Hip  abd    Pain Level (0-10 scale) post treatment: 0    ASSESSMENT/Changes in Function: Cues  For  Ambulating  On TM  Heel to toe  Gait  Pattern. OVerall  Fair response  To each there ex. Patient will continue to benefit from skilled PT services to address functional mobility deficits, address ROM deficits, address strength deficits, analyze and address soft tissue restrictions, analyze and cue movement patterns and instruct in home and community integration to attain remaining goals. [x]  See Plan of Care  []  See progress note/recertification  []  See Discharge Summary         Progress towards goals / Updated goals:  Updated Goals to be accomplished in 3 weeks:  3. Patient will demonstrate increased strength by 1 grade on MMT (Eval:3/5 hip flexion, 3+ knee) to improve functional participation in such tasks as standing and sitting for greater than 30 min.  MMT  Knee  Flex 4+  Ext 4   Hip  Flex  4  6/24/19   4.  Patient will decrease interruptions to sleep second to pain to < or = to 1 occurrence/night (Eval: >2)  CURRENT no longer waking at night due to right hip 6/28/19.  5. Patient will achieve predicted FOTO scores (54, eval 41) to demonstrate decreased functional impairment to facilitate improved participation in activities of daily living, improve overall quality of life.                FOTO:  progressing at 53    6/24/19           PLAN  [x]  Upgrade activities as tolerated     []  Continue plan of care  []  Update interventions per flow sheet       []  Discharge due to:_  []  Other:_      Madhav Roberto, PTA 7/2/2019  11:39 AM    Future Appointments   Date Time Provider Arthur Carvalho   7/3/2019 10:30 AM Marixa Gutierrez, PT [facility]   7/8/2019  2:00 PM Beena Mckenzie, PTA [facility]   7/12/2019 12:00 PM Marisol Newell, PTA [facility]   7/15/2019  2:30 PM Marixa Gutierrez, PT [facility]   7/17/2019  1:30 PM Christine Wei, PTA [facility]   7/19/2019 11:00 AM Juliana Aguilar, PT [facility]   7/19/2019  1:20 PM ROOSEVELT [place] [address]   7/22/2019 11:30 AM Juliana Aguilar, PT [facility]   7/24/2019 11:15 AM [facility]

## 2019-07-03 ENCOUNTER — HOSPITAL ENCOUNTER (OUTPATIENT)
Dept: PHYSICAL THERAPY | Age: 79
Discharge: HOME | End: 2019-07-03
Payer: MEDICARE

## 2019-07-03 PROCEDURE — 97110 THERAPEUTIC EXERCISES: CPT

## 2019-07-03 PROCEDURE — 97530 THERAPEUTIC ACTIVITIES: CPT

## 2019-07-03 NOTE — PROGRESS NOTES
PT DAILY TREATMENT NOTE 10-18    Patient Name: Mukul Wilkinson  Date:7/3/2019  : 1940  [x]  Patient  Verified  Payor: [place] MEDICARE / Plan: [place] MEDICARE PART A & B / Product Type: Medicare /    In time:1028  Out time:1113  Total Treatment Time (min): 45  Visit #: 14 of 18    Medicare/BCBS Only   Total Timed Codes (min):  35 1:1 Treatment Time:  23       Treatment Area: Right hip pain [M25.551]  Presence of right artificial hip joint [Z96.641]    SUBJECTIVE  Pain Level (0-10 scale): 1  Any medication changes, allergies to medications, adverse drug reactions, diagnosis change, or new procedure performed?: [x] No    [] Yes (see summary sheet for update)  Subjective functional status/changes:   [] No changes reported  \"My balance is doing good. \"    OBJECTIVE    Modality rationale: post exercise recovery to improve the patients ability to tolerate ADLs and activities   Min Type Additional Details    [] Estim:  []Unatt       []IFC  []Premod                        []Other:  []w/ice   []w/heat  Position:  Location:    [] Estim: []Att    []TENS instruct  []NMES                    []Other:  []w/US   []w/ice   []w/heat  Position:  Location:    []  Traction: [] Cervical       []Lumbar                       [] Prone          []Supine                       []Intermittent   []Continuous Lbs:  [] before manual  [] after manual    []  Ultrasound: []Continuous   [] Pulsed                           []1MHz   []3MHz W/cm2:  Location:    []  Iontophoresis with dexamethasone         Location: [] Take home patch   [] In clinic   10 [x]  Ice  post   []  heat  []  Ice massage  []  Laser   []  Anodyne Position:reclined  Location:right hip    []  Laser with stim  []  Other:  Position:  Location:    []  Vasopneumatic Device Pressure:       [] lo [] med [] hi   Temperature: [] lo [] med [] hi   [] Skin assessment post-treatment:  []intact []redness- no adverse reaction    []redness  adverse reaction:       min []Eval []Re-Eval       18 min Therapeutic Exercise:  [] See flow sheet :   Rationale: increase ROM, increase strength, improve coordination, improve balance and increase proprioception to improve the patients ability to aid with increase tolerance to ADLs and actvities    17 min Therapeutic Activity:  [x]  See flow sheet :   Rationale: increase ROM, increase strength, improve coordination, improve balance and increase proprioception  to improve the patients ability to aid with increase tolerance to ADLs and activities      min Neuromuscular Re-education:  [x]  See flow sheet :   Rationale:   to improve the patients ability to      min Manual Therapy:     Rationale:  to      min Gait Training:  ___ feet with ___ device on level surfaces with ___ level of assist   Rationale: With   [x] TE   [x] TA   [] neuro   [] other: Patient Education: [x] Review HEP    [] Progressed/Changed HEP based on:   [] positioning   [] body mechanics   [] transfers   [] heat/ice application    [] other:      Other Objective/Functional Measures: VC exercises and technique     Pain Level (0-10 scale) post treatment: 1    ASSESSMENT/Changes in Function: tolerated well. Patient will continue to benefit from skilled PT services to modify and progress therapeutic interventions, address functional mobility deficits, address ROM deficits, address strength deficits, analyze and address soft tissue restrictions, analyze and cue movement patterns, analyze and modify body mechanics/ergonomics, assess and modify postural abnormalities and instruct in home and community integration to attain remaining goals. [x]  See Plan of Care  [x]  See progress note/recertification  []  See Discharge Summary         Progress towards goals / Updated goals:   Updated Goals to be accomplished in 3 weeks:  4.  Patient will demonstrate increased strength by 1 grade on MMT (Eval:3/5 hip flexion, 3+ knee) to improve functional participation in such tasks as standing and sitting for greater than 30 min.  MMT  Knee  Flex 4+,5  Ext 4+   Hip  Flex  4+  7/3/19  5. Patient will decrease interruptions to sleep second to pain to < or = to 1 occurrence/night (Eval: >2)  CURRENT no longer waking at night due to right hip 7/3/19  5.  Patient will achieve predicted FOTO scores (54, eval 41) to demonstrate decreased functional impairment to facilitate improved participation in activities of daily living, improve overall quality of life.                FOTO:  progressing at 53    6/24/19        PLAN  [x]  Upgrade activities as tolerated     [x]  Continue plan of care  []  Update interventions per flow sheet       []  Discharge due to:_  []  Other:_      Rosette Bal, MER 7/3/2019  10:43 AM    Future Appointments   Date Time Provider Arthur Carvalho   7/8/2019  2:00 PM Leanna Mackenzie PTA [facility]   7/12/2019 12:00 PM Leanna Mackenzie PTA [facility]   7/15/2019  2:30 PM Suzanne Valdez PT [facility]   7/17/2019  1:30 PM Babita Gilmore PTA [facility]   7/19/2019 11:00 AM Pamela Walter, PT [facility]   7/19/2019  1:20 PM ROOSEVELT [place] [address] 69   7/22/2019 11:30 AM Pamela Walter, PT [facility]   7/24/2019 11:15 AM [facility]

## 2019-07-08 ENCOUNTER — APPOINTMENT (OUTPATIENT)
Dept: PHYSICAL THERAPY | Age: 79
End: 2019-07-08
Payer: MEDICARE

## 2019-07-12 ENCOUNTER — APPOINTMENT (OUTPATIENT)
Dept: PHYSICAL THERAPY | Age: 79
End: 2019-07-12
Payer: MEDICARE

## 2019-07-15 ENCOUNTER — APPOINTMENT (OUTPATIENT)
Dept: PHYSICAL THERAPY | Age: 79
End: 2019-07-15
Payer: MEDICARE

## 2019-07-17 ENCOUNTER — HOSPITAL ENCOUNTER (OUTPATIENT)
Dept: PHYSICAL THERAPY | Age: 79
Discharge: HOME | End: 2019-07-17
Payer: MEDICARE

## 2019-07-17 PROCEDURE — 97110 THERAPEUTIC EXERCISES: CPT

## 2019-07-17 PROCEDURE — 97112 NEUROMUSCULAR REEDUCATION: CPT

## 2019-07-17 NOTE — PROGRESS NOTES
PT DAILY TREATMENT NOTE 10-18    Patient Name: Regine Witt  Date:2019 : 1940  [x]  Patient  Verified  Payor: [place] MEDICARE / Plan: [place] MEDICARE PART A & B / Product Type: Medicare /    In time:1:30  Out time:2:12  Total Treatment Time (min): 42  Visit #: 15 of 18    Medicare/BCBS Only   Total Timed Codes (min):  32 1:1 Treatment Time:  30       Treatment Area: Right hip pain [M25.551]  Presence of right artificial hip joint [Z96.641]    SUBJECTIVE  Pain Level (0-10 scale): 4  Any medication changes, allergies to medications, adverse drug reactions, diagnosis change, or new procedure performed?: [x] No    [] Yes (see summary sheet for update)  Subjective functional status/changes:   [] No changes reported  Pt reports having more pain today, her  had been in the hospital for over a week and she had to walk the halls a lot    OBJECTIVE    Modality rationale: decrease inflammation and decrease pain to improve the patients ability to perform daily tasks   Min Type Additional Details    [] Estim:  []Unatt       []IFC  []Premod                        []Other:  []w/ice   []w/heat  Position:  Location:    [] Estim: []Att    []TENS instruct  []NMES                    []Other:  []w/US   []w/ice   []w/heat  Position:  Location:    []  Traction: [] Cervical       []Lumbar                       [] Prone          []Supine                       []Intermittent   []Continuous Lbs:  [] before manual  [] after manual    []  Ultrasound: []Continuous   [] Pulsed                           []1MHz   []3MHz W/cm2:  Location:    []  Iontophoresis with dexamethasone         Location: [] Take home patch   [] In clinic   10 [x]  Ice     []  heat  []  Ice massage  []  Laser   []  Anodyne Position: S/L  Location: right hip    []  Laser with stim  []  Other:  Position:  Location:    []  Vasopneumatic Device Pressure:       [] lo [] med [] hi   Temperature: [] lo [] med [] hi   [] Skin assessment post-treatment: []intact []redness- no adverse reaction    []redness  adverse reaction:       22 min Therapeutic Exercise:  [x] See flow sheet :   Rationale: increase ROM and increase strength to improve the patients ability to perform ADLs     10 min Neuromuscular Re-education:  [x]  See flow sheet :   Rationale: increase strength and increase proprioception  to improve the patients ability to perform functional tasks            With   [] TE   [] TA   [] neuro   [] other: Patient Education: [x] Review HEP    [] Progressed/Changed HEP based on:   [] positioning   [] body mechanics   [] transfers   [] heat/ice application    [] other:      Other Objective/Functional Measures:   vc's to slow speed of exercises and focus on eccentric control     Pain Level (0-10 scale) post treatment: 0    ASSESSMENT/Changes in Function: Cont's to be greatly challenged with right SLS. Vc's to focus on ecc control as pt tends to move through therex quickly. 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 Prior to recent flare up pt had no difficulty walking and standing up to 10 min, now unable to walk 5 min without pain. Patient will continue to benefit from skilled PT services to modify and progress therapeutic interventions, address functional mobility deficits, address ROM deficits, address strength deficits, analyze and address soft tissue restrictions, analyze and cue movement patterns and analyze and modify body mechanics/ergonomics to attain remaining goals. []  See Plan of Care  []  See progress note/recertification  []  See Discharge Summary         Progress towards goals / Updated goals:   Updated Goals to be accomplished in 3 weeks:  4. Patient will demonstrate increased strength by 1 grade on MMT (Eval:3/5 hip flexion, 3+ knee) to improve functional participation in such tasks as standing and sitting for greater than 30 min.  MMT  Knee  Flex 4+,5  Ext 4+   Hip  Flex  4+  7/3/19  5.  Patient will decrease interruptions to sleep second to pain to < or = to 1 occurrence/night (Eval: >2)  CURRENT no longer waking at night due to right hip 7/3/19  5.  Patient will achieve predicted FOTO scores (54, eval 41) to demonstrate decreased functional impairment to facilitate improved participation in activities of daily living, improve overall quality of life.                FOTO:  progressing at 53    6/24/19    PLAN  []  Upgrade activities as tolerated     [x]  Continue plan of care  []  Update interventions per flow sheet       []  Discharge due to:_  []  Other:_      Davin Morocho, PTA 7/17/2019  1:29 PM    Future Appointments   Date Time Provider Arthur Carvalho   7/17/2019  1:30 PM Rell Ramsey [facility]   7/19/2019 11:00 AM Thomas Espinal, PT [facility]   7/19/2019  1:20 PM ROOSEVELT [place] [address]   7/22/2019 11:30 AM Rajni Luong [facility]   7/24/2019 11:15 AM [facility]

## 2019-07-19 ENCOUNTER — APPOINTMENT (OUTPATIENT)
Dept: PHYSICAL THERAPY | Age: 79
End: 2019-07-19
Payer: MEDICARE

## 2019-07-19 ENCOUNTER — OFFICE VISIT (OUTPATIENT)
Dept: ORTHOPEDIC SURGERY | Age: 79
End: 2019-07-19

## 2019-07-19 VITALS
HEART RATE: 70 BPM | BODY MASS INDEX: 26.21 KG/M2 | OXYGEN SATURATION: 99 % | DIASTOLIC BLOOD PRESSURE: 68 MMHG | HEIGHT: 59 IN | SYSTOLIC BLOOD PRESSURE: 136 MMHG | TEMPERATURE: 98.3 F | WEIGHT: 130 LBS

## 2019-07-19 DIAGNOSIS — M54.50 LUMBAR PAIN: Primary | ICD-10-CM

## 2019-07-19 DIAGNOSIS — Z96.641 STATUS POST HIP REPLACEMENT, RIGHT: ICD-10-CM

## 2019-07-19 DIAGNOSIS — M54.16 LUMBAR RADICULOPATHY: ICD-10-CM

## 2019-07-19 DIAGNOSIS — M25.551 RIGHT HIP PAIN: ICD-10-CM

## 2019-07-19 RX ORDER — CELECOXIB 200 MG/1
200 CAPSULE ORAL 2 TIMES DAILY
Qty: 60 CAP | Refills: 2 | Status: CANCELLED | OUTPATIENT
Start: 2019-07-19 | End: 2019-10-17

## 2019-07-19 RX ORDER — METHYLPREDNISOLONE 4 MG/1
TABLET ORAL
Qty: 1 DOSE PACK | Refills: 0 | Status: SHIPPED | OUTPATIENT
Start: 2019-07-19

## 2019-07-19 NOTE — PROGRESS NOTES
[address] [place] [address]  [phone]           Patient: Regine Witt                MRN: [id number]       SSN: [id number]  YOB: 1940        AGE: 66 y.o. SEX: female  Body mass index is 26.26 kg/m². PCP: Kobe Short MD  07/19/19      This office note has been dictated. REVIEW OF SYSTEMS:  Constitutional: Negative for fever, chills, weight loss and malaise/fatigue. HENT: Negative. Eyes: Negative. Respiratory: Negative. Cardiovascular: Negative. Gastrointestinal: No bowel incontinence or constipation. Genitourinary: No bladder incontinence or saddle anesthesia. Skin: Negative. Neurological: Negative. Endo/Heme/Allergies: Negative. Psychiatric/Behavioral: Negative. Musculoskeletal: As per HPI above. Past Medical History:   Diagnosis Date    Constipation     Hypercholesteremia     Hypertension     no meds now    Microscopic hematuria     MRSA (methicillin resistant staph aureus) culture positive 06/2018    On abdominal wall- tx with Vibramycin  (care everywhere)    Stroke [facility]) not sure when    blurred vision, resolved (taking plavix)     PEACE (stress urinary incontinence, female)     UTI (urinary tract infection)          Current Outpatient Medications:     clopidogrel (PLAVIX) 75 mg tab, Take 75 mg by mouth daily. , Disp: , Rfl:     aspirin delayed-release 325 mg tablet, Take 1 Tab by mouth two (2) times a day., Disp: 60 Tab, Rfl: 1    celecoxib (CELEBREX) 200 mg capsule, Take 1 Cap by mouth two (2) times a day for 90 days. , Disp: 60 Cap, Rfl: 2    ferrous sulfate 325 mg (65 mg iron) tablet, Take 1 Tab by mouth two (2) times daily (with meals). , Disp: 60 Tab, Rfl: 1    docusate sodium (COLACE) 100 mg capsule, Take 1 Cap by mouth two (2) times a day for 90 days. , Disp: 60 Cap, Rfl: 2    amLODIPine (NORVASC) 5 mg tablet, Take 5 mg by mouth daily. , Disp: , Rfl:    trimethoprim-sulfamethoxazole (BACTRIM DS) 160-800 mg per tablet, Take 1 Tab by mouth two (2) times a day., Disp: 10 Tab, Rfl: 0    mupirocin (BACTROBAN) 2 % ointment, Use 1 Application to affected area 3 Times Daily. , Disp: , Rfl:     SF 5000 PLUS 1.1 % crea, BRUSH ON PASTE AS DIRECTED EVERY DAY, Disp: , Rfl: 99    acetaminophen (TYLENOL) 325 mg tablet, Take 650 mg by mouth every six (6) hours as needed for Pain., Disp: , Rfl:     polyethylene glycol (MIRALAX) 17 gram/dose powder, Take 17 g by mouth daily as needed (Constipation). , Disp: , Rfl:     ESTRACE 0.01 % (0.1 mg/gram) vaginal cream, APPLY 2/3 LENGTH OF PINKY FINGER AND INSERT INTO VAGINA ONCE A WEEK, Disp: 42.5 g, Rfl: 0    simvastatin (ZOCOR) 40 mg tablet, Take 40 mg by mouth nightly., Disp: , Rfl:     CHOLECALCIFEROL, VITAMIN D3, (VITAMIN D3 PO), Take 1,000 Units by mouth daily. , Disp: , Rfl:     Allergies   Allergen Reactions    Erythromycin Rash and Itching       inflammation    Furacin [Nitrofurazone] Hives and Other (comments)     Intolerance    Tobrex [Tobramycin Sulfate] Other (comments)     Intolerance, extreme redness       Social History     Socioeconomic History    Marital status:      Spouse name: Not on file    Number of children: Not on file    Years of education: Not on file    Highest education level: Not on file   Occupational History    Not on file   Social Needs    Financial resource strain: Not on file    Food insecurity:     Worry: Not on file     Inability: Not on file    Transportation needs:     Medical: Not on file     Non-medical: Not on file   Tobacco Use    Smoking status: Never Smoker    Smokeless tobacco: Never Used   Substance and Sexual Activity    Alcohol use: No    Drug use: No    Sexual activity: Not on file   Lifestyle    Physical activity:     Days per week: Not on file     Minutes per session: Not on file    Stress: Not on file   Relationships    Social connections:     Talks on phone: Not on file     Gets together: Not on file     Attends Synagogue service: Not on file     Active member of club or organization: Not on file     Attends meetings of clubs or organizations: Not on file     Relationship status: Not on file    Intimate partner violence:     Fear of current or ex partner: Not on file     Emotionally abused: Not on file     Physically abused: Not on file     Forced sexual activity: Not on file   Other Topics Concern    Not on file   Social History Narrative    Not on file       Past Surgical History:   Procedure Laterality Date    HX CATARACT REMOVAL Bilateral     HX CHOLECYSTECTOMY  1972    HX HYSTERECTOMY  09/2016    total    HX KNEE REPLACEMENT Right 04/27/2015    HX OTHER SURGICAL  2005, 2015    skin ca removed     HX OTHER SURGICAL  09/2016    bladder support    HX OTHER SURGICAL  01/2019    basal skin ca removed     HX TONSILLECTOMY  1946             We did see Ms. Sean Araiza for followup with regards to her right hip. She is status post right total hip replacement. She has done very well with her hip replacement. She is very happy with the results. She is now about two and a half months out. Her  has been in the hospital as recent with some cardiac issues. Fortunately, he is home now, but she did a lot of walking and is having some back pain with some radicular pain down the right lower extremity. She does have a history of significant back issues. She has had no change in her bowel or bladder habits and no fever or chills. PHYSICAL EXAMINATION:  In general, the patient is alert and oriented x 3 in no acute distress. The patient is well-developed, well-nourished, with a normal affect. The patient is afebrile. HEENT:  Head is normocephalic and atraumatic. Pupils are equally round and reactive to light and accommodation. Extraocular eye movements are intact. Neck is supple. Trachea is midline. No JVD is present. Breathing is nonlabored. Examination of the back reveals the skin is intact. There is no ecchymosis, no warmth. There are no signs for infection. There is no pain to palpation to the midline lumbar spine. She does have paraspinal discomfort present at the lower lumbar spine, as well as to the right SI joint and the sciatic notch on the right. There is no pain with rotation of the hip. There is no pain to palpation of the trochanteric bursae. The surgical wounds are healed up nicely. Leg lengths look perfect. There is negative straight leg raise. There is negative calf tenderness. There is negative Cristhian's. There is no evidence of DVT present. RADIOGRAPHS:  Radiographs in the office, including AP and lateral of the lumbar spine, show multilevel degenerative changes, significant scoliosis noted, and spondylosis. AP of the pelvis shows the right total hip components are well-fixed with no evidence of loosening or fracture noted. ASSESSMENT:      1. Status post right hip replacement. 2. Lumbar degenerative disc disease, scoliosis, and spondylosis. 3. Lumbar radiculopathy. PLAN:  At this point, we are going to start her on a Medrol Dosepak. She was instructed on use, as well as precautions. We will get her into physical therapy. She will continue with total hip protocol, as well as add a low back program.  We will see her back in the office in about four weeks' time for evaluation.               JR Woodrow DAO PA-C, ATC

## 2019-07-22 ENCOUNTER — APPOINTMENT (OUTPATIENT)
Dept: PHYSICAL THERAPY | Age: 79
End: 2019-07-22
Payer: MEDICARE

## 2019-07-24 ENCOUNTER — HOSPITAL ENCOUNTER (OUTPATIENT)
Dept: MAMMOGRAPHY | Age: 79
Discharge: HOME | End: 2019-07-24
Attending: FAMILY MEDICINE
Payer: MEDICARE

## 2019-07-24 DIAGNOSIS — Z12.39 BREAST SCREENING, UNSPECIFIED: ICD-10-CM

## 2019-07-24 PROCEDURE — 77063 BREAST TOMOSYNTHESIS BI: CPT

## 2019-08-07 ENCOUNTER — HOSPITAL ENCOUNTER (OUTPATIENT)
Dept: PHYSICAL THERAPY | Age: 79
Discharge: HOME | End: 2019-08-07
Payer: MEDICARE

## 2019-08-07 PROCEDURE — 97530 THERAPEUTIC ACTIVITIES: CPT

## 2019-08-07 PROCEDURE — 97110 THERAPEUTIC EXERCISES: CPT

## 2019-08-07 NOTE — PROGRESS NOTES
PT DAILY TREATMENT NOTE 10-18    Patient Name: Chris Baker  Date:2019  : 1940  [x]  Patient  Verified  Payor: VA MEDICARE / Plan: VA MEDICARE PART A & B / Product Type: Medicare /    In time:11:30  Out time:12:00  Total Treatment Time (min): 30  Visit #: 16 of 18    Medicare/BCBS Only   Total Timed Codes (min):  30 1:1 Treatment Time:  30       Treatment Area: Right hip pain [M25.551]  Presence of right artificial hip joint [Z96.641]    SUBJECTIVE  Pain Level (0-10 scale): 8/10  Any medication changes, allergies to medications, adverse drug reactions, diagnosis change, or new procedure performed?: [x] No    [] Yes (see summary sheet for update)  Subjective functional status/changes:   [] No changes reported  I have been in more pain lately, my  is in the hospital.     OBJECTIVE    Modality rationale: decrease edema, decrease inflammation, decrease pain and increase tissue extensibility to improve the patients ability to return to caring for her .     Min Type Additional Details    [] Estim:  []Unatt       []IFC  []Premod                        []Other:  []w/ice   []w/heat  Position:  Location:    [] Estim: []Att    []TENS instruct  []NMES                    []Other:  []w/US   []w/ice   []w/heat  Position:  Location:    []  Traction: [] Cervical       []Lumbar                       [] Prone          []Supine                       []Intermittent   []Continuous Lbs:  [] before manual  [] after manual    []  Ultrasound: []Continuous   [] Pulsed                           []1MHz   []3MHz W/cm2:  Location:    []  Iontophoresis with dexamethasone         Location: [] Take home patch   [] In clinic   Declined ice []  Ice     []  heat  []  Ice massage  []  Laser   []  Anodyne Position  Location:    []  Laser with stim  []  Other:  Position:  Location:    []  Vasopneumatic Device Pressure:       [] lo [] med [] hi   Temperature: [] lo [] med [] hi   [x] Skin assessment post-treatment: []intact []redness- no adverse reaction    []redness  adverse reaction:     20 min Therapeutic Exercise:  [x] See flow sheet :   Rationale: increase ROM, increase strength, improve coordination, improve balance and increase proprioception to improve the patients ability to return to normal activities. 5 min Therapeutic Activity:  [x]  See flow sheet :   Rationale: increase ROM, increase strength and improve coordination  to improve the patients ability to return to caring for her ill . 5 min Manual Therapy:  DTM, TPR of right glut to relieve tension form current exacerbation. Rationale: decrease pain, increase ROM and decrease edema  to return to caring for her ill . With   [] TE   [] TA   [] neuro   [] other: Patient Education: [x] Review HEP    [] Progressed/Changed HEP based on:   [] positioning   [] body mechanics   [] transfers   [] heat/ice application    [x] other: Recommend that patient use SC when she finds that she is unsteady and when she is required to ambulate increased distances that fatigue her. Other Objective/Functional Measures: Increased antalgic gait today from increased pain and discomfort after visiting her  in the hospital over the last few days. Pain Level (0-10 scale) post treatment: 5/10    ASSESSMENT/Changes in Function: Exacerbation from increased ambulation 2/2 's hospitalization. Patient will continue to benefit from skilled PT services to modify and progress therapeutic interventions to attain remaining goals. [x]  See Plan of Care  []  See progress note/recertification  []  See Discharge Summary         Progress towards goals / Updated goals:  Updated Goals to be accomplished in 3 weeks:  4.  Patient will demonstrate increased strength by 1 grade on MMT (Eval:3/5 hip flexion, 3+ knee) to improve functional participation in such tasks as standing and sitting for greater than 30 min.  MMT  Knee  Flex 4+,5  Ext 4+   Hip  Flex  4+  7/3/19  5. Patient will decrease interruptions to sleep second to pain to < or = to 1 occurrence/night (Eval: >2)  CURRENT no longer waking at night due to right hip 7/3/19  5.  Patient will achieve predicted FOTO scores (54, eval 41) to demonstrate decreased functional impairment to facilitate improved participation in activities of daily living, improve overall quality of life.                FOTO:  progressing at 53    6/24/19       PLAN  []  Upgrade activities as tolerated     []  Continue plan of care  []  Update interventions per flow sheet       []  Discharge due to:_  []  Other:_      [facility] Room 8/7/2019  11:30 AM    Future Appointments   Date Time Provider Arthur Carvalho   10/18/2019  1:10 PM ROOSEVELT Russell Dani 69

## 2019-08-13 ENCOUNTER — HOSPITAL ENCOUNTER (OUTPATIENT)
Dept: PHYSICAL THERAPY | Age: 79
Discharge: HOME | End: 2019-08-13
Payer: MEDICARE

## 2019-08-13 PROCEDURE — 97110 THERAPEUTIC EXERCISES: CPT

## 2019-08-13 NOTE — PROGRESS NOTES
PT DAILY TREATMENT NOTE 10-18    Patient Name: Davidson Hoang  Date:2019  : 1940  [x]  Patient  Verified  Payor: VA MEDICARE / Plan: VA MEDICARE PART A & B / Product Type: Medicare /    In time: 1:56  Out time: 2:21  Total Treatment Time (min): 25  Visit #: 17 of 18    Medicare/BCBS Only   Total Timed Codes (min):  25 1:1 Treatment Time:  15       Treatment Area: Right hip pain [M25.551]  Presence of right artificial hip joint [Z96.641]    SUBJECTIVE  Pain Level (0-10 scale): 1/10  Any medication changes, allergies to medications, adverse drug reactions, diagnosis change, or new procedure performed?: [x] No    [] Yes (see summary sheet for update)  Subjective functional status/changes:   [] No changes reported  \"I'm feeling fine, just having back pain, and I'm not sure if that's what is causing this pain in my right hip/butt\"    OBJECTIVE    Modality rationale: pt declined   Min Type Additional Details    [] Estim:  []Unatt       []IFC  []Premod                        []Other:  []w/ice   []w/heat  Position:  Location:    [] Estim: []Att    []TENS instruct  []NMES                    []Other:  []w/US   []w/ice   []w/heat  Position:  Location:    []  Traction: [] Cervical       []Lumbar                       [] Prone          []Supine                       []Intermittent   []Continuous Lbs:  [] before manual  [] after manual    []  Ultrasound: []Continuous   [] Pulsed                           []1MHz   []3MHz W/cm2:  Location:    []  Iontophoresis with dexamethasone         Location: [] Take home patch   [] In clinic    []  Ice     []  heat  []  Ice massage  []  Laser   []  Anodyne Position:  Location:    []  Laser with stim  []  Other:  Position:  Location:    []  Vasopneumatic Device Pressure:       [] lo [] med [] hi   Temperature: [] lo [] med [] hi   [] Skin assessment post-treatment:  []intact []redness- no adverse reaction    []redness  adverse reaction:     25 min Therapeutic Exercise: [x] See flow sheet :   Rationale: increase ROM, increase strength and improve coordination to improve the patients ability to perform ADLs with ease          With   [x] TE   [] TA   [] neuro   [] other: Patient Education: [x] Review HEP    [] Progressed/Changed HEP based on:   [] positioning   [] body mechanics   [] transfers   [] heat/ice application    [x] other: discussed discharge plans and evaluation for back per Rx from physician. Other Objective/Functional Measures:   Clamshells blue theraband 3 sets 10     Pain Level (0-10 scale) post treatment: 0.5/10    ASSESSMENT/Changes in Function: Pt is progressing well with therapy and feels she is ready for discharge at her next visit, however she also has a new order from her MD for deficits involving her low back that may be contributing to her hip pain. Patient will continue to benefit from skilled PT services to modify and progress therapeutic interventions, address functional mobility deficits, address ROM deficits, address strength deficits, analyze and address soft tissue restrictions, analyze and cue movement patterns, analyze and modify body mechanics/ergonomics, assess and modify postural abnormalities, address imbalance/dizziness and instruct in home and community integration to attain remaining goals. []  See Plan of Care  [x]  See progress note/recertification  []  See Discharge Summary         Progress towards goals / Updated goals:  Updated Goals to be accomplished in 3 weeks:  4. Patient will demonstrate increased strength by 1 grade on MMT (Eval:3/5 hip flexion, 3+ knee) to improve functional participation in such tasks as standing and sitting for greater than 30 min.  MMT  Knee  Flex 4+,5  Ext 4+   Hip  Flex  4+  7/3/19  5. Patient will decrease interruptions to sleep second to pain to < or = to 1 occurrence/night (Eval: >2)  CURRENT no longer waking at night due to right hip 7/3/19  5.  Patient will achieve predicted FOTO scores (54, eval 41) to demonstrate decreased functional impairment to facilitate improved participation in activities of daily living, improve overall quality of life.                FOTO:  progressing at 53    6/24/19     PLAN  []  Upgrade activities as tolerated     [x]  Continue plan of care  []  Update interventions per flow sheet       []  Discharge due to:_  []  Other:_      Luiza Lloyd 8/13/2019  2:40 PM    Future Appointments   Date Time Provider Arthur Carvalho   8/21/2019  2:30 PM [facility]   10/18/2019  1:10 PM ROOSEVELT Purcell 75

## 2019-08-21 ENCOUNTER — HOSPITAL ENCOUNTER (OUTPATIENT)
Dept: PHYSICAL THERAPY | Age: 79
Discharge: HOME | End: 2019-08-21
Payer: MEDICARE

## 2019-08-21 PROCEDURE — 97110 THERAPEUTIC EXERCISES: CPT

## 2019-08-21 NOTE — PROGRESS NOTES
PT DAILY TREATMENT NOTE 10-18    Patient Name: Xiomara Gustafson  Date:2019  : 1940  [x]  Patient  Verified  Payor: VA MEDICARE / Plan: VA MEDICARE PART A & B / Product Type: Medicare /    In time:2:33  Out time:3:00  Total Treatment Time (min): 27  Visit #: 18 of 18    Medicare/BCBS Only   Total Timed Codes (min):  27 1:1 Treatment Time:  27       Treatment Area: Right hip pain [M25.551]  Presence of right artificial hip joint [Z96.641]    SUBJECTIVE  Pain Level (0-10 scale): 1  Any medication changes, allergies to medications, adverse drug reactions, diagnosis change, or new procedure performed?: [x] No    [] Yes (see summary sheet for update)  Subjective functional status/changes:   [] No changes reported  States right hip is feeling better, she has a new order for her back    OBJECTIVE      27 min Therapeutic Exercise:  [x] See flow sheet :   Rationale: increase ROM and increase strength to improve the patients ability to perform ADLs          With   [] TE   [] TA   [] neuro   [] other: Patient Education: [x] Review HEP    [] Progressed/Changed HEP based on:   [] positioning   [] body mechanics   [] transfers   [] heat/ice application    [] other:      Other Objective/Functional Measures:  FOTO 58    Pain Level (0-10 scale) post treatment: 0    ASSESSMENT/Changes in Function: Pt has made good progress in PT towards inc'd hip strength, activity tolerance, and improved gait. Pt reports no longer having difficulty sleeping and has no difficulty performing household chores due to right hip pain. Pt demo's good understanding of HEP and is prepared for D/C from PT at this time. Pt does plan on returning to PT in the near future for low back pain. []  See Plan of Care  []  See progress note/recertification  [x]  See Discharge Summary         Progress towards goals / Updated goals:  Updated Goals to be accomplished in 3 weeks:  3.  Patient will demonstrate increased strength by 1 grade on MMT (Eval:3/5 hip flexion,  3+ knee) to improve functional participation in such tasks as standing and sitting  for greater than 30 min.     CURRENT: MET -MMT  Knee  Flex 4+,5  Ext 4+   Hip  Flex  4+    4. Patient will decrease interruptions to sleep second to pain to < or = to 1 occurrence/night (Eval: >2)   CURRENT MET no longer waking at night due to right hip   5.  Patient will achieve predicted FOTO scores (54, eval 41) to demonstrate decreased functional impairment to facilitate improved participation in activities of daily living, improve overall quality of life.                CURRENT:  MET FOTO 58    PLAN  []  Upgrade activities as tolerated     []  Continue plan of care  []  Update interventions per flow sheet       [x]  Discharge due to:_ Program complete  []  Other:_      Umberto Kussmaul Winter, PTA 8/21/2019  2:30 PM    Future Appointments   Date Time Provider Arthur Carvalho   10/18/2019  1:10 PM ROOSEVELT Sheridan Dani 69

## 2019-08-23 NOTE — PROGRESS NOTES
[facility] [address] [facility]  [address] [facility], [address] [facility], [facility] [address] [address]  [phone] fax      Discharge Summary    Patient name: Jerrod Dejesus     Start of Care: 6/3/19  Referral source: Maddie Meyers MD    : 1940  Medical/Treatment Diagnosis: Right hip pain [M25.551]  Presence of right artificial hip joint [Z96.641]  Payor: [employer] Members / Plan: VA MEDICARE PART A & B / Product Type: Medicare /        Onset Date:19  Prior Hospitalization: see medical history   Provider#: [id number]  Comorbidities: Arthritis, Cancer, HTN, stroke, R TKA   Prior Level of Function: Was driving and independent with housework and cooking prior to surgery.  Medications: Verified on Patient Summary List    Visits from Start of Care: 18    Missed Visits: 1  Reporting Period : 6/3/19 to 19      Summary of Care:  Goal:Patient will decrease pain during aggravating activities by 2 points on Verbal Analog Scale   Status at last note/certification:eval  Status at discharge: met    Goal:Patient will demonstrate increased strength by ½ grade on MMT   Status at last note/certification:eval  Status at discharge: met    Goal:Patient will decrease interruptions to sleep second to pain by 1 occurrence   Status at last note/certification:eval  Status at discharge: met    Goal:Patient will decrease interruptions to sleep second to pain to < or = to 1   Status at last note/certification:eval  Status at discharge: met      ASSESSMENT/RECOMMENDATIONS:  [x]Discontinue therapy progressing towards or have reached established goals  []Discontinue therapy due to lack of appreciable progress towards goals  []Discontinue therapy due to lack of attendance or compliance  []Other:     Thank you for this referral.     Cayden Fitzgerald, PT 2019 7:47 AM

## 2019-08-23 NOTE — PROGRESS NOTES
[facility] [address] [facility]  [address] [facility], [address] [place] [address], [facility] [address] [address]  [phone] fax      Continued Plan of Care/ Re-certification for Physical Therapy Services    Patient name: Mukul Wilkinson Start of Care: 6/3/19   Referral source: Jeffrey Chang MD : 1940   Medical/Treatment Diagnosis: Right hip pain [M25.551]  Presence of right artificial hip joint [Z96.641]  Payor: Leslie Diego / Plan: VA MEDICARE PART A & B / Product Type: Medicare /  Onset Date:19     Prior Hospitalization: see medical history Provider#: [id number]   Medications: Verified on Patient Summary List    Comorbidities: Arthritis, Cancer, HTN, stroke, R TKA   Prior Level of Function: Was driving and independent with housework and cooking prior to surgery.      Visits from Start of Care: 15    Missed Visits: 1    The Plan of Care and following information is based on the patient's current status:  Goal:Patient will demonstrate increased strength by 1 grade on MMT (Eval:3/5 hip flexion, 3+ knee) to improve functional participation in such tasks as standing and sitting for greater than 30 min.    Status at last note/certification:last MD note  Current Status:  MMT  Knee  Flex 4+,5  Ext 4+   Hip  Flex  4+  7/3/19  Goal:Patient will decrease interruptions to sleep second to pain to < or = to 1 occurrence/night (Eval: >2).   Status at last note/certification:last MD note  Current Status: no longer waking at night due to right hip 7/3/19  Goal:Patient will achieve predicted FOTO scores (54, eval 41) to demonstrate decreased functional impairment to facilitate improved participation in activities of daily living, improve overall quality of life  Status at last note/certification:last MD note  Current Status:53     Key functional changes: increase tolerance to exercise      Problems/ barriers to goal attainment: residual pain      Problem List: pain affecting function, decrease ROM, decrease strength, decrease ADL/ functional abilitiies, decrease activity tolerance, decrease flexibility/ joint mobility and decrease transfer abilities    Treatment Plan: Therapeutic exercise, Therapeutic activities, Neuromuscular re-education, Physical agent/modality, Gait/balance training, Manual therapy, Patient education, Self Care training, Functional mobility training, Home safety training and Stair training     Patient Goal (s) has been updated and includes: get stronger     Goals for this certification period to be accomplished in 18  treatments:  Continued goals    Frequency / Duration: Patient to be seen 3 times per week for 18 treatments:    Assessment / Recommendations:Patient demonstrates the potential to make further gains with improving ROM, strength, endurance/activity tolerance, functional FOTO survey score   and all within a reasonable time frame so as to further increase their functional independence with ADLs and activities for carryover to  Improve quality of life and tolerance to household and community activities. Patient requires skilled Physical Therapy so as to monitor their response to and modify their treatment plan accordingly. Patient appears to be an appropriate candidate for skilled outpatient Physical Therapy. Certification Period: 8/3/19- 9/2/19    Vanessa Reyes, PT 8/23/2019 7:36 AM    ________________________________________________________________________  I certify that the above Therapy Services are being furnished while the patient is under my care. I agree with the treatment plan and certify that this therapy is necessary. [] I have read the above and request that my patient continue as recommended.   [] I have read the above report and request that my patient continue therapy with the following changes/special instructions: _____________________________________________  [] I have read the above report and request that my patient be discharged from therapy    Physician's Signature:____________Date:_________TIME:________    [facility], Date and Time must be completed for valid certification **    Please sign and return to In Iliana Torres [address], [address] [place] [address],[facility] [address]  [phone] fax

## 2019-08-29 ENCOUNTER — HOSPITAL ENCOUNTER (OUTPATIENT)
Dept: PHYSICAL THERAPY | Age: 79
Discharge: HOME | End: 2019-08-29
Payer: MEDICARE

## 2019-08-29 PROCEDURE — 97110 THERAPEUTIC EXERCISES: CPT

## 2019-08-29 PROCEDURE — 97530 THERAPEUTIC ACTIVITIES: CPT

## 2019-08-29 PROCEDURE — 97161 PT EVAL LOW COMPLEX 20 MIN: CPT

## 2019-08-29 NOTE — PROGRESS NOTES
[facility] [address] [facility]  [address] [facility], [address] [place] [address], [facility] [address] [address]  [phone] fax      Plan of Care/ Statement of Necessity for Physical Therapy Services    Patient name: Jerrod Dejesus Start of Care: 2019   Referral source: Abdirahmana Or : 1940    Medical Diagnosis: Low back pain [M54.5]  Payor: Rodney Members / Plan: VA MEDICARE PART A & B / Product Type: Medicare /  Onset Date:several years ago, P/O Right WIL May 2019    Treatment Diagnosis: lower back pain, right hip and thigh pain, gait abnormality   Prior Hospitalization: see medical history Provider#: [id number]   Medications: Verified on Patient summary List    Comorbidities: arthritis, HTN, Skin, right WIL ANTERIOR APPROACH, cancer, visual impairment, stroke- eye     Prior Level of Function: :I all areas of all areas of ADLs and activities , no AD use, household and community activities, yard work, volunteer work       [employer] of Care and following information is based on the information from the initial evaluation. Assessment/ key information: 67 YO female diagnosed as above and with S/S consistent with above diagnosis presents to skilled outpatient PT [facility] pain in the lower back and right leg indicates piriformis and lateral thigh.pain today is 2/10, decrease trunk ROM, decrease core strength, postural and gait deviations, STC TTP right hip and thigh and buttock. Patient demonstrates the potential to make gains with improved ROM, strength, endurance/activity tolerance, functional FOTO survey score  and all within a reasonable time frame so as to increase their functional independence with ADLs and activities for carryover to  Improved quality of life , tolerance to household and community activities. Patient requires skilled Physical Therapy so as to monitor their response to and modify their treatment plan accordingly.  Patient appears to be an appropriate candidate for skilled outpatient Physical Therapy. Evaluation Complexity History MEDIUM  Complexity : 1-2 comorbidities / personal factors will impact the outcome/ POC ; Examination MEDIUM Complexity : 3 Standardized tests and measures addressing body structure, function, activity limitation and / or participation in recreation  ;Presentation LOW Complexity : Stable, uncomplicated  ;Clinical Decision Making MEDIUM Complexity : FOTO score of 26-74  Overall Complexity Rating: LOW   Problem List: pain affecting function, decrease ROM, decrease strength, impaired gait/ balance, decrease ADL/ functional abilitiies, decrease activity tolerance, decrease flexibility/ joint mobility, decrease transfer abilities and other FOTO 48   Treatment Plan may include any combination of the following: Therapeutic exercise, Therapeutic activities, Neuromuscular re-education, Physical agent/modality, Gait/balance training, Manual therapy, Patient education, Self Care training, Functional mobility training, Home safety training and Stair training  Patient / Family readiness to learn indicated by: asking questions, trying to perform skills and interest  Persons(s) to be included in education: patient (P)  Barriers to Learning/Limitations: None  Patient Goal (s): to be able to walk longer at a time and make chores easier to do  Patient Self Reported Health Status: good  Rehabilitation Potential: good    Short Term Goals: To be accomplished in 5 treatments:   1 patient will have established and be I with HEP to aid with self management at discharge   EVAL issued   CURRENT    Long Term Goals:  To be accomplished in 18 treatments:   1 patient will have overall 50% improvement to aid with increase tolerance to standing up to 30 minutes   EVAL limited to 15 minutes   CURRENT   2 patient will tolerate walking 20 minutes with no significant increase pain to tolerate community activities   EVAL 5 minutes   CURRENT   3 patient will have FOTO 56 to show significant improvement to carrying groceries   EVAL 48, limited a little   CURRENT   4 patient will have pain 1/10 to aid with increase tolerance to climbing stairs   EVAL 2/10, slight problem   CURRENT  Frequency / Duration: Patient to be seen 3 times per week for 18 treatments. Patient/ Caregiver education and instruction: Diagnosis, prognosis, self care, activity modification and exercises   [x]  Plan of care has been reviewed with PTA    Certification Period: 8/29/19 - 10/28/19  Flora Marrufo, PT 8/29/2019 12:15 PM    ________________________________________________________________________    I certify that the above Therapy Services are being furnished while the patient is under my care. I agree with the treatment plan and certify that this therapy is necessary.     Physician's Signature:____________Date:_________TIME:________    [employer], Date and Time must be completed for valid certification **    Please sign and return to In . Shaniqua Ksaweregeorgiana [address], [address] [place] [address], [address] [place] [address],[facility] [address]  [phone] fax

## 2019-08-29 NOTE — PROGRESS NOTES
PT DAILY TREATMENT NOTE/LUMBAR EVAL 10-18    Patient Name: Brett Hirsch  Date:2019  : 1940  [x]  Patient  Verified  Payor: [place] MEDICARE / Plan: [place] MEDICARE PART A & B / Product Type: Medicare /    In time:1214  Out time:1247  Total Treatment Time (min): 33  Visit #: 1 of 18    Medicare/BCBS Only   Total Timed Codes (min):  23 1:1 Treatment Time:  23     Treatment Area: Low back pain [M54.5]  SUBJECTIVE  Pain Level (0-10 scale): 2  []constant [x]intermittent [x]improving []worsening []no change since onset  WORSE prolonged walking (>5 minutes) or standing (15 minutes),  BETTER rest, ice  Any medication changes, allergies to medications, adverse drug reactions, diagnosis change, or new procedure performed?: [x] No    [] Yes (see summary sheet for update)  Subjective functional status/changes:     PLOF:I all areas of all areas of ADLs and activities , no AD use, household and community activities, yard work, volunteer work   Limitations to [employer]: pain  Mechanism of Injury: progressive over time, began  Several years ago.  P/O right WIL May 2019  Current symptoms/Complaints: 65 YO female diagnosed as above and with S/S consistent with above diagnosis presents to skilled outpatient PT [facility] pain in the lower back and right leg indicates piriformis and lateral thigh.pain today is 2/10  Previous Treatment/Compliance: MD rafat, P/O PT for the WIL, Tylenol  PMHx/Surgical Hx: arthritis, HTN, Skin, right WIL ANTERIOR APPROACH, cancer, visual impairment, stroke- eye  Work Hx: retired   Living Situation: lives in a 1 story , FROG  Pt Goals: to be able to walk longer at a time and make chores easier to do  Barriers: [x]pain []financial []time []transportation []other  Motivation: good  Substance use: []Alcohol []Tobacco []other:   FABQ Score: []low []elevate  Cognition: A & O x 4    Other:    OBJECTIVE/EXAMINATION  Domestic Life: household and community activities, care for   Activity/Recreational Limitations: pain  Mobility: I  Self Care: I        Modality rationale:    Min Type Additional Details    [] Estim:  []Unatt       []IFC  []Premod                        []Other:  []w/ice   []w/heat  Position:  Location:    [] Estim: []Att    []TENS instruct  []NMES                    []Other:  []w/US   []w/ice   []w/heat  Position:  Location:    []  Traction: [] Cervical       []Lumbar                       [] Prone          []Supine                       []Intermittent   []Continuous Lbs:  [] before manual  [] after manual    []  Ultrasound: []Continuous   [] Pulsed                           []1MHz   []3MHz Location:  W/cm2:    []  Iontophoresis with dexamethasone         Location: [] Take home patch   [] In clinic    []  Ice     []  heat  []  Ice massage  []  Laser   []  Anodyne Position:  Location:    []  Laser with stim  []  Other: Position:  Location:    []  Vasopneumatic Device Pressure:       [] lo [] med [] hi   Temperature: [] lo [] med [] hi   [] Skin assessment post-treatment:  []intact []redness- no adverse reaction    []redness  adverse reaction:     21 min [x]Eval                  []Re-Eval       13 min Therapeutic Exercise:  [x] See flow sheet :   Rationale: increase ROM, increase strength, improve coordination and improve balance to improve the patients ability to aid with increase tolerance to ADLs and activities    10 min Therapeutic Activity:  [x]  See flow sheet :   Rationale: increase ROM, increase strength and improve coordination  to improve the patients ability to aid with increase tolerance to ADLs and activities      min Neuromuscular Re-education:  []  See flow sheet :   Rationale:   to improve the patients ability to      min Manual Therapy:     Rationale:  to      min Gait Training:  ___ feet with ___ device on level surfaces with ___ level of assist   Rationale:           With   [x] TE   [x] TA   [] neuro   [] other: Patient Education: [x] Review HEP    [] Progressed/Changed HEP based on:   [] positioning   [] body mechanics   [] transfers   [] heat/ice application    [x] other: anatomy, diagnosis,      Other Objective/Functional Measures:     Physical Therapy Evaluation - Lumbar Spine ([facility])    SUBJECTIVE  Chief Complaint:    Mechanism of injury:    Symptoms:  Aggravated by:   [] Bending [] Sitting [] Standing [] Walking   [] Moving [] Cough [] Sneeze [] Valsalva   [] AM  [] PM  Lying:  [] sup   [] pro   [] sidelying   [] Other:     Eased by:    [] Bending [] Sitting [] Standing [] Walking   [] Moving [] AM  [] PM  Lying: [] sup  [] pro  [] sidelying   [] Other:     General Health:  Red Flags Indicated? [] Yes    [] No  [] Yes [] No Recent weight change (If yes, due to dieting? [] Yes  [] No)   [] Yes [] No Weakness in legs during walking  [] Yes [] No Unremitting pain at night  [] Yes [] No Abdominal pain or problems  [] Yes [] No Rectal bleeding  [] Yes [] No Feet more cold or painful in cold weather  [] Yes [] No Menstrual irregularities  [] Yes [] No Blood or pain with urination  [] Yes [] No Dysfunction of bowel or bladder  [] Yes [] No Recent illness within past 3 weeks (i.e, cold, flu)  [] Yes [] No Numbness/tingling in buttock/genitalia region    Past History/Treatments:     Diagnostic Tests: [] Lab work [] X-rays    [] CT [] MRI     [] Other:  Results:    Functional Status  Prior level of function:as above  Present functional limitations:FOTO  What position do you sleep in?:SL mostly left    OBJECTIVE  Posture:mild FH and RS ?  Scoliosis with elevated iliac crest on the right and rib hump  Lateral Shift: [] R    [x] L     [x] +  [] -  Kyphosis: [] Increased [] Decreased   []  WNL  Lordosis:  [] Increased [x] Decreased   [] WNL  Pelvic symmetry: [] WNL    [] Other:    Gait:  [] Normal     [] Abnormal:    Active Movements: [] N/A   [] Too acute   [] Other:  ROM  AROM % PROM Comments:pain, area   Forward flexion 40-60 13 cm     Extension 20-30 25 degrees     SB right 20-30 41 cm SB left 20-30 44 cm     Rotation right 5-10 %     Rotation left 5-10 %       Repeated Movements   Effects on present pain: produces (AR), abolishes (A), increases (incr), decreases (decr), centralizes (C), peripheral (PH), no effect (NE)   Pre-Test Sx Flexion Repeated Flexion Extension Repeated Extension Repeated SBL Repeated SBR   Sitting          Standing          Lying      N/A N/A   Comments:  Side Glide:  Sustained passive positioning test:    Neuro Screen [] WNL   Myotome/Dermatome/Reflexes:  Comments:    Dural Mobility:  SLR Sitting: [] R    [] L    [] +    [] -  @ (degrees):           Supine: [] R    [] L    [] +    [] -  @ (degrees):   Slump Test: [] R    [] L    [] +    [] -  @ (degrees):   Prone Knee Bend: [] R    [] L    [] +    [] -     Palpation  [] Min  [x] Mod  [] Severe    Location:[facility] Right piriformis and superior glut, ITBand  [x] Min  [] Mod  [] Severe    Location:[facility] left piriformis  [] Min  [] Mod  [] Severe    Location:    Strength   L(0-5) R (0-5) N/T   Hip Flexion (L1,2)   []   Knee Extension (L3,4)   []   Ankle Dorsiflexion (L4)   []   Great Toe Extension (L5)   []   Ankle Plantarflexion (S1)   []   Knee Flexion (S1,2)   []   Upper Abdominals   []   Lower Abdominals   []   Paraspinals   []   Back Rotators   []   Gluteus Juan Francisco   []   Other   []     Special Tests  Lumbar:  Lumb.  Compression: [] Pos  [] Neg               Lumbar Distraction:   [] Pos  [] Neg    Quadrant:  [] Pos  [] Neg   [] Flex  [] Ext    Sacroilliac:  Gaenslen's: [] R    [] L    [] +    [] -     Compression: [] +    [] -     Gapping:  [] +    [] -     Thigh Thrust: [] R    [] L    [] +    [] -     Leg Length: [] +    [] -   Position:    Crests:    ASIS:    PSIS:    Sacral Sulcus:    Mobility: Standing flex:     Sitting flex:     Supine to sit:     Prone knee bend:         Hip: [place]achy [place]:  [] R    [] L    [] +    [] -     Scour:  [] R    [] L    [] +    [] -     Piriformis: [x] R   > [x] L    [x] +    [] - Deficits: Ada's: [] R    [] L    [] +    [] -     Veto: [] R    [] L    [] +    [] -     Hamstrings 90/90:    Gastrocsoleus (to neutral): Right: Left:       Global Muscular Weakness:  Abdominals:  Quadratus Lumborum:  Paraspinals: Other:    Other tests/comments:       Pain Level (0-10 scale) post treatment: 2    ASSESSMENT/Changes in Function: Patient demonstrates the potential to make gains with improved ROM, strength, endurance/activity tolerance, functional FOTO survey score  and all within a reasonable time frame so as to increase their functional independence with ADLs and activities for carryover to  Improved quality of life , tolerance to household and community activities. Patient requires skilled Physical Therapy so as to monitor their response to and modify their treatment plan accordingly. Patient appears to be an appropriate candidate for skilled outpatient Physical Therapy. Patient will continue to benefit from skilled PT services to modify and progress therapeutic interventions, address functional mobility deficits, address ROM deficits, address strength deficits, analyze and address soft tissue restrictions, analyze and cue movement patterns, analyze and modify body mechanics/ergonomics, assess and modify postural abnormalities, address imbalance/dizziness and instruct in home and community integration to attain remaining goals.      [x]  See Plan of Care  []  See progress note/recertification  []  See Discharge Summary         Progress towards goals / Updated goals:       PLAN  [x]  Upgrade activities as tolerated     [x]  Continue plan of care  []  Update interventions per flow sheet       []  Discharge due to:_  []  Other:_      Vanessa Reyes, PT 8/29/2019  12:15 PM

## 2019-09-04 ENCOUNTER — HOSPITAL ENCOUNTER (OUTPATIENT)
Dept: PHYSICAL THERAPY | Age: 79
Discharge: HOME | End: 2019-09-04
Payer: MEDICARE

## 2019-09-04 PROCEDURE — 97110 THERAPEUTIC EXERCISES: CPT

## 2019-09-04 PROCEDURE — 97140 MANUAL THERAPY 1/> REGIONS: CPT

## 2019-09-04 NOTE — PROGRESS NOTES
PT DAILY TREATMENT NOTE 10-18    Patient Name: Shahriar Hdz  Date:2019  : 1940  [x]  Patient  Verified  Payor: [place] MEDICARE / Plan: [place] MEDICARE PART A & B / Product Type: Medicare /    In time: 11:08 Out time:11:56  Total Treatment Time (min): 40  Visit #: 2 of 18    Medicare/BCBS Only   Total Timed Codes (min):  30 1:1 Treatment Time:  30       Treatment Area: Low back pain [M54.5]    SUBJECTIVE  Pain Level (0-10 scale): 4  Any medication changes, allergies to medications, adverse drug reactions, diagnosis change, or new procedure performed?: [x] No    [] Yes (see summary sheet for update)  Subjective functional status/changes:   [] No changes reported  \"Still some pain. \"    OBJECTIVE    Modality rationale: decrease edema, decrease inflammation, decrease pain and increase tissue extensibility to improve the patients ability to perform ADL    Min Type Additional Details    [] Estim:  []Unatt       []IFC  []Premod                        []Other:  []w/ice   []w/heat  Position:  Location:    [] Estim: []Att    []TENS instruct  []NMES                    []Other:  []w/US   []w/ice   []w/heat  Position:  Location:    []  Traction: [] Cervical       []Lumbar                       [] Prone          []Supine                       []Intermittent   []Continuous Lbs:  [] before manual  [] after manual    []  Ultrasound: []Continuous   [] Pulsed                           []1MHz   []3MHz W/cm2:  Location:    []  Iontophoresis with dexamethasone         Location: [] Take home patch   [] In clinic   10 [x]  Ice post    []  heat  []  Ice massage  []  Laser   []  Anodyne Position:left  SL  Location:right glute region    []  Laser with stim  []  Other:  Position:  Location:    []  Vasopneumatic Device Pressure:       [] lo [] med [] hi   Temperature: [] lo [] med [] hi   [x] Skin assessment post-treatment:  [x]intact []redness- no adverse reaction    []redness  adverse reaction:      min []Eval []Re-Eval       20 min Therapeutic Exercise:  [x] See flow sheet :   Rationale: increase ROM and increase strength to improve the patients ability to perform ADL      min Therapeutic Activity:  []  See flow sheet :   Rationale:   to improve the patients ability to       min Neuromuscular Re-education:  []  See flow sheet :   Rationale:   to improve the patients ability to     10 min Manual Therapy:  CFM/DTM  Right  glute region   Rationale: decrease pain, increase ROM, increase tissue extensibility and decrease edema  to perform  ADL     min Gait Training:  ___ feet with ___ device on level surfaces with ___ level of assist   Rationale: With   [x] TE   [] TA   [] neuro   [] other: Patient Education: [x] Review HEP    [] Progressed/Changed HEP based on:   [] positioning   [] body mechanics   [] transfers   [] heat/ice application    [] other:      Other Objective/Functional Measures:      Pain Level (0-10 scale) post treatment: 0    ASSESSMENT/Changes in Function: c/o  Pain  radiating  Down right LE  Following  There ex. Fair  Response  To each there ex. Patient will continue to benefit from skilled PT services to address functional mobility deficits, address ROM deficits, address strength deficits, analyze and address soft tissue restrictions and analyze and cue movement patterns to attain remaining goals. [x]  See Plan of Care  []  See progress note/recertification  []  See Discharge Summary         Progress towards goals / Updated goals:     1 patient will have established and be I with HEP to aid with self management at discharge              EVAL issued              CURRENT progressing 9/4/19  Long Term Goals:  To be accomplished in 18 treatments:              1 patient will have overall 50% improvement to aid with increase tolerance to standing up to 30 minutes              EVAL limited to 15 minutes              CURRENT              2 patient will tolerate walking 20 minutes with no significant increase pain to tolerate community activities              EVAL 5 minutes              CURRENT              3 patient will have FOTO 56 to show significant improvement to carrying groceries              EVAL 48, limited a little              CURRENT              4 patient will have pain 1/10 to aid with increase tolerance to climbing stairs              EVAL 2/10, slight problem              CURRENT    PLAN  []  Upgrade activities as tolerated     [x]  Continue plan of care  []  Update interventions per flow sheet       []  Discharge due to:_  []  Other:_      Romana Rodriguez PTA 9/4/2019  11:12 AM    Future Appointments   Date Time Provider Arthur Carvalho   9/9/2019 12:00 PM Patlito Ramírez, PT [facility]   9/11/2019 11:30 AM Debora Ramírez, PT [facility]   9/13/2019 11:00 AM Debora Ramírez, PT [facility]   9/16/2019 11:00 AM Luis Fernando Araujo, SHELIA [facility]   9/20/2019 11:00 AM Steven Meza, PT [facility]   9/25/2019 11:30 AM Steven Meza, PT [facility]   9/27/2019 11:00 AM Paulina Amor, PT [facility]   10/18/2019  1:10 PM ROOSEVELT Hernández Dani 69

## 2019-09-09 ENCOUNTER — HOSPITAL ENCOUNTER (OUTPATIENT)
Dept: PHYSICAL THERAPY | Age: 79
Discharge: HOME | End: 2019-09-09
Payer: MEDICARE

## 2019-09-09 PROCEDURE — 97140 MANUAL THERAPY 1/> REGIONS: CPT

## 2019-09-09 PROCEDURE — 97110 THERAPEUTIC EXERCISES: CPT

## 2019-09-09 NOTE — PROGRESS NOTES
PT DAILY TREATMENT NOTE 10-18    Patient Name: Nicki Hercules  Date:2019  : 1940  [x]  Patient  Verified  Payor: [place] MEDICARE / Plan: [place] MEDICARE PART A & B / Product Type: Medicare /    In time: 12:04    Out time: 1:01  Total Treatment Time (min): 57  Visit #: 3 of 18    Medicare/BCBS Only   Total Timed Codes (min):  47 1:1 Treatment Time:  47       Treatment Area: Low back pain [M54.5]    SUBJECTIVE  Pain Level (0-10 scale): 0  Any medication changes, allergies to medications, adverse drug reactions, diagnosis change, or new procedure performed?: [x] No    [] Yes (see summary sheet for update)  Subjective functional status/changes:   [] No changes reported  Reports no pain currently but has pain with prolonged ambulation.      OBJECTIVE    Modality rationale: decrease inflammation and decrease pain to improve the patients ability to perform ADLs    Min Type Additional Details    [] Estim:  []Unatt       []IFC  []Premod                        []Other:  []w/ice   []w/heat  Position:  Location:    [] Estim: []Att    []TENS instruct  []NMES                    []Other:  []w/US   []w/ice   []w/heat  Position:  Location:    []  Traction: [] Cervical       []Lumbar                       [] Prone          []Supine                       []Intermittent   []Continuous Lbs:  [] before manual  [] after manual    []  Ultrasound: []Continuous   [] Pulsed                           []1MHz   []3MHz W/cm2:  Location:    []  Iontophoresis with dexamethasone         Location: [] Take home patch   [] In clinic   10 [x]  Ice     []  heat  []  Ice massage  []  Laser   []  Anodyne Position: left s/l (pillow between LES)  Location:right glute/hip region    []  Laser with stim  []  Other:  Position:  Location:    []  Vasopneumatic Device Pressure:       [] lo [] med [] hi   Temperature: [] lo [] med [] hi   [x] Skin assessment post-treatment:  [x]intact []redness- no adverse reaction    []redness  adverse reaction:     29 min Therapeutic Exercise:  [x] See flow sheet :   Rationale: increase ROM and increase strength to improve the patients ability to perform ADLs     18 min Manual Therapy: Pelvic alignment and leg length assessments, left LE pull to correct left innominate upslip, TPR/DTM to right glutes/piriformis, pt education on how to use a tennis ball for self-TPR to the right glutes/piriformis in hooklying at home (1-2 min hold)   Rationale: decrease pain, increase ROM, increase tissue extensibility and decrease edema  to perform  ADLs with less pain          With   [x] TE   [] TA   [] neuro   [x] other: manual Patient Education: [x] Review HEP    [] Progressed/Changed HEP based on:   [] positioning   [] body mechanics   [] transfers   [] heat/ice application    [] other:      Other Objective/Functional Measures: Tightness/Tenderness noted to the right glutes/piriformis region during manual interventions. Improvement in pelvic alignment noted post manual.      Pain Level (0-10 scale) post treatment: 0    ASSESSMENT/Changes in Function: Reported no increased pain with exercises today or post session. Educated pt on using ice pack as needed after exercising and can use a MHP at home on the right glute as needed for 15-20 mins to improve tightness in this region. Increased reps per flow sheet to improve strength and mobility. Continue POC as tolerated. Patient will continue to benefit from skilled PT services to modify and progress therapeutic interventions, address functional mobility deficits, address ROM deficits, address strength deficits, analyze and address soft tissue restrictions, analyze and cue movement patterns, analyze and modify body mechanics/ergonomics, address imbalance/dizziness and instruct in home and community integration to attain remaining goals.      []  See Plan of Care  []  See progress note/recertification  []  See Discharge Summary         Progress towards goals / Updated goals:     1 patient will have established and be I with HEP to aid with self management at discharge              EVAL issued              CURRENT progressing 9/4/19  Long Term Goals:  To be accomplished in 18 treatments:              1 patient will have overall 50% improvement to aid with increase tolerance to standing up to 30 minutes              EVAL limited to 15 minutes              CURRENT              2 patient will tolerate walking 20 minutes with no significant increase pain to tolerate community activities              EVAL 5 minutes              CURRENT              3 patient will have FOTO 56 to show significant improvement to carrying groceries              EVAL 48, limited a little              CURRENT              4 patient will have pain 1/10 to aid with increase tolerance to climbing stairs              EVAL 2/10, slight problem              CURRENT    PLAN  [x]  Upgrade activities as tolerated     [x]  Continue plan of care  [x]  Update interventions per flow sheet       []  Discharge due to:_  []  Other:_      Heath Shah, PT 9/9/2019  12:08 PM    Future Appointments   Date Time Provider Arthur Carvalho   9/11/2019 11:30 AM Ivonne Browning, PT [facility]   9/13/2019 11:00 AM Ivonne Browning, PT [facility]   9/16/2019 11:00 AM Siomara Grade, PTA [facility]   9/20/2019 11:00 AM Celestia Loral, PT [facility]   9/25/2019 11:30 AM Celestia Dull, PT [facility]   9/27/2019 11:00 AM David Oar, PT [facility]   10/18/2019  1:10 PM ROOSEVELT Leary Dani 69

## 2019-09-11 ENCOUNTER — HOSPITAL ENCOUNTER (OUTPATIENT)
Dept: PHYSICAL THERAPY | Age: 79
Discharge: HOME | End: 2019-09-11
Payer: MEDICARE

## 2019-09-11 PROCEDURE — 97140 MANUAL THERAPY 1/> REGIONS: CPT

## 2019-09-11 PROCEDURE — 97110 THERAPEUTIC EXERCISES: CPT

## 2019-09-11 NOTE — PROGRESS NOTES
PT DAILY TREATMENT NOTE 10-18    Patient Name: Wilfredo Westbrook  Date:2019  : 1940  [x]  Patient  Verified  Payor: [place] MEDICARE / Plan: [place] MEDICARE PART A & B / Product Type: Medicare /    In time: 11:33    Out time: 12:23  Total Treatment Time (min): 50  Visit #: 4 of 18    Medicare/BCBS Only   Total Timed Codes (min):  40 1:1 Treatment Time:  40       Treatment Area: Low back pain [M54.5]    SUBJECTIVE  Pain Level (0-10 scale): 0  Any medication changes, allergies to medications, adverse drug reactions, diagnosis change, or new procedure performed?: [x] No    [] Yes (see summary sheet for update)  Subjective functional status/changes:   [] No changes reported  Pt reports having no pain currently.      OBJECTIVE    Modality rationale: decrease inflammation and decrease pain to improve the patients ability to perform ADLs    Min Type Additional Details    [] Estim:  []Unatt       []IFC  []Premod                        []Other:  []w/ice   []w/heat  Position:  Location:    [] Estim: []Att    []TENS instruct  []NMES                    []Other:  []w/US   []w/ice   []w/heat  Position:  Location:    []  Traction: [] Cervical       []Lumbar                       [] Prone          []Supine                       []Intermittent   []Continuous Lbs:  [] before manual  [] after manual    []  Ultrasound: []Continuous   [] Pulsed                           []1MHz   []3MHz W/cm2:  Location:    []  Iontophoresis with dexamethasone         Location: [] Take home patch   [] In clinic   10 [x]  Ice     []  heat  []  Ice massage  []  Laser   []  Anodyne Position: left s/l (pillow between LES)  Location:right glute/hip region    []  Laser with stim  []  Other:  Position:  Location:    []  Vasopneumatic Device Pressure:       [] lo [] med [] hi   Temperature: [] lo [] med [] hi   [x] Skin assessment post-treatment:  [x]intact []redness- no adverse reaction    []redness  adverse reaction:     28 min Therapeutic Exercise: [x] See flow sheet :   Rationale: increase ROM and increase strength to improve the patients ability to perform ADLs     12 min Manual Therapy: Pelvic alignment and leg length assessment, TPR/DTM right glutes/piriformis   Rationale: decrease pain, increase ROM, increase tissue extensibility and decrease edema  to perform  ADLs with less pain          With   [x] TE   [] TA   [] neuro   [] other: Patient Education: [x] Review HEP    [] Progressed/Changed HEP based on:   [] positioning   [] body mechanics   [] transfers   [] heat/ice application    [] other:      Other Objective/Functional Measures: pelvic alignment and leg length WNL today. Tightness/Tendereness to the right glutes/piriformins with manual interventions today. Pain Level (0-10 scale) post treatment: 0    ASSESSMENT/Changes in Function: Reported no pain post session. Pt continued to report tenderness to the right glutes/piriformis post manual but improvement in tightness noted post manual. Added bridges and added 1# weights to hipx3 and marches to improve strength in the LEs. Continue POC as tolerated. Patient will continue to benefit from skilled PT services to modify and progress therapeutic interventions, address functional mobility deficits, address ROM deficits, address strength deficits, analyze and address soft tissue restrictions, analyze and cue movement patterns, analyze and modify body mechanics/ergonomics, address imbalance/dizziness and instruct in home and community integration to attain remaining goals. []  See Plan of Care  []  See progress note/recertification  []  See Discharge Summary         Progress towards goals / Updated goals:     1 patient will have established and be I with HEP to aid with self management at discharge              EVAL issued              CURRENT progressing 9/4/19  Long Term Goals:  To be accomplished in 18 treatments:              1 patient will have overall 50% improvement to aid with increase tolerance to standing up to 30 minutes              EVAL limited to 15 minutes              CURRENT              2 patient will tolerate walking 20 minutes with no significant increase pain to tolerate community activities              EVAL 5 minutes              CURRENT              3 patient will have FOTO 56 to show significant improvement to carrying groceries              EVAL 48, limited a little              CURRENT              4 patient will have pain 1/10 to aid with increase tolerance to climbing stairs              EVAL 2/10, slight problem              CURRENT    PLAN  [x]  Upgrade activities as tolerated     [x]  Continue plan of care  [x]  Update interventions per flow sheet       []  Discharge due to:_  []  Other:_      Constantine Romero, PT 9/11/2019  11:56 AM    Future Appointments   Date Time Provider Arthur Carvalho   9/11/2019 11:30 AM Dayanna Camargo, PT [facility]   9/13/2019 11:00 AM Dayanna Camargo, PT [facility]   9/16/2019 11:00 AM Zach Dillon PTA [facility]   9/20/2019 11:00 AM Nehemias Deleon, PT [facility]   9/25/2019 11:30 AM Nehemais Deleon, PT [facility]   9/27/2019 11:00 AM Lisa Morrow, PT [facility]   10/18/2019  1:10 PM ROOSEVELT Farooq Dani 69

## 2019-09-13 ENCOUNTER — HOSPITAL ENCOUNTER (OUTPATIENT)
Dept: PHYSICAL THERAPY | Age: 79
Discharge: HOME | End: 2019-09-13
Payer: MEDICARE

## 2019-09-13 PROCEDURE — 97140 MANUAL THERAPY 1/> REGIONS: CPT

## 2019-09-13 NOTE — PROGRESS NOTES
PT DAILY TREATMENT NOTE 10-18    Patient Name: Caity Jasso  Date:2019  : 1940  [x]  Patient  Verified  Payor: [place] MEDICARE / Plan: [place] MEDICARE PART A & B / Product Type: Medicare /    In time: 10:59    Out time: 12:02  Total Treatment Time (min): 55 (8 min wait on therapist)  Visit #: 5 of 18    Medicare/BCBS Only   Total Timed Codes (min): 45 1:1 Treatment Time: 15       Treatment Area: Low back pain [M54.5]    SUBJECTIVE  Pain Level (0-10 scale): 1  Any medication changes, allergies to medications, adverse drug reactions, diagnosis change, or new procedure performed?: [x] No    [] Yes (see summary sheet for update)  Subjective functional status/changes:   [] No changes reported  Reports having some pain in the right SIJ region pre session.      OBJECTIVE    Modality rationale: decrease inflammation and decrease pain to improve the patients ability to perform ADLs    Min Type Additional Details    [] Estim:  []Unatt       []IFC  []Premod                        []Other:  []w/ice   []w/heat  Position:  Location:    [] Estim: []Att    []TENS instruct  []NMES                    []Other:  []w/US   []w/ice   []w/heat  Position:  Location:    []  Traction: [] Cervical       []Lumbar                       [] Prone          []Supine                       []Intermittent   []Continuous Lbs:  [] before manual  [] after manual    []  Ultrasound: []Continuous   [] Pulsed                           []1MHz   []3MHz W/cm2:  Location:    []  Iontophoresis with dexamethasone         Location: [] Take home patch   [] In clinic   10 [x]  Ice     []  heat  []  Ice massage  []  Laser   []  Anodyne Position: left s/l (pillow between LEs)  Location:right glute/hip region    []  Laser with stim  []  Other:  Position:  Location:    []  Vasopneumatic Device Pressure:       [] lo [] med [] hi   Temperature: [] lo [] med [] hi   [x] Skin assessment post-treatment:  [x]intact []redness- no adverse reaction    []redness  adverse reaction:     33 min Therapeutic Exercise:  [x] See flow sheet :   Rationale: increase ROM and increase strength to improve the patients ability to perform ADLs     12 min Manual Therapy: Pelvic alignment and leg length assessment, TPR/DTM right glutes/piriformis   Rationale: decrease pain, increase ROM, increase tissue extensibility and decrease edema  to perform  ADLs with less pain          With   [x] TE   [] TA   [] neuro   [] other: Patient Education: [x] Review HEP    [] Progressed/Changed HEP based on:   [] positioning   [] body mechanics   [] transfers   [] heat/ice application    [] other:      Other Objective/Functional Measures: Pelvic alignment/leg length WNL. Improvement in tenderness reported to the left glutes/piriformis today than in previous sessions. Pain Level (0-10 scale) post treatment: 0    ASSESSMENT/Changes in Function: Reported no pain post session. Pt seems to be responding well with less pain/tightness. We will plan on continuing therapy to improve the pt's mobility and tightness. Continue POC as tolerated. Patient will continue to benefit from skilled PT services to modify and progress therapeutic interventions, address functional mobility deficits, address ROM deficits, address strength deficits, analyze and address soft tissue restrictions, analyze and cue movement patterns, analyze and modify body mechanics/ergonomics, address imbalance/dizziness and instruct in home and community integration to attain remaining goals. []  See Plan of Care  []  See progress note/recertification  []  See Discharge Summary         Progress towards goals / Updated goals:     1 patient will have established and be I with HEP to aid with self management at discharge              EVAL issued              CURRENT progressing 9/4/19  Long Term Goals:  To be accomplished in 18 treatments:              1 patient will have overall 50% improvement to aid with increase tolerance to standing up to 30 minutes              EVAL limited to 15 minutes              CURRENT              2 patient will tolerate walking 20 minutes with no significant increase pain to tolerate community activities              EVAL 5 minutes              CURRENT              3 patient will have FOTO 56 to show significant improvement to carrying groceries              EVAL 48, limited a little              CURRENT              4 patient will have pain 1/10 to aid with increase tolerance to climbing stairs              EVAL 2/10, slight problem              CURRENT    PLAN  [x]  Upgrade activities as tolerated     [x]  Continue plan of care  [x]  Update interventions per flow sheet       []  Discharge due to:_  []  Other:_      Javier Joseph, PT 9/13/2019  11:56 AM    Future Appointments   Date Time Provider [facility] Carvalho   9/13/2019 11:00 AM Zoë Whittaker, PT [facility]   9/16/2019 11:00 AM Jey Wheat, PTA [facility]   9/20/2019 11:00 AM Stephanie Gracia, PT [facility]   9/25/2019 11:30 AM Stephanie Gracia, PT [facility]   9/27/2019 11:00 AM Reinier Martínez, PT [facility]   10/18/2019  1:10 PM ROOSEVELT Luther Dani 69

## 2019-09-16 ENCOUNTER — HOSPITAL ENCOUNTER (OUTPATIENT)
Dept: PHYSICAL THERAPY | Age: 79
Discharge: HOME | End: 2019-09-16
Payer: MEDICARE

## 2019-09-16 PROCEDURE — 97140 MANUAL THERAPY 1/> REGIONS: CPT

## 2019-09-16 PROCEDURE — 97110 THERAPEUTIC EXERCISES: CPT

## 2019-09-16 NOTE — PROGRESS NOTES
PT DAILY TREATMENT NOTE 10-18    Patient Name: Davidson Hoang  Date:2019  : 1940  [x]  Patient  Verified  Payor: [place] MEDICARE / Plan: [place] MEDICARE PART A & B / Product Type: Medicare /    In time: 10:56 Out time:11:44  Total Treatment Time (min): 37  Visit #: 6 of 18    Medicare/BCBS Only   Total Timed Codes (min):  37 1:1 Treatment Time:  37       Treatment Area: Low back pain [M54.5]    SUBJECTIVE  Pain Level (0-10 scale): 1  Any medication changes, allergies to medications, adverse drug reactions, diagnosis change, or new procedure performed?: [x] No    [] Yes (see summary sheet for update)  Subjective functional status/changes:   [] No changes reported  \"Little  Pain. \" \"IF  I do a lot  Pain is more. \"    OBJECTIVE    Modality rationale: decrease edema, decrease inflammation and decrease pain to improve the patients ability to perform ADL    Min Type Additional Details    [] Estim:  []Unatt       []IFC  []Premod                        []Other:  []w/ice   []w/heat  Position:  Location:    [] Estim: []Att    []TENS instruct  []NMES                    []Other:  []w/US   []w/ice   []w/heat  Position:  Location:    []  Traction: [] Cervical       []Lumbar                       [] Prone          []Supine                       []Intermittent   []Continuous Lbs:  [] before manual  [] after manual    []  Ultrasound: []Continuous   [] Pulsed                           []1MHz   []3MHz W/cm2:  Location:    []  Iontophoresis with dexamethasone         Location: [] Take home patch   [] In clinic    []  Ice     []  heat  []  Ice massage  []  Laser   []  Anodyne Position:  Location:    []  Laser with stim  []  Other:  Position:  Location:    []  Vasopneumatic Device Pressure:       [] lo [] med [] hi   Temperature: [] lo [] med [] hi   [x] Skin assessment post-treatment:  [x]intact []redness- no adverse reaction    []redness  adverse reaction:      min []Eval                  []Re-Eval       29 min Therapeutic Exercise:  [x] See flow sheet :   Rationale: increase ROM and increase strength to improve the patients ability to perform ADL     min Therapeutic Activity:  []  See flow sheet :   Rationale:   to improve the patients ability to       min Neuromuscular Re-education:  []  See flow sheet :   Rationale:   to improve the patients ability to     8 min Manual Therapy:  STM/effleurage (B) LSP   Rationale: decrease pain, increase ROM, increase tissue extensibility and decrease edema  to perform ADL      min Gait Training:  ___ feet with ___ device on level surfaces with ___ level of assist   Rationale: With   [x] TE   [] TA   [] neuro   [] other: Patient Education: [x] Review HEP    [] Progressed/Changed HEP based on:   [] positioning   [] body mechanics   [] transfers   [] heat/ice application    [] other:      Other Objective/Functional Measures:     Pain Level (0-10 scale) post treatment: 0    ASSESSMENT/Changes in Function: discussed with pt on importance  On performing  HEP 2  Xday. Overall fair response  To each there ex. Patient will continue to benefit from skilled PT services to address functional mobility deficits, address ROM deficits, address strength deficits, analyze and address soft tissue restrictions, analyze and cue movement patterns and instruct in home and community integration to attain remaining goals.      [x]  See Plan of Care  []  See progress note/recertification  []  See Discharge Summary         Progress towards goals / Updated goals:    1 patient will have established and be I with HEP to aid with self management at discharge              EVAL issued              CURRENT progressing 9/4/19  Long Term Goals: To be accomplished in 18 treatments:              1 patient will have overall 50% improvement to aid with increase tolerance to standing up to 30 minutes              EVAL limited to 15 minutes              CURRENT              2 patient will tolerate walking 20 minutes with no significant increase pain to tolerate community activities              EVAL 5 minutes              CURRENT              3 patient will have FOTO 56 to show significant improvement to carrying groceries              EVAL 48, limited a little              CURRENT              4 patient will have pain 1/10 to aid with increase tolerance to climbing stairs              EVAL 2/10, slight problem              CURRENT       PLAN  []  Upgrade activities as tolerated     [x]  Continue plan of care  []  Update interventions per flow sheet       []  Discharge due to:_  [x]  Other:_  Trial  TM  NV    Beena Mckenzie, PTA 9/16/2019  11:07 AM    Future Appointments   Date Time Provider Arthur Carvalho   9/20/2019 11:00 AM Jignesh Giraldo PT [facility]   9/25/2019 11:30 AM Jignesh Giraldo PT [facility]   9/27/2019 11:00 AM Cassie Reyes PT [facility]   10/18/2019  1:10 PM ROOSEVELT Snow Dani 69

## 2019-09-20 ENCOUNTER — HOSPITAL ENCOUNTER (OUTPATIENT)
Dept: PHYSICAL THERAPY | Age: 79
Discharge: HOME | End: 2019-09-20
Payer: MEDICARE

## 2019-09-20 PROCEDURE — 97110 THERAPEUTIC EXERCISES: CPT | Performed by: PHYSICAL THERAPIST

## 2019-09-20 PROCEDURE — 97530 THERAPEUTIC ACTIVITIES: CPT | Performed by: PHYSICAL THERAPIST

## 2019-09-20 NOTE — PROGRESS NOTES
PT DAILY TREATMENT NOTE 10-18    Patient Name: Georgeann Olszewski  Date:2019  : 1940  [x]  Patient  Verified  Payor: [place] MEDICARE / Plan: [place] MEDICARE PART A & B / Product Type: Medicare /    In time:11:05A  Out time:11:49A  Total Treatment Time (min): 44min  Visit #: 7 of 17    Medicare/BCBS Only   Total Timed Codes (min):  23 1:1 Treatment Time:  23       Treatment Area: Low back pain [M54.5]    SUBJECTIVE  Pain Level (0-10 scale): 0/10  Any medication changes, allergies to medications, adverse drug reactions, diagnosis change, or new procedure performed?: [x] No    [] Yes (see summary sheet for update)  Subjective functional status/changes:   [] No changes reported  Patient states she is feeling pretty good, but not really back to walking. Is  Just doing the stretches at home. OBJECTIVE    Modality rationale: decrease edema and decrease inflammation to improve the patients ability to increase activity tolerance.     Min Type Additional Details    [] Estim:  []Unatt       []IFC  []Premod                        []Other:  []w/ice   []w/heat  Position:  Location:    [] Estim: []Att    []TENS instruct  []NMES                    []Other:  []w/US   []w/ice   []w/heat  Position:  Location:    []  Traction: [] Cervical       []Lumbar                       [] Prone          []Supine                       []Intermittent   []Continuous Lbs:  [] before manual  [] after manual    []  Ultrasound: []Continuous   [] Pulsed                           []1MHz   []3MHz W/cm2:  Location:    []  Iontophoresis with dexamethasone         Location: [] Take home patch   [] In clinic   12 [x]  Ice     []  heat  []  Ice massage  []  Laser   []  Anodyne Position: sidelying L  Location: R hip    []  Laser with stim  []  Other:  Position:  Location:    []  Vasopneumatic Device Pressure:       [] lo [] med [] hi   Temperature: [] lo [] med [] hi   [] Skin assessment post-treatment:  []intact []redness- no adverse reaction []redness  adverse reaction:     12 min Therapeutic Exercise:  [x] See flow sheet :   Rationale: increase ROM and increase strength to improve the patients ability to A/ROM and decrease pain with movement. 20 min Therapeutic Activity:  [x]  See flow sheet :   Rationale: increase strength and improve coordination  to improve the patients ability to Tolerate basic ADLs and job-related tasks without pain. With   [x] TE   [x] TA   [] neuro   [] other: Patient Education: [x] Review HEP    [x] Progressed/Changed HEP based on:   [x] positioning   [] body mechanics   [] transfers   [] heat/ice application    [] other:      Other Objective/Functional Measures: RPE 4-5 with TM today     Pain Level (0-10 scale) post treatment: 0/10    ASSESSMENT/Changes in Function: Patient is progressing towards goals of increased activity tolerance and decreased pain. Discussed increasing her walking in the community on her own. Patient will continue to benefit from skilled PT services to modify and progress therapeutic interventions, address functional mobility deficits, address ROM deficits, address strength deficits, analyze and address soft tissue restrictions, analyze and cue movement patterns, analyze and modify body mechanics/ergonomics and assess and modify postural abnormalities to attain remaining goals.      [x]  See Plan of Care  []  See progress note/recertification  []  See Discharge Summary         Progress towards goals / Updated goals:    1 patient will have established and be I with HEP to aid with self management at discharge              EVAL issued              CURRENT progressing 9/4/19, doing them diligently 9/20/19  Long Term Goals: To be accomplished in 18 treatments:              1 patient will have overall 50% improvement to aid with increase tolerance to standing up to 30 minutes              EVAL limited to 15 minutes              CURRENT progressing to 20 min 09/20/19              2 patient will tolerate walking 20 minutes with no significant increase pain to tolerate community activities              EVAL 5 minutes              CURRENT              3 patient will have FOTO 56 to show significant improvement to carrying groceries              EVAL 48, limited a little              CURRENT              4 patient will have pain 1/10 to aid with increase tolerance to climbing stairs              EVAL 2/10, slight problem              CURRENT    PLAN  [x]  Upgrade activities as tolerated     []  Continue plan of care  []  Update interventions per flow sheet       []  Discharge due to:_  []  Other:_      Gregory Rosa, PT 9/20/2019  11:30 AM    Future Appointments   Date Time Provider Arthur Carvalho   9/25/2019 11:30 AM Heather De Oliveira, PT [facility]   9/27/2019 11:00 AM Madison Fernandes, PT [facility]   10/18/2019  1:10 PM ROOSEVELT Russell Dani 69

## 2019-09-25 ENCOUNTER — HOSPITAL ENCOUNTER (OUTPATIENT)
Dept: PHYSICAL THERAPY | Age: 79
Discharge: HOME | End: 2019-09-25
Payer: MEDICARE

## 2019-09-25 PROCEDURE — 97110 THERAPEUTIC EXERCISES: CPT

## 2019-09-25 NOTE — PROGRESS NOTES
PT DAILY TREATMENT NOTE 10-18    Patient Name: Mercedes January  Date:2019  : 1940  [x]  Patient  Verified  Payor: Patric Mean / Plan: [place] MED[place]ARE [place] & B / Product Type: Medicare /    In time:  11:05 Out time:12:11  Total Treatment Time (min): 40  Visit #: 8 of 17    Medicare/BCBS Only   Total Timed Codes (min):  30 1:1 Treatment Time:  30       Treatment Area: Low back pain [M54.5]    SUBJECTIVE  Pain Level (0-10 scale): 0  Any medication changes, allergies to medications, adverse drug reactions, diagnosis change, or new procedure performed?: [x] No    [] Yes (see summary sheet for update)  Subjective functional status/changes:   [] No changes reported  \"feeling  Ok. \"    OBJECTIVE    Modality rationale: decrease edema, decrease inflammation, decrease pain and increase tissue extensibility to improve the patients ability to perform ADL   Min Type Additional Details    [] Estim:  []Unatt       []IFC  []Premod                        []Other:  []w/ice   []w/heat  Position:  Location:    [] Estim: []Att    []TENS instruct  []NMES                    []Other:  []w/US   []w/ice   []w/heat  Position:  Location:    []  Traction: [] Cervical       []Lumbar                       [] Prone          []Supine                       []Intermittent   []Continuous Lbs:  [] before manual  [] after manual    []  Ultrasound: []Continuous   [] Pulsed                           []1MHz   []3MHz W/cm2:  Location:    []  Iontophoresis with dexamethasone         Location: [] Take home patch   [] In clinic   10 [x]  Ice  post   []  heat  []  Ice massage  []  Laser   []  Anodyne Position:  Location:    []  Laser with stim  []  Other:  Position:  Location:    []  Vasopneumatic Device Pressure:       [] lo [] med [] hi   Temperature: [] lo [] med [] hi   [x] Skin assessment post-treatment:  [x]intact []redness- no adverse reaction    []redness  adverse reaction:      min []Eval                  []Re-Eval       30 min Therapeutic Exercise:  [] See flow sheet :   Rationale: increase ROM and increase strength to improve the patients ability to perform ADL      min Therapeutic Activity:  []  See flow sheet :   Rationale: increase ROM and increase strength  to improve the patients ability to perform ADL       min Neuromuscular Re-education:  []  See flow sheet :   Rationale:   to improve the patients ability to     min Manual Therapy:     Rationale: decrease pain, increase ROM, increase tissue extensibility and decrease edema  to perform ADL     min Gait Training:  ___ feet with ___ device on level surfaces with ___ level of assist   Rationale: With   [x] TE   [] TA   [] neuro   [] other: Patient Education: [x] Review HEP    [] Progressed/Changed HEP based on:   [] positioning   [] body mechanics   [] transfers   [] heat/ice application    [] other:      Other Objective/Functional Measures:     Pain Level (0-10 scale) post treatment: 0    ASSESSMENT/Changes in Function: Completed  Each there ex  Fairly well. Patient will continue to benefit from skilled PT services to address functional mobility deficits, address ROM deficits, address strength deficits, analyze and address soft tissue restrictions and analyze and cue movement patterns to attain remaining goals.      [x]  See Plan of Care  []  See progress note/recertification  []  See Discharge Summary         Progress towards goals / Updated goals:    1 patient will have established and be I with HEP to aid with self management at discharge              EVAL issued              CURRENT progressing 9/4/19, doing them diligently 9/20/19  Long Term Goals: To be accomplished in 18 treatments:              1 patient will have overall 50% improvement to aid with increase tolerance to standing up to 30 minutes              EVAL limited to 15 minutes              CURRENT progressing to 20 min 09/20/19              2 patient will tolerate walking 20 minutes with no significant increase pain to tolerate community activities              EVAL 5 minutes              CURRENT              3 patient will have FOTO 56 to show significant improvement to carrying groceries              EVAL 48, limited a little              CURRENT              4 patient will have pain 1/10 to aid with increase tolerance to climbing stairs              EVAL 2/10, slight problem              CURRENT    PLAN  []  Upgrade activities as tolerated     [x]  Continue plan of care  []  Update interventions per flow sheet       []  Discharge due to:_  []  Other:_      Beena Mckenzie, SHELIA 9/25/2019  11:37 AM    Future Appointments   Date Time Provider Arthur Carvalho   9/27/2019 11:00 AM Gisell Henriquez, PT [facility]   10/18/2019  1:10 PM ROOSEVELT Morgan 69

## 2019-09-27 ENCOUNTER — HOSPITAL ENCOUNTER (OUTPATIENT)
Dept: PHYSICAL THERAPY | Age: 79
Discharge: HOME | End: 2019-09-27
Payer: MEDICARE

## 2019-09-27 PROCEDURE — 97530 THERAPEUTIC ACTIVITIES: CPT

## 2019-09-27 PROCEDURE — 97110 THERAPEUTIC EXERCISES: CPT

## 2019-09-27 NOTE — PROGRESS NOTES
PT DAILY TREATMENT NOTE 10-18    Patient Name: Wesley Rodgers  Date:2019  : 1940  [x]  Patient  Verified  Payor: [place] MEDICARE / Plan: [place] MEDICARE PART A & B / Product Type: Medicare /    In time:1100  Out time:1144  Total Treatment Time (min): 44  Visit #: 9 of 18    Medicare/BCBS Only   Total Timed Codes (min):  34 1:1 Treatment Time:  25       Treatment Area: Low back pain [M54.5]    SUBJECTIVE  Pain Level (0-10 scale): 1  Any medication changes, allergies to medications, adverse drug reactions, diagnosis change, or new procedure performed?: [x] No    [] Yes (see summary sheet for update)  Subjective functional status/changes:   [] No changes reported  \"that is my main problem- walking for longer distances and standing\"    OBJECTIVE    Modality rationale: decrease pain, increase tissue extensibility and post exercise recovery to improve the patients ability to tolerate ADLs and activities   Min Type Additional Details    [] Estim:  []Unatt       []IFC  []Premod                        []Other:  []w/ice   []w/heat  Position:  Location:    [] Estim: []Att    []TENS instruct  []NMES                    []Other:  []w/US   []w/ice   []w/heat  Position:  Location:    []  Traction: [] Cervical       []Lumbar                       [] Prone          []Supine                       []Intermittent   []Continuous Lbs:  [] before manual  [] after manual    []  Ultrasound: []Continuous   [] Pulsed                           []1MHz   []3MHz W/cm2:  Location:    []  Iontophoresis with dexamethasone         Location: [] Take home patch   [] In clinic   10 CONCURRENT WITH RECHECK  [x]  Ice post      []  heat  []  Ice massage  []  Laser   []  Anodyne Position:supine with LE wedge  Location:Right LS and hip    []  Laser with stim  []  Other:  Position:  Location:    []  Vasopneumatic Device Pressure:       [] lo [] med [] hi   Temperature: [] lo [] med [] hi   [] Skin assessment post-treatment:  []intact []redness- no adverse reaction    []redness  adverse reaction:       min []Eval                  []Re-Eval       24 min Therapeutic Exercise:  [] See flow sheet :   Rationale: increase ROM, increase strength, improve coordination, improve balance and increase proprioception to improve the patients ability to tolerate ADLS and activities    20 min Therapeutic Activity:  [x]  See flow sheet :   Rationale: increase ROM, increase strength, improve coordination, improve balance and increase proprioception  to improve the patients ability to tolerate ADLs and activities      min Neuromuscular Re-education:  []  See flow sheet :   Rationale:   to improve the patients ability to      min Manual Therapy:     Rationale:  to      min Gait Training:  ___ feet with ___ device on level surfaces with ___ level of assist   Rationale: With   [x] TE   [x] TA   [] neuro   [] other: Patient Education: [x] Review HEP    [x] Progressed/Changed HEP based on:   [] positioning   [] body mechanics   [] transfers   [] heat/ice application    [x] other:FOTO      Other Objective/Functional Measures: VC exercises and tech FOTO 52     Pain Level (0-10 scale) post treatment: 0    ASSESSMENT/Changes in Function: tolerate ADLS and activities    Patient will continue to benefit from skilled PT services to modify and progress therapeutic interventions, address functional mobility deficits, address ROM deficits, address strength deficits, analyze and address soft tissue restrictions, analyze and cue movement patterns, analyze and modify body mechanics/ergonomics, assess and modify postural abnormalities and instruct in home and community integration to attain remaining goals.      [x]  See Plan of Care  [x]  See progress note/recertification  []  See Discharge Summary         Progress towards goals / Updated goals:    1 patient will have established and be I with HEP to aid with self management at discharge              EVAL issued              CURRENT progressing 9/4/19, doing them diligently 9/20/19 9/27/19  Long Term Goals: To be accomplished in 18 treatments:              1 patient will have overall 50% improvement to aid with increase tolerance to standing up to 30 minutes              EVAL limited to 15 minutes              CURRENT progressing to 20 min 09/20/19   20 minutes and 75% improved 9/27/19              2 patient will tolerate walking 20 minutes with no significant increase pain to tolerate community activities              EVAL 5 minutes              CURRENT walking tolerance is 10 minutes 9/27/19              3 patient will have FOTO 56 to show significant improvement to carrying groceries              EVAL 48, limited a little              CURRENT   52 9/27/19              4 patient will have pain 1/10 to aid with increase tolerance to climbing stairs              EVAL 2/10, slight problem              CURRENT 1 9/27/19    PLAN  [x]  Upgrade activities as tolerated     [x]  Continue plan of care  []  Update interventions per flow sheet       []  Discharge due to:_  [x]  Other:_  Send MD 30 day note    Rajesh Bound, PT 9/27/2019  11:00 AM    Future Appointments   Date Time Provider Arthur Carvalho   10/18/2019  1:10 PM ROOSEVELT Antonio Dani 69

## 2019-09-27 NOTE — PROGRESS NOTES
[address]  [address] [place] [address], [address] [place] [address], [address] [place] [address],[facility] [address]  [phone] fax      Medicare Progress Report    Patient name: Alec Musa Start of Care: 19   Referral source: Juan David Lizarraga : 1940   Medical/Treatment Diagnosis: Low back pain [M54.5]  Payor: Ketty  / Plan: [place] MEDICARE PART A & B / Product Type: Medicare /  Onset Date:May 2019     Prior Hospitalization: see medical history Provider#: [id number]   Medications: Verified on Patient Summary List     Comorbidities: arthritis, HTN, Skin, right WIL ANTERIOR APPROACH, cancer, visual impairment, stroke- eye     Prior Level of Function: :I all areas of all areas of ADLs and activities , no AD use, household and community activities, yard work, volunteer work                      Visits from [facility]: 9    Missed Visits: 0    Reporting Period: 19 to 19    Subjective Reports:\" that is my main problem- walking for longer distances and standing\"    Current Status/ treatment goals Objective measures   1. patient will have established and be I with HEP to aid with self management at discharge   [] met                 [] not met  [] progressing  reports compliance   2. patient will have overall 50% improvement to aid with increase tolerance to standing up to 30 minutes   [] met                 [] not met  [x] progressing 50%m 20 minutes   3.  patient will tolerate walking 20 minutes with no significant increase pain to tolerate community activities   [] met                 [] not met  [x] progressing 10 minutes   4. patient will have FOTO 56 to show significant improvement to carrying groceries   [] met                 [] not met  [x] progressing 52     Key functional changes: decrease pain, increase tolerance to standing and walking      Problems/ barriers to goal attainment: residual decrease tolerance to standing and walking     Assessment / Recommendations:Patient demonstrates the potential to make further gains with improving ROM, strength, endurance/activity tolerance, functional FOTO survey score   and all within a reasonable time frame so as to further increase their functional independence with ADLs and activities for carryover to  Improved quality of life and tolerance to household and community activities. Patient requires skilled Physical Therapy so as to monitor their response to and modify their treatment plan accordingly. Patient appears to be an appropriate candidate for skilled outpatient Physical Therapy.       Problem List: pain affecting function, decrease ROM, decrease strength, impaired gait/ balance, decrease ADL/ functional abilitiies, decrease activity tolerance, decrease flexibility/ joint mobility, decrease transfer abilities and other FOTO 52   Treatment Plan: Therapeutic exercise, Therapeutic activities, Neuromuscular re-education, Physical agent/modality, Gait/balance training, Manual therapy, Patient education, Self Care training, Functional mobility training, Home safety training and Stair training    Patient Goal (s) has been updated and includes: get the leg stronger and the pain less to tolerate standing and walking     Updated Goals to be accomplished in 18 total  treatments:  Continue with eval LTGs as modified          4 patient will have overall 80% improvement to aid with increase tolerance to standing up to 30 minutes            PN   20 minutes and 75% improved 9/27/19              2 patient will tolerate walking 20 minutes with no significant increase pain to tolerate community activities           PN walking tolerance is 10 minutes 9/27/19              3 patient will have FOTO 58 to show significant improvement to carrying groceries        PN 52 9/27/19              4 patient will have pain 0-1/10 to aid with increase tolerance to climbing stairs             PN  1 9/27/19  Frequency / Duration: Patient to be seen 3 times per week for 18 total treatments:        Nathalia Wakefield, PT 9/27/2019 11:04 AM

## 2019-10-02 ENCOUNTER — APPOINTMENT (OUTPATIENT)
Dept: PHYSICAL THERAPY | Age: 79
End: 2019-10-02
Payer: MEDICARE

## 2019-10-03 ENCOUNTER — HOSPITAL ENCOUNTER (OUTPATIENT)
Dept: PHYSICAL THERAPY | Age: 79
Discharge: HOME | End: 2019-10-03
Payer: MEDICARE

## 2019-10-03 PROCEDURE — 97110 THERAPEUTIC EXERCISES: CPT

## 2019-10-03 NOTE — PROGRESS NOTES
PT DAILY TREATMENT NOTE 10-18    Patient Name: Dennis Valdivia  Date:10/3/2019  : 1940  [x]  Patient  Verified  Payor: VA MEDICARE / Plan: VA MEDICARE PART A & B / Product Type: Medicare /    In time:4:05  Out time:4:44  Total Treatment Time (min): 38  Visit #: 10 of 18    Medicare/BCBS Only   Total Timed Codes (min):  28 1:1 Treatment Time:  28       Treatment Area: Low back pain [M54.5]    SUBJECTIVE  Pain Level (0-10 scale): 1  Any medication changes, allergies to medications, adverse drug reactions, diagnosis change, or new procedure performed?: [x] No    [] Yes (see summary sheet for update)  Subjective functional status/changes:   [] No changes reported  \"Little  Pain. \"    OBJECTIVE    Modality rationale: decrease edema, decrease inflammation, decrease pain and increase tissue extensibility to improve the patients ability to perform ADL    Min Type Additional Details    [] Estim:  []Unatt       []IFC  []Premod                        []Other:  []w/ice   []w/heat  Position:  Location:    [] Estim: []Att    []TENS instruct  []NMES                    []Other:  []w/US   []w/ice   []w/heat  Position:  Location:    []  Traction: [] Cervical       []Lumbar                       [] Prone          []Supine                       []Intermittent   []Continuous Lbs:  [] before manual  [] after manual    []  Ultrasound: []Continuous   [] Pulsed                           []1MHz   []3MHz W/cm2:  Location:    []  Iontophoresis with dexamethasone         Location: [] Take home patch   [] In clinic   10 [x]  Ice   post  []  heat  []  Ice massage  []  Laser   []  Anodyne Position:long  sit  Location:(B) LSP    []  Laser with stim  []  Other:  Position:  Location:    []  Vasopneumatic Device Pressure:       [] lo [] med [] hi   Temperature: [] lo [] med [] hi   [x] Skin assessment post-treatment:  [x]intact []redness- no adverse reaction    []redness  adverse reaction:      min []Eval                  []Re-Eval 28 min Therapeutic Exercise:  [x] See flow sheet :   Rationale: increase ROM and increase strength to improve the patients ability to perform ADL     min Therapeutic Activity:  []  See flow sheet :   Rationale:   to improve the patients ability to       min Neuromuscular Re-education:  []  See flow sheet :   Rationale:   to improve the patients ability to      min Manual Therapy:     Rationale: decrease pain, increase ROM, increase tissue extensibility and decrease edema  to perform ADL     min Gait Training:  ___ feet with ___ device on level surfaces with ___ level of assist   Rationale: With   [x] TE   [] TA   [] neuro   [] other: Patient Education: [x] Review HEP    [] Progressed/Changed HEP based on:   [] positioning   [] body mechanics   [] transfers   [] heat/ice application    [] other:      Other Objective/Functional Measures: Added  2.5#  PRE's    Pain Level (0-10 scale) post treatment: 0    ASSESSMENT/Changes in Function: Completed  Each there ex  Fairly  Well. Patient will continue to benefit from skilled PT services to address functional mobility deficits, address ROM deficits, address strength deficits, analyze and address soft tissue restrictions and analyze and cue movement patterns to attain remaining goals.      [x]  See Plan of Care  []  See progress note/recertification  []  See Discharge Summary         Progress towards goals / Updated goals:  Updated Goals to be accomplished in 18 total  treatments:  Continue with eval LTGs as modified          4 patient will have overall 80% improvement to aid with increase tolerance to standing up to 30 minutes            PN   20 minutes and 75% improved 9/27/19              2 patient will tolerate walking 20 minutes with no significant increase pain to tolerate community activities           PN walking tolerance is 10 minutes 9/27/19              3 patient will have FOTO 58 to show significant improvement to carrying groceries  [id number] 9/27/19              4 patient will have pain 0-1/10 to aid with increase tolerance to climbing stairs             PN  1 9/27/19    PLAN  []  Upgrade activities as tolerated     [x]  Continue plan of care  []  Update interventions per flow sheet       []  Discharge due to:_  []  Other:_      Beena Mckenzie PTA 10/3/2019  4:07 PM    Future Appointments   Date Time Provider Arthur Carvalho   10/4/2019 10:30 AM Nando Maldonado, PT [facility]   10/7/2019 11:00 AM Edgardo Lopez PTA [facility]   10/9/2019 11:00 AM Mary Esquivel, PT [facility]   10/11/2019 11:00 AM Beena Mckenzie PTA [facility]   10/14/2019  1:30 PM Marley Guerra [facility]   10/16/2019 11:00 AM Dorian Patel PT [facility]   10/17/2019 11:30 AM Marley Guerra [facility]   10/18/2019  1:10 PM Maritza Landers PA-C [facility]   10/21/2019 11:00 AM Dorian Patel, PT [facility]   10/23/2019 11:00 AM Jay Coto PTA [facility]   10/25/2019 11:00 AM Marley Guerra [facility]

## 2019-10-04 ENCOUNTER — HOSPITAL ENCOUNTER (OUTPATIENT)
Dept: PHYSICAL THERAPY | Age: 79
Discharge: HOME | End: 2019-10-04
Payer: MEDICARE

## 2019-10-04 PROCEDURE — 97530 THERAPEUTIC ACTIVITIES: CPT

## 2019-10-04 PROCEDURE — 97110 THERAPEUTIC EXERCISES: CPT

## 2019-10-04 NOTE — PROGRESS NOTES
PT DAILY TREATMENT NOTE 10-18    Patient Name: Barber Arizmendi  Date:10/4/2019  : 1940  [x]  Patient  Verified  Payor: [place] MEDICARE / Plan: [place] MEDICARE PART A & B / Product Type: Medicare /    In PTPZ:0449  Out time:1120  Total Treatment Time (min): 42  Visit #: 11 of 18    Medicare/BCBS Only   Total Timed Codes (min):  32 1:1 Treatment Time:  32       Treatment Area: Low back pain [M54.5]    SUBJECTIVE  Pain Level (0-10 scale): 1  Any medication changes, allergies to medications, adverse drug reactions, diagnosis change, or new procedure performed?: [x] No    [] Yes (see summary sheet for update)  Subjective functional status/changes:   [] No changes reported  \"I am getting stronger, I am doing better.  \"    OBJECTIVE    Modality rationale:     Min Type Additional Details    [] Estim:  []Unatt       []IFC  []Premod                        []Other:  []w/ice   []w/heat  Position:  Location:    [] Estim: []Att    []TENS instruct  []NMES                    []Other:  []w/US   []w/ice   []w/heat  Position:  Location:    []  Traction: [] Cervical       []Lumbar                       [] Prone          []Supine                       []Intermittent   []Continuous Lbs:  [] before manual  [] after manual    []  Ultrasound: []Continuous   [] Pulsed                           []1MHz   []3MHz W/cm2:  Location:    []  Iontophoresis with dexamethasone         Location: [] Take home patch   [] In clinic   10 [x]  Ice post    []  heat  []  Ice massage  []  Laser   []  Anodyne Position:reclined  Location:B LS    []  Laser with stim  []  Other:  Position:  Location:    []  Vasopneumatic Device Pressure:       [] lo [] med [] hi   Temperature: [] lo [] med [] hi   [] Skin assessment post-treatment:  []intact []redness- no adverse reaction    []redness  adverse reaction:       min []Eval                  []Re-Eval       24 min Therapeutic Exercise:  [x] See flow sheet :   Rationale: increase ROM, increase strength and improve coordination to improve the patients ability to tolerate ADLs and activities    8 min Therapeutic Activity:  [x]  See flow sheet :   Rationale: increase ROM, increase strength and improve coordination  to improve the patients ability to tolerate ADLs and activities      min Neuromuscular Re-education:  []  See flow sheet :   Rationale:   to improve the patients ability to      min Manual Therapy:     Rationale: to      min Gait Training:  ___ feet with ___ device on level surfaces with ___ level of assist   Rationale: With   [x] TE   [x] TA   [] neuro   [] other: Patient Education: [x] Review HEP    [x] Progressed/Changed HEP based on:   [] positioning   [] body mechanics   [] transfers   [] heat/ice application    [x] other: POC     Other Objective/Functional Measures: VC exercises and technique     Pain Level (0-10 scale) post treatment: 0    ASSESSMENT/Changes in Function: tolerated well. Patient will continue to benefit from skilled PT services to modify and progress therapeutic interventions, address functional mobility deficits, address ROM deficits, address strength deficits, analyze and address soft tissue restrictions, analyze and cue movement patterns, analyze and modify body mechanics/ergonomics, assess and modify postural abnormalities, address imbalance/dizziness and instruct in home and community integration to attain remaining goals.      [x]  See Plan of Care  [x]  See progress note/recertification  []  See Discharge Summary         Progress towards goals / Updated goals:  Updated Goals to be accomplished in 18 total  treatments:  Continue with eval LTGs as modified          6 patient will have overall 80% improvement to aid with increase tolerance to standing up to 30 minutes            PN   20 minutes and 75% improved 9/27/19 10/4/19              2 patient will tolerate walking 20 minutes with no significant increase pain to tolerate community activities           PN walking tolerance is 10 minutes 9/27/19 10/4/19              3 patient will have FOTO 58 to show significant improvement to carrying groceries        PN 52 9/27/19    NA 10/4/19              4 patient will have pain 0-1/10 to aid with increase tolerance to climbing stairs             PN  1 9/27/19  1 10/4/19       PLAN  [x]  Upgrade activities as tolerated     [x]  Continue plan of care  []  Update interventions per flow sheet       []  Discharge due to:_  []  Other:_      Yousuf Dyer, PT 10/4/2019  10:49 AM    Future Appointments   Date Time Provider Arthur Carvalho   10/7/2019 11:00 AM Miguel Angel Lopez PTA [facility] [address]   10/9/2019 11:00 AM Effie Dowd, PT [facility] [address]   10/11/2019 11:00 AM Beena Mckenzie PTA [facility] [address]   10/14/2019  1:30 PM Truety Buenrostroe [facility] [address]   10/16/2019 11:00 AM Chyrl Fess, PT [facility] [address]   10/17/2019 11:30 AM Truett Haskell [facility] [address]   10/18/2019  1:10 PM Carrillo Valdovinos PA-C [facility]   10/21/2019 11:00 AM Chyrl Fess, PT [facility] [address]   10/23/2019 11:00 AM Saeed Lozano PTA [facility] [address]   10/25/2019 11:00 AM Truety Buenrostroe [facility] [address]

## 2019-10-07 ENCOUNTER — HOSPITAL ENCOUNTER (OUTPATIENT)
Dept: PHYSICAL THERAPY | Age: 79
Discharge: HOME | End: 2019-10-07
Payer: MEDICARE

## 2019-10-07 PROCEDURE — 97110 THERAPEUTIC EXERCISES: CPT

## 2019-10-07 PROCEDURE — 97112 NEUROMUSCULAR REEDUCATION: CPT

## 2019-10-07 NOTE — PROGRESS NOTES
PT DAILY TREATMENT NOTE 10-18    Patient Name: Klarissa Garcia  Date:10/7/2019  : 1940  [x]  Patient  Verified  Payor: [place] MEDICARE / Plan: [place] MEDICARE PART A & B / Product Type: Medicare /    In time:10:58  Out time:11:28  Total Treatment Time (min): 30  Visit #: 12 of 18    Medicare/BCBS Only   Total Timed Codes (min):  30 1:1 Treatment Time:  30       Treatment Area: Low back pain [M54.5]    SUBJECTIVE  Pain Level (0-10 scale): 1  Any medication changes, allergies to medications, adverse drug reactions, diagnosis change, or new procedure performed?: [x] No    [] Yes (see summary sheet for update)  Subjective functional status/changes:   [] No changes reported  Pt reports being able to do household chores without difficulty but it takes longer and it's tiring    OBJECTIVE    22 min Therapeutic Exercise:  [x] See flow sheet :   Rationale: increase ROM and increase strength to improve the patients ability to perform ADLs    8 min Neuromuscular Re-education:  [x]  See flow sheet :   Rationale: increase strength and increase proprioception  to improve the patients ability to perform functional tasks            With   [] TE   [] TA   [] neuro   [] other: Patient Education: [x] Review HEP    [] Progressed/Changed HEP based on:   [] positioning   [] body mechanics   [] transfers   [] heat/ice application    [] other:      Other Objective/Functional Measures:   incr'd reps per flow sheet   Left lateral shift with ascending stairs  Excessive posterior lean with standing TR    Pain Level (0-10 scale) post treatment: 0    ASSESSMENT/Changes in Function: Pt was challenged well with therex and notes fatigue at end of treatment. Unable to perform standing TR without UE support due to posterior lean and near LOB. Pt reports improved ability to perform household chores void of pain but is easily fatigued. Pt also reports back pain with prolonged walking.     Patient will continue to benefit from skilled PT services to modify and progress therapeutic interventions, address functional mobility deficits, address ROM deficits, address strength deficits, analyze and address soft tissue restrictions, analyze and cue movement patterns and analyze and modify body mechanics/ergonomics to attain remaining goals.      []  See Plan of Care  []  See progress note/recertification  []  See Discharge Summary         Progress towards goals / Updated goals:  Continue with eval LTGs as modified          7 patient will have overall 80% improvement to aid with increase tolerance to standing up to 30 minutes            PN   20 minutes and 75% improved 9/27/19 10/4/19              2 patient will tolerate walking 20 minutes with no significant increase pain to tolerate community activities           PN walking tolerance is 10 minutes 9/27/19 10/4/19              3 patient will have FOTO 58 to show significant improvement to carrying groceries        PN 52 9/27/19    NA 10/4/19              4 patient will have pain 0-1/10 to aid with increase tolerance to climbing stairs             PN  1 9/27/19  1 10/4/19    PLAN  []  Upgrade activities as tolerated     [x]  Continue plan of care  []  Update interventions per flow sheet       []  Discharge due to:_  []  Other:_      Richard Freitas PTA 10/7/2019  10:59 AM    Future Appointments   Date Time Provider Arthur Carvalho   10/7/2019 11:00 AM Alvina Ledezma PTA [facility]   10/9/2019 11:00 AM Becca Caldera, MER [facility]   10/11/2019 11:00 AM Beena Mckenzie PTA [facility]   10/14/2019  1:30 PM Allyson Keven [facility]   10/16/2019 11:00 AM Israel Medina, PT [facility]   10/17/2019 11:30 AM Genlakisha Baca [facility]   10/18/2019  1:10 PM Miri Velázquez PA-C [facility]   10/21/2019 11:00 AM Israel Medina, PT [facility]   10/23/2019 11:00 AM [facility]   10/25/2019 11:00 AM [facility]

## 2019-10-09 ENCOUNTER — HOSPITAL ENCOUNTER (OUTPATIENT)
Dept: PHYSICAL THERAPY | Age: 79
Discharge: HOME | End: 2019-10-09
Payer: MEDICARE

## 2019-10-09 PROCEDURE — 97110 THERAPEUTIC EXERCISES: CPT

## 2019-10-09 PROCEDURE — 97112 NEUROMUSCULAR REEDUCATION: CPT

## 2019-10-09 NOTE — PROGRESS NOTES
PT DAILY TREATMENT NOTE 10-18    Patient Name: Talat Carlin  Date:10/9/2019  : 1940  [x]  Patient  Verified  Payor: [place] MEDICARE / Plan: [place] MEDICARE PART A & B / Product Type: Medicare /    In time:1100  Out time:1138  Total Treatment Time (min): 38  Visit #: 13 of 18    Medicare/BCBS Only   Total Timed Codes (min):  38 1:1 Treatment Time:  38       Treatment Area: Low back pain [M54.5]    SUBJECTIVE  Pain Level (0-10 scale): 1  Any medication changes, allergies to medications, adverse drug reactions, diagnosis change, or new procedure performed?: [x] No    [] Yes (see summary sheet for update)  Subjective functional status/changes:   [] No changes reported  I'm doing pretty good. OBJECTIVE    30 min Therapeutic Exercise:  [] See flow sheet :   Rationale: increase ROM and increase strength to improve the patients ability to perform daily activities     8 min Neuromuscular Re-education:  []  See flow sheet :   Rationale: increase strength, improve coordination, improve balance and increase proprioception  to improve the patients ability to ambulate community distances              With   [] TE   [] TA   [] neuro   [] other: Patient Education: [x] Review HEP    [] Progressed/Changed HEP based on:   [] positioning   [] body mechanics   [] transfers   [] heat/ice application    [] other:      Other Objective/Functional Measures:      Pain Level (0-10 scale) post treatment: 1    ASSESSMENT/Changes in Function: Required an occasional rest break during treatment. Overall, good progress towards goals. LOB posteriorly while descending stairs; however, patient was able to correct and regain balance (I). Patient will continue to benefit from skilled PT services to modify and progress therapeutic interventions, address ROM deficits, address strength deficits, analyze and cue movement patterns and address imbalance/dizziness to attain remaining goals.      []  See Plan of Care  []  See progress note/recertification  []  See Discharge Summary         Progress towards goals / Updated goals:  Continue with eval LTGs as modified          2 patient will have overall 80% improvement to aid with increase tolerance to standing up to 30 minutes            PN   20 minutes and 75% improved 9/27/19 10/4/19              2 patient will tolerate walking 20 minutes with no significant increase pain to tolerate community activities           PN walking tolerance is 10 minutes 9/27/19 10/4/19              3 patient will have FOTO 58 to show significant improvement to carrying groceries        PN 52 9/27/19 [facility] 10/4/19              4 patient will have pain 0-1/10 to aid with increase tolerance to climbing stairs             PN  1 9/27/19  1 10/4/19    PLAN  []  Upgrade activities as tolerated     [x]  Continue plan of care  []  Update interventions per flow sheet       []  Discharge due to:_  []  Other:_      Ana Cali, MPT, CMTPT 10/9/2019  11:19 AM    Future Appointments   Date Time Provider Arthur Carvalho   10/11/2019 11:00 AM Darci Salinas PTA [facility]   10/14/2019  1:30 PM Bernie De Santiago [facility]   10/16/2019 11:00 AM Letty Sandy PT [facility]   10/17/2019 11:30 AM Bernie De Santiago [facility]   10/18/2019  1:10 PM Nicki Nieto PA-C [facility]   10/21/2019 11:00 AM Letty Sandy PT [facility]   10/23/2019 11:00 AM Darci Salinas PTA [facility]   10/25/2019 11:00 AM Bernie De Santiago [facility]

## 2019-10-10 ENCOUNTER — TELEPHONE (OUTPATIENT)
Dept: ORTHOPEDIC SURGERY | Age: 79
End: 2019-10-10

## 2019-10-10 NOTE — TELEPHONE ENCOUNTER
Patient called for Varghese Amaya. Patient called and said she has a Dental Appointment on 10/24/19. Patient would like to know what Antibiotic medication she should or should not take. Patient said she has Amoxicillin. Patient is also going to have surgery done for a different issue , and would like to know if she would need Antibiotics. That they are going to remove a spot from her Hairline. [facility] on [address] tel. [phone]. Patient is requesting a call back at tel. [phone]. Note : patient had Rt Hip sx done by  and Erik Troy on 05/08/2019.

## 2019-10-10 NOTE — TELEPHONE ENCOUNTER
Patient made aware of the info noted below. She states that she already has some amoxicillin 500 mg tablets so she doesn't need a new Rx called in at this time.

## 2019-10-11 ENCOUNTER — HOSPITAL ENCOUNTER (OUTPATIENT)
Dept: PHYSICAL THERAPY | Age: 79
Discharge: HOME | End: 2019-10-11
Payer: MEDICARE

## 2019-10-11 PROCEDURE — 97110 THERAPEUTIC EXERCISES: CPT

## 2019-10-11 PROCEDURE — 97530 THERAPEUTIC ACTIVITIES: CPT

## 2019-10-11 NOTE — PROGRESS NOTES
PT DAILY TREATMENT NOTE 10-18    Patient Name: Marily Mohan  Date:10/11/2019  : 1940  [x]  Patient  Verified  Payor: [place] MEDICARE / Plan: VA MEDICARE PART A & B / Product Type: Medicare /    In time:11:00   Out time:11:44  Total Treatment Time (min): 44  Visit #: 14 of 18    Medicare/BCBS Only   Total Timed Codes (min):  34 1:1 Treatment Time:  34       Treatment Area: Low back pain [M54.5]    SUBJECTIVE  Pain Level (0-10 scale): 0  Any medication changes, allergies to medications, adverse drug reactions, diagnosis change, or new procedure performed?: [x] No    [] Yes (see summary sheet for update)  Subjective functional status/changes:   [] No changes reported  \"Feeling ok. \"    OBJECTIVE    Modality rationale: decrease edema, decrease inflammation, decrease pain and increase tissue extensibility to improve the patients ability to perform ADL    Min Type Additional Details    [] Estim:  []Unatt       []IFC  []Premod                        []Other:  []w/ice   []w/heat  Position:  Location:    [] Estim: []Att    []TENS instruct  []NMES                    []Other:  []w/US   []w/ice   []w/heat  Position:  Location:    []  Traction: [] Cervical       []Lumbar                       [] Prone          []Supine                       []Intermittent   []Continuous Lbs:  [] before manual  [] after manual    []  Ultrasound: []Continuous   [] Pulsed                           []1MHz   []3MHz W/cm2:  Location:    []  Iontophoresis with dexamethasone         Location: [] Take home patch   [] In clinic   10 []  Ice     [x]  heat  []  Ice massage  []  Laser   []  Anodyne Position:long sit  Location:(B) LSP/left  knee    []  Laser with stim  []  Other:  Position:  Location:    []  Vasopneumatic Device Pressure:       [] lo [] med [] hi   Temperature: [] lo [] med [] hi   [x] Skin assessment post-treatment:  [x]intact []redness- no adverse reaction    []redness  adverse reaction:      min []Eval []Re-Eval       24 min Therapeutic Exercise:  [x] See flow sheet :   Rationale: increase ROM and increase strength to improve the patients ability to perform ADL     10 min Therapeutic Activity:  []  See flow sheet :   Rationale:   to improve the patients ability to       min Neuromuscular Re-education:  []  See flow sheet :   Rationale:   to improve the patients ability to      min Manual Therapy:     Rationale:  to    min Gait Training:  ___ feet with ___ device on level surfaces with ___ level of assist   Rationale: With   [x] TE   [] TA   [] neuro   [] other: Patient Education: [x] Review HEP    [] Progressed/Changed HEP based on:   [] positioning   [] body mechanics   [] transfers   [] heat/ice application    [] other:      Other Objective/Functional Measures:    Pain Level (0-10 scale) post treatment: 0    ASSESSMENT/Changes in Function:  Completed  Each there ex  Fairly  Well. Pt  C/oleft knee pain  Today due  To arthritis. Patient will continue to benefit from skilled PT services to address functional mobility deficits, address ROM deficits and address strength deficits to attain remaining goals.      [x]  See Plan of Care  []  See progress note/recertification  []  See Discharge Summary         Progress towards goals / Updated goals:  Continue with eval LTGs as modified          8 patient will have overall 80% improvement to aid with increase tolerance to standing up to 30 minutes            PN   20 minutes and 75% improved 9/27/19 10/4/19              2 patient will tolerate walking 20 minutes with no significant increase pain to tolerate community activities           PN walking tolerance is 10 minutes 9/27/19 10/4/19              3 patient will have FOTO 58 to show significant improvement to carrying groceries        PN 52 9/27/19 [facility] 10/4/19              4 patient will have pain 0-1/10 to aid with increase tolerance to climbing stairs             PN  1 9/27/19  1 10/4/19   0 10/11/19    PLAN  [x]  Upgrade activities as tolerated     []  Continue plan of care  []  Update interventions per flow sheet       []  Discharge due to:_  []  Other:_      Beena Mckenzie PTA 10/11/2019  10:52 AM    Future Appointments   Date Time Provider Arthur Carvalho   10/11/2019 11:00 AM Lorraine Majano PTA [facility]   10/14/2019  1:30 PM Yakelin Gonzales [facility]   10/16/2019 11:00 AM Monika Mensah, PT [facility]   10/17/2019 11:30 AM Yakelin Gonzales [facility]   10/18/2019  1:10 PM Zander Wallace PA-C [facility]   10/21/2019 11:00 AM Monika Mensah, MER [facility]   10/23/2019 11:00 AM Lorraine Majano PTA [facility]   10/25/2019 11:00 AM Yakelin Gonzales [facility]

## 2019-10-14 ENCOUNTER — HOSPITAL ENCOUNTER (OUTPATIENT)
Dept: PHYSICAL THERAPY | Age: 79
Discharge: HOME | End: 2019-10-14
Payer: MEDICARE

## 2019-10-14 PROCEDURE — 97110 THERAPEUTIC EXERCISES: CPT

## 2019-10-14 PROCEDURE — 97530 THERAPEUTIC ACTIVITIES: CPT

## 2019-10-14 NOTE — PROGRESS NOTES
PT DAILY TREATMENT NOTE 10-18    Patient Name: Bg Layne  Date:10/14/2019  : 1940  [x]  Patient  Verified  Payor: [place] MEDICARE / Plan: [place] MEDICARE PART A & B / Product Type: Medicare /    In time:130  Out time:210  Total Treatment Time (min): 40  Visit #: 15 of 18    Medicare/BCBS Only   Total Timed Codes (min):  30 1:1 Treatment Time:  30       Treatment Area: Low back pain [M54.5]    SUBJECTIVE  Pain Level (0-10 scale): 1  Any medication changes, allergies to medications, adverse drug reactions, diagnosis change, or new procedure performed?: [x] No    [] Yes (see summary sheet for update)  Subjective functional status/changes:   [] No changes reported  Pt reports she is doing better. HEP is being completed daily. OBJECTIVE    Modality rationale: decrease inflammation and decrease pain to improve the patients ability to complete functional activities with decreased pain.     Min Type Additional Details    [] Estim:  []Unatt       []IFC  []Premod                        []Other:  []w/ice   []w/heat  Position:  Location:    [] Estim: []Att    []TENS instruct  []NMES                    []Other:  []w/US   []w/ice   []w/heat  Position:  Location:    []  Traction: [] Cervical       []Lumbar                       [] Prone          []Supine                       []Intermittent   []Continuous Lbs:  [] before manual  [] after manual    []  Ultrasound: []Continuous   [] Pulsed                           []1MHz   []3MHz W/cm2:  Location:    []  Iontophoresis with dexamethasone         Location: [] Take home patch   [] In clinic   10 [x]  Ice     []  heat  []  Ice massage  []  Laser   []  Anodyne Position: long sitting  Location: lumbar spine    []  Laser with stim  []  Other:  Position:  Location:    []  Vasopneumatic Device Pressure:       [] lo [] med [] hi   Temperature: [] lo [] med [] hi   [x] Skin assessment post-treatment:  [x]intact []redness- no adverse reaction    []redness  adverse reaction: min []Eval                  []Re-Eval       15 min Therapeutic Exercise:  [x] See flow sheet :   Rationale: increase ROM, increase strength and improve coordination to improve the patients ability to complete functional task with decreased pain. 15 min Therapeutic Activity:  [x]  See flow sheet :   Rationale: increase ROM, increase strength, improve coordination, improve balance and increase proprioception  to improve the patients ability to complete household chores. With   [x] TE   [x] TA   [] neuro   [] other: Patient Education: [x] Review HEP    [] Progressed/Changed HEP based on:   [] positioning   [] body mechanics   [] transfers   [] heat/ice application    [] other:      Other Objective/Functional Measures:      Pain Level (0-10 scale) post treatment: 0    ASSESSMENT/Changes in Function: Pt completed 5' on TM to improve cardiovascular endurance and LE strength to increase abilities to complete functional task. Verbal cues and demonstration required to use correct body mechanics. Encouraged pt to complete HEP daily to help to continue to progress PT interventions to improve patient's ability to complete functional and community task independently. At the conclusion of the session, pt reported no pain. Patient will continue to benefit from skilled PT services to modify and progress therapeutic interventions, address functional mobility deficits, address ROM deficits, address strength deficits, analyze and address soft tissue restrictions, analyze and cue movement patterns, analyze and modify body mechanics/ergonomics and assess and modify postural abnormalities to attain remaining goals.      [x]  See Plan of Care  []  See progress note/recertification  []  See Discharge Summary         Progress towards goals / Updated goals:  Continue with eval LTGs as modified          3 patient will have overall 80% improvement to aid with increase tolerance to standing up to 30 minutes            PN   20 minutes and 75% improved 9/27/19 10/4/19              2 patient will tolerate walking 20 minutes with no significant increase pain to tolerate community activities           PN walking tolerance is 10 minutes 9/27/19 10/4/19              3 patient will have FOTO 58 to show significant improvement to carrying groceries        PN 52 9/27/19 [facility] 10/4/19              4 patient will have pain 0-1/10 to aid with increase tolerance to climbing stairs             PN  1 9/27/19  1 10/4/19   0  10/11/19    PLAN  [x]  Upgrade activities as tolerated     [x]  Continue plan of care  [x]  Update interventions per flow sheet       []  Discharge due to:_  []  Other:_      Huy Browning 10/14/2019  1:33 PM    Future Appointments   Date Time Provider Arthur Carvalho   10/16/2019 11:00 AM Jonh Guzman, PT [facility]   10/17/2019 11:30 AM Domo Krueger [facility]   10/18/2019  1:10 PM Lennox Schilling, PA-C [address]   10/21/2019 11:00 AM Jonh Guzman PT [facility]   10/23/2019 11:00 AM Anthony Beaver PTA [facility]   10/25/2019 11:00 AM Jonh Guzman PT [facility]

## 2019-10-16 ENCOUNTER — APPOINTMENT (OUTPATIENT)
Dept: PHYSICAL THERAPY | Age: 79
End: 2019-10-16
Payer: MEDICARE

## 2019-10-21 ENCOUNTER — HOSPITAL ENCOUNTER (OUTPATIENT)
Dept: PHYSICAL THERAPY | Age: 79
Discharge: HOME | End: 2019-10-21
Payer: MEDICARE

## 2019-10-21 PROCEDURE — 97112 NEUROMUSCULAR REEDUCATION: CPT

## 2019-10-21 PROCEDURE — 97110 THERAPEUTIC EXERCISES: CPT

## 2019-10-21 NOTE — PROGRESS NOTES
PT DAILY TREATMENT NOTE 10-18    Patient Name: Sheree Webb  Date:10/21/2019  : 1940  [x]  Patient  Verified  Payor: [place] MEDICARE / Plan: [place] MEDICARE PART A & B / Product Type: Medicare /    In time: 11:00  Out time: 11:33  Total Treatment Time (min): 33  Visit #: 16 of 18    Medicare/BCBS Only   Total Timed Codes (min):  33 1:1 Treatment Time: 26       Treatment Area: Low back pain [M54.5]    SUBJECTIVE  Pain Level (0-10 scale):  1/10- left knee    Any medication changes, allergies to medications, adverse drug reactions, diagnosis change, or new procedure performed?: [x] No    [] Yes (see summary sheet for update)  Subjective functional status/changes:   [] No changes reported  Patient stated that she thinks the exercises have been helping, especially in her low back. She said this morning she has been doing lot of things, so her left knee is bothering her a little and that the knee is causing the 1/10 pain. Patient denies any LBP currently.      OBJECTIVE    Modality rationale: PD   Min Type Additional Details    [] Estim:  []Unatt       []IFC  []Premod                        []Other:  []w/ice   []w/heat  Position:  Location:    [] Estim: []Att    []TENS instruct  []NMES                    []Other:  []w/US   []w/ice   []w/heat  Position:  Location:    []  Traction: [] Cervical       []Lumbar                       [] Prone          []Supine                       []Intermittent   []Continuous Lbs:  [] before manual  [] after manual    []  Ultrasound: []Continuous   [] Pulsed                           []1MHz   []3MHz W/cm2:  Location:    []  Iontophoresis with dexamethasone         Location: [] Take home patch   [] In clinic    []  Ice     []  heat  []  Ice massage  []  Laser   []  Anodyne Position:  Location:    []  Laser with stim  []  Other:  Position:  Location:    []  Vasopneumatic Device Pressure:       [] lo [] med [] hi   Temperature: [] lo [] med [] hi   [] Skin assessment post-treatment: []intact []redness- no adverse reaction    []redness  adverse reaction:     25 min Therapeutic Exercise:  [x] See flow sheet :   Rationale: increase ROM and increase strength to improve the patients ability to perform ADls. 8 min Neuromuscular Re-education:  [x]  See flow sheet : balance acts    Rationale: increase strength, improve coordination, improve balance and increase proprioception  to improve the patients ability to ambulate on a variety of terrains safely. With   [] TE   [] TA   [] neuro   [] other: Patient Education: [x] Review HEP    [] Progressed/Changed HEP based on:   [] positioning   [] body mechanics   [] transfers   [] heat/ice application    [] other:      Other Objective/Functional Measures: Patient was able to increase speed on the treadmill and indicated it felt good to walk a little faster. Pain Level (0-10 scale) post treatment: 0/10    ASSESSMENT/Changes in Function: Therapist introduced more balance activities today and patient performed MSR stance on foam surface without LOB, but did note decreased ankle stability. Patient was able to self correct without HHA any instances of instability during the 30 second hold. Patient demonstrated good weight shift with alternating cone tap, but was challenged with maintaining stabilization on 1 LE. Patient challenged with navigating stairs today due to left knee discomfort so reduced reps to 4x. Patient stated some days her left knee is worse then other days. Patient reported no pain at end of the session. Patient will continue to benefit from skilled PT services to modify and progress therapeutic interventions, address functional mobility deficits, address ROM deficits, address strength deficits, analyze and address soft tissue restrictions, analyze and cue movement patterns, assess and modify postural abnormalities and address imbalance/dizziness to attain remaining goals.      []  See Plan of Care  []  See progress note/recertification  []  See Discharge Summary         Progress towards goals / Updated goals:  Continue with eval LTGs as modified          8 patient will have overall 80% improvement to aid with increase tolerance to standing up to 30 minutes            PN   20 minutes and 75% improved 9/27/19 10/4/19              2 patient will tolerate walking 20 minutes with no significant increase pain to tolerate community activities           PN walking tolerance is 10 minutes 9/27/19 10/4/19              3 patient will have FOTO 58 to show significant improvement to carrying groceries        PN 52 9/27/19    NA 10/4/19              4 patient will have pain 0-1/10 to aid with increase tolerance to climbing stairs             PN  1 9/27/19  1 10/4/19   0  10/11/19       PLAN  []  Upgrade activities as tolerated     [x]  Continue plan of care  []  Update interventions per flow sheet       []  Discharge due to:_  []  Other:_      Cata Olmos, PT 10/21/2019  11:08 AM    Future Appointments   Date Time Provider Arthur Carvalho   10/22/2019  3:30 PM [facility]   10/24/2019  3:20 PM [address] [address]   10/25/2019 11:00 AM Janene Homans, PT [facility]

## 2019-10-22 ENCOUNTER — HOSPITAL ENCOUNTER (OUTPATIENT)
Dept: PHYSICAL THERAPY | Age: 79
Discharge: HOME | End: 2019-10-22
Payer: MEDICARE

## 2019-10-22 PROCEDURE — 97110 THERAPEUTIC EXERCISES: CPT | Performed by: PHYSICAL THERAPIST

## 2019-10-22 PROCEDURE — 97112 NEUROMUSCULAR REEDUCATION: CPT | Performed by: PHYSICAL THERAPIST

## 2019-10-22 NOTE — PROGRESS NOTES
PT DAILY TREATMENT NOTE 10-18    Patient Name: Bg Layne  Date:10/22/2019  : 1940  [x]  Patient  Verified  Payor: VA MEDICARE / Plan: VA MEDICARE PART A & B / Product Type: Medicare /    In time:3:30P  Out time:4:15P  Total Treatment Time (min): 40min  Visit #: 17 of 18    Medicare/BCBS Only   Total Timed Codes (min):  45 1:1 Treatment Time:  23       Treatment Area: Low back pain [M54.5]    SUBJECTIVE  Pain Level (0-10 scale): 1/10  Any medication changes, allergies to medications, adverse drug reactions, diagnosis change, or new procedure performed?: [x] No    [] Yes (see summary sheet for update)  Subjective functional status/changes:   [] No changes reported  Patient is contemplating [facility] [employer] to [facility] [employer], able to do most things she wants to, but c/o her L knee \"acting up\" the last few days. OBJECTIVE    30 min Therapeutic Exercise:  [x] See flow sheet :   Rationale: increase ROM and increase strength to improve the patients ability to perform activities of daily living with minimal to no difficulty. 15 min Neuromuscular Re-education:  [x]  See flow sheet :   Rationale: improve coordination, improve balance and increase proprioception  to improve the patients ability to perform activities with good form and proprioception with tactile and verbal cuing appropriately. With   [x] TE   [x] TA   [] neuro   [] other: Patient Education: [x] Review HEP    [x] Progressed/Changed HEP based on:   [x] positioning   [] body mechanics   [] transfers   [] heat/ice application    [] other:      Other Objective/Functional Measures: Able to stand NBOS for 30 sec EC on firm, 20 secs EC on foam     Pain Level (0-10 scale) post treatment: 0/10    ASSESSMENT/Changes in Function: patient is progressing well towards goals, continues to need verbal and tactile cuing for balance and proprioceptive awareness about 15% of the time. Anticipate discharge next session.      Patient will continue to benefit from skilled PT services to modify and progress therapeutic interventions, address functional mobility deficits, address ROM deficits, address strength deficits, analyze and address soft tissue restrictions, analyze and cue movement patterns, analyze and modify body mechanics/ergonomics and assess and modify postural abnormalities to attain remaining goals.      [x]  See Plan of Care  []  See progress note/recertification  []  See Discharge Summary         Progress towards goals / Updated goals:  Continue with eval LTGs as modified          6 patient will have overall 80% improvement to aid with increase tolerance to standing up to 30 minutes            PN   20 minutes and 75% improved 9/27/19 10/4/19, 30 min and 85% improved 10/22/19              2 patient will tolerate walking 20 minutes with no significant increase pain to tolerate community activities           PN walking tolerance is 10 minutes 9/27/19 10/4/19, 30 min 10/22/19              3 patient will have FOTO 58 to show significant improvement to carrying groceries        PN 52 9/27/19 [facility] 10/4/19              4 patient will have pain 0-1/10 to aid with increase tolerance to climbing stairs             PN  1 9/27/19  1 10/4/19   0  10/11/19, 0/10 at end of treatment 10/22/19    PLAN  [x]  Upgrade activities as tolerated     []  Continue plan of care  []  Update interventions per flow sheet       []  Discharge due to:_  []  Other:_      Casa January, PT 10/22/2019  5:12 PM    Future Appointments   Date Time Provider Arthur Carvalho   10/24/2019  3:20 PM [address]T [address]   10/25/2019 11:00 AM [facility]

## 2019-10-23 ENCOUNTER — APPOINTMENT (OUTPATIENT)
Dept: PHYSICAL THERAPY | Age: 79
End: 2019-10-23
Payer: MEDICARE

## 2019-10-24 ENCOUNTER — OFFICE VISIT (OUTPATIENT)
Dept: ORTHOPEDIC SURGERY | Age: 79
End: 2019-10-24

## 2019-10-24 VITALS
OXYGEN SATURATION: 97 % | DIASTOLIC BLOOD PRESSURE: 69 MMHG | TEMPERATURE: 99.2 F | HEART RATE: 66 BPM | HEIGHT: 59 IN | BODY MASS INDEX: 26.21 KG/M2 | RESPIRATION RATE: 14 BRPM | SYSTOLIC BLOOD PRESSURE: 133 MMHG | WEIGHT: 130 LBS

## 2019-10-24 DIAGNOSIS — Z96.641 STATUS POST HIP REPLACEMENT, RIGHT: Primary | ICD-10-CM

## 2019-10-24 DIAGNOSIS — M25.551 RIGHT HIP PAIN: ICD-10-CM

## 2019-10-24 NOTE — PROGRESS NOTES
1. Have you been to the ER, urgent care clinic since your last visit? Hospitalized since your last visit? No    2. Have you seen or consulted any other health care providers outside of the [address] [place] [address] since your last visit? Include any pap smears or colon screening.  No

## 2019-10-24 NOTE — PROGRESS NOTES
[address] [place] [address]  [phone]           Patient: Kimberlee Carney                MRN: [id number]       SSN: [id number]  YOB: 1940        AGE: 78 y.o. SEX: female  Body mass index is 26.26 kg/m². PCP: Beni Khan MD  10/24/19      This office note has been dictated. REVIEW OF SYSTEMS:  Constitutional: Negative for fever, chills, weight loss and malaise/fatigue. HENT: Negative. Eyes: Negative. Respiratory: Negative. Cardiovascular: Negative. Gastrointestinal: No bowel incontinence or constipation. Genitourinary: No bladder incontinence or saddle anesthesia. Skin: Negative. Neurological: Negative. Endo/Heme/Allergies: Negative. Psychiatric/Behavioral: Negative. Musculoskeletal: As per HPI above. Past Medical History:   Diagnosis Date    Constipation     Hypercholesteremia     Hypertension     no meds now    Microscopic hematuria     MRSA (methicillin resistant staph aureus) culture positive 06/2018    On abdominal wall- tx with Vibramycin  (care everywhere)    Stroke [facility]) not sure when    blurred vision, resolved (taking plavix)     PEACE (stress urinary incontinence, female)     UTI (urinary tract infection)          Current Outpatient Medications:     clopidogrel (PLAVIX) 75 mg tab, Take 75 mg by mouth daily. , Disp: , Rfl:     ferrous sulfate 325 mg (65 mg iron) tablet, Take 1 Tab by mouth two (2) times daily (with meals). , Disp: 60 Tab, Rfl: 1    SF 5000 PLUS 1.1 % crea, BRUSH ON PASTE AS DIRECTED EVERY DAY, Disp: , Rfl: 99    polyethylene glycol (MIRALAX) 17 gram/dose powder, Take 17 g by mouth daily as needed (Constipation). , Disp: , Rfl:     ESTRACE 0.01 % (0.1 mg/gram) vaginal cream, APPLY 2/3 LENGTH OF PINKY FINGER AND INSERT INTO VAGINA ONCE A WEEK, Disp: 42.5 g, Rfl: 0    simvastatin (ZOCOR) 40 mg tablet, Take 40 mg by mouth nightly., Disp: , Rfl:    CHOLECALCIFEROL, VITAMIN D3, (VITAMIN D3 PO), Take 1,000 Units by mouth daily. , Disp: , Rfl:     methylPREDNISolone (MEDROL DOSEPACK) 4 mg tablet, Per dose pack instructions, Disp: 1 Dose Pack, Rfl: 0    aspirin delayed-release 325 mg tablet, Take 1 Tab by mouth two (2) times a day., Disp: 60 Tab, Rfl: 1    amLODIPine (NORVASC) 5 mg tablet, Take 5 mg by mouth daily. , Disp: , Rfl:     trimethoprim-sulfamethoxazole (BACTRIM DS) 160-800 mg per tablet, Take 1 Tab by mouth two (2) times a day., Disp: 10 Tab, Rfl: 0    mupirocin (BACTROBAN) 2 % ointment, Use 1 Application to affected area 3 Times Daily. , Disp: , Rfl:     acetaminophen (TYLENOL) 325 mg tablet, Take 650 mg by mouth every six (6) hours as needed for Pain., Disp: , Rfl:     Allergies   Allergen Reactions    Erythromycin Rash and Itching       inflammation    Furacin [Nitrofurazone] Hives and Other (comments)     Intolerance    Tobrex [Tobramycin Sulfate] Other (comments)     Intolerance, extreme redness       Social History     Socioeconomic History    Marital status:      Spouse name: Not on file    Number of children: Not on file    Years of education: Not on file    Highest education level: Not on file   Occupational History    Not on file   Social Needs    Financial resource strain: Not on file    Food insecurity:     Worry: Not on file     Inability: Not on file    Transportation needs:     Medical: Not on file     Non-medical: Not on file   Tobacco Use    Smoking status: Never Smoker    Smokeless tobacco: Never Used   Substance and Sexual Activity    Alcohol use: No    Drug use: No    Sexual activity: Not on file   Lifestyle    Physical activity:     Days per week: Not on file     Minutes per session: Not on file    Stress: Not on file   Relationships    Social connections:     Talks on phone: Not on file     Gets together: Not on file     Attends Spiritism service: Not on file     Active member of club or organization: Not on file     Attends meetings of clubs or organizations: Not on file     Relationship status: Not on file    Intimate partner violence:     Fear of current or ex partner: Not on file     Emotionally abused: Not on file     Physically abused: Not on file     Forced sexual activity: Not on file   Other Topics Concern    Not on file   Social History Narrative    Not on file       Past Surgical History:   Procedure Laterality Date    HX CATARACT REMOVAL Bilateral     HX CHOLECYSTECTOMY  1972    HX HYSTERECTOMY  09/2016    total    HX KNEE REPLACEMENT Right 04/27/2015    HX OTHER SURGICAL  2005, 2015    skin ca removed     HX OTHER SURGICAL  09/2016    bladder support    HX OTHER SURGICAL  01/2019    basal skin ca removed     HX TONSILLECTOMY  1946           We did see Ms. Bear Arenas for followup with regards to her right total hip replacement. The patient had a direct anterior hip replacement. She is now about five and a half months out. She is doing quite well. She is quite happy with the results of the hip replacement. There is a little bit of laterally-based discomfort. She denies any radiating pain down the lower extremities and no fevers, chills, systemic changes, or injuries to report. She does have some back troubles. She has not had any treatment as of recent. PHYSICAL EXAMINATION:  In general, the patient is alert and oriented x 3 in no acute distress. The patient is well-developed, well-nourished, with a normal affect. The patient is afebrile. HEENT:  Head is normocephalic and atraumatic. Pupils are equally round and reactive to light and accommodation. Extraocular eye movements are intact. Neck is supple. Trachea is midline. No JVD is present. Breathing is nonlabored. Examination of the lower extremities reveals pain-free range of motion of the hips. There is no pain to palpation of the greater trochanteric bursae on the left side. There is slightly on the right.   There is negative straight leg raise. There is negative calf tenderness. There is negative Cristhian's. There is no evidence of DVT present. Leg lengths look good. Abduction strength is symmetric bilaterally. ASSESSMENT:      1. Status post right direct anterior hip replacement. 2. Right hip trochanteric bursitis mild. PLAN:  At this point, she will continue activities as tolerated. She has done very well with the hip replacement. We will see her back in six months' time for evaluation and x-ray of the right hip. She will call with any questions or concerns that shall arise.                     JR Woodrow DAO, PA-C, ATC

## 2019-10-25 ENCOUNTER — HOSPITAL ENCOUNTER (OUTPATIENT)
Dept: PHYSICAL THERAPY | Age: 79
Discharge: HOME | End: 2019-10-25
Payer: MEDICARE

## 2019-10-25 PROCEDURE — 97110 THERAPEUTIC EXERCISES: CPT

## 2019-10-25 NOTE — PROGRESS NOTES
PT DAILY TREATMENT NOTE 10-18    Patient Name: Juan Carlos Cortes  Date:10/25/2019  : 1940  [x]  Patient  Verified  Payor: VA MEDICARE / Plan: VA MEDICARE PART A & B / Product Type: Medicare /    In time:10:55  Out time:11:30  Total Treatment Time (min): 35  Visit #: 18 of 18    Medicare/BCBS Only   Total Timed Codes (min):  35 1:1 Treatment Time:  31       Treatment Area: Low back pain [M54.5]    SUBJECTIVE  Pain Level (0-10 scale): 1  Any medication changes, allergies to medications, adverse drug reactions, diagnosis change, or new procedure performed?: [x] No    [] Yes (see summary sheet for update)  Subjective functional status/changes:   [] No changes reported  Pt reports still having some trouble with walking for long distances but has been getting better    OBJECTIVE    35 min Therapeutic Exercise:  [x] See flow sheet :   Rationale: increase ROM and increase strength to improve the patients ability to perform ADLs          With   [] TE   [] TA   [] neuro   [] other: Patient Education: [x] Review HEP    [] Progressed/Changed HEP based on:   [] positioning   [] body mechanics   [] transfers   [] heat/ice application    [] other:      Other Objective/Functional Measures:   FOTO 59    Pain Level (0-10 scale) post treatment: 0    ASSESSMENT/Changes in Function: Pt has made good progress in PT towards incr'd strength, activity tolerance, and decr'd pain. Pt has met all goals at this time and is prepared for D/C from PT at this time with plans to cont home-based program.    Patient will continue to benefit from skilled PT services to modify and progress therapeutic interventions, address functional mobility deficits, address ROM deficits, address strength deficits, analyze and address soft tissue restrictions, analyze and cue movement patterns and analyze and modify body mechanics/ergonomics to attain remaining goals.      []  See Plan of Care  []  See progress note/recertification  [x]  See Discharge Summary         Progress towards goals / Updated goals:  Continue with eval LTGs as modified          6 patient will have overall 80% improvement to aid with increase tolerance to standing up to 30 minutes            PN   20 minutes and 75% improved 9/27/19 10/4/19, MET 30 min and 85% improved 10/22/19                2 patient will tolerate walking 20 minutes with no significant increase pain to tolerate community activities           PN walking tolerance is 10 minutes 9/27/19 10/4/19, MET 30 min 10/22/19                 3 patient will have FOTO 58 to show significant improvement to carrying groceries        PN 52 9/27/19    MET 59 10/25/19                4 patient will have pain 0-1/10 to aid with increase tolerance to climbing stairs             PN  1 9/27/19  1 10/4/19   0  10/11/19, MET 0/10 at end of treatment 10/22/19    PLAN  []  Upgrade activities as tolerated     []  Continue plan of care  []  Update interventions per flow sheet       [x]  Discharge due to:_goals met  []  Other:_      Saumya Leung PTA 10/25/2019  11:02 AM    Future Appointments   Date Time Provider Arthur Carvalho   4/24/2020  1:00 PM ROOSEVELT Garcia [address]

## 2019-10-28 NOTE — PROGRESS NOTES
[facility]  [facility], [address] [place] [address], [address] [place], [place] [address]  [phone] fax      Discharge Summary    Patient name: Laura Lowry     Start of Care: 19  Referral source: Camryn Ulrich    : 1940  Medical/Treatment Diagnosis: Low back pain [M54.5]  Payor: Corina Slight / Plan: [place] MEDICARE PART A & B / Product Type: Medicare /        Onset Date:May 2019  Prior Hospitalization: see medical history   Provider#: [id number]  Comorbidities: arthritis, HTN, Skin, right WIL ANTERIOR APPROACH, cancer, visual impairment, stroke- eye     Prior Level of Function: :I all areas of all areas of ADLs and activities , no AD use, household and community activities, yard work, volunteer work Medications: Verified on Patient Summary List    Visits from [facility] Care: 18    Missed Visits: 0  Reporting Period : 19 to 10/25/19      Summary of Care:Patient seen for 18 total treatments. She has exercises for her  HEP and should follow up with her MD as needed. Thank you. Goal:patient will have overall 80% improvement to aid with increase tolerance to standing up to 30 minutes  Status at last note/certification:last MD note  Status at discharge: met, 85%    Goal:patient will tolerate walking 20 minutes with no significant increase pain to tolerate community activities  Status at last note/certification:Last MD Note  Status at discharge: met, 30 minutes    Goal: patient will have FOTO 58 to show significant improvement to carrying groceries  Status at last note/certification:last MD note  Status at discharge: met, 59    Goal: patient will have pain 0-1/10 to aid with increase tolerance to climbing stairs  Status at last note/certification:last MD note  Status at discharge: met, 0       ASSESSMENT/RECOMMENDATIONS:  [x]Discontinue therapy progressing towards or have reached established goals.   []Discontinue therapy due to lack of appreciable progress towards goals  []Discontinue therapy due to lack of attendance or compliance  []Other:     Thank you for this referral.     Carl Ball, PT 10/28/2019 9:39 AM

## 2020-08-14 ENCOUNTER — OFFICE VISIT (OUTPATIENT)
Dept: ORTHOPEDIC SURGERY | Age: 80
End: 2020-08-14

## 2020-08-14 VITALS
TEMPERATURE: 97.7 F | BODY MASS INDEX: 28.02 KG/M2 | SYSTOLIC BLOOD PRESSURE: 159 MMHG | WEIGHT: 139 LBS | HEIGHT: 59 IN | DIASTOLIC BLOOD PRESSURE: 60 MMHG | RESPIRATION RATE: 15 BRPM | HEART RATE: 60 BPM | OXYGEN SATURATION: 98 %

## 2020-08-14 DIAGNOSIS — Z96.641 STATUS POST HIP REPLACEMENT, RIGHT: Primary | ICD-10-CM

## 2020-08-14 DIAGNOSIS — M51.36 DDD (DEGENERATIVE DISC DISEASE), LUMBAR: ICD-10-CM

## 2020-08-14 DIAGNOSIS — M25.551 RIGHT HIP PAIN: ICD-10-CM

## 2020-08-14 NOTE — PROGRESS NOTES
[address] [place] [address]  [phone]           Patient: Klarissa Garcia                MRN: [id number]       SSN: [id number]  YOB: 1940        AGE: 78 y.o. SEX: female  Body mass index is 28.07 kg/m². PCP: Barber Quintanilla MD  08/14/20      This office note has been dictated. REVIEW OF SYSTEMS:  Constitutional: Negative for fever, chills, weight loss and malaise/fatigue. HENT: Negative. Eyes: Negative. Respiratory: Negative. Cardiovascular: Negative. Gastrointestinal: No bowel incontinence or constipation. Genitourinary: No bladder incontinence or saddle anesthesia. Skin: Negative. Neurological: Negative. Endo/Heme/Allergies: Negative. Psychiatric/Behavioral: Negative. Musculoskeletal: As per HPI above. Past Medical History:   Diagnosis Date    Constipation     Hypercholesteremia     Hypertension     no meds now    Microscopic hematuria     MRSA (methicillin resistant staph aureus) culture positive 06/2018    On abdominal wall- tx with Vibramycin  (care everywhere)    Stroke [facility]) not sure when    blurred vision, resolved (taking plavix)     PEACE (stress urinary incontinence, female)     UTI (urinary tract infection)          Current Outpatient Medications:     methylPREDNISolone (MEDROL DOSEPACK) 4 mg tablet, Per dose pack instructions, Disp: 1 Dose Pack, Rfl: 0    clopidogrel (PLAVIX) 75 mg tab, Take 75 mg by mouth daily. , Disp: , Rfl:     aspirin delayed-release 325 mg tablet, Take 1 Tab by mouth two (2) times a day., Disp: 60 Tab, Rfl: 1    ferrous sulfate 325 mg (65 mg iron) tablet, Take 1 Tab by mouth two (2) times daily (with meals). , Disp: 60 Tab, Rfl: 1    amLODIPine (NORVASC) 5 mg tablet, Take 5 mg by mouth daily. , Disp: , Rfl:     trimethoprim-sulfamethoxazole (BACTRIM DS) 160-800 mg per tablet, Take 1 Tab by mouth two (2) times a day., Disp: 10 Tab, Rfl: 0    mupirocin (BACTROBAN) 2 % ointment, Use 1 Application to affected area 3 Times Daily. , Disp: , Rfl:     SF 5000 PLUS 1.1 % crea, BRUSH ON PASTE AS DIRECTED EVERY DAY, Disp: , Rfl: 99    acetaminophen (TYLENOL) 325 mg tablet, Take 650 mg by mouth every six (6) hours as needed for Pain., Disp: , Rfl:     polyethylene glycol (MIRALAX) 17 gram/dose powder, Take 17 g by mouth daily as needed (Constipation). , Disp: , Rfl:     ESTRACE 0.01 % (0.1 mg/gram) vaginal cream, APPLY 2/3 LENGTH OF PINKY FINGER AND INSERT INTO VAGINA ONCE A WEEK, Disp: 42.5 g, Rfl: 0    simvastatin (ZOCOR) 40 mg tablet, Take 40 mg by mouth nightly., Disp: , Rfl:     CHOLECALCIFEROL, VITAMIN D3, (VITAMIN D3 PO), Take 1,000 Units by mouth daily. , Disp: , Rfl:     Allergies   Allergen Reactions    Erythromycin Rash and Itching       inflammation    Furacin [Nitrofurazone] Hives and Other (comments)     Intolerance    Tobrex [Tobramycin Sulfate] Other (comments)     Intolerance, extreme redness       Social History     Socioeconomic History    Marital status:      Spouse name: Not on file    Number of children: Not on file    Years of education: Not on file    Highest education level: Not on file   Occupational History    Not on file   Social Needs    Financial resource strain: Not on file    Food insecurity     Worry: Not on file     Inability: Not on file    Transportation needs     Medical: Not on file     Non-medical: Not on file   Tobacco Use    Smoking status: Never Smoker    Smokeless tobacco: Never Used   Substance and Sexual Activity    Alcohol use: No    Drug use: No    Sexual activity: Not on file   Lifestyle    Physical activity     Days per week: Not on file     Minutes per session: Not on file    Stress: Not on file   Relationships    Social connections     Talks on phone: Not on file     Gets together: Not on file     Attends Ep[employer] service: Not on file     Active member of club or organization: Not on file     Attends meetings of clubs or organizations: Not on file     Relationship status: Not on file    Intimate partner violence     Fear of current or ex partner: Not on file     Emotionally abused: Not on file     Physically abused: Not on file     Forced sexual activity: Not on file   Other Topics Concern    Not on file   Social History Narrative    Not on file       Past Surgical History:   Procedure Laterality Date    HX CATARACT REMOVAL Bilateral     HX CHOLECYSTECTOMY  1972    HX HYSTERECTOMY  09/2016    total    HX KNEE REPLACEMENT Right 04/27/2015    HX OTHER SURGICAL  2005, 2015    skin ca removed     HX OTHER SURGICAL  09/2016    bladder support    HX OTHER SURGICAL  01/2019    basal skin ca removed     HX TONSILLECTOMY  1946           Patient seen and evaluated today for her right hip. She is status post right hip replacement surgery. She is done very well with peers very pleased with results. She has had trouble with her low back and reports some low back discomfort buttock discomfort and leg discomfort by the end of the day at times. She has had no change in bowel or bladder habits. Patient denies recent fevers, chills, chest pain, SOB, or injuries. No recent systemic changes noted. A 12-point review of systems is performed today. Pertinent positives are noted. All other systems reviewed and otherwise are negative. Physical exam: General: Alert and oriented x3, nad.  well-developed, well nourished. normal affect, AF. NC/AT, EOMI, neck supple, trachea midline, no JVD present. Breathing is non-labored. Examination of lower extremities reveals pain-free range of motion of the hips. There is no pain to palpation the trochanteric bursa. Does have a little discomfort to the sciatic notch. Negative straight leg raise. Negative calf tenderness. Negative Homans. No signs of DVT present.     Radiographs obtained in office today including AP pelvis, AP and crosstable lateral the right hip shows a total hip components to be well fixed without evidence for loosening or fracture noted. The AP pelvis shows significant degenerative changes noted in the lower lumbar spine as well as scoliosis. Films done 8/14/2020 to review location. Assessment: Status post right hip replacement, lumbar degenerative disc disease and scoliosis    Plan: At this point, the patient will continue activities as tolerated. She will follow-up in years time for evaluation. She declined a referral to the spine center today.               JR Woodrow DAO, PA-C, ATC

## 2020-08-18 ENCOUNTER — HOSPITAL ENCOUNTER (OUTPATIENT)
Dept: MAMMOGRAPHY | Age: 80
Discharge: HOME | End: 2020-08-18
Attending: FAMILY MEDICINE
Payer: MEDICARE

## 2020-08-18 DIAGNOSIS — Z12.31 VISIT FOR SCREENING MAMMOGRAM: ICD-10-CM

## 2020-08-18 PROCEDURE — 77063 BREAST TOMOSYNTHESIS BI: CPT

## 2021-07-29 ENCOUNTER — TRANSCRIBE ORDER (OUTPATIENT)
Dept: SCHEDULING | Age: 81
End: 2021-07-29

## 2021-07-29 DIAGNOSIS — Z12.31 VISIT FOR SCREENING MAMMOGRAM: Primary | ICD-10-CM

## 2021-09-01 ENCOUNTER — HOSPITAL ENCOUNTER (OUTPATIENT)
Dept: MAMMOGRAPHY | Age: 81
Discharge: HOME | End: 2021-09-01
Attending: FAMILY MEDICINE
Payer: MEDICARE

## 2021-09-01 DIAGNOSIS — Z12.31 VISIT FOR SCREENING MAMMOGRAM: ICD-10-CM

## 2021-09-01 PROCEDURE — 77067 SCR MAMMO BI INCL CAD: CPT

## 2021-09-20 ENCOUNTER — OFFICE VISIT (OUTPATIENT)
Dept: ORTHOPEDIC SURGERY | Age: 81
End: 2021-09-20
Payer: MEDICARE

## 2021-09-20 VITALS
RESPIRATION RATE: 16 BRPM | WEIGHT: 137.6 LBS | TEMPERATURE: 97 F | BODY MASS INDEX: 27.74 KG/M2 | OXYGEN SATURATION: 98 % | HEART RATE: 55 BPM | HEIGHT: 59 IN

## 2021-09-20 DIAGNOSIS — M17.12 ARTHRITIS OF LEFT KNEE: ICD-10-CM

## 2021-09-20 DIAGNOSIS — Z96.641 STATUS POST HIP REPLACEMENT, RIGHT: Primary | ICD-10-CM

## 2021-09-20 PROCEDURE — 1090F PRES/ABSN URINE INCON ASSESS: CPT | Performed by: PHYSICIAN ASSISTANT

## 2021-09-20 PROCEDURE — G8419 CALC BMI OUT NRM PARAM NOF/U: HCPCS | Performed by: PHYSICIAN ASSISTANT

## 2021-09-20 PROCEDURE — G8427 DOCREV CUR MEDS BY ELIG CLIN: HCPCS | Performed by: PHYSICIAN ASSISTANT

## 2021-09-20 PROCEDURE — 1101F PT FALLS ASSESS-DOCD LE1/YR: CPT | Performed by: PHYSICIAN ASSISTANT

## 2021-09-20 PROCEDURE — G8536 NO DOC ELDER MAL SCRN: HCPCS | Performed by: PHYSICIAN ASSISTANT

## 2021-09-20 PROCEDURE — 73502 X-RAY EXAM HIP UNI 2-3 VIEWS: CPT | Performed by: PHYSICIAN ASSISTANT

## 2021-09-20 PROCEDURE — 99214 OFFICE O/P EST MOD 30 MIN: CPT | Performed by: PHYSICIAN ASSISTANT

## 2021-09-20 PROCEDURE — G8400 PT W/DXA NO RESULTS DOC: HCPCS | Performed by: PHYSICIAN ASSISTANT

## 2021-09-20 PROCEDURE — G8432 DEP SCR NOT DOC, RNG: HCPCS | Performed by: PHYSICIAN ASSISTANT

## 2021-09-20 PROCEDURE — 20611 DRAIN/INJ JOINT/BURSA W/US: CPT | Performed by: PHYSICIAN ASSISTANT

## 2021-09-20 RX ORDER — BETAMETHASONE SODIUM PHOSPHATE AND BETAMETHASONE ACETATE 3; 3 MG/ML; MG/ML
6 INJECTION, SUSPENSION INTRA-ARTICULAR; INTRALESIONAL; INTRAMUSCULAR; SOFT TISSUE ONCE
Status: COMPLETED | OUTPATIENT
Start: 2021-09-20 | End: 2021-09-20

## 2021-09-20 RX ORDER — ALENDRONATE SODIUM 70 MG/1
TABLET ORAL
COMMUNITY
Start: 2021-07-06

## 2021-09-20 RX ADMIN — BETAMETHASONE SODIUM PHOSPHATE AND BETAMETHASONE ACETATE 6 MG: 3; 3 INJECTION, SUSPENSION INTRA-ARTICULAR; INTRALESIONAL; INTRAMUSCULAR; SOFT TISSUE at 10:33

## 2021-09-20 NOTE — PROGRESS NOTES
[address] [place] [address]  [phone]           Patient: Kassy Lyle                MRN: [id number]       SSN: [id number]  YOB: 1940        AGE: 80 y.o. SEX: female  Body mass index is 27.79 kg/m². PCP: Fransisca Camejo MD  09/20/21            REVIEW OF SYSTEMS:  Constitutional: Negative for fever, chills, weight loss and malaise/fatigue. HENT: Negative. Eyes: Negative. Respiratory: Negative. Cardiovascular: Negative. Gastrointestinal: No bowel incontinence or constipation. Genitourinary: No bladder incontinence or saddle anesthesia. Skin: Negative. Neurological: Negative. Endo/Heme/Allergies: Negative. Psychiatric/Behavioral: Negative. Musculoskeletal: As per HPI above. Past Medical History:   Diagnosis Date    Constipation     Hypercholesteremia     Hypertension     no meds now    Microscopic hematuria     MRSA (methicillin resistant staph aureus) culture positive 06/2018    On abdominal wall- tx with Vibramycin  (care everywhere)    Stroke [facility]) not sure when    blurred vision, resolved (taking plavix)     PEACE (stress urinary incontinence, female)     UTI (urinary tract infection)          Current Outpatient Medications:     alendronate (FOSAMAX) 70 mg tablet, TAKE 1 TABLET EVERY 7 DAYS, Disp: , Rfl:     amLODIPine (NORVASC) 5 mg tablet, Take 5 mg by mouth daily. , Disp: , Rfl:     acetaminophen (TYLENOL) 325 mg tablet, Take 650 mg by mouth every six (6) hours as needed for Pain., Disp: , Rfl:     ESTRACE 0.01 % (0.1 mg/gram) vaginal cream, APPLY 2/3 LENGTH OF PINKY FINGER AND INSERT INTO VAGINA ONCE A WEEK, Disp: 42.5 g, Rfl: 0    simvastatin (ZOCOR) 40 mg tablet, Take 40 mg by mouth nightly., Disp: , Rfl:     CHOLECALCIFEROL, VITAMIN D3, (VITAMIN D3 PO), Take 1,000 Units by mouth daily. , Disp: , Rfl:     methylPREDNISolone (MEDROL DOSEPACK) 4 mg tablet, Per dose pack instructions (Patient not taking: Reported on 9/20/2021), Disp: 1 Dose Pack, Rfl: 0    clopidogrel (PLAVIX) 75 mg tab, Take 75 mg by mouth daily. (Patient not taking: Reported on 9/20/2021), Disp: , Rfl:     aspirin delayed-release 325 mg tablet, Take 1 Tab by mouth two (2) times a day. (Patient not taking: Reported on 9/20/2021), Disp: 60 Tab, Rfl: 1    ferrous sulfate 325 mg (65 mg iron) tablet, Take 1 Tab by mouth two (2) times daily (with meals). (Patient not taking: Reported on 9/20/2021), Disp: 60 Tab, Rfl: 1    trimethoprim-sulfamethoxazole (BACTRIM DS) 160-800 mg per tablet, Take 1 Tab by mouth two (2) times a day. (Patient not taking: Reported on 9/20/2021), Disp: 10 Tab, Rfl: 0    mupirocin (BACTROBAN) 2 % ointment, Use 1 Application to affected area 3 Times Daily. (Patient not taking: Reported on 9/20/2021), Disp: , Rfl:     SF 5000 PLUS 1.1 % crea, BRUSH ON PASTE AS DIRECTED EVERY DAY (Patient not taking: Reported on 9/20/2021), Disp: , Rfl: 99    polyethylene glycol (MIRALAX) 17 gram/dose powder, Take 17 g by mouth daily as needed (Constipation).  (Patient not taking: Reported on 9/20/2021), Disp: , Rfl:     Allergies   Allergen Reactions    Erythromycin Rash and Itching       inflammation    Furacin [Nitrofurazone] Hives and Other (comments)     Intolerance    Tobrex [Tobramycin Sulfate] Other (comments)     Intolerance, extreme redness       Social History     Socioeconomic History    Marital status:      Spouse name: Not on file    Number of children: Not on file    Years of education: Not on file    Highest education level: Not on file   Occupational History    Not on file   Tobacco Use    Smoking status: Never Smoker    Smokeless tobacco: Never Used   Substance and Sexual Activity    Alcohol use: No    Drug use: No    Sexual activity: Not on file   Other Topics Concern    Not on file   Social History Narrative    Not on file     Social Determinants of Health Financial Resource Strain:     Difficulty of Paying Living Expenses:    Food Insecurity:     Worried About Running Out of Food in the Last Year:     [address] in the Last Year:    Transportation Needs:     Lack of Transportation (Medical):  Lack of Transportation (Non-Medical):    Physical Activity:     Days of Exercise per Week:     Minutes of Exercise per Session:    Stress:     Feeling of Stress :    Social Connections:     Frequency of Communication with Friends and Family:     Frequency of Social Gatherings with Friends and Family:     Attends [employer] Services:     Active Member of Clubs or Organizations:     Attends Club or Organization Meetings:     Marital Status:    Intimate Partner Violence:     Fear of Current or Ex-Partner:     Emotionally Abused:     Physically Abused:     Sexually Abused:        Past Surgical History:   Procedure Laterality Date    HX CATARACT REMOVAL Bilateral     HX CHOLECYSTECTOMY  1972    HX HYSTERECTOMY  09/2016    total    HX KNEE REPLACEMENT Right 04/27/2015    HX OTHER SURGICAL  2005, 2015    skin ca removed     HX OTHER SURGICAL  09/2016    bladder support    HX OTHER SURGICAL  01/2019    basal skin ca removed     HX TONSILLECTOMY  1946       Patient seen evaluate today for her hips and her knees. She has had a right hip replacement in the past as well as her right knee replacement in the past.  Each of these are doing quite well. Her left knee is what slowing her down. She does have decreased walking tolerance. She has trouble getting from a chair. She has trouble stairs. She is unable to kneel on the knee. She takes occasional Tylenol as well as an occasional tramadol. Patient denies recent fevers, chills, chest pain, SOB, or injuries. No recent systemic changes noted. A 12-point review of systems is performed today. Pertinent positives are noted. All other systems reviewed and otherwise are negative.     Physical exam: General: Alert and oriented x3, nad.  well-developed, well nourished. normal affect, AF. NC/AT, EOMI, neck supple, trachea midline, no JVD present. Breathing is non-labored. Examination of the lower extremities reveals pain-free range of motion the hips. There is no pain to palpation the trochanter bursa. Negative straight leg raise. Negative calf tenderness. Negative Homans. No signs of DVT present. Leg lengths look good. Abduction strength is symmetric bilaterally. Each of the knees reveal skin intact. The surgical wound on the right side is healed nicely. There is no erythema, ecchymosis or warmth noted. There are no signs of infection or status post them. The left knee does have findings consistent with advanced arthritis with discomfort to palpation tricompartmentally and crepitus arising the anterior compartment. Radiographs pain office today 9/20/2021 at the [place] location including AP pelvis, AP and crosstable lateral of the right hip shows a total hip components to be well fixed without evidence for loosening or fracture noted. There are significant degenerative changes noted to the lower lumbar spine. Assessment: #1 status post right total hip replacement, #2 status post right total knee replacement, #3 left knee end-stage arthritis    Plan: At this point, we discussed treatment options. We will move for the cortisone injection for the left knee today. After informed consent, under aseptic conditions, with US guided assitance, the left knee was prepped with betadine and a mxiture of 3ml 1% lidocaine and 6mg of celestone was injected without complications. The patient tolerated the injection well. The patient is instructed on post-injection care. We discussed surgical intervention and will try to maximize her nonoperative treatment. We will see her back in 3 months time for evaluation. We discussed viscosupplementation and will get this preapproved by insurance.   She will continue activities as tolerated and call with any questions or concerns that shall arise. Chart reviewed for the following:  Oneal WEST PA-C, have reviewed the History, Physical and updated the Allergic reactions for Abbie Limb? TIME OUT performed immediately prior to start of procedure:  Oneal WEST PA-C, have performed the following reviews on Abbie Limb prior to the start of the procedure:  ????????  * Patient was identified by name and date of birth   * Agreement on procedure being performed was verified  * Risks and Benefits explained to the patient  * Procedure site verified and marked as necessary  * Patient was positioned for comfort  * Consent was signed and verified    Time:10:26 AM    Body part: left knee, intra-articular    Medication & Dose: 3ml 1% lidocaine and 6mg celestone    Date of procedure: 09/20/21    Procedure performed by: Oneal Mcleod PA-C    Provider assisted by: none    Patient assisted by: self    How tolerated by patient: tolerated the procedure well with no complications    Post Procedural Pain Scale: 1    Comments:   [address] using a frequency of 10MHz with a 12L-RS transducer head was used to confirm needle placement.   Ultrasound images captured using [address] Ultrasound machine and scanned into patient's chart       Estevan Samaniego PA-C, ATC

## 2021-10-01 ENCOUNTER — DOCUMENTATION ONLY (OUTPATIENT)
Dept: ORTHOPEDIC SURGERY | Age: 81
End: 2021-10-01

## 2022-02-10 ENCOUNTER — OFFICE VISIT (OUTPATIENT)
Dept: ORTHOPEDIC SURGERY | Age: 82
End: 2022-02-10
Payer: MEDICARE

## 2022-02-10 VITALS — HEIGHT: 59 IN | OXYGEN SATURATION: 98 % | BODY MASS INDEX: 27.62 KG/M2 | HEART RATE: 62 BPM | WEIGHT: 137 LBS

## 2022-02-10 DIAGNOSIS — M17.12 ARTHRITIS OF LEFT KNEE: ICD-10-CM

## 2022-02-10 DIAGNOSIS — M51.36 DDD (DEGENERATIVE DISC DISEASE), LUMBAR: ICD-10-CM

## 2022-02-10 DIAGNOSIS — Z96.641 STATUS POST HIP REPLACEMENT, RIGHT: Primary | ICD-10-CM

## 2022-02-10 PROCEDURE — G8536 NO DOC ELDER MAL SCRN: HCPCS | Performed by: PHYSICIAN ASSISTANT

## 2022-02-10 PROCEDURE — 20611 DRAIN/INJ JOINT/BURSA W/US: CPT | Performed by: PHYSICIAN ASSISTANT

## 2022-02-10 PROCEDURE — G8432 DEP SCR NOT DOC, RNG: HCPCS | Performed by: PHYSICIAN ASSISTANT

## 2022-02-10 PROCEDURE — 99214 OFFICE O/P EST MOD 30 MIN: CPT | Performed by: PHYSICIAN ASSISTANT

## 2022-02-10 PROCEDURE — 1101F PT FALLS ASSESS-DOCD LE1/YR: CPT | Performed by: PHYSICIAN ASSISTANT

## 2022-02-10 PROCEDURE — G8427 DOCREV CUR MEDS BY ELIG CLIN: HCPCS | Performed by: PHYSICIAN ASSISTANT

## 2022-02-10 PROCEDURE — G8419 CALC BMI OUT NRM PARAM NOF/U: HCPCS | Performed by: PHYSICIAN ASSISTANT

## 2022-02-10 PROCEDURE — 1090F PRES/ABSN URINE INCON ASSESS: CPT | Performed by: PHYSICIAN ASSISTANT

## 2022-02-10 PROCEDURE — G8400 PT W/DXA NO RESULTS DOC: HCPCS | Performed by: PHYSICIAN ASSISTANT

## 2022-02-10 RX ORDER — BETAMETHASONE SODIUM PHOSPHATE AND BETAMETHASONE ACETATE 3; 3 MG/ML; MG/ML
6 INJECTION, SUSPENSION INTRA-ARTICULAR; INTRALESIONAL; INTRAMUSCULAR; SOFT TISSUE ONCE
Status: COMPLETED | OUTPATIENT
Start: 2022-02-10 | End: 2022-02-10

## 2022-02-10 RX ADMIN — BETAMETHASONE SODIUM PHOSPHATE AND BETAMETHASONE ACETATE 6 MG: 3; 3 INJECTION, SUSPENSION INTRA-ARTICULAR; INTRALESIONAL; INTRAMUSCULAR; SOFT TISSUE at 13:49

## 2022-02-10 NOTE — PROGRESS NOTES
[address] [place] [address]  [phone]           Patient: Julian Holloway                MRN: [id number]       SSN: [id number]  YOB: 1940        AGE: 80 y.o. SEX: female  Body mass index is 27.67 kg/m². PCP: Carey Leahy MD  02/10/22            REVIEW OF SYSTEMS:  Constitutional: Negative for fever, chills, weight loss and malaise/fatigue. HENT: Negative. Eyes: Negative. Respiratory: Negative. Cardiovascular: Negative. Gastrointestinal: No bowel incontinence or constipation. Genitourinary: No bladder incontinence or saddle anesthesia. Skin: Negative. Neurological: Negative. Endo/Heme/Allergies: Negative. Psychiatric/Behavioral: Negative. Musculoskeletal: As per HPI above. Past Medical History:   Diagnosis Date    Constipation     Hypercholesteremia     Hypertension     no meds now    Microscopic hematuria     MRSA (methicillin resistant staph aureus) culture positive 06/2018    On abdominal wall- tx with Vibramycin  (care everywhere)    Stroke [facility]) not sure when    blurred vision, resolved (taking plavix)     PEACE (stress urinary incontinence, female)     UTI (urinary tract infection)          Current Outpatient Medications:     alendronate (FOSAMAX) 70 mg tablet, TAKE 1 TABLET EVERY 7 DAYS, Disp: , Rfl:     amLODIPine (NORVASC) 5 mg tablet, Take 5 mg by mouth daily. , Disp: , Rfl:     acetaminophen (TYLENOL) 325 mg tablet, Take 650 mg by mouth every six (6) hours as needed for Pain., Disp: , Rfl:     ESTRACE 0.01 % (0.1 mg/gram) vaginal cream, APPLY 2/3 LENGTH OF PINKY FINGER AND INSERT INTO VAGINA ONCE A WEEK, Disp: 42.5 g, Rfl: 0    simvastatin (ZOCOR) 40 mg tablet, Take 40 mg by mouth nightly., Disp: , Rfl:     CHOLECALCIFEROL, VITAMIN D3, (VITAMIN D3 PO), Take 1,000 Units by mouth daily. , Disp: , Rfl:     methylPREDNISolone (MEDROL DOSEPACK) 4 mg tablet, Per dose pack instructions (Patient not taking: Reported on 9/20/2021), Disp: 1 Dose Pack, Rfl: 0    clopidogrel (PLAVIX) 75 mg tab, Take 75 mg by mouth daily. (Patient not taking: Reported on 9/20/2021), Disp: , Rfl:     aspirin delayed-release 325 mg tablet, Take 1 Tab by mouth two (2) times a day. (Patient not taking: Reported on 9/20/2021), Disp: 60 Tab, Rfl: 1    ferrous sulfate 325 mg (65 mg iron) tablet, Take 1 Tab by mouth two (2) times daily (with meals). (Patient not taking: Reported on 9/20/2021), Disp: 60 Tab, Rfl: 1    trimethoprim-sulfamethoxazole (BACTRIM DS) 160-800 mg per tablet, Take 1 Tab by mouth two (2) times a day. (Patient not taking: Reported on 9/20/2021), Disp: 10 Tab, Rfl: 0    mupirocin (BACTROBAN) 2 % ointment, Use 1 Application to affected area 3 Times Daily. (Patient not taking: Reported on 9/20/2021), Disp: , Rfl:     SF 5000 PLUS 1.1 % crea, BRUSH ON PASTE AS DIRECTED EVERY DAY (Patient not taking: Reported on 9/20/2021), Disp: , Rfl: 99    polyethylene glycol (MIRALAX) 17 gram/dose powder, Take 17 g by mouth daily as needed (Constipation).  (Patient not taking: Reported on 9/20/2021), Disp: , Rfl:     Allergies   Allergen Reactions    Erythromycin Rash and Itching       inflammation    Furacin [Nitrofurazone] Hives and Other (comments)     Intolerance    Tobrex [Tobramycin Sulfate] Other (comments)     Intolerance, extreme redness       Social History     Socioeconomic History    Marital status:      Spouse name: Not on file    Number of children: Not on file    Years of education: Not on file    Highest education level: Not on file   Occupational History    Not on file   Tobacco Use    Smoking status: Never Smoker    Smokeless tobacco: Never Used   Substance and Sexual Activity    Alcohol use: No    Drug use: No    Sexual activity: Not on file   Other Topics Concern    Not on file   Social History Narrative    Not on file     Social Determinants of Health Financial Resource Strain:     Difficulty of Paying Living Expenses: Not on file   Food Insecurity:     Worried About Running Out of Food in the Last Year: Not on file    Evelia of Food in the Last Year: Not on file   Transportation Needs:     Lack of Transportation (Medical): Not on file    Lack of Transportation (Non-Medical): Not on file   Physical Activity:     Days of Exercise per Week: Not on file    Minutes of Exercise per Session: Not on file   Stress:     Feeling of Stress : Not on file   Social Connections:     Frequency of Communication with Friends and Family: Not on file    Frequency of Social Gatherings with Friends and Family: Not on file    Attends [employer]ism Services: Not on file    Active Member of [address] [place] [address] [employer] or Organizations: Not on file    Attends Club or Organization Meetings: Not on file    Marital Status: Not on file   Intimate Partner Violence:     Fear of Current or Ex-Partner: Not on file    Emotionally Abused: Not on file    Physically Abused: Not on file    Sexually Abused: Not on file   Housing Stability:     Unable to Pay for Housing in the Last Year: Not on file    Number of [place] in the Last Year: Not on file    Unstable Housing in the Last Year: Not on file       Past Surgical History:   Procedure Laterality Date    HX CATARACT REMOVAL Bilateral     HX CHOLECYSTECTOMY  1972    HX HYSTERECTOMY  09/2016    total    HX KNEE REPLACEMENT Right 04/27/2015    HX OTHER SURGICAL  2005, 2015    skin ca removed     HX OTHER SURGICAL  09/2016    bladder support    HX OTHER SURGICAL  01/2019    basal skin ca removed     HX TONSILLECTOMY  1946       Patient seen evaluated today for her hips and knees. She is had a previous right hip replacement as well as a previous right knee replacement. She done very well with them. She does have known advanced arthritis of the left knee. The last cortisone injection was quite efficacious for her.   She does have some increasing discomfort without injury. She has decreased walking tolerance. She has some pain at night. She has trouble stairs. She does report some discomfort into her thighs when she is lying in bed at night or with extended standing. She does not see anybody for her back currently. Patient denies recent fevers, chills, chest pain, SOB, or injuries. No recent systemic changes noted. A 12-point review of systems is performed today. Pertinent positives are noted. All other systems reviewed and otherwise are negative. Physical exam: General: Alert and oriented x3, nad.  well-developed, well nourished. normal affect, AF. NC/AT, EOMI, neck supple, trachea midline, no JVD present. Breathing is non-labored. Examination the lower extremities reveals pain-free range of motion the hips. There is no pain to palpation the trochanter bursa. Neck straight leg raise. Negative calf tenderness. Negative Homans. No signs of DVT present. The right knee has full range of motion with very good stability and patella tracks nicely without rubs or crepitus noted. Left knee reveals skin intact. There is no erythema, ecchymosis or warmth. There are no signs of infection or cellulitis present. She does have findings consistent with advanced arthritis of the left knee with pain to palpation tricompartmentally and crepitus arising anterior compartment. Assessment: #1 left knee end-stage arthritis, #2 status post right hip replacement doing well, #3 right knee replacement doing well, #4 lumbar stenosis    Plan: At this point, we discussed treatment options. I have recommended a total knee replacement for her on the left. It is decided against today given the efficacy of conservative treatment as well as her advanced age. We will move for the cortisone injection for the left knee.   After informed consent, under aseptic conditions, with US guided assitance, the left knee was prepped with betadine and a mxiture of 3ml 1% lidocaine and 6mg of celestone was injected without complications. The patient tolerated the injection well. The patient is instructed on post-injection care. We will get a series of viscosupplementation preapproved and see her back in a month's time for reevaluation. We will place a referral to the spine center for further evaluation and treatment of her lumbar stenosis. Chart reviewed for the following:  Yohana WEST PA-C, have reviewed the History, Physical and updated the Allergic reactions for Daren Booth? TIME OUT performed immediately prior to start of procedure:  Yohana WEST PA-C, have performed the following reviews on Daren Booth prior to the start of the procedure:  ????????  * Patient was identified by name and date of birth   * Agreement on procedure being performed was verified  * Risks and Benefits explained to the patient  * Procedure site verified and marked as necessary  * Patient was positioned for comfort  * Consent was signed and verified    Time:1:43 PM    Body part: left knee, intra-artiuclar    Medication & Dose: 3ml 1% lidocaine and 6mg celestone    Date of procedure: 02/10/22    Procedure performed by: Yohana Oreilly PA-C    Provider assisted by: none    Patient assisted by: self    How tolerated by patient: tolerated the procedure well with no complications    Post Procedural Pain Scale: 5    Comments:   [address] using a frequency of 10MHz with a 12L-RS transducer head was used to confirm needle placement.   Ultrasound images captured using [address] Ultrasound machine and scanned into patient's chart       Jeannette Siu PA-C, ATC

## 2022-03-01 ENCOUNTER — DOCUMENTATION ONLY (OUTPATIENT)
Dept: ORTHOPEDIC SURGERY | Age: 82
End: 2022-03-01

## 2022-03-18 PROBLEM — M79.2 NEURITIS: Status: ACTIVE | Noted: 2017-03-27

## 2022-03-18 PROBLEM — Z79.01 CHRONIC ANTICOAGULATION: Status: ACTIVE | Noted: 2018-03-29

## 2022-03-19 PROBLEM — M16.10 HIP ARTHRITIS: Status: ACTIVE | Noted: 2019-05-08

## 2022-03-19 PROBLEM — M47.816 LUMBAR FACET ARTHROPATHY: Status: ACTIVE | Noted: 2017-03-27

## 2022-03-19 PROBLEM — M48.061 LUMBAR SPINAL STENOSIS: Status: ACTIVE | Noted: 2017-03-27

## 2022-03-31 ENCOUNTER — OFFICE VISIT (OUTPATIENT)
Dept: ORTHOPEDIC SURGERY | Age: 82
End: 2022-03-31
Payer: MEDICARE

## 2022-03-31 VITALS
BODY MASS INDEX: 26.88 KG/M2 | HEART RATE: 60 BPM | HEIGHT: 60 IN | OXYGEN SATURATION: 99 % | WEIGHT: 136.9 LBS | TEMPERATURE: 97.3 F

## 2022-03-31 DIAGNOSIS — M19.041 PRIMARY OSTEOARTHRITIS OF BOTH HANDS: Primary | ICD-10-CM

## 2022-03-31 DIAGNOSIS — M19.042 PRIMARY OSTEOARTHRITIS OF BOTH HANDS: Primary | ICD-10-CM

## 2022-03-31 PROCEDURE — G8432 DEP SCR NOT DOC, RNG: HCPCS | Performed by: ORTHOPAEDIC SURGERY

## 2022-03-31 PROCEDURE — 73130 X-RAY EXAM OF HAND: CPT | Performed by: ORTHOPAEDIC SURGERY

## 2022-03-31 PROCEDURE — 1101F PT FALLS ASSESS-DOCD LE1/YR: CPT | Performed by: ORTHOPAEDIC SURGERY

## 2022-03-31 PROCEDURE — G8536 NO DOC ELDER MAL SCRN: HCPCS | Performed by: ORTHOPAEDIC SURGERY

## 2022-03-31 PROCEDURE — G8400 PT W/DXA NO RESULTS DOC: HCPCS | Performed by: ORTHOPAEDIC SURGERY

## 2022-03-31 PROCEDURE — 1090F PRES/ABSN URINE INCON ASSESS: CPT | Performed by: ORTHOPAEDIC SURGERY

## 2022-03-31 PROCEDURE — G8419 CALC BMI OUT NRM PARAM NOF/U: HCPCS | Performed by: ORTHOPAEDIC SURGERY

## 2022-03-31 PROCEDURE — 99214 OFFICE O/P EST MOD 30 MIN: CPT | Performed by: ORTHOPAEDIC SURGERY

## 2022-03-31 PROCEDURE — G8427 DOCREV CUR MEDS BY ELIG CLIN: HCPCS | Performed by: ORTHOPAEDIC SURGERY

## 2022-03-31 NOTE — PROGRESS NOTES
Jordon Hoyos is a 80 y.o. female right handed retiree. Worker's Compensation and legal considerations: none filed. Vitals:    03/31/22 1057   Pulse: 60   Temp: 97.3 °F (36.3 °C)   TempSrc: Temporal   SpO2: 99%   Weight: 136 lb 14.4 oz (62.1 kg)   Height: 5' (1.524 m)   PainSc:   0 - No pain   PainLoc: Hand           Chief Complaint   Patient presents with    Hand Pain     Chidi          HPI: Patient presents with complaints of bilateral hand deformities, mild in nature. She also reports mild aching pain on occasion.     Date of onset: Chronic    Injury: No    Prior Treatment:  No    Numbness/ Tingling: No      ROS: Review of Systems - General ROS: negative  Psychological ROS: negative  ENT ROS: negative  Allergy and Immunology ROS: negative  Hematological and Lymphatic ROS: negative  Respiratory ROS: no cough, shortness of breath, or wheezing  Cardiovascular ROS: no chest pain or dyspnea on exertion  Gastrointestinal ROS: no abdominal pain, change in bowel habits, or black or bloody stools  Musculoskeletal ROS: negative  Neurological ROS: negative  Dermatological ROS: negative    Past Medical History:   Diagnosis Date    Constipation     Hypercholesteremia     Hypertension     no meds now    Microscopic hematuria     MRSA (methicillin resistant staph aureus) culture positive 06/2018    On abdominal wall- tx with Vibramycin  (care everywhere)    Stroke [facility]) not sure when    blurred vision, resolved (taking plavix)     PEACE (stress urinary incontinence, female)     UTI (urinary tract infection)        Past Surgical History:   Procedure Laterality Date    HX CATARACT REMOVAL Bilateral     HX CHOLECYSTECTOMY  1972    HX HYSTERECTOMY  09/2016    total    HX KNEE REPLACEMENT Right 04/27/2015    HX OTHER SURGICAL  2005, 2015    skin ca removed     HX OTHER SURGICAL  09/2016    bladder support    HX OTHER SURGICAL  01/2019    basal skin ca removed     HX TONSILLECTOMY  1946       Current Outpatient Medications   Medication Sig Dispense Refill    alendronate (FOSAMAX) 70 mg tablet TAKE 1 TABLET EVERY 7 DAYS      methylPREDNISolone (MEDROL DOSEPACK) 4 mg tablet Per dose pack instructions (Patient not taking: Reported on 9/20/2021) 1 Dose Pack 0    clopidogrel (PLAVIX) 75 mg tab Take 75 mg by mouth daily. (Patient not taking: Reported on 9/20/2021)      aspirin delayed-release 325 mg tablet Take 1 Tab by mouth two (2) times a day. (Patient not taking: Reported on 9/20/2021) 60 Tab 1    ferrous sulfate 325 mg (65 mg iron) tablet Take 1 Tab by mouth two (2) times daily (with meals). (Patient not taking: Reported on 9/20/2021) 60 Tab 1    amLODIPine (NORVASC) 5 mg tablet Take 5 mg by mouth daily.  trimethoprim-sulfamethoxazole (BACTRIM DS) 160-800 mg per tablet Take 1 Tab by mouth two (2) times a day. (Patient not taking: Reported on 9/20/2021) 10 Tab 0    mupirocin (BACTROBAN) 2 % ointment Use 1 Application to affected area 3 Times Daily. (Patient not taking: Reported on 9/20/2021)      SF 5000 PLUS 1.1 % crea BRUSH ON PASTE AS DIRECTED EVERY DAY (Patient not taking: Reported on 9/20/2021)  99    acetaminophen (TYLENOL) 325 mg tablet Take 650 mg by mouth every six (6) hours as needed for Pain.  polyethylene glycol (MIRALAX) 17 gram/dose powder Take 17 g by mouth daily as needed (Constipation). (Patient not taking: Reported on 9/20/2021)      ESTRACE 0.01 % (0.1 mg/gram) vaginal cream APPLY 2/3 LENGTH OF PINKY FINGER AND INSERT INTO VAGINA ONCE A WEEK 42.5 g 0    simvastatin (ZOCOR) 40 mg tablet Take 40 mg by mouth nightly.  CHOLECALCIFEROL, VITAMIN D3, (VITAMIN D3 PO) Take 1,000 Units by mouth daily.          Allergies   Allergen Reactions    Erythromycin Rash and Itching       inflammation    Furacin [Nitrofurazone] Hives and Other (comments)     Intolerance    Tobrex [Tobramycin Sulfate] Other (comments)     Intolerance, extreme redness           PE:     Physical Exam  Vitals and nursing note reviewed. Constitutional:       General: She is not in acute distress. Appearance: Normal appearance. She is not ill-appearing. Cardiovascular:      Pulses: Normal pulses. Pulmonary:      Effort: Pulmonary effort is normal. No respiratory distress. Musculoskeletal:         General: Deformity present. No swelling, tenderness or signs of injury. Normal range of motion. Cervical back: Normal range of motion and neck supple. Right lower leg: No edema. Left lower leg: No edema. Skin:     General: Skin is warm and dry. Capillary Refill: Capillary refill takes less than 2 seconds. Findings: No bruising or erythema. Neurological:      General: No focal deficit present. Mental Status: She is alert and oriented to person, place, and time. Psychiatric:         Mood and Affect: Mood normal.         Behavior: Behavior normal.            Bilateral hands: There are mild deformities noted about the small joints. There is minimal tenderness to palpation and range of motion is full. Neurovascularly intact. Imaging:     3/31/2022 3 views of bilateral hands is positive for diffuse degenerative changes mild to severe in nature. ICD-10-CM ICD-9-CM    1. Primary osteoarthritis of both hands  M19.041 715.14 AMB POC XRAY, HAND; 3+ VIEWS    M19.042  AMB POC XRAY, HAND; 3+ VIEWS         Plan:     Discussed the possibility of anti-inflammatories or topical versus oral.  Patient would like to try topical Voltaren gel. We also discussed the possibility of injections in the future if needed. Follow-up and Dispositions    · Return if symptoms worsen or fail to improve.           Plan was reviewed with patient, who verbalized agreement and understanding of the plan

## 2022-04-01 NOTE — PROGRESS NOTES
[facility]  [address]  [place], [address]  [phone] fax    Physical Therapy Discharge Summary    Patient name: Dorothy Priest Start of Care: 2017   Referral source: Jerel Frost MD : 1940    Medical Diagnosis: Spinal stenosis, lumbar region [M48.06]  Other specific arthropathies, not elsewhere classified, other specified site [M12.88]  Neuralgia and neuritis, unspecified [M79.2] Onset Date: 6 months with worsening over the past 4 months    Treatment Diagnosis: LBP   Prior Hospitalization: see medical history Provider#: [id number]   Medications: Verified on Patient summary List   Comorbidities: hx of skin cancer (states she had the cancerous lesions removed), scoliosis, 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 Visits from Start of Care: 14    Missed Visits: 0  Reporting Period : 2017 to 2017    Summary of Care:  Short Term Goals: To be accomplished in 1 weeks:  1. Pt will report compliance and independence to [facility] to help the pt manage their pain and symptoms. Met - 2017    Long Term Goals: To be accomplished in 5 weeks:   1. Pt will increase FOTO score to 54 points to improve ability to perform ADLs. Progressing 45 points (increase in 4 points since start of care) 2017; Almost met, 53%. 2017  2. Pt will increase AROM l/s EXT to > 50% of WNL, B rotation and SB to 50-75% of WNL to improve mobility needed for prolonged standing. Met- 50% ext, 60% SB B 17  3. Pt will increase AROM right hip ABD to 35 degs to improve ability to tolerate yard work. - AROM (R hip abd 26 degrees.  2017; slow progress unchanged from last measurement 5/18/17; Not met- 22 deg 5/30/17  4. Pt will report being able fall asleep with mild to no difficulty secondary to LBP to improve sleep quality. Pt reports improving sleep 4/20/17; Pt reports 50% improvement. 5/4/2017; MET- Pt reports 1/10 pain at night 5/30/17      G-Codes (GP)  Position   Goal  CK= 40-59%   D/C  CK= 40-59%      The severity rating is based on clinical judgment and the FOTO score. ASSESSMENT/RECOMMENDATIONS: Pt has made good progress in regards to lumbar pain and functional mobility thus far with PT. She reports improved pain at night allowing her to sleep better and daily activities are easier to perform. She does state that her standing activity tolerance is still fairly limited. D/C with updated HEP for continued core activation and flexibility.     [x]Discontinue therapy: [x]Patient has reached or is progressing toward set goals      []Patient is non-compliant or has abdicated      []Due to lack of appreciable progress towards set [address] DPT 5/30/2017 2:01 PM no concerns

## 2022-08-04 ENCOUNTER — OFFICE VISIT (OUTPATIENT)
Dept: ORTHOPEDIC SURGERY | Age: 82
End: 2022-08-04
Payer: MEDICARE

## 2022-08-04 VITALS — HEIGHT: 61 IN | TEMPERATURE: 96.8 F | WEIGHT: 140.2 LBS | BODY MASS INDEX: 26.47 KG/M2

## 2022-08-04 DIAGNOSIS — M17.12 ARTHRITIS OF LEFT KNEE: Primary | ICD-10-CM

## 2022-08-04 DIAGNOSIS — Z96.641 STATUS POST HIP REPLACEMENT, RIGHT: ICD-10-CM

## 2022-08-04 PROCEDURE — 20611 DRAIN/INJ JOINT/BURSA W/US: CPT | Performed by: PHYSICIAN ASSISTANT

## 2022-08-04 RX ORDER — BETAMETHASONE SODIUM PHOSPHATE AND BETAMETHASONE ACETATE 3; 3 MG/ML; MG/ML
6 INJECTION, SUSPENSION INTRA-ARTICULAR; INTRALESIONAL; INTRAMUSCULAR; SOFT TISSUE ONCE
Status: COMPLETED | OUTPATIENT
Start: 2022-08-04 | End: 2022-08-04

## 2022-08-04 RX ADMIN — BETAMETHASONE SODIUM PHOSPHATE AND BETAMETHASONE ACETATE 6 MG: 3; 3 INJECTION, SUSPENSION INTRA-ARTICULAR; INTRALESIONAL; INTRAMUSCULAR; SOFT TISSUE at 14:41

## 2022-08-04 NOTE — PROGRESS NOTES
[address] [place] [address]  [phone]           Patient: Madeleine Barragan                MRN: [id number]       SSN: [id number]  YOB: 1940        AGE: 80 y.o. SEX: female  Body mass index is 26.49 kg/m². PCP: Emily Fuentes MD  08/04/22            REVIEW OF SYSTEMS:  Constitutional: Negative for fever, chills, weight loss and malaise/fatigue. HENT: Negative. Eyes: Negative. Respiratory: Negative. Cardiovascular: Negative. Gastrointestinal: No bowel incontinence or constipation. Genitourinary: No bladder incontinence or saddle anesthesia. Skin: Negative. Neurological: Negative. Endo/Heme/Allergies: Negative. Psychiatric/Behavioral: Negative. Musculoskeletal: As per HPI above. Past Medical History:   Diagnosis Date    Constipation     Hypercholesteremia     Hypertension     no meds now    Microscopic hematuria     MRSA (methicillin resistant staph aureus) culture positive 06/2018    On abdominal wall- tx with Vibramycin  (care everywhere)    Stroke [facility]) not sure when    blurred vision, resolved (taking plavix)     PEACE (stress urinary incontinence, female)     UTI (urinary tract infection)          Current Outpatient Medications:     alendronate (FOSAMAX) 70 mg tablet, TAKE 1 TABLET EVERY 7 DAYS, Disp: , Rfl:     amLODIPine (NORVASC) 5 mg tablet, Take 5 mg by mouth daily. , Disp: , Rfl:     acetaminophen (TYLENOL) 325 mg tablet, Take 650 mg by mouth every six (6) hours as needed for Pain., Disp: , Rfl:     polyethylene glycol (MIRALAX) 17 gram/dose powder, Take 17 g by mouth daily as needed (Constipation). , Disp: , Rfl:     ESTRACE 0.01 % (0.1 mg/gram) vaginal cream, APPLY 2/3 LENGTH OF PINKY FINGER AND INSERT INTO VAGINA ONCE A WEEK, Disp: 42.5 g, Rfl: 0    simvastatin (ZOCOR) 40 mg tablet, Take 40 mg by mouth nightly., Disp: , Rfl:     CHOLECALCIFEROL, VITAMIN D3, (VITAMIN D3 PO), Take 1,000 Units by mouth daily. , Disp: , Rfl:     methylPREDNISolone (MEDROL DOSEPACK) 4 mg tablet, Per dose pack instructions (Patient not taking: No sig reported), Disp: 1 Dose Pack, Rfl: 0    clopidogrel (PLAVIX) 75 mg tab, Take 75 mg by mouth daily. (Patient not taking: No sig reported), Disp: , Rfl:     aspirin delayed-release 325 mg tablet, Take 1 Tab by mouth two (2) times a day. (Patient not taking: No sig reported), Disp: 60 Tab, Rfl: 1    ferrous sulfate 325 mg (65 mg iron) tablet, Take 1 Tab by mouth two (2) times daily (with meals). (Patient not taking: No sig reported), Disp: 60 Tab, Rfl: 1    trimethoprim-sulfamethoxazole (BACTRIM DS) 160-800 mg per tablet, Take 1 Tab by mouth two (2) times a day. (Patient not taking: No sig reported), Disp: 10 Tab, Rfl: 0    mupirocin (BACTROBAN) 2 % ointment, Use 1 Application to affected area 3 Times Daily.  (Patient not taking: No sig reported), Disp: , Rfl:     SF 5000 PLUS 1.1 % crea, BRUSH ON PASTE AS DIRECTED EVERY DAY (Patient not taking: No sig reported), Disp: , Rfl: 99    Allergies   Allergen Reactions    Erythromycin Rash and Itching       inflammation    Furacin [Nitrofurazone] Hives and Other (comments)     Intolerance    Tobrex [Tobramycin Sulfate] Other (comments)     Intolerance, extreme redness       Social History     Socioeconomic History    Marital status:      Spouse name: Not on file    Number of children: Not on file    Years of education: Not on file    Highest education level: Not on file   Occupational History    Not on file   Tobacco Use    Smoking status: Never    Smokeless tobacco: Never   Substance and Sexual Activity    Alcohol use: No    Drug use: No    Sexual activity: Not on file   Other Topics Concern    Not on file   Social History Narrative    Not on file     Social Determinants of Health     Financial Resource Strain: Not on file   Food Insecurity: Not on file   Transportation Needs: Not on file   Physical Activity: Not on file Stress: Not on file   Social Connections: Not on file   Intimate Partner Violence: Not on file   Housing Stability: Not on file       Past Surgical History:   Procedure Laterality Date    HX CATARACT REMOVAL Bilateral     HX CHOLECYSTECTOMY  1972    HX HYSTERECTOMY  09/2016    total    HX KNEE REPLACEMENT Right 04/27/2015    HX OTHER SURGICAL  2005, 2015    skin ca removed     HX OTHER SURGICAL  09/2016    bladder support    HX OTHER SURGICAL  01/2019    basal skin ca removed     HX TONSILLECTOMY  1946       Patient seen evaluated today for her left knee. She does have known advanced arthritis of the left knee. The injections are becoming less effective. She does have decreased walking tolerance. She has trouble with standing for long periods. She has occasional night discomfort. No recent fevers or chills. No injuries or falls to report. Patient denies recent fevers, chills, chest pain, SOB, or injuries. No recent systemic changes noted. A 12-point review of systems is performed today. Pertinent positives are noted. All other systems reviewed and otherwise are negative. Physical exam: General: Alert and oriented x3, nad.  well-developed, well nourished. normal affect, AF. NC/AT, EOMI, neck supple, trachea midline, no JVD present. Breathing is non-labored. Examination of lower extremities reveals pain-free range of motion the hips there is no pain to palpation trochanter bursa. Neck straight leg raise. Negative calf tenderness. Negative Homans. No signs of DVT present. The left knee reveals skin intact. There is no erythema, ecchymosis or warmth. There are no signs for infection or cellulitis present. He does have findings consistent with advanced arthritis of the left knee. There is pain to palpation tricompartmentally and crepitus arising from the anterior compartment. Assessment: Left knee advanced osteoarthritis    Plan: At this point, we discussed treatment options. Surgical intervention was discussed and recommended. The patient declines and like to continue with conservative treatment. Today in the office we will move forth a cortisone junction for the left knee. After informed consent, under aseptic conditions, with US guided assitance, the left knee was prepped with betadine and a mxiture of 3ml 1% lidocaine and 6mg of celestone was injected without complications. The patient tolerated the injection well. The patient is instructed on post-injection care. We will see her back in 4 weeks time for evaluation to  efficacy of the injection and likely begin a series of viscosupplementation which will get preapproved. Chart reviewed for the following:  Indu WEST PA-C, have reviewed the History, Physical and updated the Allergic reactions for Minor Ledesma? TIME OUT performed immediately prior to start of procedure:  Indu WEST PA-C, have performed the following reviews on Minoralonso Ledesma prior to the start of the procedure:  ????????  * Patient was identified by name and date of birth   * Agreement on procedure being performed was verified  * Risks and Benefits explained to the patient  * Procedure site verified and marked as necessary  * Patient was positioned for comfort  * Consent was signed and verified    Time:2:38 PM    Body part: left knee, intra-articular    Medication & Dose: 3ml 1% lidocaine and 6mg celestone    Date of procedure: 08/04/22    Procedure performed by: Indu Cm PA-C    Provider assisted by: none    Patient assisted by: self    How tolerated by patient: tolerated the procedure well with no complications    Post Procedural Pain Scale: 8    Comments:   [address] [employer] using a frequency of 10MHz with a 12L-RS transducer head was used to confirm needle placement.   Ultrasound images captured using [address] Ultrasound machine and scanned into patient's chart       Faith Hutson PA-C, ATC

## 2022-08-09 ENCOUNTER — TRANSCRIBE ORDER (OUTPATIENT)
Dept: SCHEDULING | Age: 82
End: 2022-08-09

## 2022-08-09 DIAGNOSIS — Z12.31 VISIT FOR SCREENING MAMMOGRAM: Primary | ICD-10-CM

## 2022-08-12 ENCOUNTER — DOCUMENTATION ONLY (OUTPATIENT)
Dept: ORTHOPEDIC SURGERY | Age: 82
End: 2022-08-12

## 2022-09-07 ENCOUNTER — HOSPITAL ENCOUNTER (OUTPATIENT)
Dept: MAMMOGRAPHY | Age: 82
Discharge: HOME | End: 2022-09-07
Attending: STUDENT IN AN ORGANIZED HEALTH CARE EDUCATION/TRAINING PROGRAM

## 2022-09-07 DIAGNOSIS — Z12.31 VISIT FOR SCREENING MAMMOGRAM: ICD-10-CM

## 2022-09-22 ENCOUNTER — OFFICE VISIT (OUTPATIENT)
Dept: ORTHOPEDIC SURGERY | Age: 82
End: 2022-09-22
Payer: MEDICARE

## 2022-09-22 VITALS — TEMPERATURE: 97.3 F | OXYGEN SATURATION: 97 % | HEART RATE: 68 BPM | WEIGHT: 140 LBS | BODY MASS INDEX: 26.45 KG/M2

## 2022-09-22 DIAGNOSIS — M17.12 ARTHRITIS OF LEFT KNEE: Primary | ICD-10-CM

## 2022-09-22 PROCEDURE — 99024 POSTOP FOLLOW-UP VISIT: CPT | Performed by: PHYSICIAN ASSISTANT

## 2022-09-22 PROCEDURE — 20611 DRAIN/INJ JOINT/BURSA W/US: CPT | Performed by: PHYSICIAN ASSISTANT

## 2022-09-22 NOTE — PROGRESS NOTES
Patient: Chencho November                MRN: [id number]       SSN: [id number]  YOB: 1940        AGE: 80 y.o. SEX: female  Body mass index is 26.45 kg/m². PCP: Claudette Binning, MD  09/22/22        Pt presents today for their 1st euflexxa  injection for Left knee . There has been no recent fevers/chills, systemic changes, or injuries to report. PE:  General A&Ox3, NAD  Exam of the knees reveals skin intact, no erythema, no ecchymosis, no signs for infection. No cyanosis, clubbing, or edema present distally. Neg calf tenderness, neg homans, no signs for DVT present. A: Left knee OA    P: The Left knee was injected 2ml euflexxa with US guided assistance without complications. Patient was instructed on post injection care. Chart reviewed for the following:  Aditi WEST PA-C, have reviewed the History, Physical and updated the Allergic reactions for Chencho November? TIME OUT performed immediately prior to start of procedure:  Aditi WEST PA-C, have performed the following reviews on Chencho November prior to the start of the procedure:  ????????  * Patient was identified by name and date of birth   * Agreement on procedure being performed was verified  * Risks and Benefits explained to the patient  * Procedure site verified and marked as necessary  * Patient was positioned for comfort  * Consent was signed and verified    Time: 1:54 PM    Body part: Left knee, intra-articular    Medication & Dose: 2ml euflexxa    Date of procedure: 9/22/22    Procedure performed by: Delbert Underwood PA-C    Provider assisted by: none    Patient assisted by: self    How tolerated by patient: tolerated the procedure well with no complications    Post Procedural Pain Scale: 2    Comments:  [address] [employer] using a frequency of 10MHz with a 12L-RS transducer head was used to confirm needle placement.   Ultrasound images captured using [employer] Ultrasound machine and scanned into patient's chart      Enrique Ridley PA-C

## 2022-09-29 ENCOUNTER — OFFICE VISIT (OUTPATIENT)
Dept: ORTHOPEDIC SURGERY | Age: 82
End: 2022-09-29
Payer: MEDICARE

## 2022-09-29 VITALS
BODY MASS INDEX: 26.24 KG/M2 | RESPIRATION RATE: 16 BRPM | TEMPERATURE: 98 F | WEIGHT: 139 LBS | OXYGEN SATURATION: 99 % | HEIGHT: 61 IN | HEART RATE: 63 BPM

## 2022-09-29 DIAGNOSIS — M17.12 ARTHRITIS OF LEFT KNEE: Primary | ICD-10-CM

## 2022-09-29 PROCEDURE — 20611 DRAIN/INJ JOINT/BURSA W/US: CPT | Performed by: PHYSICIAN ASSISTANT

## 2022-09-29 NOTE — PROGRESS NOTES
Patient: Keshav Sommers                MRN: [id number]       SSN: [id number]  YOB: 1940        AGE: 80 y.o. SEX: female  Body mass index is 26.26 kg/m². PCP: Bryanna Khoury MD  09/29/22        Pt presents today for their 2nd euflexxa  injection for Left knee . There has been no recent fevers/chills, systemic changes, or injuries to report. PE:  General A&Ox3, NAD  Exam of the knees reveals skin intact, no erythema, no ecchymosis, no signs for infection. No cyanosis, clubbing, or edema present distally. Neg calf tenderness, neg homans, no signs for DVT present. A: Left knee OA    P: The Left knee was injected 2ml euflexxa with US guided assistance without complications. Patient was instructed on post injection care. Chart reviewed for the following:  Jacqui WEST PA-C, have reviewed the History, Physical and updated the Allergic reactions for Keshav Sommers? TIME OUT performed immediately prior to start of procedure:  Jacqui WEST PA-C, have performed the following reviews on Keshav Sommers prior to the start of the procedure:  ????????  * Patient was identified by name and date of birth   * Agreement on procedure being performed was verified  * Risks and Benefits explained to the patient  * Procedure site verified and marked as necessary  * Patient was positioned for comfort  * Consent was signed and verified    Time: 1:41 PM    Body part: Left knee, intra-articular    Medication & Dose: 2ml euflexxa    Date of procedure: 9/29/22    Procedure performed by: Lacie Cordova PA-C    Provider assisted by: none    Patient assisted by: self    How tolerated by patient: tolerated the procedure well with no complications    Post Procedural Pain Scale: 5    Comments:  [employer] using a frequency of 10MHz with a 12L-RS transducer head was used to confirm needle placement.   Ultrasound images captured using [employer] Ultrasound machine and scanned into patient's chart      Saud Graves PA-C

## 2022-10-06 ENCOUNTER — OFFICE VISIT (OUTPATIENT)
Dept: ORTHOPEDIC SURGERY | Age: 82
End: 2022-10-06
Payer: MEDICARE

## 2022-10-06 VITALS — BODY MASS INDEX: 26.45 KG/M2 | WEIGHT: 140 LBS | HEART RATE: 83 BPM | OXYGEN SATURATION: 98 % | TEMPERATURE: 97.3 F

## 2022-10-06 DIAGNOSIS — M17.12 ARTHRITIS OF LEFT KNEE: Primary | ICD-10-CM

## 2022-10-06 PROCEDURE — 20611 DRAIN/INJ JOINT/BURSA W/US: CPT | Performed by: PHYSICIAN ASSISTANT

## 2022-10-06 NOTE — PROGRESS NOTES
Patient: Julian Gee                MRN: [id number]       SSN: [id number]  YOB: 1940        AGE: 80 y.o. SEX: female  Body mass index is 26.45 kg/m². PCP: Maria Carrasco MD  10/06/22        Pt presents today for their 3rd euflexxa  injection for Left knee . There has been no recent fevers/chills, systemic changes, or injuries to report. PE:  General A&Ox3, NAD  Exam of the knees reveals skin intact, no erythema, no ecchymosis, no signs for infection. No cyanosis, clubbing, or edema present distally. Neg calf tenderness, neg homans, no signs for DVT present. A: Left knee OA    P: The Left knee was injected 2ml euflexxa with US guided assistance without complications. Patient was instructed on post injection care. Chart reviewed for the following:  Monalisa WEST PA-C, have reviewed the History, Physical and updated the Allergic reactions for Julian Gee? TIME OUT performed immediately prior to start of procedure:  Monalisa WEST PA-C, have performed the following reviews on Julian Gee prior to the start of the procedure:  ????????  * Patient was identified by name and date of birth   * Agreement on procedure being performed was verified  * Risks and Benefits explained to the patient  * Procedure site verified and marked as necessary  * Patient was positioned for comfort  * Consent was signed and verified    Time: 2:13 PM    Body part: Left knee, intra-articular    Medication & Dose: 2ml euflexxa    Date of procedure: 10/6/22    Procedure performed by: Buffy Torres PA-C    Provider assisted by: none    Patient assisted by: self    How tolerated by patient: tolerated the procedure well with no complications    Post Procedural Pain Scale: 4    Comments:  [employer] using a frequency of 10MHz with a 12L-RS transducer head was used to confirm needle placement.   Ultrasound images captured using [employer] Ultrasound machine and scanned into patient's chart      Liz Love PA-C

## 2022-10-13 ENCOUNTER — HOSPITAL ENCOUNTER (OUTPATIENT)
Dept: MAMMOGRAPHY | Age: 82
Discharge: HOME | End: 2022-10-13
Attending: STUDENT IN AN ORGANIZED HEALTH CARE EDUCATION/TRAINING PROGRAM
Payer: MEDICARE

## 2022-10-13 PROCEDURE — 77063 BREAST TOMOSYNTHESIS BI: CPT

## 2022-11-17 ENCOUNTER — OFFICE VISIT (OUTPATIENT)
Dept: ORTHOPEDIC SURGERY | Age: 82
End: 2022-11-17
Payer: MEDICARE

## 2022-11-17 DIAGNOSIS — M17.12 ARTHRITIS OF LEFT KNEE: Primary | ICD-10-CM

## 2022-11-17 DIAGNOSIS — M25.462 EFFUSION OF LEFT KNEE: ICD-10-CM

## 2022-11-17 PROCEDURE — 20611 DRAIN/INJ JOINT/BURSA W/US: CPT | Performed by: PHYSICIAN ASSISTANT

## 2022-11-17 RX ORDER — CEPHALEXIN 500 MG/1
500 CAPSULE ORAL 4 TIMES DAILY
Qty: 20 CAPSULE | Refills: 0 | Status: SHIPPED | OUTPATIENT
Start: 2022-11-17

## 2022-11-17 RX ORDER — BETAMETHASONE SODIUM PHOSPHATE AND BETAMETHASONE ACETATE 3; 3 MG/ML; MG/ML
6 INJECTION, SUSPENSION INTRA-ARTICULAR; INTRALESIONAL; INTRAMUSCULAR; SOFT TISSUE ONCE
Status: COMPLETED | OUTPATIENT
Start: 2022-11-17 | End: 2022-11-17

## 2022-11-17 RX ADMIN — BETAMETHASONE SODIUM PHOSPHATE AND BETAMETHASONE ACETATE 6 MG: 3; 3 INJECTION, SUSPENSION INTRA-ARTICULAR; INTRALESIONAL; INTRAMUSCULAR; SOFT TISSUE at 13:16

## 2022-11-17 NOTE — PROGRESS NOTES
[facility]  [phone]           Patient: Haroldo Ricketts                MRN: [id number]       SSN: [id number]  YOB: 1940        AGE: 80 y.o. SEX: female  There is no height or weight on file to calculate BMI. PCP: Deena Schmitz MD  11/17/22            REVIEW OF SYSTEMS:  Constitutional: Negative for fever, chills, weight loss and malaise/fatigue. HENT: Negative. Eyes: Negative. Respiratory: Negative. Cardiovascular: Negative. Gastrointestinal: No bowel incontinence or constipation. Genitourinary: No bladder incontinence or saddle anesthesia. Skin: Negative. Neurological: Negative. Endo/Heme/Allergies: Negative. Psychiatric/Behavioral: Negative. Musculoskeletal: As per HPI above. Past Medical History:   Diagnosis Date    Constipation     Hypercholesteremia     Hypertension     no meds now    Microscopic hematuria     MRSA (methicillin resistant staph aureus) culture positive 06/2018    On abdominal wall- tx with Vibramycin  (care everywhere)    Stroke [facility]) not sure when    blurred vision, resolved (taking plavix)     PEACE (stress urinary incontinence, female)     UTI (urinary tract infection)          Current Outpatient Medications:     alendronate (FOSAMAX) 70 mg tablet, TAKE 1 TABLET EVERY 7 DAYS, Disp: , Rfl:     methylPREDNISolone (MEDROL DOSEPACK) 4 mg tablet, Per dose pack instructions (Patient not taking: No sig reported), Disp: 1 Dose Pack, Rfl: 0    clopidogrel (PLAVIX) 75 mg tab, Take 75 mg by mouth daily. , Disp: , Rfl:     aspirin delayed-release 325 mg tablet, Take 1 Tab by mouth two (2) times a day. (Patient not taking: No sig reported), Disp: 60 Tab, Rfl: 1    ferrous sulfate 325 mg (65 mg iron) tablet, Take 1 Tab by mouth two (2) times daily (with meals).  (Patient not taking: No sig reported), Disp: 60 Tab, Rfl: 1    amLODIPine (NORVASC) 5 mg tablet, Take 5 mg by mouth daily. , Disp: , Rfl:     trimethoprim-sulfamethoxazole (BACTRIM DS) 160-800 mg per tablet, Take 1 Tab by mouth two (2) times a day. (Patient not taking: No sig reported), Disp: 10 Tab, Rfl: 0    mupirocin (BACTROBAN) 2 % ointment, Use 1 Application to affected area 3 Times Daily. (Patient not taking: No sig reported), Disp: , Rfl:     SF 5000 PLUS 1.1 % crea, BRUSH ON PASTE AS DIRECTED EVERY DAY, Disp: , Rfl: 99    acetaminophen (TYLENOL) 325 mg tablet, Take 650 mg by mouth every six (6) hours as needed for Pain., Disp: , Rfl:     polyethylene glycol (MIRALAX) 17 gram/dose powder, Take 17 g by mouth daily as needed (Constipation). , Disp: , Rfl:     ESTRACE 0.01 % (0.1 mg/gram) vaginal cream, APPLY 2/3 LENGTH OF PINKY FINGER AND INSERT INTO VAGINA ONCE A WEEK (Patient not taking: No sig reported), Disp: 42.5 g, Rfl: 0    simvastatin (ZOCOR) 40 mg tablet, Take 40 mg by mouth nightly., Disp: , Rfl:     CHOLECALCIFEROL, VITAMIN D3, (VITAMIN D3 PO), Take 1,000 Units by mouth daily. , Disp: , Rfl:     Allergies   Allergen Reactions    Erythromycin Rash and Itching       inflammation    Furacin [Nitrofurazone] Hives and Other (comments)     Intolerance    Tobrex [Tobramycin Sulfate] Other (comments)     Intolerance, extreme redness       Social History     Socioeconomic History    Marital status:      Spouse name: Not on file    Number of children: Not on file    Years of education: Not on file    Highest education level: Not on file   Occupational History    Not on file   Tobacco Use    Smoking status: Never    Smokeless tobacco: Never   Vaping Use    Vaping Use: Never used   Substance and Sexual Activity    Alcohol use: No    Drug use: No    Sexual activity: Not on file   Other Topics Concern    Not on file   Social History Narrative    Not on file     Social Determinants of Health     Financial Resource Strain: Not on file   Food Insecurity: Not on file   Transportation Needs: Not on file   Physical Activity: Not on file   Stress: Not on file   Social Connections: Not on file   Intimate Partner Violence: Not on file   Housing Stability: Not on file       Past Surgical History:   Procedure Laterality Date    HX CATARACT REMOVAL Bilateral     HX CHOLECYSTECTOMY  1972    HX HYSTERECTOMY  09/2016    total    HX KNEE REPLACEMENT Right 04/27/2015    HX OTHER SURGICAL  2005, 2015    skin ca removed     HX OTHER SURGICAL  09/2016    bladder support    HX OTHER SURGICAL  01/2019    basal skin ca removed     HX TONSILLECTOMY  1946       Patient seen evaluated for her left knee. She does have known advanced arthritis left knee. She finished up a series of viscosupplementation which did give her some relief however it is not working as well as it once did. She does have decreased walking tolerance. She has trouble in the chair. She has trouble stairs. She has night pain. She tripped in her house and sustained an abrasion to her right knee. She has been using Neosporin for her. She denies any fevers or chills. Patient denies recent fevers, chills, chest pain, SOB, or injuries. No recent systemic changes noted. A 12-point review of systems is performed today. Pertinent positives are noted. All other systems reviewed and otherwise are negative. Physical exam: General: Alert and oriented x3, nad.  well-developed, well nourished. normal affect, AF. NC/AT, EOMI, neck supple, trachea midline, no JVD present. Breathing is non-labored. Examination of lower extremities reveals pain-free range of motion the hips. There is no pain to palpation the trochanter bursa. Negative straight leg raise. Negative calf tenderness. Negative Homans. No signs of DVT present. The right knee reveals skin intact. She does have an abrasion at the distal part of the previous total knee replacement wound. There is no underlying fluctuance. There are some mild erythema surrounding. The left knee reveals skin intact. There is no erythema or ecchymosis noted. There are no signs for infection or cellulitis present. Findings are consistent with advanced arthritis of the left knee with pain to palpation tricompartmentally and crepitus arising from anterior compartment. Assessment: #1 left knee end-stage arthritis, #2 right lower extremity mild cellulitis    Plan: At this point, we discussed treatment options. Total replacements have been recommended however elected against given her advanced age. She would like to continue with conservative treatment. Today in the office we will move forward with a cortisone injection for the left knee. After informed consent, under aseptic conditions, with US guided assitance, the left knee was prepped with betadine and a mxiture of 3ml 1% lidocaine and 6mg of celestone was injected without complications. The patient tolerated the injection well. The patient is instructed on post-injection care. I will place her on Keflex for a couple days regarding the right lower extremity mild cellulitis. Chart reviewed for the following:  Mirela WEST PA-C, have reviewed the History, Physical and updated the Allergic reactions for Armin Charles?     TIME OUT performed immediately prior to start of procedure:  Mirela WEST PA-C, have performed the following reviews on Armin Charles prior to the start of the procedure:  ????????  * Patient was identified by name and date of birth   * Agreement on procedure being performed was verified  * Risks and Benefits explained to the patient  * Procedure site verified and marked as necessary  * Patient was positioned for comfort  * Consent was signed and verified    Time:1:13 PM    Body part: left knee, intra-articular    Medication & Dose: 3ml 1% lidocaine and 6mg celestone    Date of procedure: 11/17/22    Procedure performed by: Mirela Galeano PA-C    Provider assisted by: none    Patient assisted by: self    How tolerated by patient: tolerated the procedure well with no complications    Post Procedural Pain Scale: 4    Comments:   [address] [employer] [address] using a frequency of 10MHz with a 12L-RS transducer head was used to confirm needle placement.   Ultrasound images captured using [address] Ultrasound machine and scanned into patient's chart       Katheryn Munroe PA-C, ATC

## 2023-03-03 ENCOUNTER — OFFICE VISIT (OUTPATIENT)
Age: 83
End: 2023-03-03

## 2023-03-03 DIAGNOSIS — M25.462 EFFUSION, LEFT KNEE: ICD-10-CM

## 2023-03-03 DIAGNOSIS — M17.12 UNILATERAL PRIMARY OSTEOARTHRITIS, LEFT KNEE: Primary | ICD-10-CM

## 2023-03-03 RX ORDER — BETAMETHASONE SODIUM PHOSPHATE AND BETAMETHASONE ACETATE 3; 3 MG/ML; MG/ML
6 INJECTION, SUSPENSION INTRA-ARTICULAR; INTRALESIONAL; INTRAMUSCULAR; SOFT TISSUE ONCE
Status: COMPLETED | OUTPATIENT
Start: 2023-03-03 | End: 2023-03-03

## 2023-03-03 RX ADMIN — BETAMETHASONE SODIUM PHOSPHATE AND BETAMETHASONE ACETATE 6 MG: 3; 3 INJECTION, SUSPENSION INTRA-ARTICULAR; INTRALESIONAL; INTRAMUSCULAR; SOFT TISSUE at 10:34

## 2023-03-03 NOTE — PROGRESS NOTES
Patient: Elidia Bucio                MRN: [id number]       SSN: [id number]  YOB: 1940        AGE: 80 y.o. SEX: female  There is no height or weight on file to calculate BMI. PCP: Etienne Pimentel MD  03/03/23            REVIEW OF SYSTEMS:  Constitutional: Negative for fever, chills, weight loss and malaise/fatigue. HENT: Negative. Eyes: Negative. Respiratory: Negative. Cardiovascular: Negative. Gastrointestinal: No bowel incontinence or constipation. Genitourinary: No bladder incontinence or saddle anesthesia. Skin: Negative. Neurological: Negative. Endo/Heme/Allergies: Negative. Psychiatric/Behavioral: Negative. Musculoskeletal: As per HPI above.      Past Medical History:   Diagnosis Date    Constipation     Hypercholesteremia     Hypertension     no meds now    Microscopic hematuria     MRSA (methicillin resistant staph aureus) culture positive 06/2018    On abdominal wall- tx with Vibramycin  (care everywhere)    Stroke [facility]) not sure when    blurred vision, resolved (taking plavix)     MOHSEN (stress urinary incontinence, female)     UTI (urinary tract infection)          Current Outpatient Medications:     acetaminophen (TYLENOL) 325 MG tablet, Take 650 mg by mouth every 6 hours as needed, Disp: , Rfl:     alendronate (FOSAMAX) 70 MG tablet, TAKE 1 TABLET EVERY 7 DAYS, Disp: , Rfl:     amLODIPine (NORVASC) 5 MG tablet, Take 5 mg by mouth daily, Disp: , Rfl:     aspirin 325 MG EC tablet, Take 325 mg by mouth 2 times daily, Disp: , Rfl:     cephALEXin (KEFLEX) 500 MG capsule, Take 500 mg by mouth 4 times daily, Disp: , Rfl:     clopidogrel (PLAVIX) 75 MG tablet, Take 75 mg by mouth daily, Disp: , Rfl:     estradiol (ESTRACE VAGINAL) 0.1 MG/GM vaginal cream, APPLY 2/3 LENGTH OF PINKY FINGER AND INSERT INTO VAGINA ONCE A WEEK, Disp: , Rfl:     ferrous sulfate (IRON 325) 325 (65 Fe) MG tablet, Take 325 mg by mouth 2 times daily (with meals), Disp: , Rfl: methylPREDNISolone (MEDROL DOSEPACK) 4 MG tablet, Per dose pack instructions, Disp: , Rfl:     mupirocin (BACTROBAN) 2 % ointment, Use 1 Application to affected area 3 Times Daily. , Disp: , Rfl:     polyethylene glycol (GLYCOLAX) 17 GM/SCOOP powder, Take 17 g by mouth daily as needed, Disp: , Rfl:     simvastatin (ZOCOR) 40 MG tablet, Take 40 mg by mouth, Disp: , Rfl:     SODIUM FLUORIDE, DENTAL GEL, 1.1 % GEL, BRUSH ON PASTE AS DIRECTED EVERY DAY, Disp: , Rfl:     sulfamethoxazole-trimethoprim (BACTRIM DS;SEPTRA DS) 800-160 MG per tablet, Take 1 tablet by mouth 2 times daily, Disp: , Rfl:     Allergies   Allergen Reactions    Nitrofurazone Hives and Other (See Comments)     Intolerance    Tobramycin Sulfate Other (See Comments)     Intolerance, extreme redness    Erythromycin Itching and Rash       inflammation       Social History     Socioeconomic History    Marital status:      Spouse name: Not on file    Number of children: Not on file    Years of education: Not on file    Highest education level: Not on file   Occupational History    Not on file   Tobacco Use    Smoking status: Never    Smokeless tobacco: Never   Substance and Sexual Activity    Alcohol use: No    Drug use: No    Sexual activity: Not on file   Other Topics Concern    Not on file   Social History Narrative    Not on file     Social Determinants of Health     Financial Resource Strain: Not on file   Food Insecurity: Not on file   Transportation Needs: Not on file   Physical Activity: Not on file   Stress: Not on file   Social Connections: Not on file   Intimate Partner Violence: Not on file   Housing Stability: Not on file       Past Surgical History:   Procedure Laterality Date    CATARACT REMOVAL Bilateral     [address] (CERVIX STATUS UNKNOWN)  09/2016    total    OTHER SURGICAL HISTORY  01/2019    basal skin ca removed     OTHER SURGICAL HISTORY  2005, 2015    skin ca removed     OTHER SURGICAL HISTORY 09/2016    bladder support    TONSILLECTOMY  1946    TOTAL KNEE ARTHROPLASTY Right 04/27/2015           Patient seen evaluated today for her left knee. Does have known advanced arthritis of the left knee which has been worsening. She still gets some relief from conservative treatment. She does have decreased walking tolerance. She has trouble in the chair. She has trouble stairs. She has night pain. The right knee is doing well. Patient denies recent fevers, chills, chest pain, SOB, or injuries. No recent systemic changes noted. A 12-point review of systems is performed today. Pertinent positives are noted. All other systems reviewed and otherwise are negative. Physical exam: General: Alert and oriented x3, nad.  well-developed, well nourished. normal affect, AF. NC/AT, EOMI, neck supple, trachea midline, no JVD present. Breathing is non-labored. Examination of the lower extremities reveals pain-free range of motion of the hips. There is no pain to palpation the trochanter bursa. Negative leg raise. Negative calf tenderness. Negative Homans. No signs of DVT present. The left knee reveals skin intact. There is no erythema or ecchymosis noted. There are no signs for infection or cellulitis present. Range of motion is well-maintained. She does have findings consistent with advanced arthritis of the left knee with pain to palpation tricompartmentally and crepitus arising from the anterior compartment. Assessment: #1 left knee end-stage osteoarthritis, #2 status post right knee replacement    Plan: At this point, we discussed treatment options. We discussed total knee replacements and have recommended knee replacements for her left knee however she would like to continue with conservative treatment. Today in the office we will move forward with a cortisone injection for the left knee.   After informed consent, under aseptic conditions, with US guided assitance, the left knee was prepped with betadine and a mxiture of 3ml 1% lidocaine and 6mg of celestone was injected without complications. The patient tolerated the injection well. The patient is instructed on post-injection care. We will see her back in 3 months time for evaluation. In the meantime she will continue with a home exercise program to maintain her range of motion and mobility. Chart reviewed for the following:  Jeremie MANLEY PA-C, have reviewed the History, Physical and updated the Allergic reactions for Sherren Failing? TIME OUT performed immediately prior to start of procedure:  Jeremie MANLEY PA-C, have performed the following reviews on Shermarielena Failing prior to the start of the procedure:  ????????  * Patient was identified by name and date of birth   * Agreement on procedure being performed was verified  * Risks and Benefits explained to the patient  * Procedure site verified and marked as necessary  * Patient was positioned for comfort  * Consent was signed and verified    Time:10:34 AM    Body part: left knee, intra-articular    Medication & Dose: 3ml 1% lidocaine and 6mg celestone    Date of procedure: 03/03/23    Procedure performed by: Bushra Hess PA-C    Provider assisted by: none    Patient assisted by: self    How tolerated by patient: tolerated the procedure well with no complications    Post Procedural Pain Scale: 5    Comments:   [address] using a frequency of 10MHz with a 12L-RS transducer head was used to confirm needle placement.   Ultrasound images captured using [address] Ultrasound machine and scanned into patient's chart       Micha Bains PA-C, ATC

## 2023-07-07 ENCOUNTER — OFFICE VISIT (OUTPATIENT)
Age: 83
End: 2023-07-07

## 2023-07-07 DIAGNOSIS — M25.462 EFFUSION, LEFT KNEE: ICD-10-CM

## 2023-07-07 DIAGNOSIS — M17.12 UNILATERAL PRIMARY OSTEOARTHRITIS, LEFT KNEE: Primary | ICD-10-CM

## 2023-07-07 RX ORDER — BETAMETHASONE SODIUM PHOSPHATE AND BETAMETHASONE ACETATE 3; 3 MG/ML; MG/ML
6 INJECTION, SUSPENSION INTRA-ARTICULAR; INTRALESIONAL; INTRAMUSCULAR; SOFT TISSUE ONCE
Status: COMPLETED | OUTPATIENT
Start: 2023-07-07 | End: 2023-07-07

## 2023-07-07 RX ADMIN — BETAMETHASONE SODIUM PHOSPHATE AND BETAMETHASONE ACETATE 6 MG: 3; 3 INJECTION, SUSPENSION INTRA-ARTICULAR; INTRALESIONAL; INTRAMUSCULAR; SOFT TISSUE at 10:56

## 2023-07-07 NOTE — PROGRESS NOTES
09/2016    bladder support    TONSILLECTOMY  1946    TOTAL KNEE ARTHROPLASTY Right 04/27/2015         Patient seen evaluated today for her left knee. She does have known advancing arthritis of the left knee. She has decreased walking tolerance. Has trouble in from a chair. She has trouble stairs. Does have some pain at night. Patient denies recent fevers, chills, chest pain, SOB, or injuries. No recent systemic changes noted. A 12-point review of systems is performed today. Pertinent positives are noted. All other systems reviewed and otherwise are negative. Physical exam: General: Alert and oriented x3, nad.  well-developed, well nourished. normal affect, AF. NC/AT, EOMI, neck supple, trachea midline, no JVD present. Breathing is non-labored. Examination of lower extremities reveals pain-free range of motion of the hips. There is no pain to palpation the trochanter bursa. Negative straight leg raise. Negative calf tenderness. Negative Homans. No signs of DVT present. Left knee reveals skin intact. There is no erythema, ecchymosis or warmth. There are no signs for infection or cellulitis present. Does have findings consistent with advanced arthritis of the left knee with pain to palpation tricompartmentally and crepitus arising from the anterior compartment. Assessment: Left knee advanced arthritis    Plan: At this point, we discussed treatment options. We will move forward with a cortisone injection for the left knee today. After informed consent, under aseptic conditions, with US guided assitance, the left knee was prepped with betadine and a mxiture of 3ml 1% lidocaine and 6mg of celestone was injected without complications. The patient tolerated the injection well. The patient is instructed on post-injection care. We will get her set up for physical therapy for general strength and conditioning as well as balance and coordination.   A prescription for Voltaren gel was sent to

## 2023-08-02 ENCOUNTER — HOSPITAL ENCOUNTER (OUTPATIENT)
Facility: HOSPITAL | Age: 83
Setting detail: RECURRING SERIES
Discharge: HOME | End: 2023-08-05
Payer: MEDICARE

## 2023-08-02 PROCEDURE — 97161 PT EVAL LOW COMPLEX 20 MIN: CPT

## 2023-08-02 PROCEDURE — 97535 SELF CARE MNGMENT TRAINING: CPT

## 2023-08-02 PROCEDURE — 97110 THERAPEUTIC EXERCISES: CPT

## 2023-08-17 ENCOUNTER — HOSPITAL ENCOUNTER (OUTPATIENT)
Facility: HOSPITAL | Age: 83
Setting detail: RECURRING SERIES
Discharge: HOME | End: 2023-08-20
Payer: MEDICARE

## 2023-08-17 PROCEDURE — 97110 THERAPEUTIC EXERCISES: CPT

## 2023-08-17 NOTE — PROGRESS NOTES
PHYSICAL / OCCUPATIONAL THERAPY - DAILY TREATMENT NOTE (updated )    Patient Name: Kannan Araiza    Date: 2023    : 1940  Insurance: Payor: MEDICARE / Plan: MEDICARE PART A AND B / Product Type: *No Product type* /      Patient  verified Yes     Visit #   Current / Total 2 16   Time   In / Out 1048 1148   Pain   In / Out 4 1   Subjective Functional Status/Changes: Complains of pain in Left knee     TREATMENT AREA =  Pain in left knee [M25.562]  Unsteadiness on feet [R26.81]    OBJECTIVE    Modalities Rationale:     decrease inflammation and decrease pain to improve patient's ability to progress to PLOF and address remaining functional goals. min [] Estim Unattended, type/location:                                      []  w/ice    []  w/heat    min [] Estim Attended, type/location:                                     []  w/US     []  w/ice    []  w/heat    []  TENS insruct      min []  Mechanical Traction: type/lbs                   []  pro   []  sup   []  int   []  cont    []  before manual    []  after manual    min []  Ultrasound, settings/location:      min []  Iontophoresis w/ dexamethasone, location:                                               []  take home patch       []  in clinic   10 min  unbill [x]  Ice     []  Heat    location/position:  B knees     min []  Paraffin,  details:     min []  Vasopneumatic Device, press/temp:     min []  [place] / Murali Hammed: If using vaso (only need to measure limb vaso being performed on)      pre-treatment girth :       post-treatment girth :       measured at (landmark location) :      min []  Other:     Skin assessment post-treatment (if applicable):    [x]  intact    []  redness- no adverse reaction                 []redness - adverse reaction:         Therapeutic Procedures:     Tx Min Billable or 1:1 Min (if diff from Tx Min) Procedure, Rationale, Specifics   [phone] [id number] Therapeutic Exercise (timed):  increase ROM, strength,

## 2023-08-21 ENCOUNTER — HOSPITAL ENCOUNTER (OUTPATIENT)
Facility: HOSPITAL | Age: 83
Setting detail: RECURRING SERIES
Discharge: HOME | End: 2023-08-24
Payer: MEDICARE

## 2023-08-21 PROCEDURE — 97110 THERAPEUTIC EXERCISES: CPT

## 2023-08-21 PROCEDURE — 97112 NEUROMUSCULAR REEDUCATION: CPT

## 2023-08-23 ENCOUNTER — HOSPITAL ENCOUNTER (OUTPATIENT)
Facility: HOSPITAL | Age: 83
Setting detail: RECURRING SERIES
Discharge: HOME | End: 2023-08-26
Payer: MEDICARE

## 2023-08-23 PROCEDURE — 97110 THERAPEUTIC EXERCISES: CPT

## 2023-08-23 PROCEDURE — 97112 NEUROMUSCULAR REEDUCATION: CPT

## 2023-08-23 NOTE — PROGRESS NOTES
PHYSICAL / OCCUPATIONAL THERAPY - DAILY TREATMENT NOTE (updated )    Patient Name: Citlaly Bravo    Date: 2023    : 1940  Insurance: Payor: MEDICARE / Plan: MEDICARE PART A AND B / Product Type: *No Product type* /      Patient  verified Yes     Visit #   Current / Total 4 16   Time   In / Out 1133 1228   Pain   In / Out 1 0   Subjective Functional Status/Changes: Patient complains of pain in Left knee , difficulty with walking due to  pain in left knee      TREATMENT AREA =  Pain in left knee [M25.562]  Unsteadiness on feet [R26.81]    OBJECTIVE    Modalities Rationale:     decrease inflammation, decrease pain, and increase tissue extensibility to improve patient's ability to progress to PLOF and address remaining functional goals. min [] Estim Unattended, type/location:                                      []  w/ice    []  w/heat    min [] Estim Attended, type/location:                                     []  w/US     []  w/ice    []  w/heat    []  TENS insruct      min []  Mechanical Traction: type/lbs                   []  pro   []  sup   []  int   []  cont    []  before manual    []  after manual    min []  Ultrasound, settings/location:     10 min  unbill [x]  Ice     []  Heat    location/position: B knee soreness / sitting    min []  Paraffin,  details:     min []  Vasopneumatic Device, press/temp:     min []  Starleen Crystal / Verlon Brightly: If using vaso (only need to measure limb vaso being performed on)      pre-treatment girth :       post-treatment girth :       measured at (landmark location) :      min []  Other:    Skin assessment post-treatment:  Intact      Therapeutic Procedures: Tx Min Billable or 1:1 Min (if diff from Tx Min) Procedure, Rationale, Specifics   30  31276 Therapeutic Exercise (timed):  increase ROM, strength, coordination, balance, and proprioception to improve patient's ability to progress to PLOF and address remaining functional goals.  (see flow sheet as

## 2023-08-23 NOTE — PROGRESS NOTES
PHYSICAL / OCCUPATIONAL THERAPY - DAILY TREATMENT NOTE (updated )    Patient Name: Yuly Villalobos    Date: 2023    : 1940  Insurance: Payor: MEDICARE / Plan: MEDICARE PART A AND B / Product Type: *No Product type* /      Patient  verified {YES/NO:}     Visit #   Current / Total *** ***   Time   In / Out *** ***   Pain   In / Out *** ***   Subjective Functional Status/Changes: ***     TREATMENT AREA =  Pain in left knee [M25.562]  Unsteadiness on feet [R26.81]    OBJECTIVE    {[employer] Modality QNRP:83010}     Therapeutic Procedures: Tx Min Billable or 1:1 Min (if diff from Tx Min) Procedure, Rationale, Specifics     {[employer] Ther Procedures:78852}     Details if applicable:         {[employer] Ther Procedures:85840}     Details if applicable:       {[employer] Ther Procedures:48230}     Details if applicable:       {[employer] Ther Procedures:99206}     Details if applicable:       {[employer] Ther Procedures:99326}     Details if applicable:       [facility] Totals Reminder: bill using total billable min of TIMED therapeutic procedures (example: do not include dry needle or estim unattended, both untimed codes, in totals to left)  8-22 min = 1 unit; 23-37 min = 2 units; 38-52 min = 3 units; 53-67 min = 4 units; 68-82 min = 5 units   Total Total     [x]  Patient Education billed concurrently with other procedures   [x] Review HEP    [] Progressed/Changed HEP, detail:    [] Other detail:       Objective Information/Functional Measures/Assessment    ***    Patient will continue to benefit from skilled PT / OT services to {[employer] [facility]:97575} to address functional deficits and attain remaining goals.     Progress toward goals / Updated goals:  []  See Progress Note/Recertification    ***    Next PN/ RC due ***  Auth due ***    PLAN  {YES/NO:}  Continue plan of care  []  Upgrade activities as tolerated  []  Discharge due to :  []  Other:    Nora Campo PT    2023    10:46

## 2023-08-25 ENCOUNTER — HOSPITAL ENCOUNTER (OUTPATIENT)
Facility: HOSPITAL | Age: 83
Setting detail: RECURRING SERIES
Discharge: HOME | End: 2023-08-28
Payer: MEDICARE

## 2023-08-25 PROCEDURE — 97112 NEUROMUSCULAR REEDUCATION: CPT

## 2023-08-25 PROCEDURE — 97110 THERAPEUTIC EXERCISES: CPT

## 2023-08-25 PROCEDURE — 97116 GAIT TRAINING THERAPY: CPT

## 2023-08-25 NOTE — PROGRESS NOTES
to address functional deficits and attain remaining goals. Progress toward goals / Updated goals:  []  See Progress Note/Recertification    1. patient will have established and be I with HEP to aid with independence and self management at discharge  EVAL HEP issued at evaluation  Current - pt reports that she is doing some HEP everyday , 8/25/23  2 patient will have pain 2-3/10 to aid with increase tolerance to ADLS and regular daily activities at home and in the community . EVAL 4  Current  left knee pain 0/10 , 8/25/23  3  patient will have FOTO improved to45 to show beginning improvement with ADLS and activities at home and in the community  EVAL 40     Long Term Goals: To be accomplished in 8 weeks  1 patient will have established and be I with HEP to aid with independence and self management at discharge  EVAL HEP issued at evaluation  2 patient will have pain 1-2/10 to aid with increase tolerance to ADLS and regular daily activities at home and in the community  EVAL 4  3 patient will have FOTO improved to projected gains of 50 to show significant improvement with ADLS and activities at home and in the community  EVAL 40  4 patient will have MMT Left LE hip F , knee F 4+,5 and knee E 5 to aid with improved ability to negotiate steps and stairs reciprocally  EVAL MMT Left hip F and knee F 4 and knee E 4+  and negotiates steps and stairs step to  Current -L hip flex 4+/5  L knee flex 4+. L knee ext 4+, 8/25/23  5 patient will have tolerance to walking /standing up to 20 minutes to tolerate her shopping  EVAL pain worsens with walking, C/O being unsteady on feet and not having good step clearance.                  Next PN/ RC due 8/4/33 Recert 83/1/20  Auth due BENIGNO                PLAN  Yes  Continue plan of care  []  Upgrade activities as tolerated  []  Discharge due to :  []  Other:    Nilson Strange, PT    8/25/2023    11:43 AM    Future Appointments   Date Time Provider [address] [place] [address]   8/28/2023 10:50 AM

## 2023-08-28 ENCOUNTER — HOSPITAL ENCOUNTER (OUTPATIENT)
Facility: HOSPITAL | Age: 83
Setting detail: RECURRING SERIES
Discharge: HOME | End: 2023-08-31
Payer: MEDICARE

## 2023-08-28 ENCOUNTER — TELEPHONE (OUTPATIENT)
Facility: HOSPITAL | Age: 83
End: 2023-08-28

## 2023-08-28 PROCEDURE — 97110 THERAPEUTIC EXERCISES: CPT

## 2023-08-28 PROCEDURE — 97112 NEUROMUSCULAR REEDUCATION: CPT

## 2023-08-28 NOTE — PROGRESS NOTES
Progress Note/Recertification    1. patient will have established and be I with HEP to aid with independence and self management at discharge  EVAL HEP issued at evaluation  Current :- pt reports that she is doing some HEP everyday , 8/28/23  2 patient will have pain 2-3/10 to aid with increase tolerance to ADLS and regular daily activities at home and in the community . EVAL 4  Current : left knee pain 1/10 , 8/28/23  3  patient will have FOTO improved to45 to show beginning improvement with ADLS and activities at home and in the community  EVAL 40     Long Term Goals: To be accomplished in 8 weeks  1 patient will have established and be I with HEP to aid with independence and self management at discharge  EVAL HEP issued at evaluation  2 patient will have pain 1-2/10 to aid with increase tolerance to ADLS and regular daily activities at home and in the community  EVAL 4  3 patient will have FOTO improved to projected gains of 50 to show significant improvement with ADLS and activities at home and in the community  EVAL 40  4 patient will have MMT Left LE hip F , knee F 4+,5 and knee E 5 to aid with improved ability to negotiate steps and stairs reciprocally  EVAL MMT Left hip F and knee F 4 and knee E 4+  and negotiates steps and stairs step to  Current -L hip flex 4+/5  L knee flex 4+. L knee ext 4+, 8/25/23  5 patient will have tolerance to walking /standing up to 20 minutes to tolerate her shopping  EVAL pain worsens with walking, C/O being unsteady on feet and not having good step clearance.         Next PN/ RC due 1/8/23 Recert 40/0/68  Auth due BENIGNO         PLAN    Yes  Continue plan of care  []  Upgrade activities as tolerated  []  Discharge due to :  []  Other:    Henry Soto PT    8/28/2023    10:57 AM    Future Appointments   Date Time Provider [address]   8/30/2023 10:50 AM [facility]   9/1/2023 10:50 AM JENNIFER Rhodes [facility]   9/5/2023 10:50 AM [facility]

## 2023-08-30 ENCOUNTER — OFFICE VISIT (OUTPATIENT)
Age: 83
End: 2023-08-30
Payer: MEDICARE

## 2023-08-30 ENCOUNTER — APPOINTMENT (OUTPATIENT)
Facility: HOSPITAL | Age: 83
End: 2023-08-30
Payer: MEDICARE

## 2023-08-30 DIAGNOSIS — M54.50 LUMBAR PAIN: ICD-10-CM

## 2023-08-30 DIAGNOSIS — M79.651 RIGHT THIGH PAIN: ICD-10-CM

## 2023-08-30 DIAGNOSIS — M79.604 RIGHT LEG PAIN: ICD-10-CM

## 2023-08-30 DIAGNOSIS — M17.12 UNILATERAL PRIMARY OSTEOARTHRITIS, LEFT KNEE: Primary | ICD-10-CM

## 2023-08-30 DIAGNOSIS — M25.561 RIGHT KNEE PAIN, UNSPECIFIED CHRONICITY: ICD-10-CM

## 2023-08-30 PROCEDURE — 99214 OFFICE O/P EST MOD 30 MIN: CPT | Performed by: PHYSICIAN ASSISTANT

## 2023-08-30 PROCEDURE — G8427 DOCREV CUR MEDS BY ELIG CLIN: HCPCS | Performed by: PHYSICIAN ASSISTANT

## 2023-08-30 PROCEDURE — 1123F ACP DISCUSS/DSCN MKR DOCD: CPT | Performed by: PHYSICIAN ASSISTANT

## 2023-08-30 PROCEDURE — 1090F PRES/ABSN URINE INCON ASSESS: CPT | Performed by: PHYSICIAN ASSISTANT

## 2023-08-30 PROCEDURE — G8400 PT W/DXA NO RESULTS DOC: HCPCS | Performed by: PHYSICIAN ASSISTANT

## 2023-08-30 PROCEDURE — G8417 CALC BMI ABV UP PARAM F/U: HCPCS | Performed by: PHYSICIAN ASSISTANT

## 2023-08-30 PROCEDURE — 1036F TOBACCO NON-USER: CPT | Performed by: PHYSICIAN ASSISTANT

## 2023-08-30 PROCEDURE — 72100 X-RAY EXAM L-S SPINE 2/3 VWS: CPT | Performed by: PHYSICIAN ASSISTANT

## 2023-09-01 ENCOUNTER — HOSPITAL ENCOUNTER (OUTPATIENT)
Facility: HOSPITAL | Age: 83
Setting detail: RECURRING SERIES
Discharge: HOME | End: 2023-09-04
Payer: MEDICARE

## 2023-09-01 PROCEDURE — 97112 NEUROMUSCULAR REEDUCATION: CPT

## 2023-09-01 PROCEDURE — 97110 THERAPEUTIC EXERCISES: CPT

## 2023-09-01 NOTE — PROGRESS NOTES
Modalities Rationale:     decrease edema, decrease inflammation, decrease pain, and increase tissue extensibility to improve patient's ability to progress to PLOF and address remaining functional goals. min [] Estim Unattended, type/location:                                      []  w/ice    []  w/heat    min [] Estim Attended, type/location:                                     []  w/US     []  w/ice    []  w/heat    []  TENS insruct      min []  Mechanical Traction: type/lbs                   []  pro   []  sup   []  int   []  cont    []  before manual    []  after manual    min []  Ultrasound, settings/location:     10 min  unbill [x]  Ice     []  Heat    location/position: B knee soreness / supine     min []  Paraffin,  details:     min []  Vasopneumatic Device, press/temp:     min []  Vanesa Blue / [place]: If using vaso (only need to measure limb vaso being performed on)      pre-treatment girth :       post-treatment girth :       measured at (landmark location) :      min []  Other:    Skin assessment post-treatment:   Intact  PHYSICAL / OCCUPATIONAL THERAPY - DAILY TREATMENT NOTE (updated )    Patient Name: Bre Hale    Date: 2023    : 1940  Insurance: Payor: MEDICARE / Plan: MEDICARE PART A AND B / Product Type: *No Product type* /      Patient  verified Yes     Visit #   Current / Total 7 16   Time   In / Out 1055 1144   Pain   In / Out 1 0   Subjective Functional Status/Changes: Patient complains of pain in Left knee , difficulty with walking      TREATMENT AREA =  Pain in left knee [M25.562]  Unsteadiness on feet [R26.81]    OBJECTIVE         Therapeutic Procedures: Tx Min Billable or 1:1 Min (if diff from Tx Min) Procedure, Rationale, Specifics   25   22222 Therapeutic Exercise (timed):  increase ROM, strength, coordination, balance, and proprioception to improve patient's ability to progress to PLOF and address remaining functional goals.  (see flow sheet as

## 2023-09-05 ENCOUNTER — HOSPITAL ENCOUNTER (OUTPATIENT)
Facility: HOSPITAL | Age: 83
Setting detail: RECURRING SERIES
Discharge: HOME | End: 2023-09-08
Payer: MEDICARE

## 2023-09-05 PROCEDURE — 97112 NEUROMUSCULAR REEDUCATION: CPT

## 2023-09-05 PROCEDURE — 97530 THERAPEUTIC ACTIVITIES: CPT

## 2023-09-05 PROCEDURE — 97110 THERAPEUTIC EXERCISES: CPT

## 2023-09-05 PROCEDURE — 97535 SELF CARE MNGMENT TRAINING: CPT

## 2023-09-05 NOTE — PROGRESS NOTES
PHYSICAL / OCCUPATIONAL THERAPY - DAILY TREATMENT NOTE (updated )    Patient Name: Kameron Arana    Date: 2023    : 1940  Insurance: Payor: MEDICARE / Plan: MEDICARE PART A AND B / Product Type: *No Product type* /      Patient  verified Yes     Visit #   Current / Total 8 16   Time   In / Out 1042 1135   Pain   In / Out 1 0   Subjective Functional Status/Changes: Patient complains of pain in Left knee , difficulty with walking      TREATMENT AREA =  Pain in left knee [M25.562]  Unsteadiness on feet [R26.81]    OBJECTIVE      Therapeutic Procedures: Tx Min Billable or 1:1 Min (if diff from Tx Min) Procedure, Rationale, Specifics   15   48255 Therapeutic Exercise (timed):  increase ROM, strength, coordination, balance, and proprioception to improve patient's ability to progress to PLOF and address remaining functional goals. (see flow sheet as applicable)      Details if applicable:       15   83423 Neuromuscular Re-Education (timed):  improve balance, coordination, kinesthetic sense, posture, core stability and proprioception to improve patient's ability to develop conscious control of individual muscles and awareness of position of extremities in order to progress to PLOF and address remaining functional goals. (see flow sheet as applicable)      Details if applicable:     15       55724 Therapeutic Activity (timed):  use of dynamic activities replicating functional movements to increase ROM, strength, coordination, balance, and proprioception in order to improve patient's ability to progress to PLOF and address remaining functional goals. (see flow sheet as applicable)    Details if applicable:     8     62258 Self Care/Home Management (timed):  improve patient knowledge and understanding of pain reducing techniques, activity modification, and diagnosis/prognosis  to improve patient's ability to progress to PLOF and address remaining functional goals.   (see flow sheet as applicable)

## 2023-09-05 NOTE — PROGRESS NOTES
[address] PHYSICAL THERAPY  [address] [facility], [address] [place] [address]  UW:[phone] ES:[phone]  PHYSICAL THERAPY PROGRESS NOTE  Patient Name: Alcon Ricardo : 1940   Treatment/Medical Diagnosis: Pain in left knee [M25.562]  Unsteadiness on feet [R26.81]   Referral Source: Mert Centeno PA-C     Date of Initial Visit: 23 Attended Visits: 8 Missed Visits: 0     SUMMARY OF TREATMENT  Pt participated in Therapeutic Exercise, Neuromuscular Re-Education,  Self Care/Home Management, Dynamic  Gait Training with Cane , Hot pack   CURRENT STATUS  Patient reports that she made 40 % overall improvement with PT services . Patient also reports that she  progressed to decrease in pain  during walking ADLS   and also community activities with improved strength and Balance . Patient has participated in 8 PT  Tx sessions with no Missed visits . Pt needs verbal cues for exercises for proper technique  for exercises mentioned in Therapy flow  sheet   Patient tolerated well with PT Tx  with decrease pain to left knee 0/10 from 1/10 after PT Tx today  . Patient's pain in Left knee  progressed to  2/10 with ADLs,activities at home  and in the community . pt toleated PT Tx  well with no pain and discomfort . Pt's  progressed to L hip flex 4+/5  L knee flex 4+. L knee ext 4+. Pt progressed to FOTO score of 44 from 40 . Patient doesnot have any swelling in her Left  knee. Patient made progress towards her goals  1. patient will have established and be I with HEP to aid with independence and self management at discharge  EVAL HEP issued at evaluation  Current :- pt reports that she is doing most of HEP everyday , 23  2 patient will have pain 2-3/10 to aid with increase tolerance to ADLS and regular daily activities at home and in the community .   EVAL 4  Current : left knee pain 2/10 , 23, Met   3  patient will have FOTO improved to 45 to show beginning improvement

## 2023-09-08 ENCOUNTER — HOSPITAL ENCOUNTER (OUTPATIENT)
Facility: HOSPITAL | Age: 83
Setting detail: RECURRING SERIES
Discharge: HOME | End: 2023-09-11
Payer: MEDICARE

## 2023-09-08 PROCEDURE — 97112 NEUROMUSCULAR REEDUCATION: CPT

## 2023-09-08 PROCEDURE — 97110 THERAPEUTIC EXERCISES: CPT

## 2023-09-08 NOTE — PROGRESS NOTES
improved ability to negotiate steps and stairs reciprocally  PN -L hip flex 4+/5  L knee flex 4+. L knee ext 4+, 9/5/23    5 patient will have tolerance to walking /standing up to 20 minutes to tolerate her shopping  EVAL pain worsens with walking, C/O being unsteady on feet and not having good step clearance.    PN  : patient reports that she tolerates to walking / standing for 15 minutes with knee pain 3/10, 9/5/23, not Met        Next RC due 10/2/23    PLAN  - Continue Plan of [address] [place] [address], PT    9/8/2023    10:55 AM    Future Appointments   Date Time Provider [address]   9/12/2023  6:45 PM [facility] 1 [facility] [place]   9/13/2023 10:50 AM Todd Horatio, PT [facility]   9/15/2023 10:50 AM Todd Horatio, PT [facility]   9/15/2023  1:00 PM Osmar Machado PA-C [facility]   9/18/2023 10:50 AM Todd Horatio, PT [facility]   9/22/2023 10:50 AM Todd Horatio, PT [facility]   9/25/2023 10:50 AM Crystal Clarissa, PTA [facility]   9/27/2023 10:50 AM Todd Horatio, PT [facility]   9/29/2023 10:50 AM Todd Horatio, PT [facility]   10/2/2023 10:50 AM Todd Horatio, PT [facility]   10/5/2023 10:50 AM Todd Horatio, PT [facility]   10/13/2023 10:10 AM Osmar Machado PA-C [facility]

## 2023-09-11 ENCOUNTER — APPOINTMENT (OUTPATIENT)
Facility: HOSPITAL | Age: 83
End: 2023-09-11
Payer: MEDICARE

## 2023-09-12 ENCOUNTER — HOSPITAL ENCOUNTER (OUTPATIENT)
Facility: HOSPITAL | Age: 83
Discharge: HOME | End: 2023-09-15
Payer: MEDICARE

## 2023-09-12 DIAGNOSIS — M54.50 LUMBAR PAIN: ICD-10-CM

## 2023-09-12 DIAGNOSIS — M79.604 RIGHT LEG PAIN: ICD-10-CM

## 2023-09-12 DIAGNOSIS — M79.651 RIGHT THIGH PAIN: ICD-10-CM

## 2023-09-12 PROCEDURE — 72148 MRI LUMBAR SPINE W/O DYE: CPT

## 2023-09-13 ENCOUNTER — APPOINTMENT (OUTPATIENT)
Facility: HOSPITAL | Age: 83
End: 2023-09-13
Payer: MEDICARE

## 2023-09-15 ENCOUNTER — APPOINTMENT (OUTPATIENT)
Facility: HOSPITAL | Age: 83
End: 2023-09-15
Payer: MEDICARE

## 2023-09-18 ENCOUNTER — HOSPITAL ENCOUNTER (OUTPATIENT)
Facility: HOSPITAL | Age: 83
Setting detail: RECURRING SERIES
End: 2023-09-18
Payer: MEDICARE

## 2023-09-21 ENCOUNTER — TELEPHONE (OUTPATIENT)
Facility: HOSPITAL | Age: 83
End: 2023-09-21

## 2023-09-21 ENCOUNTER — OFFICE VISIT (OUTPATIENT)
Age: 83
End: 2023-09-21

## 2023-09-21 VITALS — BODY MASS INDEX: 26.43 KG/M2 | WEIGHT: 140 LBS | HEIGHT: 61 IN

## 2023-09-21 DIAGNOSIS — M41.9 SEVERE SCOLIOSIS: Primary | ICD-10-CM

## 2023-09-21 DIAGNOSIS — M70.61 TROCHANTERIC BURSITIS OF RIGHT HIP: ICD-10-CM

## 2023-09-21 DIAGNOSIS — M81.0 AGE RELATED OSTEOPOROSIS, UNSPECIFIED PATHOLOGICAL FRACTURE PRESENCE: ICD-10-CM

## 2023-09-21 DIAGNOSIS — M25.561 RIGHT KNEE PAIN, UNSPECIFIED CHRONICITY: ICD-10-CM

## 2023-09-21 DIAGNOSIS — M79.651 RIGHT THIGH PAIN: ICD-10-CM

## 2023-09-21 DIAGNOSIS — M79.604 RIGHT LEG PAIN: ICD-10-CM

## 2023-09-21 DIAGNOSIS — M51.36 DDD (DEGENERATIVE DISC DISEASE), LUMBAR: ICD-10-CM

## 2023-09-21 NOTE — TELEPHONE ENCOUNTER
Patient cancelled appt. on 9/21/23 at 2:55pm due to the patient having a stress fracture in the right femur and her doctor said to discontinue all PT at this time, Patient would like to be discharged.

## 2023-09-22 ENCOUNTER — APPOINTMENT (OUTPATIENT)
Facility: HOSPITAL | Age: 83
End: 2023-09-22
Payer: MEDICARE

## 2023-09-25 ENCOUNTER — APPOINTMENT (OUTPATIENT)
Facility: HOSPITAL | Age: 83
End: 2023-09-25
Payer: MEDICARE

## 2023-09-26 ENCOUNTER — TELEPHONE (OUTPATIENT)
Age: 83
End: 2023-09-26

## 2023-09-26 NOTE — TELEPHONE ENCOUNTER
Patient states she has questions about the instructions she was given on her last visit, and is asking to speak to a nurse. Patient can be reached at [phone].

## 2023-09-27 ENCOUNTER — APPOINTMENT (OUTPATIENT)
Facility: HOSPITAL | Age: 83
End: 2023-09-27
Payer: MEDICARE

## 2023-09-29 ENCOUNTER — APPOINTMENT (OUTPATIENT)
Facility: HOSPITAL | Age: 83
End: 2023-09-29
Payer: MEDICARE

## 2023-09-30 ENCOUNTER — HOSPITAL ENCOUNTER (OUTPATIENT)
Facility: HOSPITAL | Age: 83
Discharge: HOME | End: 2023-10-03

## 2023-09-30 DIAGNOSIS — M79.651 RIGHT THIGH PAIN: ICD-10-CM

## 2023-09-30 DIAGNOSIS — M79.604 RIGHT LEG PAIN: ICD-10-CM

## 2023-10-02 ENCOUNTER — HOSPITAL ENCOUNTER (OUTPATIENT)
Facility: HOSPITAL | Age: 83
Discharge: HOME | End: 2023-10-05
Payer: MEDICARE

## 2023-10-02 PROCEDURE — 73718 MRI LOWER EXTREMITY W/O DYE: CPT

## 2023-10-03 ENCOUNTER — TELEPHONE (OUTPATIENT)
Age: 83
End: 2023-10-03

## 2023-10-03 NOTE — TELEPHONE ENCOUNTER
Patient is requesting a call back with results from her MRI done yesterday. She is aware her provider is not in until 10/4. Please contact her at [phone] as soon as possible.

## 2023-10-06 ENCOUNTER — OFFICE VISIT (OUTPATIENT)
Age: 83
End: 2023-10-06

## 2023-10-06 VITALS — BODY MASS INDEX: 26.45 KG/M2 | RESPIRATION RATE: 18 BRPM | HEIGHT: 61 IN

## 2023-10-06 DIAGNOSIS — M79.651 RIGHT THIGH PAIN: ICD-10-CM

## 2023-10-06 DIAGNOSIS — S72.8X1A OTHER FRACTURE OF RIGHT FEMUR, INITIAL ENCOUNTER FOR CLOSED FRACTURE (HCC): Primary | ICD-10-CM

## 2023-10-06 DIAGNOSIS — Z96.641 PRESENCE OF RIGHT ARTIFICIAL HIP JOINT: ICD-10-CM

## 2023-10-09 ENCOUNTER — TELEPHONE (OUTPATIENT)
Age: 83
End: 2023-10-09

## 2023-10-09 NOTE — TELEPHONE ENCOUNTER
Patient called regarding her wheelchair order to [facility].    She says her daughter took the form up to [facility] and they informed her that it was the incorrect form for Medicare and will be faxing the correct form over to us.

## 2023-10-11 NOTE — TELEPHONE ENCOUNTER
Advised patient's daughter that we will have JR sign the form in the morning and fax to [facility].

## 2023-10-11 NOTE — TELEPHONE ENCOUNTER
Patient daughter called and is requesting a call back about previous message        Kelby Romero   [phone]

## 2023-10-11 NOTE — TELEPHONE ENCOUNTER
Patients daughter called to follow up on form faxed to us from [facility], it was received at [facility], requested it be sent to [facility] for completion asap. Patient has a fracture and family is trying to get her in to short term rehab.

## 2023-10-13 NOTE — TELEPHONE ENCOUNTER
Patient daughter called and states that [facility] still have not received the order that was supposed to be faxed over      [facility] Fax  [phone]

## 2023-10-27 ENCOUNTER — OFFICE VISIT (OUTPATIENT)
Age: 83
End: 2023-10-27

## 2023-10-27 VITALS — BODY MASS INDEX: 26.43 KG/M2 | WEIGHT: 140 LBS | HEIGHT: 61 IN

## 2023-10-27 DIAGNOSIS — S72.091D OTHER FRACTURE OF HEAD AND NECK OF RIGHT FEMUR, SUBSEQUENT ENCOUNTER FOR CLOSED FRACTURE WITH ROUTINE HEALING: ICD-10-CM

## 2023-10-27 DIAGNOSIS — Z96.641 PRESENCE OF RIGHT ARTIFICIAL HIP JOINT: Primary | ICD-10-CM

## 2023-10-27 DIAGNOSIS — M79.651 RIGHT THIGH PAIN: ICD-10-CM

## 2023-10-31 ENCOUNTER — TELEPHONE (OUTPATIENT)
Age: 83
End: 2023-10-31

## 2023-10-31 NOTE — TELEPHONE ENCOUNTER
Patient called to advise Zak Cooper from [facility], hasn't received the last office notes in order to fill patients prescription for her wheelchair, and she hasn't hear anything about the bone stimulator.

## 2023-11-03 ENCOUNTER — TELEPHONE (OUTPATIENT)
Age: 83
End: 2023-11-03

## 2023-11-03 NOTE — TELEPHONE ENCOUNTER
11/3/23 [facility] was called. Spoke with Ms. Delphine Waller. She is faxing the Medicare protocol for wheel chair coverage to our office. Waiting on fax.

## 2023-11-03 NOTE — TELEPHONE ENCOUNTER
11/3/23 1654 patient was called. I just received medicare guidelines for her wheelchair. And I have contacted the rep for the bone stimulator. I let the patient know that I will be working on this paperwork on Monday to get her what she needs.

## 2023-11-03 NOTE — TELEPHONE ENCOUNTER
Patient's daughter (Naomy Humphreys) called and wanted to give a direct point of contact for [facility].    She says [facility] received the fax but it didn't comply with what Medicare is requesting for the wheelchair.    She says that she's tired of being the middle man and just needs someone from our office to contact Delphine Waller at [facility].    Delphine Waller   Ph: [phone]  Ext: [phone]

## 2023-11-09 NOTE — TELEPHONE ENCOUNTER
Patient daughter Naomy Humphreys called and is requesting an update and is requesting a call back.

## 2023-11-10 ENCOUNTER — HOSPITAL ENCOUNTER (OUTPATIENT)
Facility: HOSPITAL | Age: 83
End: 2023-11-10
Payer: MEDICARE

## 2023-11-10 DIAGNOSIS — M79.651 RIGHT THIGH PAIN: ICD-10-CM

## 2023-11-10 DIAGNOSIS — S72.091D OTHER FRACTURE OF HEAD AND NECK OF RIGHT FEMUR, SUBSEQUENT ENCOUNTER FOR CLOSED FRACTURE WITH ROUTINE HEALING: ICD-10-CM

## 2023-11-10 PROCEDURE — 73700 CT LOWER EXTREMITY W/O DYE: CPT

## 2023-11-15 ENCOUNTER — OFFICE VISIT (OUTPATIENT)
Age: 83
End: 2023-11-15
Payer: MEDICARE

## 2023-11-15 VITALS — WEIGHT: 140 LBS | TEMPERATURE: 97.5 F | BODY MASS INDEX: 26.43 KG/M2 | HEIGHT: 61 IN

## 2023-11-15 DIAGNOSIS — Z96.641 PRESENCE OF RIGHT ARTIFICIAL HIP JOINT: Primary | ICD-10-CM

## 2023-11-15 DIAGNOSIS — S72.091D OTHER FRACTURE OF HEAD AND NECK OF RIGHT FEMUR, SUBSEQUENT ENCOUNTER FOR CLOSED FRACTURE WITH ROUTINE HEALING: ICD-10-CM

## 2023-11-15 PROCEDURE — G8417 CALC BMI ABV UP PARAM F/U: HCPCS | Performed by: PHYSICIAN ASSISTANT

## 2023-11-15 PROCEDURE — 1090F PRES/ABSN URINE INCON ASSESS: CPT | Performed by: PHYSICIAN ASSISTANT

## 2023-11-15 PROCEDURE — G8428 CUR MEDS NOT DOCUMENT: HCPCS | Performed by: PHYSICIAN ASSISTANT

## 2023-11-15 PROCEDURE — 1123F ACP DISCUSS/DSCN MKR DOCD: CPT | Performed by: PHYSICIAN ASSISTANT

## 2023-11-15 PROCEDURE — 99213 OFFICE O/P EST LOW 20 MIN: CPT | Performed by: PHYSICIAN ASSISTANT

## 2023-11-15 PROCEDURE — G8484 FLU IMMUNIZE NO ADMIN: HCPCS | Performed by: PHYSICIAN ASSISTANT

## 2023-11-15 PROCEDURE — G8400 PT W/DXA NO RESULTS DOC: HCPCS | Performed by: PHYSICIAN ASSISTANT

## 2023-11-15 PROCEDURE — 1036F TOBACCO NON-USER: CPT | Performed by: PHYSICIAN ASSISTANT

## 2023-11-22 ENCOUNTER — TELEPHONE (OUTPATIENT)
Age: 83
End: 2023-11-22

## 2023-12-06 ENCOUNTER — TELEPHONE (OUTPATIENT)
Age: 83
End: 2023-12-06

## 2023-12-06 NOTE — TELEPHONE ENCOUNTER
Patient is there for physical therapy and her blood pressure was taken 10 min's  ago and it was 170/68

## 2023-12-13 ENCOUNTER — OFFICE VISIT (OUTPATIENT)
Age: 83
End: 2023-12-13
Payer: MEDICARE

## 2023-12-13 DIAGNOSIS — M85.851 OSTEOPENIA OF NECK OF RIGHT FEMUR: ICD-10-CM

## 2023-12-13 DIAGNOSIS — Z96.641 PRESENCE OF RIGHT ARTIFICIAL HIP JOINT: Primary | ICD-10-CM

## 2023-12-13 DIAGNOSIS — S72.091D OTHER FRACTURE OF HEAD AND NECK OF RIGHT FEMUR, SUBSEQUENT ENCOUNTER FOR CLOSED FRACTURE WITH ROUTINE HEALING: ICD-10-CM

## 2023-12-13 PROCEDURE — G8417 CALC BMI ABV UP PARAM F/U: HCPCS | Performed by: PHYSICIAN ASSISTANT

## 2023-12-13 PROCEDURE — 99214 OFFICE O/P EST MOD 30 MIN: CPT | Performed by: PHYSICIAN ASSISTANT

## 2023-12-13 PROCEDURE — 1036F TOBACCO NON-USER: CPT | Performed by: PHYSICIAN ASSISTANT

## 2023-12-13 PROCEDURE — G8427 DOCREV CUR MEDS BY ELIG CLIN: HCPCS | Performed by: PHYSICIAN ASSISTANT

## 2023-12-13 PROCEDURE — G8400 PT W/DXA NO RESULTS DOC: HCPCS | Performed by: PHYSICIAN ASSISTANT

## 2023-12-13 PROCEDURE — 1090F PRES/ABSN URINE INCON ASSESS: CPT | Performed by: PHYSICIAN ASSISTANT

## 2023-12-13 PROCEDURE — G8484 FLU IMMUNIZE NO ADMIN: HCPCS | Performed by: PHYSICIAN ASSISTANT

## 2023-12-13 PROCEDURE — 1123F ACP DISCUSS/DSCN MKR DOCD: CPT | Performed by: PHYSICIAN ASSISTANT

## 2023-12-13 PROCEDURE — 73552 X-RAY EXAM OF FEMUR 2/>: CPT | Performed by: PHYSICIAN ASSISTANT

## 2023-12-13 NOTE — PROGRESS NOTES
Allergies   Allergen Reactions    Nitrofurazone Hives and Other (See Comments)     Intolerance    Tobramycin Sulfate Other (See Comments)     Intolerance, extreme redness    Erythromycin Itching and Rash       inflammation       Social History     Socioeconomic History    Marital status:      Spouse name: Not on file    Number of children: Not on file    Years of education: Not on file    Highest education level: Not on file   Occupational History    Not on file   Tobacco Use    Smoking status: Never    Smokeless tobacco: Never   Substance and Sexual Activity    Alcohol use: No    Drug use: No    Sexual activity: Not on file   Other Topics Concern    Not on file   Social History Narrative    Not on file     Social Determinants of Health     Financial Resource Strain: Not on file   Food Insecurity: Not on file   Transportation Needs: Not on file   Physical Activity: Not on file   Stress: Not on file   Social Connections: Not on file   Intimate Partner Violence: Not on file   Housing Stability: Not on file       Past Surgical History:   Procedure Laterality Date    CATARACT REMOVAL Bilateral     [address] ([facility])  09/2016    total    OTHER SURGICAL HISTORY  01/2019    basal skin ca removed     OTHER SURGICAL HISTORY  2005, 2015    skin ca removed     OTHER SURGICAL HISTORY  09/2016    bladder support    TONSILLECTOMY  1946    TOTAL KNEE ARTHROPLASTY Right 04/27/2015           Patient seen evaluated today for her right lower extremity. Does have a distal diaphyseal stress fracture of the femur. She has been nonweightbearing. We have discussed surgery and they are not interested in surgical intervention. She does have a bone stimulator and has been seen by endocrinology. She does continue on her bone strengthening medications. She has been with protected weightbearing doing toe-touch for the past month or so. She has no increasing pain.   She has had no

## 2024-01-02 ENCOUNTER — HOSPITAL ENCOUNTER (OUTPATIENT)
Facility: HOSPITAL | Age: 84
Discharge: HOME | End: 2024-01-05
Payer: MEDICARE

## 2024-01-02 DIAGNOSIS — S72.091D OTHER FRACTURE OF HEAD AND NECK OF RIGHT FEMUR, SUBSEQUENT ENCOUNTER FOR CLOSED FRACTURE WITH ROUTINE HEALING: ICD-10-CM

## 2024-01-02 PROCEDURE — 73718 MRI LOWER EXTREMITY W/O DYE: CPT

## 2024-01-04 ENCOUNTER — TELEPHONE (OUTPATIENT)
Age: 84
End: 2024-01-04

## 2024-01-04 NOTE — TELEPHONE ENCOUNTER
Patient: Juanpablo Jackson   #[id number]    Patient of THIEN Beltre    A Physical Therapist, Ms. العلي, called and left a VM that the pt. was at PT and they took her blood pressure , 190/79 and the second time 183/82. They instructed her to go to the ER. They left a message  to let JR Beltre know about this information because she is his patient.

## 2024-01-05 ENCOUNTER — OFFICE VISIT (OUTPATIENT)
Age: 84
End: 2024-01-05
Payer: MEDICARE

## 2024-01-05 DIAGNOSIS — S72.091D OTHER FRACTURE OF HEAD AND NECK OF RIGHT FEMUR, SUBSEQUENT ENCOUNTER FOR CLOSED FRACTURE WITH ROUTINE HEALING: ICD-10-CM

## 2024-01-05 DIAGNOSIS — M25.561 ACUTE BILATERAL KNEE PAIN: ICD-10-CM

## 2024-01-05 DIAGNOSIS — Z96.641 PRESENCE OF RIGHT ARTIFICIAL HIP JOINT: Primary | ICD-10-CM

## 2024-01-05 DIAGNOSIS — M25.562 ACUTE BILATERAL KNEE PAIN: ICD-10-CM

## 2024-01-05 PROCEDURE — 1123F ACP DISCUSS/DSCN MKR DOCD: CPT | Performed by: PHYSICIAN ASSISTANT

## 2024-01-05 PROCEDURE — G8484 FLU IMMUNIZE NO ADMIN: HCPCS | Performed by: PHYSICIAN ASSISTANT

## 2024-01-05 PROCEDURE — G8417 CALC BMI ABV UP PARAM F/U: HCPCS | Performed by: PHYSICIAN ASSISTANT

## 2024-01-05 PROCEDURE — 99213 OFFICE O/P EST LOW 20 MIN: CPT | Performed by: PHYSICIAN ASSISTANT

## 2024-01-05 PROCEDURE — G8427 DOCREV CUR MEDS BY ELIG CLIN: HCPCS | Performed by: PHYSICIAN ASSISTANT

## 2024-01-05 PROCEDURE — G8400 PT W/DXA NO RESULTS DOC: HCPCS | Performed by: PHYSICIAN ASSISTANT

## 2024-01-05 PROCEDURE — 73552 X-RAY EXAM OF FEMUR 2/>: CPT | Performed by: PHYSICIAN ASSISTANT

## 2024-01-05 PROCEDURE — 1090F PRES/ABSN URINE INCON ASSESS: CPT | Performed by: PHYSICIAN ASSISTANT

## 2024-01-05 PROCEDURE — 1036F TOBACCO NON-USER: CPT | Performed by: PHYSICIAN ASSISTANT

## 2024-01-08 ENCOUNTER — TELEPHONE (OUTPATIENT)
Age: 84
End: 2024-01-08

## 2024-01-08 NOTE — TELEPHONE ENCOUNTER
Patient is requesting a call back states she is in an assisted living facility and would like to know if PT can come out to her         Please call and advise patient at   [phone]

## 2024-01-09 ENCOUNTER — HOME HEALTH ADMISSION (OUTPATIENT)
Age: 84
End: 2024-01-09

## 2024-01-09 DIAGNOSIS — S72.091D OTHER FRACTURE OF HEAD AND NECK OF RIGHT FEMUR, SUBSEQUENT ENCOUNTER FOR CLOSED FRACTURE WITH ROUTINE HEALING: ICD-10-CM

## 2024-01-09 DIAGNOSIS — Z96.641 PRESENCE OF RIGHT ARTIFICIAL HIP JOINT: Primary | ICD-10-CM

## 2024-01-19 ENCOUNTER — TELEPHONE (OUTPATIENT)
Age: 84
End: 2024-01-19

## 2024-01-19 NOTE — TELEPHONE ENCOUNTER
Patient called in and stated that she was told to call back in if she hasn't heard from Home Health.  She states no one has called her.    Patient is requesting a call back at [phone].

## 2024-01-19 NOTE — TELEPHONE ENCOUNTER
Patient is asking for her PT order to be faxed to the [facility] Attn: Esther.    Fax # [phone]

## 2024-01-29 ENCOUNTER — TELEPHONE (OUTPATIENT)
Age: 84
End: 2024-01-29

## 2024-01-29 NOTE — TELEPHONE ENCOUNTER
Leydi from [facility] called requesting the most recent office notes.    Leydi's can be reached at [phone] and the Fax is [phone].

## 2024-02-09 ENCOUNTER — OFFICE VISIT (OUTPATIENT)
Age: 84
End: 2024-02-09
Payer: MEDICARE

## 2024-02-09 ENCOUNTER — TELEPHONE (OUTPATIENT)
Age: 84
End: 2024-02-09

## 2024-02-09 DIAGNOSIS — S72.091D OTHER FRACTURE OF HEAD AND NECK OF RIGHT FEMUR, SUBSEQUENT ENCOUNTER FOR CLOSED FRACTURE WITH ROUTINE HEALING: Primary | ICD-10-CM

## 2024-02-09 DIAGNOSIS — Z96.641 PRESENCE OF RIGHT ARTIFICIAL HIP JOINT: ICD-10-CM

## 2024-02-09 PROCEDURE — G8484 FLU IMMUNIZE NO ADMIN: HCPCS | Performed by: PHYSICIAN ASSISTANT

## 2024-02-09 PROCEDURE — 99213 OFFICE O/P EST LOW 20 MIN: CPT | Performed by: PHYSICIAN ASSISTANT

## 2024-02-09 PROCEDURE — G8427 DOCREV CUR MEDS BY ELIG CLIN: HCPCS | Performed by: PHYSICIAN ASSISTANT

## 2024-02-09 PROCEDURE — 1090F PRES/ABSN URINE INCON ASSESS: CPT | Performed by: PHYSICIAN ASSISTANT

## 2024-02-09 PROCEDURE — 1036F TOBACCO NON-USER: CPT | Performed by: PHYSICIAN ASSISTANT

## 2024-02-09 PROCEDURE — 1123F ACP DISCUSS/DSCN MKR DOCD: CPT | Performed by: PHYSICIAN ASSISTANT

## 2024-02-09 PROCEDURE — 72170 X-RAY EXAM OF PELVIS: CPT | Performed by: PHYSICIAN ASSISTANT

## 2024-02-09 PROCEDURE — G8400 PT W/DXA NO RESULTS DOC: HCPCS | Performed by: PHYSICIAN ASSISTANT

## 2024-02-09 PROCEDURE — 73552 X-RAY EXAM OF FEMUR 2/>: CPT | Performed by: PHYSICIAN ASSISTANT

## 2024-02-09 PROCEDURE — G8417 CALC BMI ABV UP PARAM F/U: HCPCS | Performed by: PHYSICIAN ASSISTANT

## 2024-02-09 NOTE — PROGRESS NOTES
Patient: Juanpablo Jackson                MRN: [id number]       SSN: [id number]  YOB: 1940        AGE: 83 y.o.        SEX: female  There is no height or weight on file to calculate BMI.    PCP: Matteo Bonilla MD  02/09/24      This office note has been dictated.      REVIEW OF SYSTEMS:  Constitutional: Negative for fever, chills, weight loss and malaise/fatigue.   HENT: Negative.    Eyes: Negative.    Respiratory: Negative.   Cardiovascular: Negative.   Gastrointestinal: No bowel incontinence or constipation.  Genitourinary: No bladder incontinence or saddle anesthesia.  Skin: Negative.   Neurological: Negative.    Endo/Heme/Allergies: Negative.    Psychiatric/Behavioral: Negative.  Musculoskeletal: As per HPI above.     Past Medical History:   Diagnosis Date    Constipation     Hypercholesteremia     Hypertension     no meds now    Microscopic hematuria     MRSA (methicillin resistant staph aureus) culture positive 06/2018    On abdominal wall- tx with Vibramycin  (care everywhere)    Stroke (HCC) not sure when    blurred vision, resolved (taking plavix)     MOHSEN (stress urinary incontinence, female)     UTI (urinary tract infection)          Current Outpatient Medications:     diclofenac sodium (VOLTAREN) 1 % GEL, Apply 4 g topically 4 times daily, Disp: 500 g, Rfl: 2    acetaminophen (TYLENOL) 325 MG tablet, Take 2 tablets by mouth every 6 hours as needed, Disp: , Rfl:     alendronate (FOSAMAX) 70 MG tablet, TAKE 1 TABLET EVERY 7 DAYS, Disp: , Rfl:     amLODIPine (NORVASC) 5 MG tablet, Take 1 tablet by mouth daily, Disp: , Rfl:     clopidogrel (PLAVIX) 75 MG tablet, Take 1 tablet by mouth daily, Disp: , Rfl:     polyethylene glycol (GLYCOLAX) 17 GM/SCOOP powder, Take 17 g by mouth daily as needed, Disp: , Rfl:     simvastatin (ZOCOR) 40 MG tablet, Take 1 tablet by mouth, Disp: , Rfl:     SODIUM FLUORIDE, DENTAL GEL, 1.1 % GEL, BRUSH ON PASTE AS DIRECTED EVERY DAY, Disp: , Rfl:

## 2024-02-09 NOTE — TELEPHONE ENCOUNTER
Patient is asking for her PT order to be faxed to [facility].    [facility] # [phone]    Patient can be reached at [phone] or [phone].

## 2024-02-12 ENCOUNTER — TELEPHONE (OUTPATIENT)
Age: 84
End: 2024-02-12

## 2024-02-12 NOTE — TELEPHONE ENCOUNTER
Ambrosia from [facility] called to give vitals and to let MARGARITA Beltre know that the patient is emotionally stressed today and that may be the reason why her blood pressure is up      Blood Pressure 168/69  Temp 99  Pulse 73  Respiration 18      Ambrosia from [facility]   [phone]

## 2024-02-19 ENCOUNTER — TELEPHONE (OUTPATIENT)
Age: 84
End: 2024-02-19

## 2024-02-19 NOTE — TELEPHONE ENCOUNTER
Ambrosia from [facility] advised patient's Blood Pressure is 180/82 manually- \"she stated patient had death in her family and is having a hard time dealing w/it\".

## 2024-03-13 ENCOUNTER — OFFICE VISIT (OUTPATIENT)
Age: 84
End: 2024-03-13
Payer: MEDICARE

## 2024-03-13 VITALS — WEIGHT: 149 LBS | BODY MASS INDEX: 30.04 KG/M2 | HEIGHT: 59 IN

## 2024-03-13 DIAGNOSIS — S72.091D OTHER FRACTURE OF HEAD AND NECK OF RIGHT FEMUR, SUBSEQUENT ENCOUNTER FOR CLOSED FRACTURE WITH ROUTINE HEALING: ICD-10-CM

## 2024-03-13 PROCEDURE — 73552 X-RAY EXAM OF FEMUR 2/>: CPT | Performed by: PHYSICIAN ASSISTANT

## 2024-03-13 PROCEDURE — G8417 CALC BMI ABV UP PARAM F/U: HCPCS | Performed by: PHYSICIAN ASSISTANT

## 2024-03-13 PROCEDURE — G8427 DOCREV CUR MEDS BY ELIG CLIN: HCPCS | Performed by: PHYSICIAN ASSISTANT

## 2024-03-13 PROCEDURE — 1123F ACP DISCUSS/DSCN MKR DOCD: CPT | Performed by: PHYSICIAN ASSISTANT

## 2024-03-13 PROCEDURE — 1036F TOBACCO NON-USER: CPT | Performed by: PHYSICIAN ASSISTANT

## 2024-03-13 PROCEDURE — 99214 OFFICE O/P EST MOD 30 MIN: CPT | Performed by: PHYSICIAN ASSISTANT

## 2024-03-13 PROCEDURE — G8400 PT W/DXA NO RESULTS DOC: HCPCS | Performed by: PHYSICIAN ASSISTANT

## 2024-03-13 PROCEDURE — 1090F PRES/ABSN URINE INCON ASSESS: CPT | Performed by: PHYSICIAN ASSISTANT

## 2024-03-13 PROCEDURE — G8484 FLU IMMUNIZE NO ADMIN: HCPCS | Performed by: PHYSICIAN ASSISTANT

## 2024-03-13 NOTE — PROGRESS NOTES
Patient: Juanpablo Jackson                MRN: [id number]       SSN: [id number]  YOB: 1940        AGE: 83 y.o.        SEX: female  Body mass index is 30.09 kg/m².    PCP: Matteo Bonilla MD  03/13/24      This office note has been dictated.      REVIEW OF SYSTEMS:  Constitutional: Negative for fever, chills, weight loss and malaise/fatigue.   HENT: Negative.    Eyes: Negative.    Respiratory: Negative.   Cardiovascular: Negative.   Gastrointestinal: No bowel incontinence or constipation.  Genitourinary: No bladder incontinence or saddle anesthesia.  Skin: Negative.   Neurological: Negative.    Endo/Heme/Allergies: Negative.    Psychiatric/Behavioral: Negative.  Musculoskeletal: As per HPI above.     Past Medical History:   Diagnosis Date    Constipation     Hypercholesteremia     Hypertension     no meds now    Microscopic hematuria     MRSA (methicillin resistant staph aureus) culture positive 06/2018    On abdominal wall- tx with Vibramycin  (care everywhere)    Stroke (HCC) not sure when    blurred vision, resolved (taking plavix)     MOHSEN (stress urinary incontinence, female)     UTI (urinary tract infection)          Current Outpatient Medications:     diclofenac sodium (VOLTAREN) 1 % GEL, Apply 4 g topically 4 times daily, Disp: 500 g, Rfl: 2    acetaminophen (TYLENOL) 325 MG tablet, Take 2 tablets by mouth every 6 hours as needed, Disp: , Rfl:     alendronate (FOSAMAX) 70 MG tablet, TAKE 1 TABLET EVERY 7 DAYS, Disp: , Rfl:     amLODIPine (NORVASC) 5 MG tablet, Take 1 tablet by mouth daily, Disp: , Rfl:     clopidogrel (PLAVIX) 75 MG tablet, Take 1 tablet by mouth daily, Disp: , Rfl:     polyethylene glycol (GLYCOLAX) 17 GM/SCOOP powder, Take 17 g by mouth daily as needed, Disp: , Rfl:     simvastatin (ZOCOR) 40 MG tablet, Take 1 tablet by mouth, Disp: , Rfl:     SODIUM FLUORIDE, DENTAL GEL, 1.1 % GEL, BRUSH ON PASTE AS DIRECTED EVERY DAY, Disp: , Rfl:     Allergies   Allergen

## 2024-04-23 ENCOUNTER — HOSPITAL ENCOUNTER (OUTPATIENT)
Facility: HOSPITAL | Age: 84
Discharge: HOME | End: 2024-04-26
Attending: NURSE PRACTITIONER
Payer: MEDICARE

## 2024-04-23 VITALS — BODY MASS INDEX: 31.14 KG/M2 | HEIGHT: 58 IN

## 2024-04-23 DIAGNOSIS — Z12.31 VISIT FOR SCREENING MAMMOGRAM: ICD-10-CM

## 2024-04-23 PROCEDURE — 77063 BREAST TOMOSYNTHESIS BI: CPT

## 2024-05-19 ENCOUNTER — APPOINTMENT (OUTPATIENT)
Facility: HOSPITAL | Age: 84
DRG: 470 | End: 2024-05-19
Payer: MEDICARE

## 2024-05-19 ENCOUNTER — HOSPITAL ENCOUNTER (INPATIENT)
Facility: HOSPITAL | Age: 84
LOS: 10 days | Discharge: NURSING, REHAB OR LONG-TERM CARE | DRG: 470 | End: 2024-05-29
Attending: STUDENT IN AN ORGANIZED HEALTH CARE EDUCATION/TRAINING PROGRAM | Admitting: STUDENT IN AN ORGANIZED HEALTH CARE EDUCATION/TRAINING PROGRAM
Payer: MEDICARE

## 2024-05-19 DIAGNOSIS — Z96.641 STATUS POST RIGHT HIP REPLACEMENT: ICD-10-CM

## 2024-05-19 DIAGNOSIS — S72.301A CLOSED FRACTURE OF SHAFT OF RIGHT FEMUR, UNSPECIFIED FRACTURE MORPHOLOGY, INITIAL ENCOUNTER (HCC): Primary | ICD-10-CM

## 2024-05-19 PROBLEM — M80.052P: Status: ACTIVE | Noted: 2024-05-19

## 2024-05-19 PROBLEM — S72.351A CLOSED DISPLACED COMMINUTED FRACTURE OF SHAFT OF RIGHT FEMUR, INITIAL ENCOUNTER (HCC): Status: ACTIVE | Noted: 2024-05-19

## 2024-05-19 LAB
ALBUMIN SERPL-MCNC: 3.6 G/DL (ref 3.4–5)
ALBUMIN/GLOB SERPL: 1.2 (ref 0.8–1.7)
ALP SERPL-CCNC: 57 U/L (ref 45–117)
ALT SERPL-CCNC: 34 U/L (ref 13–56)
ANION GAP SERPL CALC-SCNC: 5 MMOL/L (ref 3–18)
AST SERPL-CCNC: 25 U/L (ref 10–38)
BASOPHILS # BLD: 0.1 K/UL (ref 0–0.1)
BASOPHILS NFR BLD: 1 % (ref 0–2)
BILIRUB SERPL-MCNC: 0.5 MG/DL (ref 0.2–1)
BUN SERPL-MCNC: 16 MG/DL (ref 7–18)
BUN/CREAT SERPL: 24 (ref 12–20)
CALCIUM SERPL-MCNC: 8.9 MG/DL (ref 8.5–10.1)
CHLORIDE SERPL-SCNC: 107 MMOL/L (ref 100–111)
CO2 SERPL-SCNC: 26 MMOL/L (ref 21–32)
CREAT SERPL-MCNC: 0.68 MG/DL (ref 0.6–1.3)
DIFFERENTIAL METHOD BLD: ABNORMAL
EKG ATRIAL RATE: 66 BPM
EKG DIAGNOSIS: NORMAL
EKG P AXIS: 65 DEGREES
EKG P-R INTERVAL: 166 MS
EKG Q-T INTERVAL: 426 MS
EKG QRS DURATION: 78 MS
EKG QTC CALCULATION (BAZETT): 446 MS
EKG R AXIS: -31 DEGREES
EKG T AXIS: 39 DEGREES
EKG VENTRICULAR RATE: 66 BPM
EOSINOPHIL # BLD: 0.1 K/UL (ref 0–0.4)
EOSINOPHIL NFR BLD: 1 % (ref 0–5)
ERYTHROCYTE [DISTWIDTH] IN BLOOD BY AUTOMATED COUNT: 13.3 % (ref 11.6–14.5)
GLOBULIN SER CALC-MCNC: 3 G/DL (ref 2–4)
GLUCOSE SERPL-MCNC: 126 MG/DL (ref 74–99)
HCT VFR BLD AUTO: 37.6 % (ref 35–45)
HGB BLD-MCNC: 12.6 G/DL (ref 12–16)
IMM GRANULOCYTES # BLD AUTO: 0 K/UL (ref 0–0.04)
IMM GRANULOCYTES NFR BLD AUTO: 0 % (ref 0–0.5)
INR PPP: 1 (ref 0.9–1.1)
LYMPHOCYTES # BLD: 3.7 K/UL (ref 0.9–3.6)
LYMPHOCYTES NFR BLD: 37 % (ref 21–52)
MCH RBC QN AUTO: 30.7 PG (ref 24–34)
MCHC RBC AUTO-ENTMCNC: 33.5 G/DL (ref 31–37)
MCV RBC AUTO: 91.5 FL (ref 78–100)
MONOCYTES # BLD: 0.5 K/UL (ref 0.05–1.2)
MONOCYTES NFR BLD: 5 % (ref 3–10)
NEUTS SEG # BLD: 5.8 K/UL (ref 1.8–8)
NEUTS SEG NFR BLD: 56 % (ref 40–73)
NRBC # BLD: 0 K/UL (ref 0–0.01)
NRBC BLD-RTO: 0 PER 100 WBC
PLATELET # BLD AUTO: 333 K/UL (ref 135–420)
PMV BLD AUTO: 9.9 FL (ref 9.2–11.8)
POTASSIUM SERPL-SCNC: 3.8 MMOL/L (ref 3.5–5.5)
PROT SERPL-MCNC: 6.6 G/DL (ref 6.4–8.2)
PROTHROMBIN TIME: 13.3 SEC (ref 11.9–14.7)
RBC # BLD AUTO: 4.11 M/UL (ref 4.2–5.3)
SODIUM SERPL-SCNC: 138 MMOL/L (ref 136–145)
WBC # BLD AUTO: 10.2 K/UL (ref 4.6–13.2)

## 2024-05-19 PROCEDURE — 99223 1ST HOSP IP/OBS HIGH 75: CPT | Performed by: STUDENT IN AN ORGANIZED HEALTH CARE EDUCATION/TRAINING PROGRAM

## 2024-05-19 PROCEDURE — 94761 N-INVAS EAR/PLS OXIMETRY MLT: CPT

## 2024-05-19 PROCEDURE — 80053 COMPREHEN METABOLIC PANEL: CPT

## 2024-05-19 PROCEDURE — 73552 X-RAY EXAM OF FEMUR 2/>: CPT

## 2024-05-19 PROCEDURE — 86901 BLOOD TYPING SEROLOGIC RH(D): CPT

## 2024-05-19 PROCEDURE — 86923 COMPATIBILITY TEST ELECTRIC: CPT

## 2024-05-19 PROCEDURE — 2580000003 HC RX 258: Performed by: STUDENT IN AN ORGANIZED HEALTH CARE EDUCATION/TRAINING PROGRAM

## 2024-05-19 PROCEDURE — 6370000000 HC RX 637 (ALT 250 FOR IP): Performed by: STUDENT IN AN ORGANIZED HEALTH CARE EDUCATION/TRAINING PROGRAM

## 2024-05-19 PROCEDURE — 6360000002 HC RX W HCPCS: Performed by: STUDENT IN AN ORGANIZED HEALTH CARE EDUCATION/TRAINING PROGRAM

## 2024-05-19 PROCEDURE — 93005 ELECTROCARDIOGRAM TRACING: CPT | Performed by: STUDENT IN AN ORGANIZED HEALTH CARE EDUCATION/TRAINING PROGRAM

## 2024-05-19 PROCEDURE — 93010 ELECTROCARDIOGRAM REPORT: CPT | Performed by: INTERNAL MEDICINE

## 2024-05-19 PROCEDURE — 85610 PROTHROMBIN TIME: CPT

## 2024-05-19 PROCEDURE — 85025 COMPLETE CBC W/AUTO DIFF WBC: CPT

## 2024-05-19 PROCEDURE — 96374 THER/PROPH/DIAG INJ IV PUSH: CPT

## 2024-05-19 PROCEDURE — 96375 TX/PRO/DX INJ NEW DRUG ADDON: CPT

## 2024-05-19 PROCEDURE — 86850 RBC ANTIBODY SCREEN: CPT

## 2024-05-19 PROCEDURE — 99285 EMERGENCY DEPT VISIT HI MDM: CPT

## 2024-05-19 PROCEDURE — 96376 TX/PRO/DX INJ SAME DRUG ADON: CPT

## 2024-05-19 PROCEDURE — 1100000000 HC RM PRIVATE

## 2024-05-19 PROCEDURE — 86900 BLOOD TYPING SEROLOGIC ABO: CPT

## 2024-05-19 RX ORDER — MORPHINE SULFATE 4 MG/ML
4 INJECTION, SOLUTION INTRAMUSCULAR; INTRAVENOUS
Status: COMPLETED | OUTPATIENT
Start: 2024-05-19 | End: 2024-05-19

## 2024-05-19 RX ORDER — ONDANSETRON 2 MG/ML
4 INJECTION INTRAMUSCULAR; INTRAVENOUS EVERY 6 HOURS PRN
Status: DISCONTINUED | OUTPATIENT
Start: 2024-05-19 | End: 2024-05-29 | Stop reason: HOSPADM

## 2024-05-19 RX ORDER — MECOBALAMIN 5000 MCG
5 TABLET,DISINTEGRATING ORAL NIGHTLY
Status: DISCONTINUED | OUTPATIENT
Start: 2024-05-19 | End: 2024-05-29 | Stop reason: HOSPADM

## 2024-05-19 RX ORDER — MORPHINE SULFATE 2 MG/ML
2 INJECTION, SOLUTION INTRAMUSCULAR; INTRAVENOUS EVERY 4 HOURS PRN
Status: DISCONTINUED | OUTPATIENT
Start: 2024-05-19 | End: 2024-05-29 | Stop reason: HOSPADM

## 2024-05-19 RX ORDER — POTASSIUM CHLORIDE 7.45 MG/ML
10 INJECTION INTRAVENOUS PRN
Status: DISCONTINUED | OUTPATIENT
Start: 2024-05-19 | End: 2024-05-29 | Stop reason: HOSPADM

## 2024-05-19 RX ORDER — ACETAMINOPHEN 650 MG/1
650 SUPPOSITORY RECTAL EVERY 6 HOURS PRN
Status: DISCONTINUED | OUTPATIENT
Start: 2024-05-19 | End: 2024-05-21

## 2024-05-19 RX ORDER — KETOROLAC TROMETHAMINE 15 MG/ML
15 INJECTION, SOLUTION INTRAMUSCULAR; INTRAVENOUS ONCE
Status: COMPLETED | OUTPATIENT
Start: 2024-05-19 | End: 2024-05-19

## 2024-05-19 RX ORDER — ACETAMINOPHEN 325 MG/1
650 TABLET ORAL EVERY 6 HOURS SCHEDULED
Status: DISCONTINUED | OUTPATIENT
Start: 2024-05-19 | End: 2024-05-21

## 2024-05-19 RX ORDER — SODIUM CHLORIDE 0.9 % (FLUSH) 0.9 %
5-40 SYRINGE (ML) INJECTION PRN
Status: DISCONTINUED | OUTPATIENT
Start: 2024-05-19 | End: 2024-05-29 | Stop reason: HOSPADM

## 2024-05-19 RX ORDER — ACETAMINOPHEN 325 MG/1
650 TABLET ORAL EVERY 6 HOURS PRN
Status: DISCONTINUED | OUTPATIENT
Start: 2024-05-19 | End: 2024-05-21

## 2024-05-19 RX ORDER — ACETAMINOPHEN 500 MG
1000 TABLET ORAL
Status: COMPLETED | OUTPATIENT
Start: 2024-05-19 | End: 2024-05-19

## 2024-05-19 RX ORDER — POLYETHYLENE GLYCOL 3350 17 G/17G
17 POWDER, FOR SOLUTION ORAL DAILY
Status: DISCONTINUED | OUTPATIENT
Start: 2024-05-19 | End: 2024-05-29 | Stop reason: HOSPADM

## 2024-05-19 RX ORDER — SODIUM CHLORIDE 9 MG/ML
INJECTION, SOLUTION INTRAVENOUS PRN
Status: DISCONTINUED | OUTPATIENT
Start: 2024-05-19 | End: 2024-05-29 | Stop reason: HOSPADM

## 2024-05-19 RX ORDER — AMLODIPINE BESYLATE 5 MG/1
5 TABLET ORAL DAILY
Status: DISCONTINUED | OUTPATIENT
Start: 2024-05-19 | End: 2024-05-29 | Stop reason: HOSPADM

## 2024-05-19 RX ORDER — MORPHINE SULFATE 2 MG/ML
2 INJECTION, SOLUTION INTRAMUSCULAR; INTRAVENOUS
Status: COMPLETED | OUTPATIENT
Start: 2024-05-19 | End: 2024-05-19

## 2024-05-19 RX ORDER — POTASSIUM CHLORIDE 20 MEQ/1
40 TABLET, EXTENDED RELEASE ORAL PRN
Status: DISCONTINUED | OUTPATIENT
Start: 2024-05-19 | End: 2024-05-29 | Stop reason: HOSPADM

## 2024-05-19 RX ORDER — SODIUM CHLORIDE 0.9 % (FLUSH) 0.9 %
5-40 SYRINGE (ML) INJECTION EVERY 12 HOURS SCHEDULED
Status: DISCONTINUED | OUTPATIENT
Start: 2024-05-19 | End: 2024-05-29 | Stop reason: HOSPADM

## 2024-05-19 RX ORDER — ATORVASTATIN CALCIUM 20 MG/1
20 TABLET, FILM COATED ORAL DAILY
Status: DISCONTINUED | OUTPATIENT
Start: 2024-05-19 | End: 2024-05-29 | Stop reason: HOSPADM

## 2024-05-19 RX ORDER — HEPARIN SODIUM 5000 [USP'U]/ML
5000 INJECTION, SOLUTION INTRAVENOUS; SUBCUTANEOUS EVERY 8 HOURS SCHEDULED
Status: DISCONTINUED | OUTPATIENT
Start: 2024-05-19 | End: 2024-05-20

## 2024-05-19 RX ORDER — ONDANSETRON 4 MG/1
4 TABLET, ORALLY DISINTEGRATING ORAL EVERY 8 HOURS PRN
Status: DISCONTINUED | OUTPATIENT
Start: 2024-05-19 | End: 2024-05-29 | Stop reason: HOSPADM

## 2024-05-19 RX ORDER — MAGNESIUM SULFATE IN WATER 40 MG/ML
2000 INJECTION, SOLUTION INTRAVENOUS PRN
Status: DISCONTINUED | OUTPATIENT
Start: 2024-05-19 | End: 2024-05-29 | Stop reason: HOSPADM

## 2024-05-19 RX ORDER — POLYETHYLENE GLYCOL 3350 17 G/17G
17 POWDER, FOR SOLUTION ORAL DAILY PRN
Status: DISCONTINUED | OUTPATIENT
Start: 2024-05-19 | End: 2024-05-19

## 2024-05-19 RX ADMIN — ATORVASTATIN CALCIUM 20 MG: 20 TABLET, FILM COATED ORAL at 21:06

## 2024-05-19 RX ADMIN — MORPHINE SULFATE 2 MG: 2 INJECTION, SOLUTION INTRAMUSCULAR; INTRAVENOUS at 13:02

## 2024-05-19 RX ADMIN — HEPARIN SODIUM 5000 UNITS: 5000 INJECTION INTRAVENOUS; SUBCUTANEOUS at 21:08

## 2024-05-19 RX ADMIN — SODIUM CHLORIDE, PRESERVATIVE FREE 10 ML: 5 INJECTION INTRAVENOUS at 21:13

## 2024-05-19 RX ADMIN — ACETAMINOPHEN 650 MG: 325 TABLET ORAL at 18:39

## 2024-05-19 RX ADMIN — ACETAMINOPHEN 650 MG: 325 TABLET ORAL at 23:19

## 2024-05-19 RX ADMIN — Medication 5 MG: at 21:06

## 2024-05-19 RX ADMIN — ACETAMINOPHEN 1000 MG: 500 TABLET ORAL at 13:00

## 2024-05-19 RX ADMIN — KETOROLAC TROMETHAMINE 15 MG: 15 INJECTION, SOLUTION INTRAMUSCULAR; INTRAVENOUS at 12:08

## 2024-05-19 RX ADMIN — MORPHINE SULFATE 4 MG: 4 INJECTION, SOLUTION INTRAMUSCULAR; INTRAVENOUS at 12:07

## 2024-05-19 RX ADMIN — HEPARIN SODIUM 5000 UNITS: 5000 INJECTION INTRAVENOUS; SUBCUTANEOUS at 18:40

## 2024-05-19 ASSESSMENT — PAIN DESCRIPTION - ORIENTATION
ORIENTATION: RIGHT;UPPER
ORIENTATION: RIGHT
ORIENTATION: RIGHT;UPPER

## 2024-05-19 ASSESSMENT — PAIN SCALES - GENERAL
PAINLEVEL_OUTOF10: 10
PAINLEVEL_OUTOF10: 0
PAINLEVEL_OUTOF10: 3
PAINLEVEL_OUTOF10: 4
PAINLEVEL_OUTOF10: 0
PAINLEVEL_OUTOF10: 4
PAINLEVEL_OUTOF10: 10
PAINLEVEL_OUTOF10: 4

## 2024-05-19 ASSESSMENT — LIFESTYLE VARIABLES
HOW MANY STANDARD DRINKS CONTAINING ALCOHOL DO YOU HAVE ON A TYPICAL DAY: PATIENT DOES NOT DRINK
HOW OFTEN DO YOU HAVE A DRINK CONTAINING ALCOHOL: NEVER

## 2024-05-19 ASSESSMENT — PAIN DESCRIPTION - LOCATION
LOCATION: LEG

## 2024-05-19 ASSESSMENT — PAIN - FUNCTIONAL ASSESSMENT: PAIN_FUNCTIONAL_ASSESSMENT: 0-10

## 2024-05-19 NOTE — ED PROVIDER NOTES
[address]  EMERGENCY DEPARTMENT ENCOUNTER      Pt Name: Juanpablo Jackson  MRN: [id number]  Birthdate 1940  Date of evaluation: 5/19/2024  Provider: Archana Quezada MD    CHIEF COMPLAINT       Chief Complaint   Patient presents with    Fall    Leg Pain         HISTORY OF PRESENT ILLNESS   (Location/Symptom, Timing/Onset, Context/Setting, Quality, Duration, Modifying Factors, Severity)  Note limiting factors.   Juanpablo Jackson is a 83 y.o. female who presents to the emergency department for right leg pain.  Patient states that she has a stress fracture of her right femur.  She was walking today with her cane when she just felt like her leg gave out and she fell onto her right side.  States she did not hit her head.  Denies any loss of consciousness.  Denies any head or neck pain.  States that she was unable to get up and hold the emergency cord that she had at her house.  She complains of severe pain in her right leg.  Denies any other injury or trauma.  She denies any symptoms before or after falling including lightheadedness, dizziness, chest pain, shortness of breath.  She remembers having that happen.  She does take Plavix.  She took this today.     Nursing Notes were reviewed.    REVIEW OF SYSTEMS    (2-9 systems for level 4, 10 or more for level 5)     Constitutional: No fever  Respiratory: No SOB  Cardio: No chest pain  GI: No blood in stool  : No hematuria  MSK: No back pain  Skin: No rashes  Neuro: No headache    Except as noted above the remainder of the review of systems was reviewed and negative.       PAST MEDICAL HISTORY     Past Medical History:   Diagnosis Date    Constipation     History of right hip replacement     History of total right knee replacement     Hypercholesteremia     Hypertension     no meds now    Microscopic hematuria     MRSA (methicillin resistant staph aureus) culture positive 06/2018    On abdominal wall- tx with Vibramycin  (care everywhere)    Osteoporosis

## 2024-05-19 NOTE — PROGRESS NOTES
Patient received via stretcher transport, A&Ox4, R leg splint intact, denies any pain at this time, vitals stable,  family at bedside.

## 2024-05-19 NOTE — ED TRIAGE NOTES
Fall while turning, \"I guess I just went down.\"  Pain to right upper leg and knee region.   History of total knee and hip replacements on that side and also a previous femur fx after replacements.    [place] 5 gave ketamine 50mg intranasal for pain so patient could be moved.

## 2024-05-19 NOTE — H&P
History & Physical    Patient: Juanpablo Jackson MRN: [id number]  CSN: [id number]    YOB: 1940  Age: 83 y.o.  Sex: female      DOA: 5/19/2024    Chief Complaint:   Chief Complaint   Patient presents with    Fall    Leg Pain          HPI:     Juanpablo Jackson is a 83 y.o.  female with hx of CVA (on Plavix),  HTN, chronic stress fracture of her right lower extremity.    Ortho has been following patient for chronic stress fracture of her right femur. She was formerly WBAT. Patient offered surgery in March but opted for conservative management.     Patient presented to [facility] ED following a fall. She was ambulating on her cane. Subsequent pain in her right knee/hip.     Femur to be reduced and placed in long leg splint in ED.     Ortho has been consulted. Patient on Plavix. Surgery delayed until Tuesday.     Past Medical History:   Diagnosis Date    Constipation     Hypercholesteremia     Hypertension     no meds now    Microscopic hematuria     MRSA (methicillin resistant staph aureus) culture positive 06/2018    On abdominal wall- tx with Vibramycin  (care everywhere)    Stroke (HCC) not sure when    blurred vision, resolved (taking plavix)     MOHSEN (stress urinary incontinence, female)     UTI (urinary tract infection)        Past Surgical History:   Procedure Laterality Date    CATARACT REMOVAL Bilateral     CHOLECYSTECTOMY  1972    HYSTERECTOMY (CERVIX STATUS UNKNOWN)  09/2016    total    OTHER SURGICAL HISTORY  01/2019    basal skin ca removed     OTHER SURGICAL HISTORY  2005, 2015    skin ca removed     OTHER SURGICAL HISTORY  09/2016    bladder support    TONSILLECTOMY  1946    TOTAL KNEE ARTHROPLASTY Right 04/27/2015       Family History   Problem Relation Age of Onset    Pancreatic Cancer Father 33    Ovarian Cancer Daughter 59    Cancer Maternal Aunt        Social History     Socioeconomic History    Marital status:      Spouse name: None    Number of children:

## 2024-05-19 NOTE — CONSULTS
R distal 1/3 femur fx  H/o insufficiency fx d/t Foxamax since 10/23--treated nonsurgically with bone stim  Followed by Dr. Griffin and MARGARITA Willis  Now with 100% displaced & overriding fx d/t fall today  Ipsilateral TKR & THR  Hold Plavix  Hospitalist admit  NPO after MN 5/20 for OR Tuesday 5/21 locking condylar plate  Risks of surgery outlined and informed consent obtained  Planned procedure d/w pt and her daughter  Full note to follow

## 2024-05-19 NOTE — ED NOTES
Splint to right leg was removed; right femur fracture was reduced a second time by provider Dr Quezada.

## 2024-05-20 ENCOUNTER — ANESTHESIA EVENT (OUTPATIENT)
Facility: HOSPITAL | Age: 84
End: 2024-05-20
Payer: MEDICARE

## 2024-05-20 LAB
ANION GAP SERPL CALC-SCNC: 6 MMOL/L (ref 3–18)
APPEARANCE UR: CLEAR
BACTERIA URNS QL MICRO: NEGATIVE /HPF
BASOPHILS # BLD: 0 K/UL (ref 0–0.1)
BASOPHILS NFR BLD: 0 % (ref 0–2)
BILIRUB UR QL: NEGATIVE
BUN SERPL-MCNC: 15 MG/DL (ref 7–18)
BUN/CREAT SERPL: 26 (ref 12–20)
CALCIUM SERPL-MCNC: 8.8 MG/DL (ref 8.5–10.1)
CHLORIDE SERPL-SCNC: 101 MMOL/L (ref 100–111)
CO2 SERPL-SCNC: 27 MMOL/L (ref 21–32)
COLOR UR: YELLOW
CREAT SERPL-MCNC: 0.57 MG/DL (ref 0.6–1.3)
DIFFERENTIAL METHOD BLD: ABNORMAL
EOSINOPHIL # BLD: 0.1 K/UL (ref 0–0.4)
EOSINOPHIL NFR BLD: 1 % (ref 0–5)
EPITH CASTS URNS QL MICRO: NEGATIVE /LPF (ref 0–5)
ERYTHROCYTE [DISTWIDTH] IN BLOOD BY AUTOMATED COUNT: 13.4 % (ref 11.6–14.5)
GLUCOSE SERPL-MCNC: 93 MG/DL (ref 74–99)
GLUCOSE UR STRIP.AUTO-MCNC: NEGATIVE MG/DL
HCT VFR BLD AUTO: 33.6 % (ref 35–45)
HGB BLD-MCNC: 10.9 G/DL (ref 12–16)
HGB UR QL STRIP: ABNORMAL
IMM GRANULOCYTES # BLD AUTO: 0 K/UL (ref 0–0.04)
IMM GRANULOCYTES NFR BLD AUTO: 0 % (ref 0–0.5)
KETONES UR QL STRIP.AUTO: NEGATIVE MG/DL
LEUKOCYTE ESTERASE UR QL STRIP.AUTO: ABNORMAL
LYMPHOCYTES # BLD: 2.7 K/UL (ref 0.9–3.6)
LYMPHOCYTES NFR BLD: 30 % (ref 21–52)
MCH RBC QN AUTO: 30.2 PG (ref 24–34)
MCHC RBC AUTO-ENTMCNC: 32.4 G/DL (ref 31–37)
MCV RBC AUTO: 93.1 FL (ref 78–100)
MONOCYTES # BLD: 0.7 K/UL (ref 0.05–1.2)
MONOCYTES NFR BLD: 7 % (ref 3–10)
NEUTS SEG # BLD: 5.3 K/UL (ref 1.8–8)
NEUTS SEG NFR BLD: 61 % (ref 40–73)
NITRITE UR QL STRIP.AUTO: NEGATIVE
NRBC # BLD: 0 K/UL (ref 0–0.01)
NRBC BLD-RTO: 0 PER 100 WBC
PH UR STRIP: 6 (ref 5–8)
PLATELET # BLD AUTO: 286 K/UL (ref 135–420)
PMV BLD AUTO: 10.3 FL (ref 9.2–11.8)
POTASSIUM SERPL-SCNC: 4.1 MMOL/L (ref 3.5–5.5)
PROT UR STRIP-MCNC: 30 MG/DL
RBC # BLD AUTO: 3.61 M/UL (ref 4.2–5.3)
RBC #/AREA URNS HPF: NORMAL /HPF (ref 0–5)
SODIUM SERPL-SCNC: 134 MMOL/L (ref 136–145)
SP GR UR REFRACTOMETRY: 1.01 (ref 1–1.03)
UROBILINOGEN UR QL STRIP.AUTO: 0.2 EU/DL (ref 0.2–1)
WBC # BLD AUTO: 8.8 K/UL (ref 4.6–13.2)
WBC URNS QL MICRO: NORMAL /HPF (ref 0–4)

## 2024-05-20 PROCEDURE — 85025 COMPLETE CBC W/AUTO DIFF WBC: CPT

## 2024-05-20 PROCEDURE — 99232 SBSQ HOSP IP/OBS MODERATE 35: CPT | Performed by: STUDENT IN AN ORGANIZED HEALTH CARE EDUCATION/TRAINING PROGRAM

## 2024-05-20 PROCEDURE — 6360000002 HC RX W HCPCS: Performed by: STUDENT IN AN ORGANIZED HEALTH CARE EDUCATION/TRAINING PROGRAM

## 2024-05-20 PROCEDURE — 81001 URINALYSIS AUTO W/SCOPE: CPT

## 2024-05-20 PROCEDURE — 1100000000 HC RM PRIVATE

## 2024-05-20 PROCEDURE — 36415 COLL VENOUS BLD VENIPUNCTURE: CPT

## 2024-05-20 PROCEDURE — 80048 BASIC METABOLIC PNL TOTAL CA: CPT

## 2024-05-20 PROCEDURE — 6370000000 HC RX 637 (ALT 250 FOR IP): Performed by: STUDENT IN AN ORGANIZED HEALTH CARE EDUCATION/TRAINING PROGRAM

## 2024-05-20 PROCEDURE — 2580000003 HC RX 258: Performed by: STUDENT IN AN ORGANIZED HEALTH CARE EDUCATION/TRAINING PROGRAM

## 2024-05-20 PROCEDURE — 87086 URINE CULTURE/COLONY COUNT: CPT

## 2024-05-20 RX ORDER — HEPARIN SODIUM 5000 [USP'U]/ML
5000 INJECTION, SOLUTION INTRAVENOUS; SUBCUTANEOUS EVERY 8 HOURS SCHEDULED
Status: DISCONTINUED | OUTPATIENT
Start: 2024-05-21 | End: 2024-05-21

## 2024-05-20 RX ADMIN — Medication 5 MG: at 23:26

## 2024-05-20 RX ADMIN — ACETAMINOPHEN 650 MG: 325 TABLET ORAL at 10:39

## 2024-05-20 RX ADMIN — HEPARIN SODIUM 5000 UNITS: 5000 INJECTION INTRAVENOUS; SUBCUTANEOUS at 17:22

## 2024-05-20 RX ADMIN — ATORVASTATIN CALCIUM 20 MG: 20 TABLET, FILM COATED ORAL at 23:25

## 2024-05-20 RX ADMIN — SODIUM CHLORIDE, PRESERVATIVE FREE 10 ML: 5 INJECTION INTRAVENOUS at 08:31

## 2024-05-20 RX ADMIN — ACETAMINOPHEN 650 MG: 325 TABLET ORAL at 05:33

## 2024-05-20 RX ADMIN — HEPARIN SODIUM 5000 UNITS: 5000 INJECTION INTRAVENOUS; SUBCUTANEOUS at 05:33

## 2024-05-20 RX ADMIN — ACETAMINOPHEN 650 MG: 325 TABLET ORAL at 17:21

## 2024-05-20 RX ADMIN — SODIUM CHLORIDE, PRESERVATIVE FREE 10 ML: 5 INJECTION INTRAVENOUS at 23:28

## 2024-05-20 RX ADMIN — ACETAMINOPHEN 650 MG: 325 TABLET ORAL at 23:25

## 2024-05-20 ASSESSMENT — PAIN SCALES - GENERAL
PAINLEVEL_OUTOF10: 0

## 2024-05-20 ASSESSMENT — PAIN DESCRIPTION - LOCATION: LOCATION: LEG

## 2024-05-20 ASSESSMENT — PAIN DESCRIPTION - ORIENTATION: ORIENTATION: RIGHT

## 2024-05-20 NOTE — PLAN OF CARE
Problem: Discharge Planning  Goal: Discharge to home or other facility with appropriate resources  Outcome: Progressing  Flowsheets  Taken 5/19/2024 1700 by Dang Moy RN  Discharge to home or other facility with appropriate resources: Identify barriers to discharge with patient and caregiver  Taken 5/19/2024 1612 by Dang Moy, GLADYS  Discharge to home or other facility with appropriate resources: Identify barriers to discharge with patient and caregiver     Problem: Pain  Goal: Verbalizes/displays adequate comfort level or baseline comfort level  Outcome: Progressing     Problem: Skin/Tissue Integrity  Goal: Absence of new skin breakdown  Description: 1.  Monitor for areas of redness and/or skin breakdown  2.  Assess vascular access sites hourly  3.  Every 4-6 hours minimum:  Change oxygen saturation probe site  4.  Every 4-6 hours:  If on nasal continuous positive airway pressure, respiratory therapy assess nares and determine need for appliance change or resting period.  Outcome: Progressing     Problem: ABCDS Injury Assessment  Goal: Absence of physical injury  Outcome: Progressing     Problem: Safety - Adult  Goal: Free from fall injury  Outcome: Progressing

## 2024-05-20 NOTE — PROGRESS NOTES
Ángel [facility]go[facility] Hospitalist Group  Progress Note    Patient: Juanpablo Jackson Age: 83 y.o. : 1940 MR#: [id number] SSN: [id number]  Date/Time: 2024    Subjective:   Subjective:  Symptoms:  Stable.  No shortness of breath, cough, chest pain, weakness, headache, chest pressure, diarrhea or anxiety.    Diet:  Adequate intake.  No nausea or vomiting.    Activity level: Normal.    Pain:  She reports no pain.       No acute events overnight, no new concerns or complaints, vitals stable.    Review of Systems   Constitutional: Negative for chills, fever and malaise/fatigue.   HENT:  Negative for sore throat.    Eyes:  Negative for pain, photophobia and visual disturbance.   Cardiovascular:  Negative for chest pain, irregular heartbeat, palpitations and syncope.   Respiratory:  Negative for cough, shortness of breath and wheezing.    Skin:  Negative for color change, dry skin and rash.   Gastrointestinal:  Negative for abdominal pain, constipation, diarrhea, hematochezia, melena, nausea and vomiting.   Neurological:  Negative for disturbances in coordination, dizziness, focal weakness, headaches and weakness.   Psychiatric/Behavioral:  Negative for altered mental status, depression, hallucinations and suicidal ideas.       Assessment/Plan:   Distal left femur fracture  History chronic stress fracture left femur  History CVA  Hypertension  Osteoporosis  HLD    Plan  Surgery planned for tomorrow, Tuesday  May benefit from ARU following, PT OT pending for this  Hold Plavix  Other meds as appropriate    Disposition: Expected location: To be determined, ARU consulted    Expected timeframe: Two or more days before discharge       Case discussed with:  [x]Patient  []Family  [x]Nursing  []Case Management  DVT Prophylaxis:  []Lovenox  [x]Hep SQ  []SCDs  []Coumadin   []On Heparin gtt   [] DOAC    Objective:   Objective:  General Appearance:  Comfortable, well-appearing, in no acute distress and not  RDW 13.4 11.6 - 14.5 %    Platelets 286 135 - 420 K/uL    MPV 10.3 9.2 - 11.8 FL    Nucleated RBCs 0.0 0  WBC    nRBC 0.00 0.00 - 0.01 K/uL    Neutrophils % 61 40 - 73 %    Lymphocytes % 30 21 - 52 %    Monocytes % 7 3 - 10 %    Eosinophils % 1 0 - 5 %    Basophils % 0 0 - 2 %    Immature Granulocytes % 0 0.0 - 0.5 %    Neutrophils Absolute 5.3 1.8 - 8.0 K/UL    Lymphocytes Absolute 2.7 0.9 - 3.6 K/UL    Monocytes Absolute 0.7 0.05 - 1.2 K/UL    Eosinophils Absolute 0.1 0.0 - 0.4 K/UL    Basophils Absolute 0.0 0.0 - 0.1 K/UL    Immature Granulocytes Absolute 0.0 0.00 - 0.04 K/UL    Differential Type AUTOMATED     Basic Metabolic Panel w/ Reflex to MG    Collection Time: 05/20/24  5:08 AM   Result Value Ref Range    Sodium 134 (L) 136 - 145 mmol/L    Potassium 4.1 3.5 - 5.5 mmol/L    Chloride 101 100 - 111 mmol/L    CO2 27 21 - 32 mmol/L    Anion Gap 6 3.0 - 18 mmol/L    Glucose 93 74 - 99 mg/dL    BUN 15 7.0 - 18 MG/DL    Creatinine 0.57 (L) 0.6 - 1.3 MG/DL    BUN/Creatinine Ratio 26 (H) 12 - 20      Est, Glom Filt Rate >90 >60 ml/min/1.73m2    Calcium 8.8 8.5 - 10.1 MG/DL          Signed By: Chao Guerra MD     May 20, 2024 1:08 PM

## 2024-05-20 NOTE — PROGRESS NOTES
Patient is alert and oriented times 4, able to communicate needs. No changes during this shift, patient tolerate medications and treatment well. No discomfort.

## 2024-05-21 ENCOUNTER — ANESTHESIA (OUTPATIENT)
Facility: HOSPITAL | Age: 84
End: 2024-05-21
Payer: MEDICARE

## 2024-05-21 ENCOUNTER — APPOINTMENT (OUTPATIENT)
Facility: HOSPITAL | Age: 84
DRG: 470 | End: 2024-05-21
Attending: SPECIALIST
Payer: MEDICARE

## 2024-05-21 ENCOUNTER — ANESTHESIA (OUTPATIENT)
Dept: CARDIOTHORACIC SURGERY | Facility: HOSPITAL | Age: 84
End: 2024-05-21
Payer: MEDICARE

## 2024-05-21 ENCOUNTER — ANESTHESIA EVENT (OUTPATIENT)
Dept: CARDIOTHORACIC SURGERY | Facility: HOSPITAL | Age: 84
End: 2024-05-21
Payer: MEDICARE

## 2024-05-21 ENCOUNTER — APPOINTMENT (OUTPATIENT)
Facility: HOSPITAL | Age: 84
DRG: 470 | End: 2024-05-21
Payer: MEDICARE

## 2024-05-21 PROBLEM — Z96.641 STATUS POST RIGHT HIP REPLACEMENT: Status: ACTIVE | Noted: 2024-05-21

## 2024-05-21 PROBLEM — M84.453K: Status: ACTIVE | Noted: 2024-05-21

## 2024-05-21 PROBLEM — Z96.649 PERIPROSTHETIC FRACTURE OF SHAFT OF FEMUR: Status: ACTIVE | Noted: 2024-05-21

## 2024-05-21 PROBLEM — S72.301A CLOSED FRACTURE OF SHAFT OF RIGHT FEMUR (HCC): Status: ACTIVE | Noted: 2024-05-19

## 2024-05-21 PROBLEM — M97.8XXA PERIPROSTHETIC FRACTURE OF SHAFT OF FEMUR: Status: ACTIVE | Noted: 2024-05-21

## 2024-05-21 PROBLEM — I99.8 ACUTE LOWER LIMB ISCHEMIA: Status: ACTIVE | Noted: 2024-05-21

## 2024-05-21 LAB
ACT BLD: 212 SECS (ref 79–138)
ACT BLD: 239 SECS (ref 79–138)
ANION GAP BLD CALC-SCNC: ABNORMAL MMOL/L (ref 10–20)
ANION GAP SERPL CALC-SCNC: 5 MMOL/L (ref 3–18)
ANION GAP SERPL CALC-SCNC: 6 MMOL/L (ref 3–18)
BACTERIA SPEC CULT: NORMAL
BASE DEFICIT BLD-SCNC: 5.3 MMOL/L
BASOPHILS # BLD: 0 K/UL (ref 0–0.1)
BASOPHILS # BLD: 0 K/UL (ref 0–0.1)
BASOPHILS NFR BLD: 0 % (ref 0–2)
BASOPHILS NFR BLD: 0 % (ref 0–2)
BUN SERPL-MCNC: 13 MG/DL (ref 7–18)
BUN SERPL-MCNC: 13 MG/DL (ref 7–18)
BUN/CREAT SERPL: 18 (ref 12–20)
BUN/CREAT SERPL: 21 (ref 12–20)
CA-I BLD-MCNC: 1.03 MMOL/L (ref 1.12–1.32)
CALCIUM SERPL-MCNC: 7.4 MG/DL (ref 8.5–10.1)
CALCIUM SERPL-MCNC: 8.4 MG/DL (ref 8.5–10.1)
CHLORIDE BLD-SCNC: 104 MMOL/L (ref 98–107)
CHLORIDE SERPL-SCNC: 104 MMOL/L (ref 100–111)
CHLORIDE SERPL-SCNC: 107 MMOL/L (ref 100–111)
CO2 BLD-SCNC: 19 MMOL/L (ref 19–24)
CO2 SERPL-SCNC: 25 MMOL/L (ref 21–32)
CO2 SERPL-SCNC: 25 MMOL/L (ref 21–32)
CREAT BLD-MCNC: 0.39 MG/DL (ref 0.6–1.3)
CREAT SERPL-MCNC: 0.61 MG/DL (ref 0.6–1.3)
CREAT SERPL-MCNC: 0.71 MG/DL (ref 0.6–1.3)
DIFFERENTIAL METHOD BLD: ABNORMAL
DIFFERENTIAL METHOD BLD: ABNORMAL
ECHO BSA: 1.7 M2
EOSINOPHIL # BLD: 0 K/UL (ref 0–0.4)
EOSINOPHIL # BLD: 0.2 K/UL (ref 0–0.4)
EOSINOPHIL NFR BLD: 0 % (ref 0–5)
EOSINOPHIL NFR BLD: 2 % (ref 0–5)
ERYTHROCYTE [DISTWIDTH] IN BLOOD BY AUTOMATED COUNT: 13.2 % (ref 11.6–14.5)
ERYTHROCYTE [DISTWIDTH] IN BLOOD BY AUTOMATED COUNT: 13.6 % (ref 11.6–14.5)
GLUCOSE BLD STRIP.AUTO-MCNC: 216 MG/DL (ref 70–110)
GLUCOSE BLD-MCNC: 168 MG/DL (ref 70–110)
GLUCOSE SERPL-MCNC: 219 MG/DL (ref 74–99)
GLUCOSE SERPL-MCNC: 98 MG/DL (ref 74–99)
HCO3 BLD-SCNC: 19.5 MMOL/L (ref 22–26)
HCT VFR BLD AUTO: 32.8 % (ref 35–45)
HCT VFR BLD AUTO: 33.8 % (ref 35–45)
HCT VFR BLD CALC: 30 % (ref 36–49)
HGB BLD-MCNC: 10.9 G/DL (ref 12–16)
HGB BLD-MCNC: 11 G/DL (ref 12–16)
HISTORY CHECK: NORMAL
IMM GRANULOCYTES # BLD AUTO: 0 K/UL (ref 0–0.04)
IMM GRANULOCYTES # BLD AUTO: 0 K/UL (ref 0–0.04)
IMM GRANULOCYTES NFR BLD AUTO: 0 % (ref 0–0.5)
IMM GRANULOCYTES NFR BLD AUTO: 0 % (ref 0–0.5)
INR PPP: 1.1 (ref 0.9–1.1)
LACTATE BLD-SCNC: 1.69 MMOL/L (ref 0.4–2)
LYMPHOCYTES # BLD: 0.5 K/UL (ref 0.9–3.6)
LYMPHOCYTES # BLD: 2.8 K/UL (ref 0.9–3.6)
LYMPHOCYTES NFR BLD: 29 % (ref 21–52)
LYMPHOCYTES NFR BLD: 5 % (ref 21–52)
MAGNESIUM SERPL-MCNC: 1.8 MG/DL (ref 1.6–2.6)
MAGNESIUM SERPL-MCNC: 2 MG/DL (ref 1.6–2.6)
MCH RBC QN AUTO: 30.1 PG (ref 24–34)
MCH RBC QN AUTO: 30.2 PG (ref 24–34)
MCHC RBC AUTO-ENTMCNC: 32.5 G/DL (ref 31–37)
MCHC RBC AUTO-ENTMCNC: 33.2 G/DL (ref 31–37)
MCV RBC AUTO: 90.9 FL (ref 78–100)
MCV RBC AUTO: 92.3 FL (ref 78–100)
MONOCYTES # BLD: 0.6 K/UL (ref 0.05–1.2)
MONOCYTES # BLD: 0.6 K/UL (ref 0.05–1.2)
MONOCYTES NFR BLD: 6 % (ref 3–10)
MONOCYTES NFR BLD: 6 % (ref 3–10)
NEUTS SEG # BLD: 6.1 K/UL (ref 1.8–8)
NEUTS SEG # BLD: 9.7 K/UL (ref 1.8–8)
NEUTS SEG NFR BLD: 62 % (ref 40–73)
NEUTS SEG NFR BLD: 89 % (ref 40–73)
NRBC # BLD: 0 K/UL (ref 0–0.01)
NRBC # BLD: 0 K/UL (ref 0–0.01)
NRBC BLD-RTO: 0 PER 100 WBC
NRBC BLD-RTO: 0 PER 100 WBC
PCO2 BLD: 34.3 MMHG (ref 35–45)
PH BLD: 7.36 (ref 7.35–7.45)
PHOSPHATE SERPL-MCNC: 3.4 MG/DL (ref 2.5–4.9)
PLATELET # BLD AUTO: 259 K/UL (ref 135–420)
PLATELET # BLD AUTO: 288 K/UL (ref 135–420)
PMV BLD AUTO: 10.5 FL (ref 9.2–11.8)
PMV BLD AUTO: 9.9 FL (ref 9.2–11.8)
PO2 BLD: 209 MMHG (ref 80–100)
POTASSIUM BLD-SCNC: 3.6 MMOL/L (ref 3.5–5.1)
POTASSIUM SERPL-SCNC: 3.5 MMOL/L (ref 3.5–5.5)
POTASSIUM SERPL-SCNC: 3.9 MMOL/L (ref 3.5–5.5)
PROTHROMBIN TIME: 14.6 SEC (ref 11.9–14.7)
RBC # BLD AUTO: 3.61 M/UL (ref 4.2–5.3)
RBC # BLD AUTO: 3.66 M/UL (ref 4.2–5.3)
SAO2 % BLD: 100 %
SERVICE CMNT-IMP: ABNORMAL
SERVICE CMNT-IMP: NORMAL
SODIUM BLD-SCNC: 138 MMOL/L (ref 136–145)
SODIUM SERPL-SCNC: 135 MMOL/L (ref 136–145)
SODIUM SERPL-SCNC: 137 MMOL/L (ref 136–145)
SPECIMEN SITE: ABNORMAL
VAS RIGHT CFA PROX PSV: 114.7 CM/S
VAS RIGHT PTA MID PSV: 0 CM/S
VAS RIGHT SFA DIST PSV: 16.8 CM/S
VAS RIGHT SFA DIST VEL RATIO: 0.53
VAS RIGHT SFA MID PSV: 31.7 CM/S
VAS RIGHT SFA MID VEL RATIO: 0.6
VAS RIGHT SFA PROX PSV: 54.2 CM/S
VAS RIGHT SFA PROX VEL RATIO: 0.5
WBC # BLD AUTO: 10.9 K/UL (ref 4.6–13.2)
WBC # BLD AUTO: 9.7 K/UL (ref 4.6–13.2)

## 2024-05-21 PROCEDURE — 84100 ASSAY OF PHOSPHORUS: CPT

## 2024-05-21 PROCEDURE — 93925 LOWER EXTREMITY STUDY: CPT

## 2024-05-21 PROCEDURE — 7100000000 HC PACU RECOVERY - FIRST 15 MIN: Performed by: SPECIALIST

## 2024-05-21 PROCEDURE — 37799 UNLISTED PX VASCULAR SURGERY: CPT

## 2024-05-21 PROCEDURE — 2580000003 HC RX 258: Performed by: STUDENT IN AN ORGANIZED HEALTH CARE EDUCATION/TRAINING PROGRAM

## 2024-05-21 PROCEDURE — C1713 ANCHOR/SCREW BN/BN,TIS/BN: HCPCS | Performed by: SPECIALIST

## 2024-05-21 PROCEDURE — 2580000003 HC RX 258: Performed by: NURSE ANESTHETIST, CERTIFIED REGISTERED

## 2024-05-21 PROCEDURE — C1874 STENT, COATED/COV W/DEL SYS: HCPCS | Performed by: SURGERY

## 2024-05-21 PROCEDURE — 2709999900 HC NON-CHARGEABLE SUPPLY: Performed by: SURGERY

## 2024-05-21 PROCEDURE — 27506 TREATMENT OF THIGH FRACTURE: CPT | Performed by: SPECIALIST

## 2024-05-21 PROCEDURE — 3700000001 HC ADD 15 MINUTES (ANESTHESIA): Performed by: SPECIALIST

## 2024-05-21 PROCEDURE — 36415 COLL VENOUS BLD VENIPUNCTURE: CPT

## 2024-05-21 PROCEDURE — 83605 ASSAY OF LACTIC ACID: CPT

## 2024-05-21 PROCEDURE — 6370000000 HC RX 637 (ALT 250 FOR IP): Performed by: SURGERY

## 2024-05-21 PROCEDURE — 0QSB04Z REPOSITION RIGHT LOWER FEMUR WITH INTERNAL FIXATION DEVICE, OPEN APPROACH: ICD-10-PCS | Performed by: SPECIALIST

## 2024-05-21 PROCEDURE — 82962 GLUCOSE BLOOD TEST: CPT

## 2024-05-21 PROCEDURE — 75635 CT ANGIO ABDOMINAL ARTERIES: CPT

## 2024-05-21 PROCEDURE — 2500000003 HC RX 250 WO HCPCS: Performed by: NURSE ANESTHETIST, CERTIFIED REGISTERED

## 2024-05-21 PROCEDURE — 30233N1 TRANSFUSION OF NONAUTOLOGOUS RED BLOOD CELLS INTO PERIPHERAL VEIN, PERCUTANEOUS APPROACH: ICD-10-PCS | Performed by: STUDENT IN AN ORGANIZED HEALTH CARE EDUCATION/TRAINING PROGRAM

## 2024-05-21 PROCEDURE — 2580000003 HC RX 258: Performed by: SPECIALIST

## 2024-05-21 PROCEDURE — 99223 1ST HOSP IP/OBS HIGH 75: CPT | Performed by: SPECIALIST

## 2024-05-21 PROCEDURE — 84132 ASSAY OF SERUM POTASSIUM: CPT

## 2024-05-21 PROCEDURE — 73552 X-RAY EXAM OF FEMUR 2/>: CPT

## 2024-05-21 PROCEDURE — 3600000012 HC SURGERY LEVEL 2 ADDTL 15MIN: Performed by: SPECIALIST

## 2024-05-21 PROCEDURE — A4217 STERILE WATER/SALINE, 500 ML: HCPCS | Performed by: SURGERY

## 2024-05-21 PROCEDURE — 2580000003 HC RX 258: Performed by: SURGERY

## 2024-05-21 PROCEDURE — 6360000002 HC RX W HCPCS: Performed by: SURGERY

## 2024-05-21 PROCEDURE — 0SRC0J9 REPLACEMENT OF RIGHT KNEE JOINT WITH SYNTHETIC SUBSTITUTE, CEMENTED, OPEN APPROACH: ICD-10-PCS | Performed by: SPECIALIST

## 2024-05-21 PROCEDURE — 6370000000 HC RX 637 (ALT 250 FOR IP): Performed by: NURSE ANESTHETIST, CERTIFIED REGISTERED

## 2024-05-21 PROCEDURE — 82330 ASSAY OF CALCIUM: CPT

## 2024-05-21 PROCEDURE — 6360000002 HC RX W HCPCS: Performed by: NURSE ANESTHETIST, CERTIFIED REGISTERED

## 2024-05-21 PROCEDURE — 3600000012 HC SURGERY LEVEL 2 ADDTL 15MIN: Performed by: SURGERY

## 2024-05-21 PROCEDURE — 3700000000 HC ANESTHESIA ATTENDED CARE: Performed by: SPECIALIST

## 2024-05-21 PROCEDURE — P9016 RBC LEUKOCYTES REDUCED: HCPCS

## 2024-05-21 PROCEDURE — 3700000000 HC ANESTHESIA ATTENDED CARE: Performed by: SURGERY

## 2024-05-21 PROCEDURE — 6360000002 HC RX W HCPCS: Performed by: SPECIALIST

## 2024-05-21 PROCEDURE — 03HY32Z INSERTION OF MONITORING DEVICE INTO UPPER ARTERY, PERCUTANEOUS APPROACH: ICD-10-PCS | Performed by: STUDENT IN AN ORGANIZED HEALTH CARE EDUCATION/TRAINING PROGRAM

## 2024-05-21 PROCEDURE — 99232 SBSQ HOSP IP/OBS MODERATE 35: CPT | Performed by: STUDENT IN AN ORGANIZED HEALTH CARE EDUCATION/TRAINING PROGRAM

## 2024-05-21 PROCEDURE — 3600000002 HC SURGERY LEVEL 2 BASE: Performed by: SPECIALIST

## 2024-05-21 PROCEDURE — 7100000001 HC PACU RECOVERY - ADDTL 15 MIN: Performed by: SPECIALIST

## 2024-05-21 PROCEDURE — C1769 GUIDE WIRE: HCPCS | Performed by: SPECIALIST

## 2024-05-21 PROCEDURE — 85025 COMPLETE CBC W/AUTO DIFF WBC: CPT

## 2024-05-21 PROCEDURE — 2709999900 HC NON-CHARGEABLE SUPPLY: Performed by: SPECIALIST

## 2024-05-21 PROCEDURE — 6370000000 HC RX 637 (ALT 250 FOR IP): Performed by: STUDENT IN AN ORGANIZED HEALTH CARE EDUCATION/TRAINING PROGRAM

## 2024-05-21 PROCEDURE — 6360000004 HC RX CONTRAST MEDICATION: Performed by: SURGERY

## 2024-05-21 PROCEDURE — 85610 PROTHROMBIN TIME: CPT

## 2024-05-21 PROCEDURE — 3600000002 HC SURGERY LEVEL 2 BASE: Performed by: SURGERY

## 2024-05-21 PROCEDURE — C1757 CATH, THROMBECTOMY/EMBOLECT: HCPCS | Performed by: SURGERY

## 2024-05-21 PROCEDURE — C1769 GUIDE WIRE: HCPCS | Performed by: SURGERY

## 2024-05-21 PROCEDURE — 85347 COAGULATION TIME ACTIVATED: CPT

## 2024-05-21 PROCEDURE — B41FZZZ FLUOROSCOPY OF RIGHT LOWER EXTREMITY ARTERIES: ICD-10-PCS | Performed by: SURGERY

## 2024-05-21 PROCEDURE — 2720000010 HC SURG SUPPLY STERILE: Performed by: SPECIALIST

## 2024-05-21 PROCEDURE — 2000000000 HC ICU R&B

## 2024-05-21 PROCEDURE — 83735 ASSAY OF MAGNESIUM: CPT

## 2024-05-21 PROCEDURE — 0QUB0KZ SUPPLEMENT RIGHT LOWER FEMUR WITH NONAUTOLOGOUS TISSUE SUBSTITUTE, OPEN APPROACH: ICD-10-PCS | Performed by: SPECIALIST

## 2024-05-21 PROCEDURE — 93925 LOWER EXTREMITY STUDY: CPT | Performed by: INTERNAL MEDICINE

## 2024-05-21 PROCEDURE — 82803 BLOOD GASES ANY COMBINATION: CPT

## 2024-05-21 PROCEDURE — 047M3FZ DILATION OF RIGHT POPLITEAL ARTERY WITH THREE INTRALUMINAL DEVICES, PERCUTANEOUS APPROACH: ICD-10-PCS | Performed by: SURGERY

## 2024-05-21 PROCEDURE — 85014 HEMATOCRIT: CPT

## 2024-05-21 PROCEDURE — C1894 INTRO/SHEATH, NON-LASER: HCPCS | Performed by: SURGERY

## 2024-05-21 PROCEDURE — C1725 CATH, TRANSLUMIN NON-LASER: HCPCS | Performed by: SURGERY

## 2024-05-21 PROCEDURE — 99223 1ST HOSP IP/OBS HIGH 75: CPT | Performed by: SURGERY

## 2024-05-21 PROCEDURE — 84295 ASSAY OF SERUM SODIUM: CPT

## 2024-05-21 PROCEDURE — 6360000004 HC RX CONTRAST MEDICATION: Performed by: PHYSICIAN ASSISTANT

## 2024-05-21 PROCEDURE — 82947 ASSAY GLUCOSE BLOOD QUANT: CPT

## 2024-05-21 PROCEDURE — 80048 BASIC METABOLIC PNL TOTAL CA: CPT

## 2024-05-21 PROCEDURE — C1776 JOINT DEVICE (IMPLANTABLE): HCPCS | Performed by: SPECIALIST

## 2024-05-21 PROCEDURE — 3700000001 HC ADD 15 MINUTES (ANESTHESIA): Performed by: SURGERY

## 2024-05-21 DEVICE — IMPLANTABLE DEVICE: Type: IMPLANTABLE DEVICE | Site: LEG | Status: FUNCTIONAL

## 2024-05-21 DEVICE — STENT PERIPH L100MM DIA7MM CATH L120CM DIA7FR 0.035IN HEP: Type: IMPLANTABLE DEVICE | Site: LEG | Status: FUNCTIONAL

## 2024-05-21 DEVICE — IMPLANTABLE DEVICE: Type: IMPLANTABLE DEVICE | Site: FEMUR | Status: FUNCTIONAL

## 2024-05-21 DEVICE — SCREW BNE L50MM DIA5MM CNDYL S STL ST VAR ANG LOK FULL THRD: Type: IMPLANTABLE DEVICE | Site: FEMUR | Status: FUNCTIONAL

## 2024-05-21 DEVICE — SCREW BNE L38MM DIA5MM CNDYL S STL ST VAR ANG LOK T25: Type: IMPLANTABLE DEVICE | Site: FEMUR | Status: FUNCTIONAL

## 2024-05-21 DEVICE — SCREW BNE L70MM DIA5MM CNDYL S STL ST VAR ANG LOK FULL THRD: Type: IMPLANTABLE DEVICE | Site: FEMUR | Status: FUNCTIONAL

## 2024-05-21 DEVICE — STENT PERIPH L5CM DIA6MM CATH L120CM 0.018IN FEM IL ART: Type: IMPLANTABLE DEVICE | Site: LEG | Status: FUNCTIONAL

## 2024-05-21 DEVICE — SCREW BNE L34MM DIA5MM CNDYL S STL ST VAR ANG LOK FULL THRD: Type: IMPLANTABLE DEVICE | Site: FEMUR | Status: FUNCTIONAL

## 2024-05-21 DEVICE — SCREW BNE L36MM DIA5MM CNDYL S STL ST VAR ANG LOK COMPR T25: Type: IMPLANTABLE DEVICE | Site: FEMUR | Status: FUNCTIONAL

## 2024-05-21 DEVICE — SCREW BNE L12MM DIA5MM CNDYL S STL ST VAR ANG LOK COMPR T25: Type: IMPLANTABLE DEVICE | Site: FEMUR | Status: FUNCTIONAL

## 2024-05-21 DEVICE — SCREW BNE L18MM DIA5MM CNDYL S STL ST VAR ANG LOK COMPR T25: Type: IMPLANTABLE DEVICE | Site: FEMUR | Status: FUNCTIONAL

## 2024-05-21 DEVICE — CABLE SURG DIA1.7MM S STL HA CERCLAGE W/ CRMP 29880101S] DEPUY SYNTHES USA]: Type: IMPLANTABLE DEVICE | Site: FEMUR | Status: FUNCTIONAL

## 2024-05-21 DEVICE — PIN FIX L4.5MM S STL CERCLAGE THRD POS: Type: IMPLANTABLE DEVICE | Site: FEMUR | Status: FUNCTIONAL

## 2024-05-21 DEVICE — GRAFT BNE 30CC 1 8MM CANC CRUSH CHIP READIGRFT: Type: IMPLANTABLE DEVICE | Site: FEMUR | Status: FUNCTIONAL

## 2024-05-21 DEVICE — PLATE BNE L301MM 14 H R CNDYL S STL CRV VAR ANG LOK COMPR: Type: IMPLANTABLE DEVICE | Site: FEMUR | Status: FUNCTIONAL

## 2024-05-21 RX ORDER — ACETAMINOPHEN 500 MG
1000 TABLET ORAL EVERY 8 HOURS SCHEDULED
Status: DISPENSED | OUTPATIENT
Start: 2024-05-21 | End: 2024-05-23

## 2024-05-21 RX ORDER — DEXAMETHASONE SODIUM PHOSPHATE 4 MG/ML
INJECTION, SOLUTION INTRA-ARTICULAR; INTRALESIONAL; INTRAMUSCULAR; INTRAVENOUS; SOFT TISSUE PRN
Status: DISCONTINUED | OUTPATIENT
Start: 2024-05-21 | End: 2024-05-21 | Stop reason: SDUPTHER

## 2024-05-21 RX ORDER — OXYCODONE HYDROCHLORIDE 5 MG/1
5 TABLET ORAL EVERY 4 HOURS PRN
Status: DISCONTINUED | OUTPATIENT
Start: 2024-05-21 | End: 2024-05-29 | Stop reason: HOSPADM

## 2024-05-21 RX ORDER — SODIUM CHLORIDE, SODIUM LACTATE, POTASSIUM CHLORIDE, CALCIUM CHLORIDE 600; 310; 30; 20 MG/100ML; MG/100ML; MG/100ML; MG/100ML
INJECTION, SOLUTION INTRAVENOUS CONTINUOUS
Status: DISCONTINUED | OUTPATIENT
Start: 2024-05-21 | End: 2024-05-21 | Stop reason: HOSPADM

## 2024-05-21 RX ORDER — FENTANYL CITRATE 50 UG/ML
INJECTION, SOLUTION INTRAMUSCULAR; INTRAVENOUS PRN
Status: DISCONTINUED | OUTPATIENT
Start: 2024-05-21 | End: 2024-05-21 | Stop reason: SDUPTHER

## 2024-05-21 RX ORDER — ONDANSETRON 2 MG/ML
INJECTION INTRAMUSCULAR; INTRAVENOUS PRN
Status: DISCONTINUED | OUTPATIENT
Start: 2024-05-21 | End: 2024-05-21 | Stop reason: SDUPTHER

## 2024-05-21 RX ORDER — IODIXANOL 320 MG/ML
INJECTION, SOLUTION INTRAVASCULAR PRN
Status: DISCONTINUED | OUTPATIENT
Start: 2024-05-21 | End: 2024-05-21

## 2024-05-21 RX ORDER — LABETALOL HYDROCHLORIDE 5 MG/ML
10 INJECTION, SOLUTION INTRAVENOUS EVERY 4 HOURS PRN
Status: DISCONTINUED | OUTPATIENT
Start: 2024-05-21 | End: 2024-05-29 | Stop reason: HOSPADM

## 2024-05-21 RX ORDER — GLYCOPYRROLATE 0.2 MG/ML
INJECTION INTRAMUSCULAR; INTRAVENOUS PRN
Status: DISCONTINUED | OUTPATIENT
Start: 2024-05-21 | End: 2024-05-21 | Stop reason: SDUPTHER

## 2024-05-21 RX ORDER — HEPARIN SODIUM 200 [USP'U]/100ML
INJECTION, SOLUTION INTRAVENOUS
Status: DISCONTINUED
Start: 2024-05-21 | End: 2024-05-21

## 2024-05-21 RX ORDER — ONDANSETRON 2 MG/ML
4 INJECTION INTRAMUSCULAR; INTRAVENOUS
Status: DISCONTINUED | OUTPATIENT
Start: 2024-05-21 | End: 2024-05-21 | Stop reason: HOSPADM

## 2024-05-21 RX ORDER — PROPOFOL 10 MG/ML
INJECTION, EMULSION INTRAVENOUS PRN
Status: DISCONTINUED | OUTPATIENT
Start: 2024-05-21 | End: 2024-05-21 | Stop reason: SDUPTHER

## 2024-05-21 RX ORDER — LIDOCAINE HYDROCHLORIDE 10 MG/ML
1 INJECTION, SOLUTION EPIDURAL; INFILTRATION; INTRACAUDAL; PERINEURAL
Status: DISCONTINUED | OUTPATIENT
Start: 2024-05-21 | End: 2024-05-21 | Stop reason: HOSPADM

## 2024-05-21 RX ORDER — ROCURONIUM BROMIDE 10 MG/ML
INJECTION, SOLUTION INTRAVENOUS PRN
Status: DISCONTINUED | OUTPATIENT
Start: 2024-05-21 | End: 2024-05-21 | Stop reason: SDUPTHER

## 2024-05-21 RX ORDER — FAMOTIDINE 20 MG/1
20 TABLET, FILM COATED ORAL ONCE
Status: COMPLETED | OUTPATIENT
Start: 2024-05-21 | End: 2024-05-21

## 2024-05-21 RX ORDER — LIDOCAINE HYDROCHLORIDE 20 MG/ML
INJECTION, SOLUTION EPIDURAL; INFILTRATION; INTRACAUDAL; PERINEURAL PRN
Status: DISCONTINUED | OUTPATIENT
Start: 2024-05-21 | End: 2024-05-21 | Stop reason: SDUPTHER

## 2024-05-21 RX ORDER — OXYCODONE HYDROCHLORIDE 5 MG/1
10 TABLET ORAL EVERY 4 HOURS PRN
Status: DISCONTINUED | OUTPATIENT
Start: 2024-05-21 | End: 2024-05-29 | Stop reason: HOSPADM

## 2024-05-21 RX ORDER — IODIXANOL 320 MG/ML
INJECTION, SOLUTION INTRAVASCULAR
Status: DISCONTINUED
Start: 2024-05-21 | End: 2024-05-21

## 2024-05-21 RX ORDER — HEPARIN SODIUM 1000 [USP'U]/ML
INJECTION, SOLUTION INTRAVENOUS; SUBCUTANEOUS PRN
Status: DISCONTINUED | OUTPATIENT
Start: 2024-05-21 | End: 2024-05-21 | Stop reason: SDUPTHER

## 2024-05-21 RX ORDER — SODIUM CHLORIDE, SODIUM LACTATE, POTASSIUM CHLORIDE, CALCIUM CHLORIDE 600; 310; 30; 20 MG/100ML; MG/100ML; MG/100ML; MG/100ML
INJECTION, SOLUTION INTRAVENOUS CONTINUOUS PRN
Status: DISCONTINUED | OUTPATIENT
Start: 2024-05-21 | End: 2024-05-21 | Stop reason: SDUPTHER

## 2024-05-21 RX ORDER — FENTANYL CITRATE 50 UG/ML
25 INJECTION, SOLUTION INTRAMUSCULAR; INTRAVENOUS EVERY 5 MIN PRN
Status: DISCONTINUED | OUTPATIENT
Start: 2024-05-21 | End: 2024-05-21 | Stop reason: HOSPADM

## 2024-05-21 RX ORDER — DEXTROSE MONOHYDRATE AND SODIUM CHLORIDE 5; .9 G/100ML; G/100ML
INJECTION, SOLUTION INTRAVENOUS CONTINUOUS
Status: DISCONTINUED | OUTPATIENT
Start: 2024-05-21 | End: 2024-05-22

## 2024-05-21 RX ORDER — HEPARIN SODIUM 200 [USP'U]/100ML
INJECTION, SOLUTION INTRAVENOUS CONTINUOUS PRN
Status: DISCONTINUED | OUTPATIENT
Start: 2024-05-21 | End: 2024-05-21

## 2024-05-21 RX ORDER — SODIUM CHLORIDE 9 MG/ML
INJECTION, SOLUTION INTRAVENOUS CONTINUOUS PRN
Status: DISCONTINUED | OUTPATIENT
Start: 2024-05-21 | End: 2024-05-21 | Stop reason: SDUPTHER

## 2024-05-21 RX ORDER — HYDROMORPHONE HYDROCHLORIDE 2 MG/ML
INJECTION, SOLUTION INTRAMUSCULAR; INTRAVENOUS; SUBCUTANEOUS PRN
Status: DISCONTINUED | OUTPATIENT
Start: 2024-05-21 | End: 2024-05-21 | Stop reason: SDUPTHER

## 2024-05-21 RX ORDER — EPHEDRINE SULFATE/0.9% NACL/PF 50 MG/5 ML
SYRINGE (ML) INTRAVENOUS PRN
Status: DISCONTINUED | OUTPATIENT
Start: 2024-05-21 | End: 2024-05-21 | Stop reason: SDUPTHER

## 2024-05-21 RX ORDER — EPHEDRINE SULFATE/0.9% NACL/PF 25 MG/5 ML
SYRINGE (ML) INTRAVENOUS PRN
Status: DISCONTINUED | OUTPATIENT
Start: 2024-05-21 | End: 2024-05-21 | Stop reason: SDUPTHER

## 2024-05-21 RX ORDER — PHENYLEPHRINE HCL IN 0.9% NACL 1 MG/10 ML
SYRINGE (ML) INTRAVENOUS PRN
Status: DISCONTINUED | OUTPATIENT
Start: 2024-05-21 | End: 2024-05-21 | Stop reason: SDUPTHER

## 2024-05-21 RX ORDER — PROTAMINE SULFATE 10 MG/ML
INJECTION, SOLUTION INTRAVENOUS PRN
Status: DISCONTINUED | OUTPATIENT
Start: 2024-05-21 | End: 2024-05-21 | Stop reason: SDUPTHER

## 2024-05-21 RX ORDER — ENOXAPARIN SODIUM 100 MG/ML
30 INJECTION SUBCUTANEOUS 2 TIMES DAILY
Status: DISCONTINUED | OUTPATIENT
Start: 2024-05-21 | End: 2024-05-29 | Stop reason: HOSPADM

## 2024-05-21 RX ORDER — NALOXONE HYDROCHLORIDE 0.4 MG/ML
INJECTION, SOLUTION INTRAMUSCULAR; INTRAVENOUS; SUBCUTANEOUS PRN
Status: DISCONTINUED | OUTPATIENT
Start: 2024-05-21 | End: 2024-05-21 | Stop reason: HOSPADM

## 2024-05-21 RX ORDER — CEFAZOLIN SODIUM 1 G/3ML
INJECTION, POWDER, FOR SOLUTION INTRAMUSCULAR; INTRAVENOUS PRN
Status: DISCONTINUED | OUTPATIENT
Start: 2024-05-21 | End: 2024-05-21 | Stop reason: SDUPTHER

## 2024-05-21 RX ORDER — MAGNESIUM HYDROXIDE 1200 MG/15ML
LIQUID ORAL CONTINUOUS PRN
Status: DISCONTINUED | OUTPATIENT
Start: 2024-05-21 | End: 2024-05-21

## 2024-05-21 RX ORDER — NEOSTIGMINE METHYLSULFATE 1 MG/ML
INJECTION, SOLUTION INTRAVENOUS PRN
Status: DISCONTINUED | OUTPATIENT
Start: 2024-05-21 | End: 2024-05-21 | Stop reason: SDUPTHER

## 2024-05-21 RX ADMIN — SODIUM CHLORIDE, PRESERVATIVE FREE 20 ML: 5 INJECTION INTRAVENOUS at 22:35

## 2024-05-21 RX ADMIN — WATER 1000 MG: 1 INJECTION INTRAMUSCULAR; INTRAVENOUS; SUBCUTANEOUS at 21:42

## 2024-05-21 RX ADMIN — ACETAMINOPHEN 1000 MG: 500 TABLET ORAL at 22:23

## 2024-05-21 RX ADMIN — ROCURONIUM BROMIDE 10 MG: 50 INJECTION INTRAVENOUS at 11:24

## 2024-05-21 RX ADMIN — LIDOCAINE HYDROCHLORIDE 80 MG: 20 INJECTION, SOLUTION EPIDURAL; INFILTRATION; INTRACAUDAL; PERINEURAL at 10:05

## 2024-05-21 RX ADMIN — LABETALOL HYDROCHLORIDE 10 MG: 5 INJECTION, SOLUTION INTRAVENOUS at 21:37

## 2024-05-21 RX ADMIN — FENTANYL CITRATE 50 MCG: 50 INJECTION INTRAMUSCULAR; INTRAVENOUS at 10:03

## 2024-05-21 RX ADMIN — FENTANYL CITRATE 25 MCG: 50 INJECTION INTRAMUSCULAR; INTRAVENOUS at 17:35

## 2024-05-21 RX ADMIN — Medication 10 MG: at 10:23

## 2024-05-21 RX ADMIN — ROCURONIUM BROMIDE 50 MG: 50 INJECTION INTRAVENOUS at 10:06

## 2024-05-21 RX ADMIN — Medication 10 MG: at 10:56

## 2024-05-21 RX ADMIN — HYDROMORPHONE HYDROCHLORIDE 0.4 MG: 2 INJECTION INTRAMUSCULAR; INTRAVENOUS; SUBCUTANEOUS at 11:06

## 2024-05-21 RX ADMIN — HYDROMORPHONE HYDROCHLORIDE 0.4 MG: 2 INJECTION INTRAMUSCULAR; INTRAVENOUS; SUBCUTANEOUS at 14:15

## 2024-05-21 RX ADMIN — HYDROMORPHONE HYDROCHLORIDE 0.2 MG: 2 INJECTION INTRAMUSCULAR; INTRAVENOUS; SUBCUTANEOUS at 12:39

## 2024-05-21 RX ADMIN — HEPARIN SODIUM 1000 UNITS: 1000 INJECTION, SOLUTION INTRAVENOUS; SUBCUTANEOUS at 18:47

## 2024-05-21 RX ADMIN — DEXTROSE AND SODIUM CHLORIDE: 5; 900 INJECTION, SOLUTION INTRAVENOUS at 21:19

## 2024-05-21 RX ADMIN — SODIUM CHLORIDE, SODIUM LACTATE, POTASSIUM CHLORIDE, AND CALCIUM CHLORIDE: 600; 310; 30; 20 INJECTION, SOLUTION INTRAVENOUS at 17:25

## 2024-05-21 RX ADMIN — SODIUM CHLORIDE, POTASSIUM CHLORIDE, SODIUM LACTATE AND CALCIUM CHLORIDE: 600; 310; 30; 20 INJECTION, SOLUTION INTRAVENOUS at 08:34

## 2024-05-21 RX ADMIN — PROPOFOL 40 MG: 10 INJECTION, EMULSION INTRAVENOUS at 10:42

## 2024-05-21 RX ADMIN — ATORVASTATIN CALCIUM 20 MG: 20 TABLET, FILM COATED ORAL at 22:24

## 2024-05-21 RX ADMIN — EPHEDRINE SULFATE 5 MG: 5 INJECTION INTRAVENOUS at 17:53

## 2024-05-21 RX ADMIN — Medication 100 MCG: at 18:10

## 2024-05-21 RX ADMIN — SODIUM CHLORIDE, SODIUM LACTATE, POTASSIUM CHLORIDE, AND CALCIUM CHLORIDE: 600; 310; 30; 20 INJECTION, SOLUTION INTRAVENOUS at 13:35

## 2024-05-21 RX ADMIN — DEXAMETHASONE SODIUM PHOSPHATE 4 MG: 4 INJECTION INTRA-ARTICULAR; INTRALESIONAL; INTRAMUSCULAR; INTRAVENOUS; SOFT TISSUE at 10:25

## 2024-05-21 RX ADMIN — FENTANYL CITRATE 25 MCG: 50 INJECTION INTRAMUSCULAR; INTRAVENOUS at 19:26

## 2024-05-21 RX ADMIN — FAMOTIDINE 20 MG: 20 TABLET ORAL at 08:34

## 2024-05-21 RX ADMIN — HEPARIN SODIUM 1000 UNITS: 1000 INJECTION, SOLUTION INTRAVENOUS; SUBCUTANEOUS at 18:52

## 2024-05-21 RX ADMIN — TRANEXAMIC ACID 1 G: 100 INJECTION, SOLUTION INTRAVENOUS at 13:14

## 2024-05-21 RX ADMIN — CEFAZOLIN 2 G: 330 INJECTION, POWDER, FOR SOLUTION INTRAMUSCULAR; INTRAVENOUS at 17:56

## 2024-05-21 RX ADMIN — EPHEDRINE SULFATE 10 MG: 5 INJECTION INTRAVENOUS at 17:40

## 2024-05-21 RX ADMIN — HYDROMORPHONE HYDROCHLORIDE 0.2 MG: 2 INJECTION INTRAMUSCULAR; INTRAVENOUS; SUBCUTANEOUS at 13:46

## 2024-05-21 RX ADMIN — EPHEDRINE SULFATE 5 MG: 5 INJECTION INTRAVENOUS at 17:48

## 2024-05-21 RX ADMIN — HYDROMORPHONE HYDROCHLORIDE 0.4 MG: 2 INJECTION INTRAMUSCULAR; INTRAVENOUS; SUBCUTANEOUS at 14:06

## 2024-05-21 RX ADMIN — FENTANYL CITRATE 25 MCG: 50 INJECTION INTRAMUSCULAR; INTRAVENOUS at 19:46

## 2024-05-21 RX ADMIN — ROCURONIUM BROMIDE 10 MG: 50 INJECTION INTRAVENOUS at 12:31

## 2024-05-21 RX ADMIN — HYDROMORPHONE HYDROCHLORIDE 0.2 MG: 2 INJECTION INTRAMUSCULAR; INTRAVENOUS; SUBCUTANEOUS at 11:45

## 2024-05-21 RX ADMIN — IOPAMIDOL 100 ML: 755 INJECTION, SOLUTION INTRAVENOUS at 16:08

## 2024-05-21 RX ADMIN — WATER 2000 MG: 1 INJECTION, SOLUTION INTRAMUSCULAR; INTRAVENOUS; SUBCUTANEOUS at 10:15

## 2024-05-21 RX ADMIN — Medication 50 MCG: at 17:43

## 2024-05-21 RX ADMIN — SODIUM CHLORIDE, SODIUM LACTATE, POTASSIUM CHLORIDE, AND CALCIUM CHLORIDE: 600; 310; 30; 20 INJECTION, SOLUTION INTRAVENOUS at 19:20

## 2024-05-21 RX ADMIN — ONDANSETRON 4 MG: 2 INJECTION INTRAMUSCULAR; INTRAVENOUS at 13:33

## 2024-05-21 RX ADMIN — SUGAMMADEX 200 MG: 100 INJECTION, SOLUTION INTRAVENOUS at 19:57

## 2024-05-21 RX ADMIN — FENTANYL CITRATE 25 MCG: 50 INJECTION INTRAMUSCULAR; INTRAVENOUS at 19:22

## 2024-05-21 RX ADMIN — ROCURONIUM BROMIDE 50 MG: 10 INJECTION, SOLUTION INTRAVENOUS at 17:36

## 2024-05-21 RX ADMIN — PROPOFOL 25 MG: 10 INJECTION, EMULSION INTRAVENOUS at 14:05

## 2024-05-21 RX ADMIN — HYDROMORPHONE HYDROCHLORIDE 0.2 MG: 2 INJECTION INTRAMUSCULAR; INTRAVENOUS; SUBCUTANEOUS at 13:52

## 2024-05-21 RX ADMIN — PROPOFOL 120 MG: 10 INJECTION, EMULSION INTRAVENOUS at 10:05

## 2024-05-21 RX ADMIN — ENOXAPARIN SODIUM 30 MG: 100 INJECTION SUBCUTANEOUS at 23:47

## 2024-05-21 RX ADMIN — PROPOFOL 100 MG: 10 INJECTION, EMULSION INTRAVENOUS at 17:35

## 2024-05-21 RX ADMIN — ONDANSETRON 4 MG: 2 INJECTION INTRAMUSCULAR; INTRAVENOUS at 19:45

## 2024-05-21 RX ADMIN — FENTANYL CITRATE 25 MCG: 50 INJECTION INTRAMUSCULAR; INTRAVENOUS at 10:36

## 2024-05-21 RX ADMIN — FENTANYL CITRATE 25 MCG: 50 INJECTION INTRAMUSCULAR; INTRAVENOUS at 10:54

## 2024-05-21 RX ADMIN — Medication 100 MCG: at 18:16

## 2024-05-21 RX ADMIN — SODIUM CHLORIDE: 9 INJECTION, SOLUTION INTRAVENOUS at 18:35

## 2024-05-21 RX ADMIN — HEPARIN SODIUM 5000 UNITS: 1000 INJECTION, SOLUTION INTRAVENOUS; SUBCUTANEOUS at 18:15

## 2024-05-21 RX ADMIN — GLYCOPYRROLATE 0.4 MG: 0.2 INJECTION INTRAMUSCULAR; INTRAVENOUS at 13:32

## 2024-05-21 RX ADMIN — LIDOCAINE HYDROCHLORIDE 20 MG: 20 INJECTION, SOLUTION EPIDURAL; INFILTRATION; INTRACAUDAL; PERINEURAL at 17:35

## 2024-05-21 RX ADMIN — SODIUM CHLORIDE, SODIUM LACTATE, POTASSIUM CHLORIDE, AND CALCIUM CHLORIDE: 600; 310; 30; 20 INJECTION, SOLUTION INTRAVENOUS at 09:56

## 2024-05-21 RX ADMIN — PROTAMINE SULFATE 15 MG: 10 INJECTION, SOLUTION INTRAVENOUS at 19:17

## 2024-05-21 RX ADMIN — NEOSTIGMINE METHYLSULFATE 3 MG: 1 INJECTION, SOLUTION INTRAVENOUS at 13:32

## 2024-05-21 ASSESSMENT — PAIN SCALES - GENERAL
PAINLEVEL_OUTOF10: 0

## 2024-05-21 ASSESSMENT — PAIN - FUNCTIONAL ASSESSMENT: PAIN_FUNCTIONAL_ASSESSMENT: 0-10

## 2024-05-21 NOTE — ANESTHESIA PROCEDURE NOTES
Arterial Line:    An arterial line was placed using surface landmarks, in the OR for the following indication(s): continuous blood pressure monitoring.    A 20 gauge (size), 4.45 cm (length), Arrow (type) catheter was placed, into the right radial artery, secured by Tegaderm and tape.  Anesthesia type: General    Events:  patient tolerated procedure well with no complications.5/21/2024 6:24 PM  Resident/CRNA: Scarlet Hicks APRN - CRNA  Performed: Resident/CRNA   Preanesthetic Checklist  Completed: patient identified, IV checked, site marked, risks and benefits discussed, surgical/procedural consents, equipment checked, pre-op evaluation, timeout performed, anesthesia consent given, oxygen available, monitors applied/VS acknowledged, fire risk safety assessment completed and verbalized and blood product R/B/A discussed and consented

## 2024-05-21 NOTE — PROGRESS NOTES
Unable to complete shift assessment on patient. Pt was present for shift change and was taken down to OR shortly after shift change. Pt currently still in OR.

## 2024-05-21 NOTE — PROGRESS NOTES
Diminished pulses, and cap refill R foot in PACU  Reason for Exam  Priority: STAT  Diminished pulses, leg   Dx: Status post right hip replacement [Z96.641 (ICD-10-CM)]     PACS Images     Show images for Vascular duplex lower extremity arteries bilateral  Interpretation Summary         Right side findings: This is critically decreased.    Left side findings: This is within the normal range.     Lester Leach present during exam     Poorly biphasic doppler flow noted Right Mid to distal Femoral Artery      Right occlusion of the Popliteal artery with thrombus noted.     Right occlusion of posterior tibial artery and dorsalis pedis artery     Left by doppler analysis triphasic arterial flow  Discussed with pt, her son & Dr. Van  Return to CVT OR for emergency expl and thrombectomy  Appreciate vascuar input

## 2024-05-21 NOTE — CONSULTS
Vascular Surgery       History:   82 y/o F admitted 2 days ago with a hx of CVA admitted with a RLE femur fx. She had a known hx of a chronic femur stress fracture, however she fell from standing and the fracture worsened. It was noted as completely displaced and overriding. Of note she has a hx of a RLE TKR and THR in the past.     No known hx of PAD. No atrial fibrillation. She was taken to the OR today by orthopedics for R distal femur open reduction and internal fixation. Postop she was found to have diminished RLE perfusion and arterial studies were ordered. I spoke with her surgeon and there was quite a bit of traction required to reduce the fracture. No other notable findings from the operation. He noted that the coolness of the RLE was noted immediately postop which is when we were contacted, which was approx 3pm.     The pt is currently awakening from anesthesia however she notes that her foot feels \"not right.\" There is no pain. I also spoke to her . No hx of arterial disease.        Past Medical History:   Diagnosis Date    Constipation     History of right hip replacement     History of total right knee replacement     Hypercholesteremia     Hypertension     no meds now    Microscopic hematuria     MRSA (methicillin resistant staph aureus) culture positive 06/2018    On abdominal wall- tx with Vibramycin  (care everywhere)    Osteoporosis     Other fracture of right femur, initial encounter for closed fracture (HCC)     Severe scoliosis     Stroke (HCC) not sure when    blurred vision, resolved (taking plavix)     MOHSEN (stress urinary incontinence, female)     UTI (urinary tract infection)      Past Surgical History:   Procedure Laterality Date    CATARACT REMOVAL Bilateral     CHOLECYSTECTOMY  1972    HYSTERECTOMY (CERVIX STATUS UNKNOWN)  09/2016    total    OTHER SURGICAL HISTORY  01/2019    basal skin ca removed     OTHER SURGICAL HISTORY  2005, 2015    skin ca removed     OTHER SURGICAL HISTORY  K 3.5 05/21/2024 04:32 AM     05/21/2024 04:32 AM    CO2 25 05/21/2024 04:32 AM    BUN 13 05/21/2024 04:32 AM    CREATININE 0.61 05/21/2024 04:32 AM    GLUCOSE 98 05/21/2024 04:32 AM    CALCIUM 8.4 05/21/2024 04:32 AM    LABGLOM 89 05/21/2024 04:32 AM        Imaging/Studies:   RLE arterial duplex: Occluded R popliteal artery. No apparent flow distally.   Normal findings of the LLE    CTA a/p with BLE runoff: Occlusion of the R SFA in the mid segment. Thrombus present into the popliteal artery and outflow. Normal LLE arterial perfusion.         IMPRESSION:   S/p R distal femur ORIF with acute postop SFA and popliteal artery occlusion.       PLAN:   OR for RLE arterial thrombectomy, angiography, possible bypass, possible fasciotomy.   I discussed this with the pt and her son at length. Risks, benefits and alternatives were discussed with the patient and her son including but not limited to bleeding, infection, injury to surrounding structures including nerves and/or other organs, scar tissue accumulation, need for surveillance, need for further procedures, need for further operations including possible fasciotomy closure.  The patient's son expressed understanding after the opportunity to ask questions, and consented to the procedure for his mother.       Lawanda Van MD  Vascular Surgeon  [facility]

## 2024-05-21 NOTE — ANESTHESIA PRE PROCEDURE
AM    AST 25 05/19/2024 11:43 AM    ALT 34 05/19/2024 11:43 AM       POC Tests: No results for input(s): \"POCGLU\", \"POCNA\", \"POCK\", \"POCCL\", \"POCBUN\", \"POCHEMO\", \"POCHCT\" in the last 72 hours.    Coags:   Lab Results   Component Value Date/Time    PROTIME 13.3 05/19/2024 11:43 AM    INR 1.0 05/19/2024 11:43 AM       HCG (If Applicable): No results found for: \"PREGTESTUR\", \"PREGSERUM\", \"HCG\", \"HCGQUANT\"     ABGs: No results found for: \"PHART\", \"PO2ART\", \"NZF3KGW\", \"ABZ0SDS\", \"BEART\", \"S9YRQAOR\"     Type & Screen (If Applicable):  No results found for: \"LABABO\"    Drug/Infectious Status (If Applicable):  No results found for: \"HIV\", \"HEPCAB\"    COVID-19 Screening (If Applicable): No results found for: \"COVID19\"        Anesthesia Evaluation  Patient summary reviewed  Airway: Mallampati: III  TM distance: >3 FB   Neck ROM: limited  Mouth opening: > = 3 FB   Dental:    (+) poor dentition      Pulmonary:Negative Pulmonary ROS breath sounds clear to auscultation                             Cardiovascular:  Exercise tolerance: good (>4 METS)  (+) hypertension: moderate        Rhythm: regular  Rate: normal                    Neuro/Psych:   (+) CVA:            GI/Hepatic/Renal:             Endo/Other:    (+) : arthritis:..                 Abdominal:             Vascular:   + PVD, aortic or cerebral.       Other Findings:       Anesthesia Plan      general     ASA 3 - emergent       Induction: intravenous.      Anesthetic plan and risks discussed with patient.                    MICHAEL CHAPPELL MD   5/21/2024

## 2024-05-21 NOTE — BRIEF OP NOTE
Brief Postoperative Note      Patient: Juanpablo Jackson  YOB: 1940  MRN: [id number]    Date of Procedure: 5/21/2024    Pre-Op Diagnosis Codes:     * Closed fracture of distal end of right femur, unspecified fracture morphology, initial encounter ([facility]) [S72.401A]    Post-Op Diagnosis: Same       Procedure(s):  RIGHT DISTAL FEMUR OPEN REDUCTION INTERNAL FIXATION/ FRACTURE TABLE/ C-ARM/ [DEPUY SYNTHES ORTHO] SYNTHES CONDYLAR PLATE    Surgeon(s):  Lars Harden MD    Assistant:  Surgical Assistant: Anup Zaman    Anesthesia: General    Estimated Blood Loss (mL): 300     Complications: None    Specimens:   * No specimens in log *    Implants:  Implant Name Type Inv. Item Serial No.  Lot No. LRB No. Used Action   PLATE BNE L301MM 14 H R CNDYL S STL CRV DALJIT ANG HILDA COMPR - [id number]  PLATE BNE L301MM 14 H R CNDYL S STL CRV DALJIT ANG HILDA COMPR [id number] DEPUY SY[facility]THES [facility] [id number] Right 1 Implanted   SCREW BNE L30MM DIA5MM CNDYL S STL ST DALJIT ANG WESLEY HILDA FULL - [id number]  SCREW BNE L30MM DIA5MM CNDYL S STL ST DALJIT ANG WESLEY HILDA FULL [id number] DEPUY SYNTHES [place] [id number] Right 1 Implanted   SCREW BNE L12MM DIA5MM CNDYL S STL ST DALJIT ANG HILDA COMPR T25 - [id number]  SCREW BNE L12MM DIA5MM CNDYL S STL ST DALJIT ANG HILDA COMPR T25 [id number] DEPUY SYNTHES [employer] [id number] Right 1 Implanted   SCREW BNE L70MM DIA5MM CNDYL S STL ST DALJIT ANG HILDA FULL THRD - [id number]  SCREW BNE L70MM DIA5MM CNDYL S STL ST DALJIT ANG HILDA FULL THRD [id number] DEPUY SYNTHES [place] [id number] Right 1 Implanted   SCREW BNE L65MM DIA5MM CNDYL S STL ST DALJIT ANG WESLEY HILDA FULL - [id number]  SCREW BNE L65MM DIA5MM CNDYL S STL ST DALJIT ANG WESLEY HILDA FULL [id number] DEPUY SYNTHES [facility] [id number] Right 1 Implanted   SCREW BNE L70MM DIA5MM S STL SELF DRL DALJIT ANG WESLEY STEV[place] FULL - [id number]  SCREW BNE L70MM DIA5MM S STL SELF DRL DALJIT ANG WESLEY HILDA FULL [id number] [employer] [place] [id number] Right 1 Implanted   SCREW BNE L50MM DIA5MM CNDYL S STL ST DALJIT ANG HILDA FULL  THRD - [id number]  SCREW BNE L50MM DIA5MM CNDYL S STL ST DALJIT ANG HILDA FULL THRD [id number] DEPUY [employer] [place] [id number] Right 1 Implanted   SCREW BNE L18MM DIA5MM CNDYL S STL ST DALJIT ANG HILDA COMPR T25 - [id number]  SCREW BNE L18MM DIA5MM CNDYL S STL ST DALJIT ANG HILDA COMPR T25 [id number] DEPUY [employer] [place] [id number] Right 1 Implanted   CABLE SURG DIA1.7MM S STL HA CERCLAGE W/ CRMP [id number]] DEPUY SYNTHES [facility]] - [id number]  CABLE SURG DIA1.7MM S STL HA CERCLAGE W/ CRMP [id number]] DEPUY Mark One [facility]]  [employer] [place] [id number] Right 1 Implanted   PIN FIX L4.5MM S STL CERCLAGE THRD POS - [id number]  PIN FIX L4.5MM S STL CERCLAGE THRD POS  Gear[employer] [place] [id number] Right 1 Implanted   CABLE SURG DIA1.7MM S STL HA CERCLAGE W/ CRMP [id number]] DEPUY SYNTHES [facility]] - [id number]  CABLE SURG DIA1.7MM S STL HA CERCLAGE W/ CRMP [id number]] DEPUY Mark One [facility]]  [employer] [place] [id number] Right 1 Implanted   SCREW BNE L42MM DIA5MM CNDYL S STL ST DALJIT ANG HILDA T25 - [id number]  SCREW BNE L42MM DIA5MM CNDYL S STL ST DALJIT ANG HILDA T25  DEPYellowBrck [place] 0000 Right 1 Implanted   SCREW BNE L38MM DIA5MM CNDYL S STL ST DALJIT ANG HILDA T25 - [id number]  SCREW BNE L38MM DIA5MM CNDYL S STL ST DALJIT ANG HILDA T25  DEPUY [employer] [place] 0000 Right 1 Implanted   SCREW BNE L34MM DIA5MM CNDYL S STL ST DALJIT ANG HILDA FULL THRD - [id number]  SCREW BNE L34MM DIA5MM CNDYL S STL ST DALJIT ANG HILDA FULL THRD  DEPUY [employer] [place] 0000 Right 3 Implanted   SCREW BNE L36MM DIA5MM CNDYL S STL ST DALJIT ANG HILDA COMPR T25 - [id number]  SCREW BNE L36MM DIA5MM CNDYL S STL ST DALJIT ANG HILDA COMPR T25  DEPUY [employer] One [place] 0000 Right 1 Implanted   GRAFT BNE 30CC 1 8MM CANC CRUSH CHIP READIGRFT - [id number]  GRAFT BNE 30CC 1 8MM CANC CRUSH CHIP READIGRFT [id number] [facility] TISSUE [facility]-WD  Right 1 Implanted         Drains:   Urinary Catheter 05/19/24 Naqvi (Active)   Catheter Indications Perioperative use for selected surgical procedures 05/21/24 0548   Site Assessment No

## 2024-05-21 NOTE — PERIOP NOTE
TRANSFER - IN REPORT:    Verbal report received from GLADYS Muhammad on Juanpablo Jackson  being received from Room 501 for ordered procedure      Report consisted of patient's Situation, Background, Assessment and   Recommendations(SBAR).     Information from the following report(s) Nurse Handoff Report was reviewed with the receiving nurse.    Opportunity for questions and clarification was provided.      Assessment completed upon patient's arrival to unit and care assumed.

## 2024-05-21 NOTE — OP NOTE
Operative Note      Patient: Juanpablo Jackson     Date of Surgery: 5/21/2024         YOB: 1940      Age:  83 y.o.        LOS:  LOS: 2 days     Preoperative Diagnosis:  Closed fracture of distal end of right femur, unspecified fracture morphology, initial encounter ([facility]) [S72.401A], fracture nonunion, Periprosthetic fracture    Postoperative Diagnosis:  Same     Surgeon:  Lars Harden MD     Assistant:  Dwain Cross    Anesthesia:  General anesthesia    Procedure:  Procedure(s):  RIGHT DISTAL FEMUR OPEN REDUCTION INTERNAL FIXATION/ FRACTURE TABLE/ C-ARM/ [DEPHSTYLE SYNTHES ORTHO] SYNTHES CONDYLAR PLATE, cancellous bone allograft    Time out performed: YES    Estimated Blood Loss:  350 cc           Implants:    Implant Name Type Inv. Item Serial No.  Lot No. LRB No. Used Action   PLATE BNE L301MM 14 H R CNDYL S STL CRV DALJIT ANG HILDA COMPR - [id number]  PLATE BNE L301MM 14 H R CNDYL S STL CRV DALJIT ANG HILDA COMPR [id number] DEPUY SYNTHES [place] [id number] Right 1 Implanted   SCREW BNE L30MM DIA5MM CNDYL S STL ST DALJIT ANG WESLEY HILDA FULL - [id number]  SCREW BNE L30MM DIA5MM CNDYL S STL ST DALJIT ANG W[place]LEY HILDA FULL [id number] DEPUY SYNTHES [place] [id number] Right 1 Implanted   SCREW BNE L12MM DIA5MM CNDYL S STL ST DALJIT ANG HILDA COMPR T25 - [id number]  SCREW BNE L12MM DIA5MM CNDYL S STL ST DALJIT ANG HILDA COMPR T25 [id number] DEPUY SYNTHES [place] [id number] Right 1 Implanted   SCREW BNE L70MM DIA5MM CNDYL S STL ST DALJIT ANG HILDA FULL THRD - [id number]  SCREW BNE L70MM DIA5MM CNDYL S STL ST DALJIT ANG HILDA FULL THRD [id number] DEPUY SYNTHES [place] [id number] Right 1 Implanted   SCREW BNE L65MM DIA5MM CNDYL S STL ST DALJIT ANG WESLEY HILDA FULL - [id number]  SCREW BNE L65MM DIA5MM CNDYL S STL ST DALJIT ANG WESLEY HILDA FULL [id number] DEPUY SYNTHES [place] [id number] Right 1 Implanted   SCREW BNE L70MM DIA5MM S STL SELF DRL DALJIT ANG WESLEY HLIDA FULL - [id number]  SCREW BNE L70MM DIA5MM S STL SELF DRL DALJIT ANG WESLEY HILDA FULL [id number] [facility]- [id number]  PROCEDURE:  Under satisfactory general anesthesia patient was positioned supine on the fracture table with her leg in padded stirrup traction. The femoral fracture site was visualized and reduced with traction and rotation. The patient's right lower extremity was prepped with ChloraPrep and draped for femur surgery.  An incision was made over the lateral aspect of the patient's right thigh from the knee to just below the trochanter.  The incision was carried down through the subcutaneous tissue to the underlying fascia.  The fascia was incised in the direction of the incision.  The fracture site was identified and reduced further with bone clamps.  A 14 hole Synthes locking condylar plate was applied to the reduced fracture site.  The ames plate was positioned using the C arm image intensifier.  A single distal screw was inserted.  A second screw was placed proximally centering the plate over the bone in the proximal femur region.  [place] clamps were used to adjust the fracture to the plate at the fracture site.  The distal portion of the plate was then fixed to the bone using locking screws.  The proximal portion was fixed with 2 Synthes cerclage cables and more locking screws.  Cancellous bone allograft was placed at the fracture site.  Adequacy of reduction and fixation was verified using the C-arm image intensifier in 2 views. The wound was irrigated thoroughly with Irrisept and normal saline via Pulsavac lavage.  Closure was obtained using #1 Stratafix for the fascia, 0 Stratafix for the subcutaneous tissue and staples for the skin. The patient was transported back to the recovery room in good condition. Sponge and needle count was correct.  The surgery was made more difficult by some existing callus formation at the apparent nonunion site, the hip replacement above the fracture site, the knee replacement below the fracture site and the fact that the patient had been on Plavix preoperatively.

## 2024-05-21 NOTE — PROGRESS NOTES
PT order received and chart reviewed. Unable to see patient for PT evaluation as she is in the OR for right hip fracture repair. Please update activity level and WB status as appropriate. Physical therapy will follow up after procedure. Thank you.   Zena Brooke PT, DPT

## 2024-05-21 NOTE — ANESTHESIA PRE PROCEDURE
Department of Anesthesiology  Preprocedure Note       Name:  Juanpablo Jackson   Age:  83 y.o.  :  1940                                          MRN:  [id number]         Date:  2024      Surgeon: Surgeon(s):  Lars Harden MD    Procedure: Procedure(s):  RIGHT DISTAL FEMUR OPEN REDUCTION INTERNAL FIXATION/ FRACTURE TABLE/ C-ARM/ [DEPUY SYNTHES ORTHO] SYNTHES CONDYLAR PLATE    Medications prior to admission:   Prior to Admission medications    Medication Sig Start Date End Date Taking? Authorizing Provider   diclofenac sodium (VOLTAREN) 1 % GEL Apply 4 g topically 4 times daily 23   Koby Beltre PA-C   acetaminophen (TYLENOL) 325 MG tablet Take 2 tablets by mouth every 6 hours as needed    Automatic Reconciliation, Ar   amLODIPine (NORVASC) 5 MG tablet Take 1 tablet by mouth daily 19   Automatic Reconciliation, Ar   clopidogrel (PLAVIX) 75 MG tablet Take 1 tablet by mouth daily    Automatic Reconciliation, Ar   polyethylene glycol (GLYCOLAX) 17 GM/SCOOP powder Take 17 g by mouth daily as needed    Automatic Reconciliation, Ar   simvastatin (ZOCOR) 40 MG tablet Take 1 tablet by mouth    Automatic Reconciliation, Ar   SODIUM FLUORIDE, DENTAL GEL, 1.1 % GEL BRUSH ON PASTE AS DIRECTED EVERY DAY 18   Automatic Reconciliation, Ar       Current medications:    Current Facility-Administered Medications   Medication Dose Route Frequency Provider Last Rate Last Admin   • lactated ringers IV soln infusion   IntraVENous Continuous Scarlet Watson APRN - CRNA 125 mL/hr at 24 0834 New Bag at 24 0834   • lidocaine PF 1 % injection 1 mL  1 mL IntraDERmal Once PRN Scarlet Watson APRN - CRNA       • ceFAZolin (ANCEF) 2,000 mg in sterile water 20 mL IV syringe  2,000 mg IntraVENous On Call to OR Lars Harden MD       • sodium chloride flush 0.9 % injection 5-40 mL  5-40 mL IntraVENous 2 times per day Mirlande Graff, DO   10 mL at 24 6189   • sodium chloride flush 0.9 %

## 2024-05-21 NOTE — CONSULTS
Consult    Patient: Juanpablo Jackson MRN: [id number]  SSN: [id number]    YOB: 1940  Age: 83 y.o.  Sex: female      Subjective:      Juanpablo Jackson is a 83 y.o. female referred by the ED provider for right thigh pain and deformity status post fall.  Patient had been followed in our office by MARGARITA Beltre for distal third right femur insufficiency fracture.  Nonsurgical treatment was chosen and she was using a bone stimulator.  Patient fell at home 2 days ago.  X-rays in ED revealed a displaced and overriding distal third femur fracture through the site of her poorly healing insufficiency stress fracture.  She has an ipsilateral hip and knee replacement performed by Dr. Griffin in the past.  Plavix has been discontinued.    Past Medical History:   Diagnosis Date    Constipation     History of right hip replacement     History of total right knee replacement     Hypercholesteremia     Hypertension     no meds now    Microscopic hematuria     MRSA (methicillin resistant staph aureus) culture positive 06/2018    On abdominal wall- tx with Vibramycin  (care everywhere)    Osteoporosis     Other fracture of right femur, initial encounter for closed fracture (HCC)     Severe scoliosis     Stroke (HCC) not sure when    blurred vision, resolved (taking plavix)     MOHSEN (stress urinary incontinence, female)     UTI (urinary tract infection)      Past Surgical History:   Procedure Laterality Date    CATARACT REMOVAL Bilateral     CHOLECYSTECTOMY  1972    HYSTERECTOMY (CERVIX STATUS UNKNOWN)  09/2016    total    OTHER SURGICAL HISTORY  01/2019    basal skin ca removed     OTHER SURGICAL HISTORY  2005, 2015    skin ca removed     OTHER SURGICAL HISTORY  09/2016    bladder support    TONSILLECTOMY  1946    TOTAL KNEE ARTHROPLASTY Right 04/27/2015      Family History   Problem Relation Age of Onset    Pancreatic Cancer Father 33    Ovarian Cancer Daughter 59    Cancer Maternal Aunt      Social History

## 2024-05-21 NOTE — PROGRESS NOTES
Pt belongings taken to CVT ICU. Left on nurses stations with RN. Report given to RN w/ information from prior to pt going to surgery.

## 2024-05-21 NOTE — INTERVAL H&P NOTE
Update History & Physical    The patient's History and Physical of May 21, 2024 was reviewed with the patient and I examined the patient. There was no change. The surgical site was confirmed by the patient and me.     Plan: The risks, benefits, expected outcome, and alternative to the recommended procedure have been discussed with the patient. Patient understands and wants to proceed with the procedure.     Electronically signed by Lars Harden MD on 5/21/2024 at 9:18 AM

## 2024-05-21 NOTE — PERIOP NOTE
upon assessment, pts right foot noted to be pale and cold with no cap refill. Pedal and post tibial pulse unable to be palpated. Post-tibial pulse present with use of doppler. Unable to obtain doppler of pedal pulse. Dr. Harden notified. Color marginally improving but capillary refill greater than 3 seconds. Order for US doppler.     Pt escorted to duplex/doppler study and then to CTA by MARGARITA Santana at bedside.

## 2024-05-21 NOTE — PERIOP NOTE
TRANSFER - IN REPORT:    Verbal report received from Laura GRAHAM on Juanpablo Jackson  being received from [facility] for ordered procedure      Report consisted of patient's Situation, Background, Assessment and   Recommendations(SBAR).     Information from the following report(s) Nurse Handoff Report was reviewed with the receiving nurse.    Opportunity for questions and clarification was provided.      Assessment completed upon patient's arrival to unit and care assumed.

## 2024-05-21 NOTE — PROGRESS NOTES
[facility] Hospitalist Group  Progress Note    Patient: Juanpablo Jackson Age: 83 y.o. : 1940 MR#: [id number] SSN: [id number]  Date/Time: 2024    Subjective:   Subjective:  Symptoms:  Stable.  No shortness of breath, cough, chest pain, weakness, headache, chest pressure, diarrhea or anxiety.    Diet:  Adequate intake.  No nausea or vomiting.    Activity level: Normal.    Pain:  She reports no pain.       No acute events overnight, no new concerns or complaints, vitals stable.    Review of Systems   Constitutional: Negative for chills, fever and malaise/fatigue.   HENT:  Negative for sore throat.    Eyes:  Negative for pain, photophobia and visual disturbance.   Cardiovascular:  Negative for chest pain, irregular heartbeat, palpitations and syncope.   Respiratory:  Negative for cough, shortness of breath and wheezing.    Skin:  Negative for color change, dry skin and rash.   Gastrointestinal:  Negative for abdominal pain, constipation, diarrhea, hematochezia, melena, nausea and vomiting.   Neurological:  Negative for disturbances in coordination, dizziness, focal weakness, headaches and weakness.   Psychiatric/Behavioral:  Negative for altered mental status, depression, hallucinations and suicidal ideas.       Assessment/Plan:   Distal left femur fracture  History chronic stress fracture left femur  History CVA  Hypertension  Osteoporosis  HLD    Plan  Surgery today, standard post hip repair treatment afterwards including PT OT early mobilization pain control as needed and referral to SNF versus ARU    Disposition: Expected location: To be determined, ARU consulted    Expected timeframe: Two or more days before discharge       Case discussed with:  [x]Patient  []Family  [x]Nursing  []Case Management  DVT Prophylaxis:  []Lovenox  [x]Hep SQ  []SCDs  []Coumadin   []On Heparin gtt   [] DOAC    Objective:   Objective:  General Appearance:  Comfortable, well-appearing, in no acute

## 2024-05-21 NOTE — PROGRESS NOTES
OT orders received and medical chart review completed.   Pt LUKASZ for surgical intervention of (R) hip fx. Please update per Ortho for weightbearing status and activity limitations for ADLs and functional mobility. OT to follow.

## 2024-05-22 ENCOUNTER — ANESTHESIA EVENT (OUTPATIENT)
Facility: HOSPITAL | Age: 84
End: 2024-05-22
Payer: MEDICARE

## 2024-05-22 LAB
ANION GAP SERPL CALC-SCNC: 5 MMOL/L (ref 3–18)
BASOPHILS # BLD: 0 K/UL (ref 0–0.1)
BASOPHILS NFR BLD: 0 % (ref 0–2)
BUN SERPL-MCNC: 11 MG/DL (ref 7–18)
BUN/CREAT SERPL: 22 (ref 12–20)
CALCIUM SERPL-MCNC: 7.2 MG/DL (ref 8.5–10.1)
CHLORIDE SERPL-SCNC: 108 MMOL/L (ref 100–111)
CO2 SERPL-SCNC: 23 MMOL/L (ref 21–32)
CREAT SERPL-MCNC: 0.5 MG/DL (ref 0.6–1.3)
DIFFERENTIAL METHOD BLD: ABNORMAL
EOSINOPHIL # BLD: 0 K/UL (ref 0–0.4)
EOSINOPHIL NFR BLD: 0 % (ref 0–5)
ERYTHROCYTE [DISTWIDTH] IN BLOOD BY AUTOMATED COUNT: 13.9 % (ref 11.6–14.5)
GLUCOSE SERPL-MCNC: 182 MG/DL (ref 74–99)
HCT VFR BLD AUTO: 29.1 % (ref 35–45)
HGB BLD-MCNC: 9.8 G/DL (ref 12–16)
IMM GRANULOCYTES # BLD AUTO: 0 K/UL (ref 0–0.04)
IMM GRANULOCYTES NFR BLD AUTO: 0 % (ref 0–0.5)
LYMPHOCYTES # BLD: 1.7 K/UL (ref 0.9–3.6)
LYMPHOCYTES NFR BLD: 18 % (ref 21–52)
MAGNESIUM SERPL-MCNC: 1.8 MG/DL (ref 1.6–2.6)
MCH RBC QN AUTO: 30.5 PG (ref 24–34)
MCHC RBC AUTO-ENTMCNC: 33.7 G/DL (ref 31–37)
MCV RBC AUTO: 90.7 FL (ref 78–100)
MONOCYTES # BLD: 0.7 K/UL (ref 0.05–1.2)
MONOCYTES NFR BLD: 8 % (ref 3–10)
NEUTS SEG # BLD: 7.3 K/UL (ref 1.8–8)
NEUTS SEG NFR BLD: 74 % (ref 40–73)
NRBC # BLD: 0 K/UL (ref 0–0.01)
NRBC BLD-RTO: 0 PER 100 WBC
PLATELET # BLD AUTO: 233 K/UL (ref 135–420)
PMV BLD AUTO: 9.9 FL (ref 9.2–11.8)
POTASSIUM SERPL-SCNC: 3.8 MMOL/L (ref 3.5–5.5)
RBC # BLD AUTO: 3.21 M/UL (ref 4.2–5.3)
SODIUM SERPL-SCNC: 136 MMOL/L (ref 136–145)
WBC # BLD AUTO: 9.8 K/UL (ref 4.6–13.2)

## 2024-05-22 PROCEDURE — 2580000003 HC RX 258: Performed by: STUDENT IN AN ORGANIZED HEALTH CARE EDUCATION/TRAINING PROGRAM

## 2024-05-22 PROCEDURE — 83735 ASSAY OF MAGNESIUM: CPT

## 2024-05-22 PROCEDURE — 99233 SBSQ HOSP IP/OBS HIGH 50: CPT | Performed by: SURGERY

## 2024-05-22 PROCEDURE — 6370000000 HC RX 637 (ALT 250 FOR IP): Performed by: INTERNAL MEDICINE

## 2024-05-22 PROCEDURE — 6360000002 HC RX W HCPCS: Performed by: STUDENT IN AN ORGANIZED HEALTH CARE EDUCATION/TRAINING PROGRAM

## 2024-05-22 PROCEDURE — 97530 THERAPEUTIC ACTIVITIES: CPT

## 2024-05-22 PROCEDURE — 6370000000 HC RX 637 (ALT 250 FOR IP): Performed by: STUDENT IN AN ORGANIZED HEALTH CARE EDUCATION/TRAINING PROGRAM

## 2024-05-22 PROCEDURE — 97162 PT EVAL MOD COMPLEX 30 MIN: CPT

## 2024-05-22 PROCEDURE — 80048 BASIC METABOLIC PNL TOTAL CA: CPT

## 2024-05-22 PROCEDURE — 97166 OT EVAL MOD COMPLEX 45 MIN: CPT

## 2024-05-22 PROCEDURE — 2580000003 HC RX 258: Performed by: SURGERY

## 2024-05-22 PROCEDURE — 6360000002 HC RX W HCPCS: Performed by: SURGERY

## 2024-05-22 PROCEDURE — 6370000000 HC RX 637 (ALT 250 FOR IP): Performed by: SURGERY

## 2024-05-22 PROCEDURE — 2700000000 HC OXYGEN THERAPY PER DAY

## 2024-05-22 PROCEDURE — 85025 COMPLETE CBC W/AUTO DIFF WBC: CPT

## 2024-05-22 PROCEDURE — 94761 N-INVAS EAR/PLS OXIMETRY MLT: CPT

## 2024-05-22 PROCEDURE — 2000000000 HC ICU R&B

## 2024-05-22 PROCEDURE — 97535 SELF CARE MNGMENT TRAINING: CPT

## 2024-05-22 PROCEDURE — 99232 SBSQ HOSP IP/OBS MODERATE 35: CPT | Performed by: STUDENT IN AN ORGANIZED HEALTH CARE EDUCATION/TRAINING PROGRAM

## 2024-05-22 RX ORDER — SODIUM CHLORIDE, SODIUM LACTATE, POTASSIUM CHLORIDE, CALCIUM CHLORIDE 600; 310; 30; 20 MG/100ML; MG/100ML; MG/100ML; MG/100ML
INJECTION, SOLUTION INTRAVENOUS CONTINUOUS
Status: CANCELLED | OUTPATIENT
Start: 2024-05-22

## 2024-05-22 RX ORDER — LIDOCAINE HYDROCHLORIDE 10 MG/ML
1 INJECTION, SOLUTION EPIDURAL; INFILTRATION; INTRACAUDAL; PERINEURAL
Status: CANCELLED | OUTPATIENT
Start: 2024-05-22 | End: 2024-05-23

## 2024-05-22 RX ORDER — NOREPINEPHRINE BITARTRATE 0.06 MG/ML
INJECTION, SOLUTION INTRAVENOUS
Status: DISPENSED
Start: 2024-05-22 | End: 2024-05-23

## 2024-05-22 RX ORDER — MAGNESIUM SULFATE 1 G/100ML
1000 INJECTION INTRAVENOUS ONCE
Status: COMPLETED | OUTPATIENT
Start: 2024-05-22 | End: 2024-05-22

## 2024-05-22 RX ORDER — FAMOTIDINE 20 MG/1
20 TABLET, FILM COATED ORAL ONCE
Status: CANCELLED | OUTPATIENT
Start: 2024-05-22 | End: 2024-05-22

## 2024-05-22 RX ORDER — DOXYCYCLINE HYCLATE 100 MG/1
100 CAPSULE ORAL EVERY 12 HOURS SCHEDULED
Status: COMPLETED | OUTPATIENT
Start: 2024-05-22 | End: 2024-05-27

## 2024-05-22 RX ADMIN — WATER 1000 MG: 1 INJECTION INTRAMUSCULAR; INTRAVENOUS; SUBCUTANEOUS at 14:30

## 2024-05-22 RX ADMIN — ACETAMINOPHEN 1000 MG: 500 TABLET ORAL at 13:49

## 2024-05-22 RX ADMIN — ATORVASTATIN CALCIUM 20 MG: 20 TABLET, FILM COATED ORAL at 21:07

## 2024-05-22 RX ADMIN — SODIUM CHLORIDE, PRESERVATIVE FREE 10 ML: 5 INJECTION INTRAVENOUS at 21:09

## 2024-05-22 RX ADMIN — ENOXAPARIN SODIUM 30 MG: 100 INJECTION SUBCUTANEOUS at 08:30

## 2024-05-22 RX ADMIN — POLYETHYLENE GLYCOL 3350 17 G: 17 POWDER, FOR SOLUTION ORAL at 16:12

## 2024-05-22 RX ADMIN — WATER 1000 MG: 1 INJECTION INTRAMUSCULAR; INTRAVENOUS; SUBCUTANEOUS at 05:33

## 2024-05-22 RX ADMIN — DEXTROSE AND SODIUM CHLORIDE: 5; 900 INJECTION, SOLUTION INTRAVENOUS at 07:06

## 2024-05-22 RX ADMIN — OXYCODONE HYDROCHLORIDE 5 MG: 5 TABLET ORAL at 08:20

## 2024-05-22 RX ADMIN — ACETAMINOPHEN 1000 MG: 500 TABLET ORAL at 05:34

## 2024-05-22 RX ADMIN — ACETAMINOPHEN 1000 MG: 500 TABLET ORAL at 21:08

## 2024-05-22 RX ADMIN — OXYCODONE HYDROCHLORIDE 5 MG: 5 TABLET ORAL at 18:05

## 2024-05-22 RX ADMIN — MAGNESIUM SULFATE IN DEXTROSE 1000 MG: 10 INJECTION, SOLUTION INTRAVENOUS at 16:12

## 2024-05-22 RX ADMIN — DOXYCYCLINE HYCLATE 100 MG: 100 CAPSULE ORAL at 21:07

## 2024-05-22 RX ADMIN — ENOXAPARIN SODIUM 30 MG: 100 INJECTION SUBCUTANEOUS at 21:07

## 2024-05-22 RX ADMIN — OXYCODONE HYDROCHLORIDE 5 MG: 5 TABLET ORAL at 02:18

## 2024-05-22 ASSESSMENT — PAIN DESCRIPTION - FREQUENCY
FREQUENCY: CONTINUOUS

## 2024-05-22 ASSESSMENT — PAIN DESCRIPTION - LOCATION
LOCATION: LEG

## 2024-05-22 ASSESSMENT — PAIN SCALES - GENERAL
PAINLEVEL_OUTOF10: 1
PAINLEVEL_OUTOF10: 4
PAINLEVEL_OUTOF10: 0
PAINLEVEL_OUTOF10: 3
PAINLEVEL_OUTOF10: 3
PAINLEVEL_OUTOF10: 1
PAINLEVEL_OUTOF10: 0
PAINLEVEL_OUTOF10: 0
PAINLEVEL_OUTOF10: 1
PAINLEVEL_OUTOF10: 1
PAINLEVEL_OUTOF10: 4
PAINLEVEL_OUTOF10: 2
PAINLEVEL_OUTOF10: 1
PAINLEVEL_OUTOF10: 4
PAINLEVEL_OUTOF10: 0

## 2024-05-22 ASSESSMENT — PAIN DESCRIPTION - DESCRIPTORS
DESCRIPTORS: ACHING
DESCRIPTORS: CRAMPING;SORE
DESCRIPTORS: ACHING

## 2024-05-22 ASSESSMENT — PAIN DESCRIPTION - PAIN TYPE
TYPE: SURGICAL PAIN

## 2024-05-22 ASSESSMENT — PAIN - FUNCTIONAL ASSESSMENT
PAIN_FUNCTIONAL_ASSESSMENT: PREVENTS OR INTERFERES SOME ACTIVE ACTIVITIES AND ADLS
PAIN_FUNCTIONAL_ASSESSMENT: PREVENTS OR INTERFERES SOME ACTIVE ACTIVITIES AND ADLS
PAIN_FUNCTIONAL_ASSESSMENT: PREVENTS OR INTERFERES WITH MANY ACTIVE NOT PASSIVE ACTIVITIES

## 2024-05-22 ASSESSMENT — PAIN DESCRIPTION - ORIENTATION
ORIENTATION: RIGHT

## 2024-05-22 ASSESSMENT — PAIN DESCRIPTION - ONSET
ONSET: ON-GOING

## 2024-05-22 NOTE — ANESTHESIA POSTPROCEDURE EVALUATION
Department of Anesthesiology  Postprocedure Note    Patient: Juanpablo Jackson  MRN: [id number]  YOB: 1940  Date of evaluation: 5/21/2024    Procedure Summary       Date: 05/21/24 Room / Location: [facility] 02 / [facility] CARDIAC SURGERY    Anesthesia Start: 1728 Anesthesia Stop: 2034    Procedure: RIGHT COMMON FEMORAL ARTERY EXPOSURE, RIGHT LOWER EXTREMITY ANGIOGRAPHY, RIGHT SFA  AND POPLITEAL STENT PLACEMENT (Right: Leg Lower) Diagnosis:       Thrombosis      (Thrombosis [I82.90])    Surgeons: Lawanda Van MD Responsible Provider: Abebe Conway MD    Anesthesia Type: General ASA Status: 3 - Emergent            Anesthesia Type: General    Juanjose Phase I: Juanjose Score: 8    Juanjose Phase II:      Anesthesia Post Evaluation    Patient location during evaluation: floor  Patient participation: complete - patient participated  Level of consciousness: responsive to verbal stimuli  Airway patency: patent  Nausea & Vomiting: no nausea  Respiratory status: acceptable  Hydration status: euvolemic    No notable events documented.

## 2024-05-22 NOTE — PLAN OF CARE
MD at [facility] CARDIAC SURGERY       Home Situation:  Social/Functional History  Lives With: Alone  Type of Home: Senior housing apartment  Home Layout: One level  Home Access: Elevator  Bathroom Shower/Tub: Walk-in shower  Bathroom Toilet: Handicap height  Bathroom Equipment: Shower chair, Built-in shower seat  Bathroom Accessibility: Accessible  Home Equipment: Cane - Quad, Walker - Rolling  Receives Help From: Family  ADL Assistance: Independent  Homemaking Assistance: Independent  Homemaking Responsibilities: Yes  Ambulation Assistance: Independent  Transfer Assistance: Independent  Active : No  Patient's  Info: Family and Staff in Senior Living Community  Mode of Transportation: Family  Occupation: Retired    Critical Behavior:  Orientation  Orientation Level: Oriented X4    Strength (BLE):    Strength: Generally decreased, functional    Range Of Motion (BLE):  AROM: Generally decreased, functional  PROM: Generally decreased, functional    Functional Mobility:  Bed Mobility:  Bed Mobility Training  Bed Mobility Training: Yes  Supine to Sit: Moderate assistance;Assist X2  Scooting: Moderate assistance    Transfers:  Transfer Training  Transfer Training: Yes  Sit to Stand: Moderate assistance;Assist X2  Stand to Sit: Moderate assistance;Assist X2  Bed to Chair: Moderate assistance;Assist X2    Balance:   Balance  Sitting: Impaired  Sitting - Static: Good (unsupported)  Sitting - Dynamic: Good (unsupported)  Standing: Impaired  Standing - Static: Fair  Standing - Dynamic: Fair    Ambulation/Gait Training:  Gait  Gait Training: Yes  Right Side Weight Bearing: Toe touch  Overall Level of Assistance: Moderate assistance;Assist X2  Distance (ft): 3 Feet  Assistive Device: Walker, rolling      Pain:  Intensity Pre-treatment: 1/10   Intensity Post-treatment: 3/10  Scale: Numeric Rating Scale  Location: Right Leg , Calf  Intervention(s): Repositioning    Activity Tolerance:   Activity Tolerance: Patient limited by

## 2024-05-22 NOTE — CARE COORDINATION
MONO spoke with patient daughter Naomy Humphreys [phone] to discuss patient discharge plan and complete pt assessment.       Per Ms. Humphreys would like for patient  dispo to ARU when medically stable and if patient is accepted. If patient is not accepted for ARU then Ms. Humphreys is agreeable to SNF.     12:14 MONO Byers regarding reviewing patient for ARU.    Rosie Yu BSW  Case Management

## 2024-05-22 NOTE — ANESTHESIA POSTPROCEDURE EVALUATION
Department of Anesthesiology  Postprocedure Note    Patient: Juanpablo Jackson  MRN: [id number]  YOB: 1940  Date of evaluation: 5/21/2024    Procedure Summary       Date: 05/21/24 Room / Location: [facility] MAIN 06 / [facility] MAIN OR    Anesthesia Start: 0956 Anesthesia Stop: 1428    Procedure: RIGHT DISTAL FEMUR OPEN REDUCTION INTERNAL FIXATION/ FRACTURE TABLE/ C-ARM/ [DEPUY SYNTHES ORTHO] SYNTHES CONDYLAR PLATE (Right: Thigh) Diagnosis:       Closed fracture of distal end of right femur, unspecified fracture morphology, initial encounter ([facility])      (Closed fracture of distal end of right femur, unspecified fracture morphology, initial encounter ([facility]) [S72.401A])    Surgeons: Lars Harden MD Responsible Provider: Denys Skelton Jr., MD    Anesthesia Type: general ASA Status: 3            Anesthesia Type: No value filed.    Juanjose Phase I: Juanjose Score: 8    Juanjose Phase II:      Anesthesia Post Evaluation    Patient location during evaluation: bedside  Patient participation: complete - patient participated  Level of consciousness: responsive to verbal stimuli  Airway patency: patent  Nausea & Vomiting: no nausea  Respiratory status: acceptable  Hydration status: euvolemic    No notable events documented.

## 2024-05-22 NOTE — PLAN OF CARE
Problem: Discharge Planning  Goal: Discharge to home or other facility with appropriate resources  Outcome: Progressing     Problem: Pain  Goal: Verbalizes/displays adequate comfort level or baseline comfort level  Outcome: Progressing     Problem: Skin/Tissue Integrity  Goal: Absence of new skin breakdown  Description: 1.  Monitor for areas of redness and/or skin breakdown  2.  Assess vascular access sites hourly  3.  Every 4-6 hours minimum:  Change oxygen saturation probe site  4.  Every 4-6 hours:  If on nasal continuous positive airway pressure, respiratory therapy assess nares and determine need for appliance change or resting period.  Outcome: Progressing     Problem: ABCDS Injury Assessment  Goal: Absence of physical injury  Outcome: Progressing     Problem: Safety - Adult  Goal: Free from fall injury  Outcome: Progressing     Problem: Chronic Conditions and Co-morbidities  Goal: Patient's chronic conditions and co-morbidity symptoms are monitored and maintained or improved  Outcome: Progressing     Problem: Physical Therapy - Adult  Goal: By Discharge: Performs mobility at highest level of function for planned discharge setting.  See evaluation for individualized goals.  Description: Physical Therapy Goals  Initiated 5/22/2024 and to be accomplished within 7 day(s)  1.  Patient will move from supine to sit and sit to supine  in bed with supervision/set-up.    2.  Patient will transfer from bed to chair and chair to bed with supervision/set-up using the least restrictive device.  3.  Patient will perform sit to stand with minimal assistance/contact guard assist.  4.  Patient will ambulate with minimal assistance/contact guard assist for 100 feet with the least restrictive device.     PLOF: Independent. Lives in Assisted Living, one level, and elevator. Did not use AD for ambulation.   5/22/2024 1152 by Rae Lance, SPT  Outcome: Progressing     Problem: Occupational Therapy - Adult  Goal: By Discharge:

## 2024-05-22 NOTE — PLAN OF CARE
Problem: Occupational Therapy - Adult  Goal: By Discharge: Performs self-care activities at highest level of function for planned discharge setting.  See evaluation for individualized goals.  Description: Occupational Therapy Goals:  Initiated 5/22/2024 to be met within 7-10 days.    1.  Patient will perform bed mobility for ADLs with supervision/set-up.   2.  Patient will perform grooming with supervision/set-up while sitting at EOB with Good balance.  3.  Patient will perform upper body dressing with supervision/set-up.  4.  Patient will perform toilet transfers with minimal assistance/contact guard assist.  5.  Patient will perform all aspects of toileting with minimal assistance/contact guard assist.  6.  Patient will participate in upper extremity therapeutic exercise/activities with supervision/set-up for 8 minutes to improve endurance and UB strength needed for ADLs    7.  Patient will utilize energy conservation techniques during functional activities with verbal cues.    PLOF: Pt lives alone in senior living apartment, reports being Mod Ind for ADLs and functional mobility using QC most recently.  Outcome: Progressing  OCCUPATIONAL THERAPY EVALUATION    Patient: Juanpablo Jackson (83 y.o. female)  Date: 5/22/2024  Primary Diagnosis: Closed displaced comminuted fracture of shaft of right femur, initial encounter ([facility]) [S72.351A]  Age-related osteoporosis with current pathological fracture of left femur with malunion [M80.052P]  Procedure(s) (LRB):  RIGHT COMMON FEMORAL ARTERY EXPOSURE, RIGHT LOWER EXTREMITY ANGIOGRAPHY, RIGHT SFA  AND POPLITEAL STENT PLACEMENT (Right) 1 Day Post-Op   Precautions: Fall Risk, Surgical Protocols, Weight Bearing, Right Lower Extremity Weight Bearing: Toe Touch Weight Bearing    ASSESSMENT :    Pt cleared to participate in OT evaluation by RN. Upon entering room, pt received in bed, alert, and agreeable to OT eval/treatment with supportive son present. Pt required additional  performed by Lars Harden MD at [facility] MAIN OR    HYSTERECTOMY (CERVIX STATUS UNKNOWN)  09/2016    total    OTHER SURGICAL HISTORY  01/2019    basal skin ca removed     OTHER SURGICAL HISTORY  2005, 2015    skin ca removed     OTHER SURGICAL HISTORY  09/2016    bladder support    TONSILLECTOMY  1946    TOTAL KNEE ARTHROPLASTY Right 04/27/2015    VASCULAR SURGERY Right 5/21/2024    RIGHT COMMON FEMORAL ARTERY EXPOSURE, RIGHT LOWER EXTREMITY ANGIOGRAPHY, RIGHT SFA  AND POPLITEAL STENT PLACEMENT performed by Lawanda Van MD at [facility] CARDIAC SURGERY       Home Situation:   Social/Functional History  Lives With: Alone  Type of Home: Senior housing apartment  Home Layout: One level  Home Access: Elevator  Bathroom Shower/Tub: Walk-in shower  Bathroom Toilet: Handicap height  Bathroom Equipment: Shower chair, Built-in shower seat  Bathroom Accessibility: Accessible  Home Equipment: Walker - Rolling, Wheelchair - Manual, Cane  Receives Help From: Family  ADL Assistance: Independent  Homemaking Assistance: Independent  Homemaking Responsibilities: Yes  Ambulation Assistance: Independent  Transfer Assistance: Independent  Active : No  Patient's  Info: Family and Staff in [facility]  Mode of Transportation: Family  Occupation: Retired  []  Right hand dominant   []  Left hand dominant    Cognitive/Behavioral Status:  Orientation  Overall Orientation Status: Within Functional Limits  Orientation Level: Oriented X4  Cognition  Overall Cognitive Status: WFL    Skin: visible skin intact  Edema: none noted    Vision/Perceptual:    Vision  Vision: Within Functional Limits        Coordination: BUE  Coordination: Generally decreased, functional        Balance:     Balance  Sitting: Impaired  Sitting - Static: Good (unsupported)  Sitting - Dynamic: Good (unsupported);Fair (occasional)  Standing: Impaired  Standing - Static: Fair  Standing - Dynamic: Fair    Strength: BUE  Strength: Generally

## 2024-05-22 NOTE — PROGRESS NOTES
diaphyseal fracture site is slightly improved. Persistent displacement.    XR FEMUR RIGHT (MIN 2 VIEWS)    Result Date: 5/19/2024  Improved alignment of femur fracture. Angulation resolved. Persistent displacement with overlapping segment.    XR FEMUR RIGHT (MIN 2 VIEWS)    Result Date: 5/19/2024  Acute, displaced fracture of the femoral diaphysis, not affecting hip or knee arthroplasty.      Current Medications:  Current Facility-Administered Medications: doxycycline hyclate (VIBRAMYCIN) capsule 100 mg, 100 mg, Oral, 2 times per day  norepinephrine-sodium chloride (LEVOPHED) 16-0.9 MG/250ML-% infusion, , ,   enoxaparin Sodium (LOVENOX) injection 30 mg, 30 mg, SubCUTAneous, BID  oxyCODONE (ROXICODONE) immediate release tablet 5 mg, 5 mg, Oral, Q4H PRN **OR** oxyCODONE (ROXICODONE) immediate release tablet 10 mg, 10 mg, Oral, Q4H PRN  labetalol (NORMODYNE;TRANDATE) injection 10 mg, 10 mg, IntraVENous, Q4H PRN  acetaminophen (TYLENOL) tablet 1,000 mg, 1,000 mg, Oral, 3 times per day **OR** [DISCONTINUED] acetaminophen (TYLENOL) suppository 650 mg, 650 mg, Rectal, Q6H PRN  sodium chloride flush 0.9 % injection 5-40 mL, 5-40 mL, IntraVENous, 2 times per day  sodium chloride flush 0.9 % injection 5-40 mL, 5-40 mL, IntraVENous, PRN  0.9 % sodium chloride infusion, , IntraVENous, PRN  potassium chloride (KLOR-CON M) extended release tablet 40 mEq, 40 mEq, Oral, PRN **OR** potassium bicarb-citric acid (EFFER-K) effervescent tablet 40 mEq, 40 mEq, Oral, PRN **OR** potassium chloride 10 mEq/100 mL IVPB (Peripheral Line), 10 mEq, IntraVENous, PRN  magnesium sulfate 2000 mg in 50 mL IVPB premix, 2,000 mg, IntraVENous, PRN  ondansetron (ZOFRAN-ODT) disintegrating tablet 4 mg, 4 mg, Oral, Q8H PRN **OR** ondansetron (ZOFRAN) injection 4 mg, 4 mg, IntraVENous, Q6H PRN  [Held by provider] amLODIPine (NORVASC) tablet 5 mg, 5 mg, Oral, Daily  atorvastatin (LIPITOR) tablet 20 mg, 20 mg, Oral, Daily  morphine (PF) injection 2 mg, 2 mg,  IntraVENous, Q4H PRN  polyethylene glycol (GLYCOLAX) packet 17 g, 17 g, Oral, Daily  [Held by provider] melatonin disintegrating tablet 5 mg, 5 mg, Oral, Nightly      Assessment//Plan           Hospital Problems             Last Modified POA    * (Principal) Closed fracture of shaft of right femur (HCC) 5/21/2024 Yes    Chronic anticoagulation 5/21/2024 Yes    Status post total right knee replacement 5/21/2024 Yes    Age-related osteoporosis with current pathological fracture of left femur with malunion 5/19/2024 Yes    Insufficiency fracture of femur with nonunion, subsequent encounter 5/21/2024 Yes    Status post right hip replacement 5/21/2024 Yes    Acute lower limb ischemia 5/21/2024 Yes    Periprosthetic fracture of shaft of femur 5/21/2024 Yes     Assessment:    Condition: In stable condition.  Improving.   (    #Distal left femur fracture  #SFA-pop dissection following complex R distal femur ORIF  #History chronic stress fracture left femur  #History CVA  #Hypertension  #Osteoporosis  #HLD  ).     Plan:   Wean off oxygen.  Regular diet.  Administer medications as ordered.   (    -Wean patient off oxygen  - Resume MiraLAX  - Plan for OR tomorrow to close fasciotomy  - Otherwise stable.  Continue to hold home amlodipine in the setting of normotension  - PT OT early mobilization pain control as needed and referral to SNF versus ARU).       Case discussed with:  [x]Patient  [x]Family  [x]Nursing  []Case Management  DVT Prophylaxis:  [x]Lovenox  []Hep SQ  []SCDs  []Coumadin   []Heparin gtt   []Xarelto   []Rut Torres DO, MBA, MS  [facility]  Hospitalist

## 2024-05-22 NOTE — PROGRESS NOTES
ARU/IPR REFERRAL CONTACT NOTE  [facility]      Thank you for the opportunity to review this patient's case for admission to [facility].    Referral received: 5/22/2024 time:1220pm    Based on our pre-admission screening:                          [x ] Our Team/Medical Director is following this case.   Comments: Referral received. Will review with team    Again, Thank you for this referral. Should you have any questions please do not hesitate to call.     Sincerely,  Justina Byers    Clinical Liaison  [facility]  [phone]

## 2024-05-22 NOTE — CONSULTS
[facility]  (A Division of [facility])      Consultation Note      Date of Admission: 5/19/2024    Date of Note: 5/22/2024      Reason for Referral: hx of MRSA, s/p fasciotomy  Referring Physician: Dr. Rehan Restrepo from this admission:   5/20 urine cx: negative    Current Antimicrobials:    Prior Antimicrobials:  Ancef 5/21- present        Assessment:         Status post right common femoral artery exposure right SFA and popliteal stent placement:  Displaced right-sided femoral fracture with underlying chronic femoral fracture.  Fall at home  Osteoporosis  Remote history of possible MRSA skin lesion: Unable to find any supporting cultures from [facility] or [place] since 2013    Plan:   Can likely remove MRSA contact precautions-I am not able to find any positive MRSA cultures documented since 2013 in either [facility] or [facility].  Screening nasal swab is reasonable if required.    Trend CBC, BMP to monitor for any medication associated toxicities.    Given complicated surgical history and requirement for fasciotomy, will give 5 to 7 days of perioperative antibiotics to help reduce risk of future hardware infection.    Fasciotomy wound care as per vascular surgery team.  May need additional washout or closure in the next 24 to 48 hours    Discussed with vascular surgery on bedside rounds    DO Elena [facility]  [address], [address], [place] [address]  Office: [phone], Ext [phone]      Lines / Catheters:  peripheral    HPI:  Ms. Jackson is a an 83-year-old female with a past medical history of hypertension, osteoporosis, prior CVA, recurrent UTIs who had a chronic right femur fracture.  She had been followed as an outpatient by orthopedic surgery and did not want any additional surgical interventions.  Unfortunately she had a fall at home which resulted in a 100% displaced overriding fracture.   atorvastatin (LIPITOR) tablet 20 mg  20 mg Oral Daily Mirlande Graff DO   20 mg at 05/21/24 2224    morphine (PF) injection 2 mg  2 mg IntraVENous Q4H PRN Mirlande Graff DO        polyethylene glycol (GLYCOLAX) packet 17 g  17 g Oral Daily Mirlande Graff DO        [Held by provider] melatonin disintegrating tablet 5 mg  5 mg Oral Nightly Mirlande Graff DO   5 mg at 05/20/24 2326     Allergies   Allergen Reactions    Nitrofurazone Hives and Other (See Comments)     Intolerance    Tobramycin Sulfate Other (See Comments)     Intolerance, extreme redness    Erythromycin Itching and Rash       inflammation     Social History     Socioeconomic History    Marital status:      Spouse name: Not on file    Number of children: Not on file    Years of education: Not on file    Highest education level: Not on file   Occupational History    Not on file   Tobacco Use    Smoking status: Never    Smokeless tobacco: Never   Substance and Sexual Activity    Alcohol use: No    Drug use: No    Sexual activity: Not on file   Other Topics Concern    Not on file   Social History Narrative    Not on file     Social Determinants of Health     Financial Resource Strain: Not on file   Food Insecurity: No Food Insecurity (5/19/2024)    Hunger Vital Sign     Worried About Running Out of Food in the Last Year: Never true     Ran Out of Food in the Last Year: Never true   Transportation Needs: No Transportation Needs (5/19/2024)    PRAPARE - Transportation     Lack of Transportation (Medical): No     Lack of Transportation (Non-Medical): No   Physical Activity: Not on file   Stress: Not on file   Social Connections: Not on file   Intimate Partner Violence: Not on file   Housing Stability: High Risk (5/19/2024)    Housing Stability Vital Sign     Unable to Pay for Housing in the Last Year: No     Number of Places Lived in the Last Year: 3     Unstable Housing in the Last Year: No        Review of Systems    Negative Unless

## 2024-05-22 NOTE — BRIEF OP NOTE
05/21/24 1427   Status Draining;Patent 05/21/24 1427   Output (mL) 1900 mL 05/21/24 0548       Findings:  Infection Present At Time Of Surgery (PATOS) (choose all levels that have infection present):  No infection present  Other Findings: Occlusion of the distal SFA with distal popliteal reconstitution. Stent placed across the occluded segment with restoration of flow. Robust PT and AT runoff to the foot after stent placement.     Disposition: to recovery in stable condition    Electronically signed by Lawanda Van MD on 5/21/2024 at 8:55 PM

## 2024-05-22 NOTE — PROGRESS NOTES
with pain reproduced distal lateral thigh.  Sensation noted fully right lower extremity.  Bulky dressing noted right lower leg below the ankle.    No bloody strikethrough noted.    DIAGNOSIS / IMPRESSIONS:    1 Day Post-Op, Procedure(s):  RIGHT COMMON FEMORAL ARTERY EXPOSURE, RIGHT LOWER EXTREMITY ANGIOGRAPHY, RIGHT SFA  AND POPLITEAL STENT PLACEMENT - stable  And status post right distal femur fracture with placement of supracondylar plate screw fixation.  Post Operative / Post Procedural Pain -  on opiate analgesics  Post Procedure / Operative Range of Motion - working with PT/OT per protocol  Anemia - [  ] Chronic, [ XX] Acute Post Op Blood Loss    Patient Active Problem List   Diagnosis    Microscopic hematuria    UTI (urinary tract infection)    Chronic anticoagulation    Neuritis    MOHSEN (stress urinary incontinence, female)    Lumbar facet arthropathy    Hip arthritis    Status post total right knee replacement    Lumbar spinal stenosis    Stroke (HCC)    Constipation    Hypercholesteremia    Closed fracture of shaft of right femur (HCC)    Age-related osteoporosis with current pathological fracture of left femur with malunion    Insufficiency fracture of femur with nonunion, subsequent encounter    Status post right hip replacement    Acute lower limb ischemia    Periprosthetic fracture of shaft of femur          Plan:      1. Justification for continued stay: Fair progression towards established rehabilitation goals.   2. Medical Issues being followed closely:   [X] Fall and safety precautions    [X ] Wound Care per protocol   [X] Bowel and Bladder Function    [X] Fluid Electrolyte and Nutrition Balance    [X] Pain Control per protocol   3. Issues that 24 hour nursing is following:   [X] Fall and safety precautions    [X] Wound Care    [X] Bowel and Bladder Function    [X] Fluid Electrolyte and Nutrition Balance    [X] Pain Control    [X] Assistance with and education on in-room safety with transfers to and

## 2024-05-22 NOTE — CARE COORDINATION
05/22/24 1139   Service Assessment   Patient Orientation Unable to Assess   Cognition Other (see comment)   History Provided By Child/Family   Primary Caregiver Self   Accompanied By/Relationship None   Support Systems Children;Family Members;Other (Comment);Friends/Neighbors   Patient's Healthcare Decision Maker is: Legal Next of Kin   PCP Verified by CM Yes   Prior Functional Level Independent in ADLs/IADLs   Current Functional Level Independent in ADLs/IADLs   Can patient return to prior living arrangement Yes   Ability to make needs known: Fair   Family able to assist with home care needs: Yes   Would you like for me to discuss the discharge plan with any other family members/significant others, and if so, who? No   Financial Resources Medicare   Community Resources Other (Comment);Transportation  ([facility])   CM/SW Referral Other (see comment)  (None)   Social/Functional History   Lives With Alone   Type of Home Senior housing apartment   Home Layout One level   Home Access Elevator   Bathroom Shower/Tub Walk-in shower   Bathroom Toilet Handicap height   Bathroom Equipment Shower chair;Built-in shower seat   Bathroom Accessibility Accessible   Home Equipment Walker - Rolling;Wheelchair - Manual;Cane   Receives Help From Family   ADL Assistance Independent   Homemaking Assistance Independent   Homemaking Responsibilities Yes   Ambulation Assistance Independent   Transfer Assistance Independent   Active  No   Patient's  Info Family and Staff in [facility]   Mode of Transportation Family   Occupation Retired   Discharge Planning   Type of Residence Apartment   Living Arrangements Alone   Current Services Prior To Admission None   Potential Assistance Needed Skilled Nursing Facility   DME Ordered? No   Potential Assistance Purchasing Medications No   Type of Home Care Services Aide Services   Patient expects to be discharged to: Acute rehab   One/Two Story Residence One

## 2024-05-22 NOTE — PROGRESS NOTES
conducted an initial consultation and Spiritual Assessment for Juanpablo Jackson, who is a 83 y.o.,female. Patient's Primary Language is: English.   According to the patient's EMR Bahai Affiliation is: Hinduism.     The reason the Patient came to the hospital is:   Patient Active Problem List    Diagnosis Date Noted    Insufficiency fracture of femur with nonunion, subsequent encounter 05/21/2024    Status post right hip replacement 05/21/2024    Acute lower limb ischemia 05/21/2024    Periprosthetic fracture of shaft of femur 05/21/2024    Closed fracture of shaft of right femur (HCC) 05/19/2024    Age-related osteoporosis with current pathological fracture of left femur with malunion 05/19/2024    Hip arthritis 05/08/2019    Chronic anticoagulation 03/29/2018    Neuritis 03/27/2017    Lumbar facet arthropathy 03/27/2017    Lumbar spinal stenosis 03/27/2017    Status post total right knee replacement 04/27/2015    Microscopic hematuria 10/02/2013    MOHSEN (stress urinary incontinence, female) 10/03/2012    UTI (urinary tract infection) 09/27/2012    Stroke (HCC) 09/27/2012    Constipation 09/27/2012    Hypercholesteremia 09/27/2012        The  provided the following Interventions:  Initiated a relationship of care and support.   Explored issues of cade, belief, spirituality and Cheondoism/ritual needs while hospitalized.  Listened empathically.  Provided chaplaincy education concerning Advance Medical Directive.  Provided information about Spiritual Care Services.  Offered prayer and assurance of continued prayers on patient's behalf.   Chart reviewed.    The following outcomes where achieved:  Patient shared limited information about both their medical narrative and spiritual journey/beliefs.   confirmed Patient's Bahai Affiliation.  Patient processed feeling about current hospitalization.  Patient expressed gratitude for 's visit.    Assessment:  Patient does not have

## 2024-05-23 ENCOUNTER — ANESTHESIA (OUTPATIENT)
Facility: HOSPITAL | Age: 84
End: 2024-05-23
Payer: MEDICARE

## 2024-05-23 LAB
ABO + RH BLD: NORMAL
ANION GAP SERPL CALC-SCNC: 4 MMOL/L (ref 3–18)
BASOPHILS # BLD: 0 K/UL (ref 0–0.1)
BASOPHILS NFR BLD: 0 % (ref 0–2)
BLD PROD TYP BPU: NORMAL
BLD PROD TYP BPU: NORMAL
BLOOD BANK BLOOD PRODUCT EXPIRATION DATE: NORMAL
BLOOD BANK DISPENSE STATUS: NORMAL
BLOOD BANK DISPENSE STATUS: NORMAL
BLOOD BANK ISBT PRODUCT BLOOD TYPE: 6200
BLOOD BANK PRODUCT CODE: NORMAL
BLOOD BANK UNIT TYPE AND RH: NORMAL
BLOOD GROUP ANTIBODIES SERPL: NORMAL
BPU ID: NORMAL
BPU ID: NORMAL
BUN SERPL-MCNC: 10 MG/DL (ref 7–18)
BUN/CREAT SERPL: 22 (ref 12–20)
CALCIUM SERPL-MCNC: 8.4 MG/DL (ref 8.5–10.1)
CHLORIDE SERPL-SCNC: 110 MMOL/L (ref 100–111)
CO2 SERPL-SCNC: 25 MMOL/L (ref 21–32)
CREAT SERPL-MCNC: 0.45 MG/DL (ref 0.6–1.3)
CROSSMATCH RESULT: NORMAL
CROSSMATCH RESULT: NORMAL
DIFFERENTIAL METHOD BLD: ABNORMAL
EOSINOPHIL # BLD: 0.2 K/UL (ref 0–0.4)
EOSINOPHIL NFR BLD: 2 % (ref 0–5)
ERYTHROCYTE [DISTWIDTH] IN BLOOD BY AUTOMATED COUNT: 14.2 % (ref 11.6–14.5)
GLUCOSE SERPL-MCNC: 109 MG/DL (ref 74–99)
HCT VFR BLD AUTO: 25.5 % (ref 35–45)
HGB BLD-MCNC: 8.5 G/DL (ref 12–16)
IMM GRANULOCYTES # BLD AUTO: 0.1 K/UL (ref 0–0.04)
IMM GRANULOCYTES NFR BLD AUTO: 1 % (ref 0–0.5)
INR PPP: 1.2 (ref 0.9–1.1)
LYMPHOCYTES # BLD: 2.1 K/UL (ref 0.9–3.6)
LYMPHOCYTES NFR BLD: 22 % (ref 21–52)
MAGNESIUM SERPL-MCNC: 2.1 MG/DL (ref 1.6–2.6)
MCH RBC QN AUTO: 30.6 PG (ref 24–34)
MCHC RBC AUTO-ENTMCNC: 33.3 G/DL (ref 31–37)
MCV RBC AUTO: 91.7 FL (ref 78–100)
MONOCYTES # BLD: 0.6 K/UL (ref 0.05–1.2)
MONOCYTES NFR BLD: 6 % (ref 3–10)
NEUTS SEG # BLD: 6.8 K/UL (ref 1.8–8)
NEUTS SEG NFR BLD: 70 % (ref 40–73)
NRBC # BLD: 0 K/UL (ref 0–0.01)
NRBC BLD-RTO: 0 PER 100 WBC
PLATELET # BLD AUTO: 222 K/UL (ref 135–420)
PMV BLD AUTO: 10.3 FL (ref 9.2–11.8)
POTASSIUM SERPL-SCNC: 3.6 MMOL/L (ref 3.5–5.5)
PROTHROMBIN TIME: 15.5 SEC (ref 11.9–14.7)
RBC # BLD AUTO: 2.78 M/UL (ref 4.2–5.3)
SODIUM SERPL-SCNC: 139 MMOL/L (ref 136–145)
SPECIMEN EXP DATE BLD: NORMAL
UNIT DIVISION: 0
UNIT DIVISION: 0
UNIT ISSUE DATE/TIME: NORMAL
WBC # BLD AUTO: 9.8 K/UL (ref 4.6–13.2)

## 2024-05-23 PROCEDURE — 6360000002 HC RX W HCPCS: Performed by: STUDENT IN AN ORGANIZED HEALTH CARE EDUCATION/TRAINING PROGRAM

## 2024-05-23 PROCEDURE — 2000000000 HC ICU R&B

## 2024-05-23 PROCEDURE — 6360000002 HC RX W HCPCS: Performed by: NURSE ANESTHETIST, CERTIFIED REGISTERED

## 2024-05-23 PROCEDURE — 7100000001 HC PACU RECOVERY - ADDTL 15 MIN: Performed by: SURGERY

## 2024-05-23 PROCEDURE — 85025 COMPLETE CBC W/AUTO DIFF WBC: CPT

## 2024-05-23 PROCEDURE — 6360000002 HC RX W HCPCS: Performed by: SURGERY

## 2024-05-23 PROCEDURE — 99232 SBSQ HOSP IP/OBS MODERATE 35: CPT | Performed by: SURGERY

## 2024-05-23 PROCEDURE — 80048 BASIC METABOLIC PNL TOTAL CA: CPT

## 2024-05-23 PROCEDURE — 6370000000 HC RX 637 (ALT 250 FOR IP): Performed by: INTERNAL MEDICINE

## 2024-05-23 PROCEDURE — 2709999900 HC NON-CHARGEABLE SUPPLY: Performed by: SURGERY

## 2024-05-23 PROCEDURE — 83735 ASSAY OF MAGNESIUM: CPT

## 2024-05-23 PROCEDURE — 2580000003 HC RX 258: Performed by: STUDENT IN AN ORGANIZED HEALTH CARE EDUCATION/TRAINING PROGRAM

## 2024-05-23 PROCEDURE — 3700000001 HC ADD 15 MINUTES (ANESTHESIA): Performed by: SURGERY

## 2024-05-23 PROCEDURE — 0JQN0ZZ REPAIR RIGHT LOWER LEG SUBCUTANEOUS TISSUE AND FASCIA, OPEN APPROACH: ICD-10-PCS | Performed by: SURGERY

## 2024-05-23 PROCEDURE — 85610 PROTHROMBIN TIME: CPT

## 2024-05-23 PROCEDURE — 6370000000 HC RX 637 (ALT 250 FOR IP): Performed by: SURGERY

## 2024-05-23 PROCEDURE — 3600000012 HC SURGERY LEVEL 2 ADDTL 15MIN: Performed by: SURGERY

## 2024-05-23 PROCEDURE — 2580000003 HC RX 258: Performed by: NURSE ANESTHETIST, CERTIFIED REGISTERED

## 2024-05-23 PROCEDURE — 7100000000 HC PACU RECOVERY - FIRST 15 MIN: Performed by: SURGERY

## 2024-05-23 PROCEDURE — 3700000000 HC ANESTHESIA ATTENDED CARE: Performed by: SURGERY

## 2024-05-23 PROCEDURE — 6370000000 HC RX 637 (ALT 250 FOR IP): Performed by: STUDENT IN AN ORGANIZED HEALTH CARE EDUCATION/TRAINING PROGRAM

## 2024-05-23 PROCEDURE — 2500000003 HC RX 250 WO HCPCS: Performed by: NURSE ANESTHETIST, CERTIFIED REGISTERED

## 2024-05-23 PROCEDURE — 99232 SBSQ HOSP IP/OBS MODERATE 35: CPT | Performed by: STUDENT IN AN ORGANIZED HEALTH CARE EDUCATION/TRAINING PROGRAM

## 2024-05-23 PROCEDURE — 3600000002 HC SURGERY LEVEL 2 BASE: Performed by: SURGERY

## 2024-05-23 RX ORDER — SODIUM CHLORIDE, SODIUM LACTATE, POTASSIUM CHLORIDE, CALCIUM CHLORIDE 600; 310; 30; 20 MG/100ML; MG/100ML; MG/100ML; MG/100ML
INJECTION, SOLUTION INTRAVENOUS CONTINUOUS PRN
Status: DISCONTINUED | OUTPATIENT
Start: 2024-05-23 | End: 2024-05-23 | Stop reason: SDUPTHER

## 2024-05-23 RX ORDER — LIDOCAINE HYDROCHLORIDE 20 MG/ML
INJECTION, SOLUTION EPIDURAL; INFILTRATION; INTRACAUDAL; PERINEURAL PRN
Status: DISCONTINUED | OUTPATIENT
Start: 2024-05-23 | End: 2024-05-23 | Stop reason: SDUPTHER

## 2024-05-23 RX ORDER — ACETAMINOPHEN 325 MG/1
650 TABLET ORAL
Status: DISCONTINUED | OUTPATIENT
Start: 2024-05-23 | End: 2024-05-23 | Stop reason: HOSPADM

## 2024-05-23 RX ORDER — PHENYLEPHRINE HCL IN 0.9% NACL 1 MG/10 ML
SYRINGE (ML) INTRAVENOUS PRN
Status: DISCONTINUED | OUTPATIENT
Start: 2024-05-23 | End: 2024-05-23 | Stop reason: SDUPTHER

## 2024-05-23 RX ORDER — ONDANSETRON 2 MG/ML
INJECTION INTRAMUSCULAR; INTRAVENOUS PRN
Status: DISCONTINUED | OUTPATIENT
Start: 2024-05-23 | End: 2024-05-23 | Stop reason: SDUPTHER

## 2024-05-23 RX ORDER — FENTANYL CITRATE 50 UG/ML
50 INJECTION, SOLUTION INTRAMUSCULAR; INTRAVENOUS EVERY 5 MIN PRN
Status: DISCONTINUED | OUTPATIENT
Start: 2024-05-23 | End: 2024-05-23 | Stop reason: HOSPADM

## 2024-05-23 RX ORDER — NALOXONE HYDROCHLORIDE 0.4 MG/ML
INJECTION, SOLUTION INTRAMUSCULAR; INTRAVENOUS; SUBCUTANEOUS PRN
Status: DISCONTINUED | OUTPATIENT
Start: 2024-05-23 | End: 2024-05-23 | Stop reason: HOSPADM

## 2024-05-23 RX ORDER — KETOROLAC TROMETHAMINE 15 MG/ML
INJECTION, SOLUTION INTRAMUSCULAR; INTRAVENOUS PRN
Status: DISCONTINUED | OUTPATIENT
Start: 2024-05-23 | End: 2024-05-23 | Stop reason: SDUPTHER

## 2024-05-23 RX ORDER — ONDANSETRON 2 MG/ML
4 INJECTION INTRAMUSCULAR; INTRAVENOUS
Status: DISCONTINUED | OUTPATIENT
Start: 2024-05-23 | End: 2024-05-23 | Stop reason: HOSPADM

## 2024-05-23 RX ORDER — SODIUM CHLORIDE 0.9 % (FLUSH) 0.9 %
5-40 SYRINGE (ML) INJECTION EVERY 12 HOURS SCHEDULED
Status: DISCONTINUED | OUTPATIENT
Start: 2024-05-23 | End: 2024-05-23 | Stop reason: HOSPADM

## 2024-05-23 RX ORDER — SODIUM CHLORIDE 9 MG/ML
INJECTION, SOLUTION INTRAVENOUS PRN
Status: DISCONTINUED | OUTPATIENT
Start: 2024-05-23 | End: 2024-05-23 | Stop reason: HOSPADM

## 2024-05-23 RX ORDER — FENTANYL CITRATE 50 UG/ML
INJECTION, SOLUTION INTRAMUSCULAR; INTRAVENOUS PRN
Status: DISCONTINUED | OUTPATIENT
Start: 2024-05-23 | End: 2024-05-23 | Stop reason: SDUPTHER

## 2024-05-23 RX ORDER — FENTANYL CITRATE 50 UG/ML
25 INJECTION, SOLUTION INTRAMUSCULAR; INTRAVENOUS EVERY 5 MIN PRN
Status: DISCONTINUED | OUTPATIENT
Start: 2024-05-23 | End: 2024-05-23 | Stop reason: HOSPADM

## 2024-05-23 RX ORDER — PROPOFOL 10 MG/ML
INJECTION, EMULSION INTRAVENOUS PRN
Status: DISCONTINUED | OUTPATIENT
Start: 2024-05-23 | End: 2024-05-23 | Stop reason: SDUPTHER

## 2024-05-23 RX ORDER — SODIUM CHLORIDE 0.9 % (FLUSH) 0.9 %
5-40 SYRINGE (ML) INJECTION PRN
Status: DISCONTINUED | OUTPATIENT
Start: 2024-05-23 | End: 2024-05-23 | Stop reason: HOSPADM

## 2024-05-23 RX ADMIN — SODIUM CHLORIDE, PRESERVATIVE FREE 10 ML: 5 INJECTION INTRAVENOUS at 09:25

## 2024-05-23 RX ADMIN — DOXYCYCLINE HYCLATE 100 MG: 100 CAPSULE ORAL at 09:25

## 2024-05-23 RX ADMIN — ONDANSETRON 4 MG: 2 INJECTION INTRAMUSCULAR; INTRAVENOUS at 18:25

## 2024-05-23 RX ADMIN — PROPOFOL 30 MG: 10 INJECTION, EMULSION INTRAVENOUS at 18:34

## 2024-05-23 RX ADMIN — Medication 100 MCG: at 18:32

## 2024-05-23 RX ADMIN — FENTANYL CITRATE 25 MCG: 50 INJECTION INTRAMUSCULAR; INTRAVENOUS at 18:23

## 2024-05-23 RX ADMIN — FENTANYL CITRATE 25 MCG: 50 INJECTION INTRAMUSCULAR; INTRAVENOUS at 18:48

## 2024-05-23 RX ADMIN — LIDOCAINE HYDROCHLORIDE 50 MG: 20 INJECTION, SOLUTION EPIDURAL; INFILTRATION; INTRACAUDAL; PERINEURAL at 18:04

## 2024-05-23 RX ADMIN — KETOROLAC TROMETHAMINE 15 MG: 15 INJECTION, SOLUTION INTRAMUSCULAR; INTRAVENOUS at 18:29

## 2024-05-23 RX ADMIN — ENOXAPARIN SODIUM 30 MG: 100 INJECTION SUBCUTANEOUS at 21:24

## 2024-05-23 RX ADMIN — LIDOCAINE HYDROCHLORIDE 30 MG: 20 INJECTION, SOLUTION EPIDURAL; INFILTRATION; INTRACAUDAL; PERINEURAL at 18:23

## 2024-05-23 RX ADMIN — DOXYCYCLINE HYCLATE 100 MG: 100 CAPSULE ORAL at 21:22

## 2024-05-23 RX ADMIN — PROPOFOL 20 MG: 10 INJECTION, EMULSION INTRAVENOUS at 18:33

## 2024-05-23 RX ADMIN — SODIUM CHLORIDE, PRESERVATIVE FREE 10 ML: 5 INJECTION INTRAVENOUS at 21:25

## 2024-05-23 RX ADMIN — SODIUM CHLORIDE, SODIUM LACTATE, POTASSIUM CHLORIDE, AND CALCIUM CHLORIDE: 600; 310; 30; 20 INJECTION, SOLUTION INTRAVENOUS at 17:58

## 2024-05-23 RX ADMIN — PROPOFOL 150 MG: 10 INJECTION, EMULSION INTRAVENOUS at 18:04

## 2024-05-23 RX ADMIN — Medication 100 MCG: at 18:13

## 2024-05-23 RX ADMIN — ATORVASTATIN CALCIUM 20 MG: 20 TABLET, FILM COATED ORAL at 21:22

## 2024-05-23 RX ADMIN — ACETAMINOPHEN 1000 MG: 500 TABLET ORAL at 06:19

## 2024-05-23 ASSESSMENT — PAIN DESCRIPTION - ORIENTATION: ORIENTATION: RIGHT

## 2024-05-23 ASSESSMENT — PAIN SCALES - GENERAL
PAINLEVEL_OUTOF10: 0
PAINLEVEL_OUTOF10: 1
PAINLEVEL_OUTOF10: 0

## 2024-05-23 ASSESSMENT — PAIN DESCRIPTION - LOCATION: LOCATION: LEG

## 2024-05-23 ASSESSMENT — PAIN - FUNCTIONAL ASSESSMENT: PAIN_FUNCTIONAL_ASSESSMENT: PREVENTS OR INTERFERES SOME ACTIVE ACTIVITIES AND ADLS

## 2024-05-23 ASSESSMENT — PAIN DESCRIPTION - PAIN TYPE: TYPE: SURGICAL PAIN

## 2024-05-23 ASSESSMENT — PAIN DESCRIPTION - ONSET: ONSET: ON-GOING

## 2024-05-23 ASSESSMENT — PAIN DESCRIPTION - FREQUENCY: FREQUENCY: CONTINUOUS

## 2024-05-23 ASSESSMENT — PAIN DESCRIPTION - DESCRIPTORS: DESCRIPTORS: ACHING

## 2024-05-23 NOTE — PLAN OF CARE
Problem: Discharge Planning  Goal: Discharge to home or other facility with appropriate resources  5/23/2024 0101 by Shauna Newton RN  Outcome: Progressing  5/22/2024 1539 by Chata Graff RN  Outcome: Progressing     Problem: Pain  Goal: Verbalizes/displays adequate comfort level or baseline comfort level  5/23/2024 0101 by Shauna Newton RN  Outcome: Progressing  5/22/2024 1539 by Chata Graff RN  Outcome: Progressing     Problem: Skin/Tissue Integrity  Goal: Absence of new skin breakdown  Description: 1.  Monitor for areas of redness and/or skin breakdown  2.  Assess vascular access sites hourly  3.  Every 4-6 hours minimum:  Change oxygen saturation probe site  4.  Every 4-6 hours:  If on nasal continuous positive airway pressure, respiratory therapy assess nares and determine need for appliance change or resting period.  5/23/2024 0101 by Shauna Newton RN  Outcome: Progressing  5/22/2024 1539 by Chata Graff RN  Outcome: Progressing     Problem: ABCDS Injury Assessment  Goal: Absence of physical injury  5/23/2024 0101 by Shauna Newton RN  Outcome: Progressing  5/22/2024 1539 by Chata Graff RN  Outcome: Progressing     Problem: Safety - Adult  Goal: Free from fall injury  5/23/2024 0101 by Shauna Newton RN  Outcome: Progressing  5/22/2024 1539 by Chata Graff RN  Outcome: Progressing     Problem: Chronic Conditions and Co-morbidities  Goal: Patient's chronic conditions and co-morbidity symptoms are monitored and maintained or improved  5/23/2024 0101 by Shauna Newton RN  Outcome: Progressing  5/22/2024 1539 by Chata Graff RN  Outcome: Progressing     Problem: Physical Therapy - Adult  Goal: By Discharge: Performs mobility at highest level of function for planned discharge setting.  See evaluation for individualized goals.  Description: Physical Therapy Goals  Initiated 5/22/2024 and to be accomplished within 7 day(s)  1.  Patient will move from supine to sit and sit to supine  in bed with

## 2024-05-23 NOTE — PROGRESS NOTES
Physical Therapy  Pt not seen for skilled PT due to:    []  Nausea/vomiting  []  Eating  []  Pain  []  Pt lethargic  []  Off Unit  Other: Pt on OR schedule for planned surgery today, will follow up when appropriate following procedure.     Will f/u later as schedule allows. Thank you.  Reyna Rivero, PT, DPT

## 2024-05-23 NOTE — ANESTHESIA POSTPROCEDURE EVALUATION
Department of Anesthesiology  Postprocedure Note    Patient: Juanpablo Jackson  MRN: [id number]  YOB: 1940  Date of evaluation: 5/23/2024    Procedure Summary       Date: 05/23/24 Room / Location: [facility] MAIN 07 / [facility] MAIN OR    Anesthesia Start: 1758 Anesthesia Stop: 1854    Procedure: RIGHT LOWER EXTREMITY FASCIOTOMY CLOSURE (Right: Leg Lower) Diagnosis:       Open wound of right lower leg, initial encounter      (Open wound of right lower leg, initial encounter [S81.801A])    Surgeons: Lawanda Van MD Responsible Provider: Denys Skelton Jr., MD    Anesthesia Type: General ASA Status: 3            Anesthesia Type: General    Juanjose Phase I: Juanjose Score: 10    Juanjose Phase II:      Anesthesia Post Evaluation    Patient location during evaluation: PACU  Patient participation: complete - patient participated  Level of consciousness: awake  Pain score: 0  Airway patency: patent  Nausea & Vomiting: no nausea and no vomiting  Cardiovascular status: hemodynamically stable  Respiratory status: acceptable  Hydration status: euvolemic  Multimodal analgesia pain management approach  Pain management: adequate    No notable events documented.

## 2024-05-23 NOTE — PROGRESS NOTES
ARU/IPR REFERRAL CONTACT NOTE  [facility]      Thank you for the opportunity to review this patient's case for admission to [facility].    Based on our pre-admission screening:                          [x ] Our Team/Medical Director is following this case.   Comments:Continuing to follow case. Pt schedule for OR today.    Again, Thank you for this referral. Should you have any questions please do not hesitate to call.     Sincerely,  Justina Byers    Clinical Liaison  [place] [facility]  [phone]

## 2024-05-23 NOTE — PROGRESS NOTES
Vascular Surgery Progress Note      No events overnight.        PE:   Blood pressure (!) 129/43, pulse 80, temperature 98.4 °F (36.9 °C), temperature source Oral, resp. rate 12, height 1.524 m (5'), weight 68 kg (150 lb), SpO2 95 %.      Intake/Output Summary (Last 24 hours) at 5/23/2024 0825  Last data filed at 5/23/2024 0400  Gross per 24 hour   Intake 880 ml   Output 2875 ml   Net -1995 ml       NAD  Breathing comfortably  Very alert this morning and appropriately conversant  A&O x 3  BLE warm  Strong DP pulse of the RLE  Motor and sensory grossly intact      Lab Results   Component Value Date    WBC 9.8 05/23/2024    HGB 8.5 (L) 05/23/2024    HCT 25.5 (L) 05/23/2024    MCV 91.7 05/23/2024     05/23/2024     Lab Results   Component Value Date/Time     05/23/2024 04:37 AM    K 3.6 05/23/2024 04:37 AM     05/23/2024 04:37 AM    CO2 25 05/23/2024 04:37 AM    BUN 10 05/23/2024 04:37 AM    CREATININE 0.45 05/23/2024 04:37 AM    GLUCOSE 109 05/23/2024 04:37 AM    CALCIUM 8.4 05/23/2024 04:37 AM    LABGLOM >90 05/23/2024 04:37 AM          IMPRESSION:   POD 2 s/p RLE CFA exploration, antegrade SFA and popliteal artery stent placement with 4 compartment fasciotomies due to SFA-pop dissection following complex R distal femur ORIF. Clinically doing well.       PLAN:   RLE fasciotomy closure today.   Updated family yesterday.  Discussed w/ Dr. Harden yesterday.   Will likely request transfer back to medical service postop.       Lawanda Van MD  Vascular Surgeon  [facility]

## 2024-05-23 NOTE — PROGRESS NOTES
[facility] Hospitalist [facility]  Progress Note  Date:2024       Room:[facility]  Patient Name:Juanpablo Jackson     YOB: 1940     Age:83 y.o.        Subjective    Subjective   Review of Systems    No acute events overnight.    Patient resting comfortably in bed. She's not on oxygen. She is open to going to ARU.    Objective         Vitals Last 24 Hours:  TEMPERATURE:  Temp  Av °F (36.7 °C)  Min: 97.1 °F (36.2 °C)  Max: 98.9 °F (37.2 °C)  RESPIRATIONS RANGE: Resp  Av.2  Min: 6  Max: 25  PULSE OXIMETRY RANGE: SpO2  Av.2 %  Min: 95 %  Max: 100 %  PULSE RANGE: Pulse  Av.6  Min: 66  Max: 95  BLOOD PRESSURE RANGE: Systolic (24hrs), Av , Min:122 , Max:154     ; Diastolic (24hrs), Av, Min:40, Max:55      I/O (24Hr):    Intake/Output Summary (Last 24 hours) at 2024 1331  Last data filed at 2024 1200  Gross per 24 hour   Intake 340 ml   Output 3075 ml   Net -2735 ml       Objective:  General Appearance:  Comfortable.    Vital signs: (most recent): Blood pressure (!) 154/55, pulse 82, temperature 98.3 °F (36.8 °C), temperature source Oral, resp. rate 19, height 1.524 m (5'), weight 68 kg (150 lb), SpO2 99 %.    Output: Producing urine.    HEENT: Normal HEENT exam.    Lungs:  Normal effort and normal respiratory rate.    Heart: Normal rate.  Regular rhythm.    Abdomen: Abdomen is soft and flat.  Bowel sounds are normal.   There is no abdominal tenderness.     Extremities: Normal range of motion.  (Right calf dressed in gauze)  Pulses: Distal pulses are intact.    Neurological: Patient is alert and oriented to person, place and time.    Skin:  Warm and dry.          Labs/Imaging/Diagnostics    Labs:  CBC:  Recent Labs     24  2140 24  0420 24  0437   WBC 10.9 9.8 9.8   RBC 3.61* 3.21* 2.78*   HGB 10.9* 9.8* 8.5*   HCT 32.8* 29.1* 25.5*   MCV 90.9 90.7 91.7   RDW 13.6 13.9 14.2    233 222       CHEMISTRIES:  Recent Labs      5/21/2024 Yes    Chronic anticoagulation 5/21/2024 Yes    Status post total right knee replacement 5/21/2024 Yes    Age-related osteoporosis with current pathological fracture of left femur with malunion 5/19/2024 Yes    Insufficiency fracture of femur with nonunion, subsequent encounter 5/21/2024 Yes    Status post right hip replacement 5/21/2024 Yes    Acute lower limb ischemia 5/21/2024 Yes    Periprosthetic fracture of shaft of femur 5/21/2024 Yes   Assessment:    Condition: In stable condition.  Improving.   (    #Distal left femur fracture  #SFA-pop dissection following complex R distal femur ORIF  #History chronic stress fracture left femur  #History CVA  #Hypertension  #Osteoporosis  #HLD  ).     Plan:   Wean off oxygen.  Regular diet.  Administer medications as ordered.   (    - Continue MiraLAX  - Plan for OR today to close fasciotomy  - Resume amlodipine  - PT/OT early mobilization  - Pain control as needed  - Patient will likely be transferred to medicine service after surgery today  - Disposition: Stable for discharge on Friday or Saturday to ARU vs SNF pending auth).       Case discussed with:  [x]Patient  []Family  [x]Nursing  []Case Management  DVT Prophylaxis:  [x]Lovenox  []Hep SQ  []SCDs  []Coumadin   []Heparin gtt   []Xarelto   []Rut Torres DO, MBA, MS [facility]  Hospitalist

## 2024-05-23 NOTE — ANESTHESIA PRE PROCEDURE
BILITOT 0.5 05/19/2024 11:43 AM    ALKPHOS 57 05/19/2024 11:43 AM    AST 25 05/19/2024 11:43 AM    ALT 34 05/19/2024 11:43 AM       POC Tests:   Recent Labs     05/21/24 1953 05/21/24  2357   POCGLU 168* 216*   POCNA 138  --    POCK 3.6  --    POCCL 104  --    POCHCT 30*  --        Coags:   Lab Results   Component Value Date/Time    PROTIME 15.5 05/23/2024 04:37 AM    INR 1.2 05/23/2024 04:37 AM       HCG (If Applicable): No results found for: \"PREGTESTUR\", \"PREGSERUM\", \"HCG\", \"HCGQUANT\"     ABGs: No results found for: \"PHART\", \"PO2ART\", \"HXH6AWS\", \"JFK1QPP\", \"BEART\", \"F1ZAVKBC\"     Type & Screen (If Applicable):  No results found for: \"LABABO\"    Drug/Infectious Status (If Applicable):  No results found for: \"HIV\", \"HEPCAB\"    COVID-19 Screening (If Applicable): No results found for: \"COVID19\"        Anesthesia Evaluation  Patient summary reviewed  Airway: Mallampati: II  TM distance: >3 FB   Neck ROM: full  Mouth opening: > = 3 FB   Dental:          Pulmonary:                              Cardiovascular:    (+) hypertension: no interval change      ECG reviewed                        Neuro/Psych:   (+) CVA: no interval change            GI/Hepatic/Renal:             Endo/Other:    (+) blood dyscrasia: anemia:..                 Abdominal:             Vascular:          Other Findings:       Anesthesia Plan      general     ASA 3       Induction: intravenous.      Anesthetic plan and risks discussed with patient.      Plan discussed with CRNA.    Attending anesthesiologist reviewed and agrees with Preprocedure content            Denys Skelton Jr, MD   5/23/2024

## 2024-05-23 NOTE — PROGRESS NOTES
[facility]  (A Division of [facility])      Consultation Note      Date of Admission: 5/19/2024    Date of Note: 5/23/2024      Reason for Referral: hx of MRSA, s/p fasciotomy  Referring Physician: Dr. Rehan Restrepo from this admission:   5/20 urine cx: negative    Current Antimicrobials:    Prior Antimicrobials:  Ancef 5/21- present  Doxycycline 5/22 to present        Assessment:         Status post right common femoral artery exposure right SFA and popliteal stent placement:  Displaced right-sided femoral fracture with underlying chronic femoral fracture.  Fall at home  Osteoporosis  Remote history of possible MRSA skin lesion: Unable to find any supporting cultures [facility] or [facility] since 2013    Plan:   Can remove MRSA contact precautions-I am not able to find any positive MRSA cultures documented since 2013 in either [facility] or [facility].      Trend CBC, BMP to monitor for any medication associated toxicities.    Given complicated surgical history and requirement for fasciotomy, continue doxycycline   -Stop date 5/29  Fasciotomy wound care as per vascular surgery team.  May need additional washout or closure in the next 24 to 48 hours    Discussed with vascular surgery on bedside rounds  Discussed with Dr.Idowu Eleazar Muhammad DO  [facility]  [address], [address], [place] [address]  Office: [phone], Ext [phone]      Lines / Catheters:  peripheral    Subjective:   Seen and examined.  Family at bedside.  She is resting comfortably and wakes up easily.  Having some pain but generally controlled.  Still waiting for transfer out of the ICU    Tmax 98.9  WBCs 9.8    HPI:  Ms. Jackson is a an 83-year-old female with a past medical history of hypertension, osteoporosis, prior CVA, recurrent UTIs who had a chronic right femur fracture.  She had been followed as an outpatient by orthopedic surgery  and did not want any additional surgical interventions.  Unfortunately she had a fall at home which resulted in a 100% displaced overriding fracture.  She was taken to the OR on  for placement of a symphysis condylar plate and ORIF.  In the trial operative.  It was noted that she had decreased pedal pulses on the right side with a suspected right SFA occlusion.  She was taken to the OR again on  for angiography and SFA stenting and fasciotomy.         Objective:      BP (!) 154/55   Pulse 82   Temp 98.3 °F (36.8 °C) (Oral)   Resp 19   Ht 1.524 m (5')   Wt 68 kg (150 lb)   SpO2 99%   BMI 29.29 kg/m²   Temp (24hrs), Av °F (36.7 °C), Min:97.1 °F (36.2 °C), Max:98.9 °F (37.2 °C)        General:   awake alert and oriented, appears stated age   Skin:   no rashes or skin lesions noted on limited exam, no jaundice   HEENT:  Normocephalic, atraumatic, PERRL, EOMI, no scleral icterus; no conjunctival hemmohage;    Lymph Nodes:   no cervical or inguinal adenopathy   Lungs:   non-labored, bilaterally clear to aspiration   Heart:  RRR, s1 and s2; no murmurs, no edema, + pedal pulses   Abdomen:  soft, non-distended, active bowel sounds. Non-tender   Genitourinary:  deferred   Extremities:   no clubbing, cyanosis; no joint effusions or swelling; muscle mass appropriate for age; clean dressings in place over the right calf; lateral incision is clean with intact dressings, no strikethrough is noted, slight increase in swelling compared to the left.   Neurologic:  No gross focal sensory abnormalities; equal muscle strength to upper and lower extremities. Speech appropirate. Cranial nerves intact   Psychiatric:   appropriate and interactive.       Labs: Results:   Chemistry Recent Labs     24    136 139   K 3.9 3.8 3.6    108 110   CO2 25 23 25   BUN 13 11 10      CBC w/Diff Recent Labs     24   WBC 10.9 9.8 9.8   RBC

## 2024-05-23 NOTE — BRIEF OP NOTE
Brief Postoperative Note      Patient: Juanpablo Jackson  YOB: 1940  MRN: [id number]    Date of Procedure: 5/23/2024    Pre-Op Diagnosis Codes:     * Open wound of right lower leg, initial encounter [S81.801A]  S/p RLE 4 compartment fasciotomy 2 days prior    Post-Op Diagnosis: Same       Procedure(s):  RIGHT LOWER EXTREMITY FASCIOTOMY CLOSURE    Surgeon(s):  Lawanda Van MD    Assistant:  Surgical Assistant: Mily Frazier    Anesthesia: General    Estimated Blood Loss (mL): 5mL    Complications: none    Specimens:   * No specimens in log *    Implants:  * No implants in log *      Drains:   Urinary Catheter 05/19/24 Naqvi (Active)   Catheter Indications Perioperative use for selected surgical procedures 05/23/24 1600   Site Assessment No urethral drainage 05/23/24 1600   Urine Color Yellow 05/23/24 1600   Urine Appearance Clear 05/23/24 1600   Urine Odor Malodorous;Other (Comment) 05/23/24 1600   Collection Container Standard 05/23/24 1600   Securement Method Securing device (Describe) 05/23/24 1600   Catheter Care  Perineal wipes 05/23/24 1200   Catheter Best Practices  Drainage tube clipped to bed;Catheter secured to thigh;Tamper seal intact;Bag below bladder;Bag not on floor;Lack of dependent loop in tubing;Drainage bag less than half full 05/23/24 1600   Status Draining;Patent 05/23/24 1600   Output (mL) 200 mL 05/23/24 1600       Findings:  Infection Present At Time Of Surgery (PATOS) (choose all levels that have infection present):  No infection present  Other Findings: Healthy viable muscle in all 4 compartments. No edema. Closed easily without tension.     Disposition: to recovery in stable condition    Lawanda Van MD  Vascular Surgeon      Electronically signed by Lawanda Van MD on 5/23/2024 at 6:37 PM

## 2024-05-24 PROBLEM — I10 PRIMARY HYPERTENSION: Status: ACTIVE | Noted: 2024-05-24

## 2024-05-24 LAB
ANION GAP SERPL CALC-SCNC: 6 MMOL/L (ref 3–18)
BUN SERPL-MCNC: 11 MG/DL (ref 7–18)
BUN/CREAT SERPL: 30 (ref 12–20)
CALCIUM SERPL-MCNC: 7.9 MG/DL (ref 8.5–10.1)
CHLORIDE SERPL-SCNC: 108 MMOL/L (ref 100–111)
CO2 SERPL-SCNC: 23 MMOL/L (ref 21–32)
CREAT SERPL-MCNC: 0.37 MG/DL (ref 0.6–1.3)
ERYTHROCYTE [DISTWIDTH] IN BLOOD BY AUTOMATED COUNT: 14.2 % (ref 11.6–14.5)
GLUCOSE SERPL-MCNC: 91 MG/DL (ref 74–99)
HCT VFR BLD AUTO: 25.3 % (ref 35–45)
HGB BLD-MCNC: 8.5 G/DL (ref 12–16)
MAGNESIUM SERPL-MCNC: 2.1 MG/DL (ref 1.6–2.6)
MCH RBC QN AUTO: 31.3 PG (ref 24–34)
MCHC RBC AUTO-ENTMCNC: 33.6 G/DL (ref 31–37)
MCV RBC AUTO: 93 FL (ref 78–100)
NRBC # BLD: 0 K/UL (ref 0–0.01)
NRBC BLD-RTO: 0 PER 100 WBC
PLATELET # BLD AUTO: 249 K/UL (ref 135–420)
PMV BLD AUTO: 10.6 FL (ref 9.2–11.8)
POTASSIUM SERPL-SCNC: 3.4 MMOL/L (ref 3.5–5.5)
RBC # BLD AUTO: 2.72 M/UL (ref 4.2–5.3)
SODIUM SERPL-SCNC: 137 MMOL/L (ref 136–145)
WBC # BLD AUTO: 9.6 K/UL (ref 4.6–13.2)

## 2024-05-24 PROCEDURE — 80048 BASIC METABOLIC PNL TOTAL CA: CPT

## 2024-05-24 PROCEDURE — 97535 SELF CARE MNGMENT TRAINING: CPT

## 2024-05-24 PROCEDURE — 99231 SBSQ HOSP IP/OBS SF/LOW 25: CPT | Performed by: FAMILY MEDICINE

## 2024-05-24 PROCEDURE — 6370000000 HC RX 637 (ALT 250 FOR IP): Performed by: SURGERY

## 2024-05-24 PROCEDURE — 6370000000 HC RX 637 (ALT 250 FOR IP): Performed by: STUDENT IN AN ORGANIZED HEALTH CARE EDUCATION/TRAINING PROGRAM

## 2024-05-24 PROCEDURE — 2000000000 HC ICU R&B

## 2024-05-24 PROCEDURE — 85027 COMPLETE CBC AUTOMATED: CPT

## 2024-05-24 PROCEDURE — 99232 SBSQ HOSP IP/OBS MODERATE 35: CPT | Performed by: SURGERY

## 2024-05-24 PROCEDURE — 2500000003 HC RX 250 WO HCPCS: Performed by: STUDENT IN AN ORGANIZED HEALTH CARE EDUCATION/TRAINING PROGRAM

## 2024-05-24 PROCEDURE — 2580000003 HC RX 258: Performed by: STUDENT IN AN ORGANIZED HEALTH CARE EDUCATION/TRAINING PROGRAM

## 2024-05-24 PROCEDURE — 94761 N-INVAS EAR/PLS OXIMETRY MLT: CPT

## 2024-05-24 PROCEDURE — 83735 ASSAY OF MAGNESIUM: CPT

## 2024-05-24 PROCEDURE — 6360000002 HC RX W HCPCS: Performed by: SURGERY

## 2024-05-24 PROCEDURE — 97530 THERAPEUTIC ACTIVITIES: CPT

## 2024-05-24 PROCEDURE — 6370000000 HC RX 637 (ALT 250 FOR IP): Performed by: INTERNAL MEDICINE

## 2024-05-24 RX ORDER — CALCIUM GLUCONATE 20 MG/ML
1000 INJECTION, SOLUTION INTRAVENOUS ONCE
Status: COMPLETED | OUTPATIENT
Start: 2024-05-24 | End: 2024-05-24

## 2024-05-24 RX ORDER — CALCIUM GLUCONATE 94 MG/ML
1000 INJECTION, SOLUTION INTRAVENOUS ONCE
Status: DISCONTINUED | OUTPATIENT
Start: 2024-05-24 | End: 2024-05-24 | Stop reason: CLARIF

## 2024-05-24 RX ADMIN — CALCIUM GLUCONATE 1000 MG: 20 INJECTION, SOLUTION INTRAVENOUS at 11:57

## 2024-05-24 RX ADMIN — OXYCODONE HYDROCHLORIDE 5 MG: 5 TABLET ORAL at 20:20

## 2024-05-24 RX ADMIN — POLYETHYLENE GLYCOL 3350 17 G: 17 POWDER, FOR SOLUTION ORAL at 20:22

## 2024-05-24 RX ADMIN — DOXYCYCLINE HYCLATE 100 MG: 100 CAPSULE ORAL at 08:30

## 2024-05-24 RX ADMIN — ENOXAPARIN SODIUM 30 MG: 100 INJECTION SUBCUTANEOUS at 08:30

## 2024-05-24 RX ADMIN — SODIUM CHLORIDE, PRESERVATIVE FREE 10 ML: 5 INJECTION INTRAVENOUS at 21:00

## 2024-05-24 RX ADMIN — SODIUM CHLORIDE, PRESERVATIVE FREE 10 ML: 5 INJECTION INTRAVENOUS at 08:32

## 2024-05-24 RX ADMIN — ATORVASTATIN CALCIUM 20 MG: 20 TABLET, FILM COATED ORAL at 21:27

## 2024-05-24 RX ADMIN — AMLODIPINE BESYLATE 5 MG: 5 TABLET ORAL at 08:30

## 2024-05-24 RX ADMIN — OXYCODONE HYDROCHLORIDE 5 MG: 5 TABLET ORAL at 11:57

## 2024-05-24 RX ADMIN — DOXYCYCLINE HYCLATE 100 MG: 100 CAPSULE ORAL at 21:27

## 2024-05-24 RX ADMIN — ENOXAPARIN SODIUM 30 MG: 100 INJECTION SUBCUTANEOUS at 21:27

## 2024-05-24 RX ADMIN — POTASSIUM BICARBONATE 40 MEQ: 782 TABLET, EFFERVESCENT ORAL at 09:20

## 2024-05-24 ASSESSMENT — PAIN DESCRIPTION - PAIN TYPE
TYPE: SURGICAL PAIN
TYPE: SURGICAL PAIN

## 2024-05-24 ASSESSMENT — PAIN DESCRIPTION - LOCATION
LOCATION: LEG

## 2024-05-24 ASSESSMENT — PAIN - FUNCTIONAL ASSESSMENT
PAIN_FUNCTIONAL_ASSESSMENT: PREVENTS OR INTERFERES WITH MANY ACTIVE NOT PASSIVE ACTIVITIES
PAIN_FUNCTIONAL_ASSESSMENT: PREVENTS OR INTERFERES WITH MANY ACTIVE NOT PASSIVE ACTIVITIES

## 2024-05-24 ASSESSMENT — PAIN DESCRIPTION - ORIENTATION
ORIENTATION: RIGHT

## 2024-05-24 ASSESSMENT — PAIN SCALES - GENERAL
PAINLEVEL_OUTOF10: 3
PAINLEVEL_OUTOF10: 0
PAINLEVEL_OUTOF10: 0
PAINLEVEL_OUTOF10: 5
PAINLEVEL_OUTOF10: 0
PAINLEVEL_OUTOF10: 4
PAINLEVEL_OUTOF10: 3

## 2024-05-24 ASSESSMENT — PAIN DESCRIPTION - DESCRIPTORS
DESCRIPTORS: ACHING
DESCRIPTORS: SHARP

## 2024-05-24 NOTE — PROGRESS NOTES
Per orthopedics:    Stop by to see patient on rounding at 11:20 AM.    Patient being assisted by 3 caregivers at bedside to bedside toilet.    Patient via nurse reports toe-touch weightbearing only right lower extremity for transfers up.    Plan: Continue PT OT per protocol nonweightbearing recommended right lower extremity but except toe-touch for transfers.  Vascular following for stent placement.    Wound care per protocol right distal lateral hip.  Patient currently on Lovenox for thromboembolism prophylaxis.    Recommend skilled facility for rehab patient will need at least 8 to 12 weeks with weightbearing restriction and will follow with serial x-rays every 2 to 3 weeks in order to modify as appropriate.

## 2024-05-24 NOTE — PERIOP NOTE
TRANSFER - OUT REPORT:    Verbal report given to GLADYS Nogueira on Juanpablo Jackson  being transferred to ICU for routine progression of patient care       Report consisted of patient's Situation, Background, Assessment and   Recommendations(SBAR).     Information from the following report(s) Nurse Handoff Report and Surgery Report was reviewed with the receiving nurse.           Lines:   Peripheral IV 05/19/24 Right Antecubital (Active)   Site Assessment Clean, dry & intact 05/23/24 1959   Line Status Infusing 05/23/24 1959   Line Care Connections checked and tightened;Ports disinfected 05/23/24 1200   Phlebitis Assessment No symptoms 05/23/24 1959   Infiltration Assessment 0 05/23/24 1200   Alcohol Cap Used No 05/23/24 1200   Dressing Status Clean, dry & intact 05/23/24 1959   Dressing Type Transparent 05/23/24 1959       Peripheral IV 05/21/24 Right Hand (Active)   Site Assessment Clean, dry & intact 05/23/24 1200   Line Status Flushed 05/23/24 1200   Line Care Connections checked and tightened;Ports disinfected 05/23/24 1200   Phlebitis Assessment No symptoms 05/23/24 1200   Infiltration Assessment 0 05/23/24 1200   Alcohol Cap Used Yes 05/23/24 1200   Dressing Status Clean, dry & intact 05/23/24 1200   Dressing Type Transparent 05/23/24 1200       Arterial Line 05/21/24 Radial (Active)   Site Assessment Clean, dry & intact 05/23/24 1200   Line Status Intact and in place;Blood return noted 05/23/24 1200   Art Line Interventions Zeroed and calibrated;Connections checked and tightened;Flushed per protocol 05/23/24 1200   Color/Movement/Sensation Capillary refill less than 3 sec;Capillary refill greater than 3 sec 05/23/24 1200   External Catheter Length (cm) 0 cm 05/22/24 0400   Dressing Type Transparent w/CHG gel 05/23/24 1200   Dressing Status Clean, dry & intact 05/23/24 1200        Opportunity for questions and clarification was provided.      Patient transported with:  Registered Nurse

## 2024-05-24 NOTE — PROGRESS NOTES
[facility] Hospitalist Group  Progress Note    Patient: Juanpablo Jackson Age: 83 y.o. : 1940 MR#: [id number] SSN: [id number]      Subjective/24-hour events:     Pain in good control, no new complaints overall.  Worked with therapy send this morning.    Assessment:   Distal left femur fracture  SFA-pop dissection following complex right distal femur ORIF  Hypertension  Hyperlipidemia  Osteoporosis  Advanced age    Plan:   Analgesia/bowel regimen.  PT/OT, mobilize as tolerated.  Being evaluated for inpatient rehab admission.  Final disposition to be determined.  Other medical management primarily supportive at this point.  Follow.    Anticipated discharge: 3 to 4 days/ARU v SNF    Case discussed with:  [x]Patient  []Family  [x] Nursing  []Case Management  DVT Prophylaxis:  [x]Lovenox  []Hep SQ  []SCDs  []Coumadin   []On Heparin gtt []PO anticoagulant    Objective:   VS: BP (!) 132/45   Pulse 83   Temp 98 °F (36.7 °C)   Resp 27   Ht 1.524 m (5')   Wt 68 kg (150 lb)   SpO2 90%   BMI 29.29 kg/m²      Tmax/24hrs: Temp (24hrs), Av.4 °F (36.9 °C), Min:98 °F (36.7 °C), Max:99.1 °F (37.3 °C)    Intake/Output Summary (Last 24 hours) at 2024 1201  Last data filed at 2024 0800  Gross per 24 hour   Intake 300 ml   Output 705 ml   Net -405 ml       Gen:  In NAD.  Nontoxic-appearing.  Lungs: Clear, no wheezes  Effort nonlabored.  CV: RRR.  Abdomen: Soft, NTTP.  Extremities: LLE, warm no ischemia  Neuro:  Awake and alert, moves extremities spontaneously.    Current Facility-Administered Medications   Medication Dose Route Frequency    calcium gluconate 1,000 mg in sodium chloride 50 mL  1,000 mg IntraVENous Once    doxycycline hyclate (VIBRAMYCIN) capsule 100 mg  100 mg Oral 2 times per day    enoxaparin Sodium (LOVENOX) injection 30 mg  30 mg SubCUTAneous BID    oxyCODONE (ROXICODONE) immediate release tablet 5 mg  5 mg Oral Q4H PRN    Or    oxyCODONE (ROXICODONE) immediate

## 2024-05-24 NOTE — PROGRESS NOTES
[facility]  (A Division of [facility])      Consultation Note      Date of Admission: 5/19/2024    Date of Note: 5/24/2024      Reason for Referral: hx of MRSA, s/p fasciotomy  Referring Physician: Dr. Rehan Restrepo from this admission:   5/20 urine cx: negative    Current Antimicrobials:    Prior Antimicrobials:  Ancef 5/21- present  Doxycycline 5/22 to present        Assessment:         Status post right common femoral artery exposure right SFA and popliteal stent placement:  Displaced right-sided femoral fracture with underlying chronic femoral fracture.  Fall at home  Osteoporosis  Remote history of possible MRSA skin lesion: Unable to find any supporting cultures from [facility] or [facility] since 2013    Plan:   Can remove MRSA contact precautions-I am not able to find any positive MRSA cultures documented since 2013 in either [facility] or [facility].      Trend CBC, BMP to monitor for any medication associated toxicities.    Given complicated surgical history and requirement for fasciotomy, continue doxycycline   -Stop date 5/29  Fasciotomy wound care as per vascular surgery team.      Discussed with vascular surgery on bedside rounds  Discussed with Dr.Wyatt Eleazar Muhammad DO  [facility]  [address], [address], [place] [address]  Office: [phone], Ext [phone]      Lines / Catheters:  peripheral    Subjective:   Seen and examined.  She is resting comfortably and wakes up easily.  Having some pain but generally controlled.    Tmax 99.1  WBCs 9.6    HPI:  Ms. Jackson is a an 83-year-old female with a past medical history of hypertension, osteoporosis, prior CVA, recurrent UTIs who had a chronic right femur fracture.  She had been followed as an outpatient by orthopedic surgery and did not want any additional surgical interventions.  Unfortunately she had a fall at home which resulted in a 100% displaced

## 2024-05-24 NOTE — PROGRESS NOTES
Vascular Surgery Progress Note      No events overnight.  She slept well overnight      PE:   Blood pressure (!) 132/45, pulse 76, temperature 98 °F (36.7 °C), resp. rate 17, height 1.524 m (5'), weight 68 kg (150 lb), SpO2 90 %.      Intake/Output Summary (Last 24 hours) at 5/24/2024 1024  Last data filed at 5/24/2024 0800  Gross per 24 hour   Intake 300 ml   Output 905 ml   Net -605 ml       NAD  Breathing comfortably  A&O x 3 pleasant F  RLE warm  Groin incision dermabond is intact. There is ecchymosis near the edges of the incision. No erythema  The RLE fasciotomy closure bandages are clean.   There is a strong RLE DP pulse present.       Lab Results   Component Value Date    WBC 9.6 05/24/2024    HGB 8.5 (L) 05/24/2024    HCT 25.3 (L) 05/24/2024    MCV 93.0 05/24/2024     05/24/2024     Lab Results   Component Value Date/Time     05/24/2024 03:30 AM    K 3.4 05/24/2024 03:30 AM     05/24/2024 03:30 AM    CO2 23 05/24/2024 03:30 AM    BUN 11 05/24/2024 03:30 AM    CREATININE 0.37 05/24/2024 03:30 AM    GLUCOSE 91 05/24/2024 03:30 AM    CALCIUM 7.9 05/24/2024 03:30 AM    LABGLOM >90 05/24/2024 03:30 AM            IMPRESSION:   POD 3/1 s/p RLE CFA exposure, SFA-pop Viabahn stent placement, and 4 compartment fasciotomy for acute SFA-pop dissection following complex distal femur ORIF. She is s/p fasciotomy closure yesterday. She is doing well.       PLAN:   Appreciate transfer back to internal medicine team. Discussed w/ IM physician this AM. From a vascular standpoint she could be transitioned to a regular nursing snyder (not stepdown).   No activity restrictions from a vascular standpoint.   Recommend a DSD dressing over the fasciotomy incisions over the weekend, with no further dressings needed after 48 hours.   Would recommend ASA and plavix for at least 1 year for stent patency, when safe from an orthopedics standpoint.   Continue lovenox 30 BID and ASA for now.   Updated pt family yesterday

## 2024-05-24 NOTE — PLAN OF CARE
Problem: Discharge Planning  Goal: Discharge to home or other facility with appropriate resources  Outcome: Progressing     Problem: Pain  Goal: Verbalizes/displays adequate comfort level or baseline comfort level  Outcome: Progressing     Problem: Skin/Tissue Integrity  Goal: Absence of new skin breakdown  Description: 1.  Monitor for areas of redness and/or skin breakdown  2.  Assess vascular access sites hourly  3.  Every 4-6 hours minimum:  Change oxygen saturation probe site  4.  Every 4-6 hours:  If on nasal continuous positive airway pressure, respiratory therapy assess nares and determine need for appliance change or resting period.  Outcome: Progressing     Problem: ABCDS Injury Assessment  Goal: Absence of physical injury  Outcome: Progressing     Problem: Safety - Adult  Goal: Free from fall injury  Outcome: Progressing     Problem: Chronic Conditions and Co-morbidities  Goal: Patient's chronic conditions and co-morbidity symptoms are monitored and maintained or improved  Outcome: Progressing     Problem: Physical Therapy - Adult  Goal: By Discharge: Performs mobility at highest level of function for planned discharge setting.  See evaluation for individualized goals.  Description: Physical Therapy Goals  Initiated 5/22/2024 and to be accomplished within 7 day(s)  1.  Patient will move from supine to sit and sit to supine  in bed with supervision/set-up.    2.  Patient will transfer from bed to chair and chair to bed with supervision/set-up using the least restrictive device.  3.  Patient will perform sit to stand with minimal assistance/contact guard assist.  4.  Patient will ambulate with minimal assistance/contact guard assist for 100 feet with the least restrictive device.     PLOF: Independent. Lives in Assisted Living, one level, and elevator. Did not use AD for ambulation.   5/24/2024 1201 by Rae Lance, SPT  Outcome: Progressing     Problem: Occupational Therapy - Adult  Goal: By Discharge:

## 2024-05-24 NOTE — PLAN OF CARE
Problem: Physical Therapy - Adult  Goal: By Discharge: Performs mobility at highest level of function for planned discharge setting.  See evaluation for individualized goals.  Description: Physical Therapy Goals  Initiated 5/22/2024 and to be accomplished within 7 day(s)  1.  Patient will move from supine to sit and sit to supine  in bed with supervision/set-up.    2.  Patient will transfer from bed to chair and chair to bed with supervision/set-up using the least restrictive device.  3.  Patient will perform sit to stand with minimal assistance/contact guard assist.  4.  Patient will ambulate with minimal assistance/contact guard assist for 100 feet with the least restrictive device.     PLOF: Independent. Lives in Assisted Living, one level, and elevator. Did not use AD for ambulation.   Outcome: Progressing   PHYSICAL THERAPY TREATMENT    Patient: Juanpablo Jackson (83 y.o. female)  Date: 5/24/2024  Diagnosis: Closed displaced comminuted fracture of shaft of right femur, initial encounter ([facility]) [S72.351A]  Age-related osteoporosis with current pathological fracture of left femur with malunion [M80.052P] Closed fracture of shaft of right femur ([facility])  Procedure(s) (LRB):  RIGHT LOWER EXTREMITY FASCIOTOMY CLOSURE (Right) 1 Day Post-Op  Precautions: Fall Risk, Surgical Protocols, Weight Bearing, Right Lower Extremity Weight Bearing: Toe Touch Weight Bearing,  ,  ,  ,  ,  ,    ASSESSMENT:  Patient received in bed, alert, and agreeable to PT. Performed bed mobility to EOB ModA x2 with LE management and trunk assistance. Pt was able to initiate mobility tasks better than last session. Toe Touch WB on R LE. Stand pivot transfer ModA x2 to bedside commode. Increased pain in R LE in dependent position, repositioned on pillow for comfort. STS ModA x3 to return to EOB from bedside commode. Decreased standing endurance on L LE. ModA x2 to return pt to supine with HOB elevated at end of session.   Educated on need for RN

## 2024-05-24 NOTE — PROGRESS NOTES
Physician Progress Note      PATIENT:               PRIMO WEBB  CSN #:                  [id number]  :                       1940  ADMIT DATE:       2024 11:34 AM  DISCH DATE:  RESPONDING  PROVIDER #:        Danielle Torres DO          QUERY TEXT:    Pt admitted with fracture. Pt noted to have  H/H 8.5/25.3. If possible, please   document in the progress notes and discharge summary if you are evaluating   and/or treating any of the following:      The medical record reflects the following:  Risk Factors: postop  Clinical Indicators:      24 21:40  Hemoglobin Quant: 10.9 (L)  Hematocrit: 32.8 (L)    24 03:30  Hemoglobin Quant: 8.5 (L)  Hematocrit: 25.3 (L)    Treatment: PRBC, lab monitoring    Thank you  Vee Rolle RN, CDI, CCDS, CRCR  Certified  Clinical Documentation   O: [phone]  [email or web address]  I can also be reached by perfect serve  Options provided:  -- Acute blood loss anemia  -- Postoperative acute blood loss anemia  -- Other - I will add my own diagnosis  -- Disagree - Not applicable / Not valid  -- Disagree - Clinically unable to determine / Unknown  -- Refer to Clinical Documentation Reviewer    PROVIDER RESPONSE TEXT:    This patient has postoperative acute blood loss anemia.    Query created by: Vee Rolle on 2024 7:07 AM      Electronically signed by:  Danielle Torres DO 2024 9:39 AM

## 2024-05-24 NOTE — PLAN OF CARE
Problem: Occupational Therapy - Adult  Goal: By Discharge: Performs self-care activities at highest level of function for planned discharge setting.  See evaluation for individualized goals.  Description: Occupational Therapy Goals:  Initiated 5/22/2024 to be met within 7-10 days.    1.  Patient will perform bed mobility for ADLs with supervision/set-up.   2.  Patient will perform grooming with supervision/set-up while sitting at EOB with Good balance.  3.  Patient will perform upper body dressing with supervision/set-up.  4.  Patient will perform toilet transfers with minimal assistance/contact guard assist.  5.  Patient will perform all aspects of toileting with minimal assistance/contact guard assist.  6.  Patient will participate in upper extremity therapeutic exercise/activities with supervision/set-up for 8 minutes to improve endurance and UB strength needed for ADLs    7.  Patient will utilize energy conservation techniques during functional activities with verbal cues.    PLOF: Pt lives alone in senior living apartment, reports being Mod Ind for ADLs and functional mobility using QC most recently.  Outcome: Progressing   OCCUPATIONAL THERAPY TREATMENT    Patient: Juanpablo Jackson (83 y.o. female)  Date: 5/24/2024  Diagnosis: Closed displaced comminuted fracture of shaft of right femur, initial encounter ([facility]) [S72.351A]  Age-related osteoporosis with current pathological fracture of left femur with malunion [M80.052P] Closed fracture of shaft of right femur ([facility])  Procedure(s) (LRB):  RIGHT LOWER EXTREMITY FASCIOTOMY CLOSURE (Right) 1 Day Post-Op  Precautions: Fall Risk, Surgical Protocols, Weight Bearing, Right Lower Extremity Weight Bearing: Toe Touch Weight Bearing    Chart, occupational therapy assessment, plan of care, and goals were reviewed.  ASSESSMENT:  Pt appears brighter. Requesting bedpan upon entry. Agreeable to function transfer training to [facility]. PT joined session to maximize safety  w/multiple line mgt. Pt requires increase time w/bed mobility and assist w/RLE to maneuver to EOB. Pt educated on stand pivot transfer technique to maximize safety w/TTWB RLE and energy conservation. Pt requires 2 person assist and hand over hand assist w/LUE reaching for armrest. Poor dynamic standing balance requires MAX A w/toileting ADL. Pt c/o RLE pain seated EOB and on BSC. Pt returned to supine and RLE pain relieved. Pt left w/all items within reach. Pt motivated to participate w/therapy and return to PLOF.     Progression toward goals:  []          Improving appropriately and progressing toward goals  [x]          Improving slowly and progressing toward goals  []          Not making progress toward goals and plan of care will be adjusted     PLAN:  Patient continues to benefit from skilled intervention to address the above impairments.  Continue treatment per established plan of care.    Further Equipment Recommendations for Discharge: shower chair and rolling walker and possibly w/c (18 inch) for community re-entry    AMPAC: AM-PAC Inpatient Daily Activity Raw Score: 15    Current research shows that an AM-PAC score of 17 or less is not associated with a discharge to the patient's home setting. Based on an AM-PAC score and their current ADL deficits; it is recommended that the patient have 5-7 sessions per week of Occupational Therapy at d/c to increase the patient's independence.  Currently, this patient demonstrates the potential endurance, and/or tolerance for 3 hours of therapy each day at d/c.      This [facility] score should be considered in conjunction with interdisciplinary team recommendations to determine the most appropriate discharge setting. Patient's social support, diagnosis, medical stability, and prior level of function should also be taken into consideration.     SUBJECTIVE:   Patient stated, \"I usually have a problem with constipation anyway.\"    OBJECTIVE DATA SUMMARY:   Cognitive/Behavioral

## 2024-05-25 LAB
ANION GAP SERPL CALC-SCNC: 3 MMOL/L (ref 3–18)
BUN SERPL-MCNC: 12 MG/DL (ref 7–18)
BUN/CREAT SERPL: 24 (ref 12–20)
CALCIUM SERPL-MCNC: 8.2 MG/DL (ref 8.5–10.1)
CHLORIDE SERPL-SCNC: 104 MMOL/L (ref 100–111)
CO2 SERPL-SCNC: 28 MMOL/L (ref 21–32)
CREAT SERPL-MCNC: 0.5 MG/DL (ref 0.6–1.3)
ERYTHROCYTE [DISTWIDTH] IN BLOOD BY AUTOMATED COUNT: 14.1 % (ref 11.6–14.5)
GLUCOSE SERPL-MCNC: 109 MG/DL (ref 74–99)
HCT VFR BLD AUTO: 26.6 % (ref 35–45)
HGB BLD-MCNC: 8.7 G/DL (ref 12–16)
MCH RBC QN AUTO: 31 PG (ref 24–34)
MCHC RBC AUTO-ENTMCNC: 32.7 G/DL (ref 31–37)
MCV RBC AUTO: 94.7 FL (ref 78–100)
NRBC # BLD: 0 K/UL (ref 0–0.01)
NRBC BLD-RTO: 0 PER 100 WBC
PLATELET # BLD AUTO: 276 K/UL (ref 135–420)
PMV BLD AUTO: 10.4 FL (ref 9.2–11.8)
POTASSIUM SERPL-SCNC: 4.2 MMOL/L (ref 3.5–5.5)
RBC # BLD AUTO: 2.81 M/UL (ref 4.2–5.3)
SODIUM SERPL-SCNC: 135 MMOL/L (ref 136–145)
WBC # BLD AUTO: 10.8 K/UL (ref 4.6–13.2)

## 2024-05-25 PROCEDURE — 80048 BASIC METABOLIC PNL TOTAL CA: CPT

## 2024-05-25 PROCEDURE — 99232 SBSQ HOSP IP/OBS MODERATE 35: CPT | Performed by: STUDENT IN AN ORGANIZED HEALTH CARE EDUCATION/TRAINING PROGRAM

## 2024-05-25 PROCEDURE — 2580000003 HC RX 258: Performed by: STUDENT IN AN ORGANIZED HEALTH CARE EDUCATION/TRAINING PROGRAM

## 2024-05-25 PROCEDURE — 6370000000 HC RX 637 (ALT 250 FOR IP): Performed by: SURGERY

## 2024-05-25 PROCEDURE — 36415 COLL VENOUS BLD VENIPUNCTURE: CPT

## 2024-05-25 PROCEDURE — 97535 SELF CARE MNGMENT TRAINING: CPT

## 2024-05-25 PROCEDURE — 6370000000 HC RX 637 (ALT 250 FOR IP): Performed by: INTERNAL MEDICINE

## 2024-05-25 PROCEDURE — 6370000000 HC RX 637 (ALT 250 FOR IP): Performed by: STUDENT IN AN ORGANIZED HEALTH CARE EDUCATION/TRAINING PROGRAM

## 2024-05-25 PROCEDURE — 97530 THERAPEUTIC ACTIVITIES: CPT

## 2024-05-25 PROCEDURE — 6360000002 HC RX W HCPCS: Performed by: SURGERY

## 2024-05-25 PROCEDURE — 2140000001 HC CVICU INTERMEDIATE R&B

## 2024-05-25 PROCEDURE — 85027 COMPLETE CBC AUTOMATED: CPT

## 2024-05-25 PROCEDURE — 94761 N-INVAS EAR/PLS OXIMETRY MLT: CPT

## 2024-05-25 RX ORDER — ASPIRIN 81 MG/1
81 TABLET, CHEWABLE ORAL DAILY
Status: DISCONTINUED | OUTPATIENT
Start: 2024-05-25 | End: 2024-05-26

## 2024-05-25 RX ORDER — CLOPIDOGREL BISULFATE 75 MG/1
75 TABLET ORAL DAILY
Status: DISCONTINUED | OUTPATIENT
Start: 2024-05-25 | End: 2024-05-29 | Stop reason: HOSPADM

## 2024-05-25 RX ADMIN — OXYCODONE HYDROCHLORIDE 5 MG: 5 TABLET ORAL at 04:01

## 2024-05-25 RX ADMIN — OXYCODONE HYDROCHLORIDE 5 MG: 5 TABLET ORAL at 09:45

## 2024-05-25 RX ADMIN — ENOXAPARIN SODIUM 30 MG: 100 INJECTION SUBCUTANEOUS at 20:22

## 2024-05-25 RX ADMIN — CLOPIDOGREL BISULFATE 75 MG: 75 TABLET ORAL at 17:47

## 2024-05-25 RX ADMIN — AMLODIPINE BESYLATE 5 MG: 5 TABLET ORAL at 09:41

## 2024-05-25 RX ADMIN — OXYCODONE HYDROCHLORIDE 5 MG: 5 TABLET ORAL at 17:59

## 2024-05-25 RX ADMIN — SODIUM CHLORIDE, PRESERVATIVE FREE 10 ML: 5 INJECTION INTRAVENOUS at 09:00

## 2024-05-25 RX ADMIN — ENOXAPARIN SODIUM 30 MG: 100 INJECTION SUBCUTANEOUS at 09:41

## 2024-05-25 RX ADMIN — DOXYCYCLINE HYCLATE 100 MG: 100 CAPSULE ORAL at 20:23

## 2024-05-25 RX ADMIN — ASPIRIN 81 MG CHEWABLE TABLET 81 MG: 81 TABLET CHEWABLE at 17:47

## 2024-05-25 RX ADMIN — ATORVASTATIN CALCIUM 20 MG: 20 TABLET, FILM COATED ORAL at 20:23

## 2024-05-25 RX ADMIN — SODIUM CHLORIDE, PRESERVATIVE FREE 10 ML: 5 INJECTION INTRAVENOUS at 20:23

## 2024-05-25 RX ADMIN — DOXYCYCLINE HYCLATE 100 MG: 100 CAPSULE ORAL at 09:41

## 2024-05-25 ASSESSMENT — PAIN SCALES - GENERAL
PAINLEVEL_OUTOF10: 4
PAINLEVEL_OUTOF10: 0
PAINLEVEL_OUTOF10: 4
PAINLEVEL_OUTOF10: 0
PAINLEVEL_OUTOF10: 6
PAINLEVEL_OUTOF10: 6

## 2024-05-25 ASSESSMENT — PAIN DESCRIPTION - LOCATION
LOCATION: LEG

## 2024-05-25 ASSESSMENT — PAIN DESCRIPTION - ORIENTATION
ORIENTATION: RIGHT

## 2024-05-25 ASSESSMENT — PAIN DESCRIPTION - DESCRIPTORS
DESCRIPTORS: ACHING
DESCRIPTORS: ACHING

## 2024-05-25 NOTE — PROGRESS NOTES
S:   No events  No chest pain, shortness of breath, nausea,vomiting, fever, or chills  Tolerating PO  Voiding spontaneously  Passing gas    O:   Patient Vitals for the past 8 hrs:   Temp Pulse Resp BP SpO2   05/25/24 0600 -- 81 12 (!) 137/46 96 %   05/25/24 0431 -- 69 13 -- 95 %   05/25/24 0400 98.3 °F (36.8 °C) 74 18 (!) 157/41 96 %   05/25/24 0200 -- 70 13 (!) 125/48 95 %          Alert, Oriented, NAD, non labored  Operative extremity:  Dressing clean, dry, intact with mild shadowing at the lateral thigh  Extremity 2+, no swelling, sensation and motor intact throughout. No calf pain.  Quad firing fully and able to actively internally rotate leg and flex and extend knee with minimal pain      A/P:   83 y.o. female post-op day postop day 4 right femur open reduction internal fixation  -Postop day 4 right common femoral artery stenting and fasciotomy and postop day 2 fasciotomy closure   doing well. Postoperative protocol discussed.     - TTWB  - DVT prophy: TEDs, SCDs, Lovenox  - Pain control  - Ice, elevation operative extremity  - Labs: Acute blood loss anemia, Hgb 8.7 asymptomatic  - PT/OT  - Dispo: Ortho stable.  Appreciate case management following for placement.  Will have restricted weightbearing for 8 to 12 weeks and will be followed every 2 to 3 weeks with x-ray in the orthopedic office      Liu Carbajal DO  5/25/2024  8:51 AM

## 2024-05-25 NOTE — PLAN OF CARE
Problem: Discharge Planning  Goal: Discharge to home or other facility with appropriate resources  Outcome: Progressing     Problem: Pain  Goal: Verbalizes/displays adequate comfort level or baseline comfort level  Outcome: Progressing     Problem: Skin/Tissue Integrity  Goal: Absence of new skin breakdown  Description: 1.  Monitor for areas of redness and/or skin breakdown  2.  Assess vascular access sites hourly  3.  Every 4-6 hours minimum:  Change oxygen saturation probe site  4.  Every 4-6 hours:  If on nasal continuous positive airway pressure, respiratory therapy assess nares and determine need for appliance change or resting period.  Outcome: Progressing     Problem: ABCDS Injury Assessment  Goal: Absence of physical injury  Outcome: Progressing     Problem: Safety - Adult  Goal: Free from fall injury  Outcome: Progressing     Problem: Safety - Adult  Goal: Free from fall injury  Outcome: Progressing     Problem: Chronic Conditions and Co-morbidities  Goal: Patient's chronic conditions and co-morbidity symptoms are monitored and maintained or improved  Outcome: Progressing     Problem: Occupational Therapy - Adult  Goal: By Discharge: Performs self-care activities at highest level of function for planned discharge setting.  See evaluation for individualized goals.  Description: Occupational Therapy Goals:  Initiated 5/22/2024 to be met within 7-10 days.    1.  Patient will perform bed mobility for ADLs with supervision/set-up.   2.  Patient will perform grooming with supervision/set-up while sitting at EOB with Good balance.  3.  Patient will perform upper body dressing with supervision/set-up.  4.  Patient will perform toilet transfers with minimal assistance/contact guard assist.  5.  Patient will perform all aspects of toileting with minimal assistance/contact guard assist.  6.  Patient will participate in upper extremity therapeutic exercise/activities with supervision/set-up for 8 minutes to improve  endurance and UB strength needed for ADLs    7.  Patient will utilize energy conservation techniques during functional activities with verbal cues.    PLOF: Pt lives alone in senior living apartment, reports being Mod Ind for ADLs and functional mobility using QC most recently.  5/25/2024 1321 by Beto Chandler, OTR/L  Outcome: Progressing

## 2024-05-25 NOTE — OP NOTE
Operative Note      Patient: Juanpablo Jackson  YOB: 1940  MRN: [id number]     Date of Procedure: 5/21/2024     Preop Dx: RLE acute ischemia s/p R femur ORIF     Post-Op Diagnosis: Same       Procedure(s):  RIGHT COMMON FEMORAL ARTERY EXPOSURE, RIGHT LOWER EXTREMITY ANGIOGRAPHY, RIGHT SFA  AND POPLITEAL STENT PLACEMENT     Surgeon(s):  Lawanda Van MD     Assistant:  Surgical Assistant: Julian Rucker     Anesthesia: General     Estimated Blood Loss (mL): 250mL     Complications: None     Specimens:   * No specimens in log *     Implants:  Implant Name Type Inv. Item Serial No.  Lot No. LRB No. Used Action   STENT PERIPH L100MM DIA6MM CATH L120CM DIA6FR 0.035IN HEP - [id number] Peripheral stents STENT PERIPH L100MM DIA6MM CATH L120CM DIA6FR 0.035IN HEP [id number]  [employer]   Right 1 Implanted   STENT PERIPH L5CM DIA6MM CATH L120CM 0.018IN FEM [place] ART - [id number] Peripheral stents STENT PERIPH L5CM DIA6MM CATH L120CM 0.018IN FEM [place] ART [id number] [employer]   Right 1 Implanted   STENT PERIPH L100MM DIA7MM CATH L120CM DIA7FR 0.035IN HEP - [id number] Peripheral stents STENT PERIPH L100MM DIA7MM CATH L120CM DIA7FR 0.035IN HEP [id number]  [employer]   Right 1 Implanted          Drains:        Urinary Catheter 05/19/24 Naqvi (Active)   Catheter Indications Perioperative use for selected surgical procedures 05/21/24 1427   Site Assessment No urethral drainage 05/21/24 1427   Urine Color Yellow 05/21/24 1427   Urine Appearance Clear 05/21/24 1427   Urine Odor Other (Comment) 05/21/24 1427   Collection Container Standard 05/21/24 1427   Securement Method Securing device (Describe) 05/21/24 1427   Catheter Care  Perineal wipes 05/21/24 0510   Catheter Best Practices  Drainage tube clipped to bed;Catheter secured to thigh;Tamper seal intact;Bag below bladder;Bag not on floor;Lack of dependent loop in tubing;Drainage bag less than half  and a short 6 F sheath was placed. An 035 GlideAdvantage wire was passed into the SFA. Using gentle catheter guidance, the wire easily passed into the BK pop artery and then into the AT artery. Catheter angiography demonstrated intraluminal location.     Several angiographic images were obtained with varying degrees of obliquity, due to the presence of a R TKR. The mid popliteal artery was patent.     6mm Viabahn stents were placed from the level of the mid popliteal artery, to the mid-SFA. Balloon dilation was performed with a 6mm PTA balloon.  Repeat imaging demonstrated restored patency of the SFA-pop. There seemed to be clot present at the distal pop/TP trunk, however. I attempted to aspirate this with a catheter without success, however this dislodged the thrombus into the peroneal artery. The PT and AT remained widely patent and passed onto the foot.    There was also a size mismatch between the stent and the popliteal artery, and it was not clear if this was spasm. There remained robust flow and I elected to complete the procedure.     The sheath was removed and the access site was closed with a 5-0 prolene. Hemostasis was achieved. Protamine was administered. The groin incision was closed in layers with interrupted absorbable sutures and the skin was closed with a subcuticular 4-0 monocryl and Dermabond.     Via 5 cm lateral and medial mid RLE incisions, 4 compartment fasciotomies were performed. The muscle in all 4 compartments was viable and not edematous. The wounds were irrigated and WTD dressings were placed. The RLE was gently wrapped with a Kerlix wrap. There were strong hyperemic DP and PT signals present.     The pt tolerated the procedure well. All instrument, sponge, and needle counts were correct. The pt was awakened and taken to the ICU in stable condition.     Lawanda Van MD  Vascular Surgeon

## 2024-05-25 NOTE — PROGRESS NOTES
Vascular Surgery Progress Note    Admit Date: 2024  POD 2 Days Post-Op    Procedure:  Procedure(s):  RIGHT LOWER EXTREMITY FASCIOTOMY CLOSURE      Subjective:     Patient has no new complaints.  No unusual pain in the leg or the foot.  Still on bedrest, and is wondering when she can get out of bed    Objective:     Blood pressure (!) 139/42, pulse 73, temperature 98.1 °F (36.7 °C), temperature source Oral, resp. rate 13, height 1.524 m (5'), weight 68 kg (150 lb), SpO2 97 %.    Temp (24hrs), Av.6 °F (37 °C), Min:98.1 °F (36.7 °C), Max:99.7 °F (37.6 °C)      Physical Exam:    Awake alert oriented x 3.  Not in distress  Mild pallor, no icterus  Right foot pink warm and adequately perfused, with +2 DP pulse    Labs: Results:       Chemistry Recent Labs     24  0330 24  0253    137 135*   K 3.6 3.4* 4.2    108 104   CO2 25 23 28   BUN 10 11 12      CBC w/Diff Recent Labs     24  0330 24  0253   WBC 9.8 9.6 10.8   RBC 2.78* 2.72* 2.81*   HGB 8.5* 8.5* 8.7*   HCT 25.5* 25.3* 26.6*    249 276      Microbiology Invalid input(s): \"CULT\"   Coagulation Recent Labs     24   INR 1.2*         Data Review: Labs reviewed    Assessment:     Principal Problem:    Closed fracture of shaft of right femur (HCC)  Active Problems:    Chronic anticoagulation    Status post total right knee replacement    Age-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 *    S/p right SFA covered stenting, fasciotomies and fasciotomy closures.  She is doing well.  She is not on any antiplatelet therapy yet.    Plan/Recommendations/Medical Decision Making:     Activity

## 2024-05-25 NOTE — PROGRESS NOTES
[facility] Hospitalist Group  Progress Note  Date:2024       Room:[facility]  Patient Name:Juanpablo Jackson     YOB: 1940     Age:83 y.o.        Subjective    Subjective   Review of Systems    No acute events overnight.    Patient resting comfortably in bed.  She would like to get out of bed more often.  She is hoping that she will be accepted to ARU.    Objective         Vitals Last 24 Hours:  TEMPERATURE:  Temp  Av.6 °F (37 °C)  Min: 98.3 °F (36.8 °C)  Max: 99.7 °F (37.6 °C)  RESPIRATIONS RANGE: Resp  Av.8  Min: 12  Max: 27  PULSE OXIMETRY RANGE: SpO2  Av.6 %  Min: 92 %  Max: 100 %  PULSE RANGE: Pulse  Av.4  Min: 69  Max: 108  BLOOD PRESSURE RANGE: Systolic (24hrs), Av , Min:98 , Max:162     ; Diastolic (24hrs), Av, Min:39, Max:58      I/O (24Hr):    Intake/Output Summary (Last 24 hours) at 2024 0905  Last data filed at 2024 0624  Gross per 24 hour   Intake 1006 ml   Output 1100 ml   Net -94 ml       Objective:  General Appearance:  Comfortable.    Vital signs: (most recent): Blood pressure (!) 137/46, pulse 81, temperature 98.3 °F (36.8 °C), temperature source Oral, resp. rate 12, height 1.524 m (5'), weight 68 kg (150 lb), SpO2 96 %.    Output: Producing urine.    HEENT: Normal HEENT exam.    Lungs:  Normal effort and normal respiratory rate.    Heart: Normal rate.  Regular rhythm.    Abdomen: Abdomen is soft and flat.  Bowel sounds are normal.   There is no abdominal tenderness.     Extremities: Normal range of motion.  (Right calf dressed in gauze)  Pulses: Distal pulses are intact.    Neurological: Patient is alert and oriented to person, place and time.    Skin:  Warm and dry.          Labs/Imaging/Diagnostics    Labs:  CBC:  Recent Labs     24  0437 24  0330 24  0253   WBC 9.8 9.6 10.8   RBC 2.78* 2.72* 2.81*   HGB 8.5* 8.5* 8.7*   HCT 25.5* 25.3* 26.6*   MCV 91.7 93.0 94.7   RDW 14.2 14.2 14.1    249

## 2024-05-25 NOTE — PROGRESS NOTES
1830pm Transfer pt given to SKIP BhatiaT Stepdown RN.  Transfer from Rm 310 to Rm 2305.Check Right DP/PT pulses with GLADYS Bhatia at the bedside. Both pulses dopplerable.

## 2024-05-25 NOTE — PROGRESS NOTES
TRANSFER - IN REPORT:    Verbal report received from Jenniffer GRAHAM on Juanpablo Jackson  being received from ICU for routine progression of patient care      Report consisted of patient's Situation, Background, Assessment and   Recommendations(SBAR).     Information from the following report(s) Nurse Handoff Report, MAR, and Recent Results was reviewed with the receiving nurse.    Opportunity for questions and clarification was provided.      1900 Assessment completed upon patient's arrival to unit and care assumed. RLE dressing dry and intact, doppler pulses.

## 2024-05-25 NOTE — PLAN OF CARE
sit EOB, initially required support for RLE, however, eventually was able to sit unsupported with BUE engaged in functional tasks. Pt completed mult seated grooming ADLs, intermittently taking RB due to pain in RLE with dependent position, noted overall improved tolerance of seated tasks. Pt requesting to return to bed once completed ADLs, positioned for comfort, all needs met.    Progression toward goals:  [x]          Improving appropriately and progressing toward goals  []          Improving slowly and progressing toward goals  []          Not making progress toward goals and plan of care will be adjusted     PLAN:  Patient continues to benefit from skilled intervention to address the above impairments.  Continue treatment per established plan of care.    Further Equipment Recommendations for Discharge: bedside commode, rolling walker, and wheelchair 18 inch    AMPAC: AM-PAC Inpatient Daily Activity Raw Score: 16    Current research shows that an AM-PAC score of 17 or less is not associated with a discharge to the patient's home setting. Based on an AM-PAC score and their current ADL deficits; it is recommended that the patient have 5-7 sessions per week of Occupational Therapy at d/c to increase the patient's independence.  Currently, this patient demonstrates the potential endurance, and/or tolerance for 3 hours of therapy each day at d/c.      This AMPAC score should be considered in conjunction with interdisciplinary team recommendations to determine the most appropriate discharge setting. Patient's social support, diagnosis, medical stability, and prior level of function should also be taken into consideration.     SUBJECTIVE:   Patient stated, \"I want to move around a little.\"    OBJECTIVE DATA SUMMARY:   Cognitive/Behavioral Status:  Orientation  Overall Orientation Status: Within Functional Limits  Orientation Level: Oriented X4  Cognition  Overall Cognitive Status: WNL    Functional Mobility and  Transfers for ADLs:   Bed Mobility:  Bed Mobility Training  Bed Mobility Training: Yes  Supine to Sit: Moderate assistance;Assist X1  Sit to Supine: Moderate assistance;Additional time  Scooting: Minimum assistance   Transfers:  Transfer Training  Transfer Training: No    Balance:  Balance  Sitting: Impaired  Sitting - Static: Good (unsupported)  Sitting - Dynamic: Fair (occasional)    ADL Intervention:  Feeding: Supervision  Grooming: Supervision;Setup        UE Dressing: Contact guard assistance (gown)     Neuro Re-Education:   Education on postural exercises at EOB to facilitate tolerance of upright position for ADLs  Scapular strengthening/ROM exercises in sitting for ~ 3 min with min visual cues to improve UB strength.    Pain:  Intensity Pre-treatment: 5/10   Intensity Post-treatment: 5/10  Scale: Numeric Rating Scale  Location: Right Leg  Quality: Aching and Pressure  Intervention(s): Repositioning  and Rest    Activity Tolerance:       Please refer to the flowsheet for vital signs taken during this treatment.  After treatment:   []  Patient left in no apparent distress sitting up in chair  [x]  Patient left in no apparent distress in bed  [x]  Call bell left within reach  [x]  Nursing notified  [x]  Caregiver present  []  Bed alarm activated    COMMUNICATION/EDUCATION:   Patient Education  Education Given To: Patient;Family  Education Provided: Plan of Care;Transfer Training;ADL Adaptive Strategies;Energy Conservation;Precautions;Fall Prevention Strategies  Education Method: Demonstration;Verbal;Teach Back  Barriers to Learning: None  Education Outcome: Continued education needed      Thank you for this referral.  Beto Chandler OTR/L  Minutes: 41

## 2024-05-25 NOTE — PLAN OF CARE
Problem: Pain  Goal: Verbalizes/displays adequate comfort level or baseline comfort level  5/25/2024 0212 by Denice Lizama RN  Outcome: Progressing  Flowsheets (Taken 5/24/2024 2000)  Verbalizes/displays adequate comfort level or baseline comfort level:   Encourage patient to monitor pain and request assistance   Assess pain using appropriate pain scale   Administer analgesics based on type and severity of pain and evaluate response   Implement non-pharmacological measures as appropriate and evaluate response   Consider cultural and social influences on pain and pain management   Notify Licensed Independent Practitioner if interventions unsuccessful or patient reports new pain  5/24/2024 1757 by Renzo Camarillo, RN  Outcome: Progressing     Problem: Skin/Tissue Integrity  Goal: Absence of new skin breakdown  Description: 1.  Monitor for areas of redness and/or skin breakdown  2.  Assess vascular access sites hourly  3.  Every 4-6 hours minimum:  Change oxygen saturation probe site  4.  Every 4-6 hours:  If on nasal continuous positive airway pressure, respiratory therapy assess nares and determine need for appliance change or resting period.  5/25/2024 0212 by Denice Lizama, RN  Outcome: Progressing  5/24/2024 1757 by Renzo Camarillo, RN  Outcome: Progressing

## 2024-05-26 LAB
ERYTHROCYTE [DISTWIDTH] IN BLOOD BY AUTOMATED COUNT: 13.9 % (ref 11.6–14.5)
HCT VFR BLD AUTO: 26.1 % (ref 35–45)
HGB BLD-MCNC: 8.5 G/DL (ref 12–16)
MCH RBC QN AUTO: 30.5 PG (ref 24–34)
MCHC RBC AUTO-ENTMCNC: 32.6 G/DL (ref 31–37)
MCV RBC AUTO: 93.5 FL (ref 78–100)
NRBC # BLD: 0.02 K/UL (ref 0–0.01)
NRBC BLD-RTO: 0.2 PER 100 WBC
PLATELET # BLD AUTO: 322 K/UL (ref 135–420)
PMV BLD AUTO: 9.9 FL (ref 9.2–11.8)
RBC # BLD AUTO: 2.79 M/UL (ref 4.2–5.3)
WBC # BLD AUTO: 10.4 K/UL (ref 4.6–13.2)

## 2024-05-26 PROCEDURE — 99232 SBSQ HOSP IP/OBS MODERATE 35: CPT | Performed by: STUDENT IN AN ORGANIZED HEALTH CARE EDUCATION/TRAINING PROGRAM

## 2024-05-26 PROCEDURE — 6370000000 HC RX 637 (ALT 250 FOR IP): Performed by: SPECIALIST

## 2024-05-26 PROCEDURE — 6370000000 HC RX 637 (ALT 250 FOR IP): Performed by: STUDENT IN AN ORGANIZED HEALTH CARE EDUCATION/TRAINING PROGRAM

## 2024-05-26 PROCEDURE — 2140000001 HC CVICU INTERMEDIATE R&B

## 2024-05-26 PROCEDURE — 85027 COMPLETE CBC AUTOMATED: CPT

## 2024-05-26 PROCEDURE — 6370000000 HC RX 637 (ALT 250 FOR IP): Performed by: INTERNAL MEDICINE

## 2024-05-26 PROCEDURE — 6370000000 HC RX 637 (ALT 250 FOR IP): Performed by: SURGERY

## 2024-05-26 PROCEDURE — 6360000002 HC RX W HCPCS: Performed by: SURGERY

## 2024-05-26 PROCEDURE — 2580000003 HC RX 258: Performed by: SPECIALIST

## 2024-05-26 PROCEDURE — 2580000003 HC RX 258: Performed by: STUDENT IN AN ORGANIZED HEALTH CARE EDUCATION/TRAINING PROGRAM

## 2024-05-26 PROCEDURE — 36415 COLL VENOUS BLD VENIPUNCTURE: CPT

## 2024-05-26 PROCEDURE — 94761 N-INVAS EAR/PLS OXIMETRY MLT: CPT

## 2024-05-26 RX ORDER — SODIUM CHLORIDE 9 MG/ML
INJECTION, SOLUTION INTRAVENOUS PRN
Status: DISCONTINUED | OUTPATIENT
Start: 2024-05-26 | End: 2024-05-29 | Stop reason: HOSPADM

## 2024-05-26 RX ORDER — TRAMADOL HYDROCHLORIDE 50 MG/1
50 TABLET ORAL EVERY 6 HOURS PRN
Status: DISCONTINUED | OUTPATIENT
Start: 2024-05-26 | End: 2024-05-29 | Stop reason: HOSPADM

## 2024-05-26 RX ORDER — BISACODYL 5 MG/1
5 TABLET, DELAYED RELEASE ORAL DAILY
Status: DISCONTINUED | OUTPATIENT
Start: 2024-05-26 | End: 2024-05-29 | Stop reason: HOSPADM

## 2024-05-26 RX ORDER — HYDROXYZINE HYDROCHLORIDE 10 MG/1
10 TABLET, FILM COATED ORAL EVERY 8 HOURS PRN
Status: DISCONTINUED | OUTPATIENT
Start: 2024-05-26 | End: 2024-05-29 | Stop reason: HOSPADM

## 2024-05-26 RX ORDER — ASPIRIN 81 MG/1
81 TABLET ORAL 2 TIMES DAILY
Status: DISCONTINUED | OUTPATIENT
Start: 2024-05-26 | End: 2024-05-29 | Stop reason: HOSPADM

## 2024-05-26 RX ORDER — OXYCODONE HYDROCHLORIDE 5 MG/1
5 TABLET ORAL EVERY 4 HOURS PRN
Status: DISCONTINUED | OUTPATIENT
Start: 2024-05-26 | End: 2024-05-26

## 2024-05-26 RX ORDER — POLYETHYLENE GLYCOL 3350 17 G/17G
17 POWDER, FOR SOLUTION ORAL DAILY
Status: DISCONTINUED | OUTPATIENT
Start: 2024-05-26 | End: 2024-05-26 | Stop reason: SDUPTHER

## 2024-05-26 RX ORDER — SODIUM CHLORIDE 0.9 % (FLUSH) 0.9 %
5-40 SYRINGE (ML) INJECTION PRN
Status: DISCONTINUED | OUTPATIENT
Start: 2024-05-26 | End: 2024-05-29 | Stop reason: HOSPADM

## 2024-05-26 RX ORDER — SODIUM CHLORIDE 0.9 % (FLUSH) 0.9 %
5-40 SYRINGE (ML) INJECTION EVERY 12 HOURS SCHEDULED
Status: DISCONTINUED | OUTPATIENT
Start: 2024-05-26 | End: 2024-05-29 | Stop reason: HOSPADM

## 2024-05-26 RX ORDER — ONDANSETRON 2 MG/ML
4 INJECTION INTRAMUSCULAR; INTRAVENOUS EVERY 4 HOURS PRN
Status: DISCONTINUED | OUTPATIENT
Start: 2024-05-26 | End: 2024-05-26

## 2024-05-26 RX ORDER — DEXTROSE, SODIUM CHLORIDE, SODIUM LACTATE, POTASSIUM CHLORIDE, AND CALCIUM CHLORIDE 5; .6; .31; .03; .02 G/100ML; G/100ML; G/100ML; G/100ML; G/100ML
INJECTION, SOLUTION INTRAVENOUS CONTINUOUS
Status: DISCONTINUED | OUTPATIENT
Start: 2024-05-26 | End: 2024-05-29 | Stop reason: HOSPADM

## 2024-05-26 RX ORDER — ACETAMINOPHEN 500 MG
1000 TABLET ORAL EVERY 8 HOURS SCHEDULED
Status: DISCONTINUED | OUTPATIENT
Start: 2024-05-26 | End: 2024-05-29 | Stop reason: HOSPADM

## 2024-05-26 RX ADMIN — OXYCODONE HYDROCHLORIDE 5 MG: 5 TABLET ORAL at 08:24

## 2024-05-26 RX ADMIN — DOXYCYCLINE HYCLATE 100 MG: 100 CAPSULE ORAL at 08:24

## 2024-05-26 RX ADMIN — ASPIRIN 81 MG: 81 TABLET, COATED ORAL at 21:27

## 2024-05-26 RX ADMIN — AMLODIPINE BESYLATE 5 MG: 5 TABLET ORAL at 08:25

## 2024-05-26 RX ADMIN — BISACODYL 5 MG: 5 TABLET, COATED ORAL at 08:26

## 2024-05-26 RX ADMIN — ACETAMINOPHEN 1000 MG: 500 TABLET ORAL at 06:44

## 2024-05-26 RX ADMIN — ACETAMINOPHEN 1000 MG: 500 TABLET ORAL at 13:34

## 2024-05-26 RX ADMIN — SODIUM CHLORIDE, PRESERVATIVE FREE 10 ML: 5 INJECTION INTRAVENOUS at 08:30

## 2024-05-26 RX ADMIN — POLYETHYLENE GLYCOL 3350 17 G: 17 POWDER, FOR SOLUTION ORAL at 21:27

## 2024-05-26 RX ADMIN — CLOPIDOGREL BISULFATE 75 MG: 75 TABLET ORAL at 08:25

## 2024-05-26 RX ADMIN — OXYCODONE HYDROCHLORIDE 5 MG: 5 TABLET ORAL at 22:45

## 2024-05-26 RX ADMIN — DOXYCYCLINE HYCLATE 100 MG: 100 CAPSULE ORAL at 21:27

## 2024-05-26 RX ADMIN — ENOXAPARIN SODIUM 30 MG: 100 INJECTION SUBCUTANEOUS at 21:33

## 2024-05-26 RX ADMIN — ENOXAPARIN SODIUM 30 MG: 100 INJECTION SUBCUTANEOUS at 08:30

## 2024-05-26 RX ADMIN — ACETAMINOPHEN 1000 MG: 500 TABLET ORAL at 21:27

## 2024-05-26 RX ADMIN — ASPIRIN 81 MG: 81 TABLET, COATED ORAL at 08:25

## 2024-05-26 RX ADMIN — SODIUM CHLORIDE, PRESERVATIVE FREE 10 ML: 5 INJECTION INTRAVENOUS at 21:00

## 2024-05-26 RX ADMIN — ATORVASTATIN CALCIUM 20 MG: 20 TABLET, FILM COATED ORAL at 21:27

## 2024-05-26 ASSESSMENT — PAIN SCALES - GENERAL
PAINLEVEL_OUTOF10: 4
PAINLEVEL_OUTOF10: 6
PAINLEVEL_OUTOF10: 0
PAINLEVEL_OUTOF10: 0
PAINLEVEL_OUTOF10: 2
PAINLEVEL_OUTOF10: 0
PAINLEVEL_OUTOF10: 3
PAINLEVEL_OUTOF10: 1
PAINLEVEL_OUTOF10: 5
PAINLEVEL_OUTOF10: 1

## 2024-05-26 ASSESSMENT — PAIN DESCRIPTION - DESCRIPTORS
DESCRIPTORS: ACHING
DESCRIPTORS: ACHING;SORE
DESCRIPTORS: ACHING

## 2024-05-26 ASSESSMENT — PAIN DESCRIPTION - ORIENTATION
ORIENTATION: RIGHT

## 2024-05-26 ASSESSMENT — PAIN DESCRIPTION - PAIN TYPE
TYPE: ACUTE PAIN;SURGICAL PAIN
TYPE: SURGICAL PAIN

## 2024-05-26 ASSESSMENT — PAIN DESCRIPTION - ONSET
ONSET: PROGRESSIVE
ONSET: GRADUAL
ONSET: PROGRESSIVE

## 2024-05-26 ASSESSMENT — PAIN DESCRIPTION - FREQUENCY
FREQUENCY: INTERMITTENT

## 2024-05-26 ASSESSMENT — PAIN DESCRIPTION - LOCATION
LOCATION: LEG

## 2024-05-26 ASSESSMENT — PAIN - FUNCTIONAL ASSESSMENT
PAIN_FUNCTIONAL_ASSESSMENT: PREVENTS OR INTERFERES SOME ACTIVE ACTIVITIES AND ADLS
PAIN_FUNCTIONAL_ASSESSMENT: ACTIVITIES ARE NOT PREVENTED
PAIN_FUNCTIONAL_ASSESSMENT: ACTIVITIES ARE NOT PREVENTED
PAIN_FUNCTIONAL_ASSESSMENT: PREVENTS OR INTERFERES SOME ACTIVE ACTIVITIES AND ADLS
PAIN_FUNCTIONAL_ASSESSMENT: ACTIVITIES ARE NOT PREVENTED

## 2024-05-26 NOTE — PROGRESS NOTES
S:   No events  No chest pain, shortness of breath, nausea,vomiting, fever, or chills  Tolerating PO  Voiding spontaneously  Passing gas    O:   Patient Vitals for the past 8 hrs:   Temp Pulse Resp BP SpO2   05/26/24 0742 98.7 °F (37.1 °C) 77 18 108/78 97 %          Alert, Oriented, NAD, non labored  Operative extremity:  Dressing clean, dry, intact and a portion of dressing changed today as there was some old drainage on the bandage and on the pillow.  Incision inspected Eleno in place incision is clean dry and intact without any active drainage.  Extremity 2+, no swelling, sensation and motor intact throughout. No calf pain.  Quad firing fully and able to actively internally rotate leg and flex and extend knee with minimal pain      A/P:   83 y.o. female post-op day postop day 5 right femur open reduction internal fixation  -Postop day 5 right common femoral artery stenting and fasciotomy and postop day 2 fasciotomy closure   doing well. Postoperative protocol discussed.     - TTWB  - DVT prophy: TEDs, SCDs, Lovenox  - Pain control  - Ice, elevation operative extremity  - Labs: Acute blood loss anemia, Hgb 8.7 asymptomatic  - PT/OT  - Dispo: Ortho stable.  Appreciate case management following for placement.  Will have restricted weightbearing for 8 to 12 weeks and will be followed every 2 to 3 weeks with x-ray in the orthopedic office      Jaz Lopez PA-C  5/26/2024  8:51 AM

## 2024-05-26 NOTE — PROGRESS NOTES
1900 Bedside and Verbal shift change report given to Ellis GRAHAM (oncoming nurse). Report included the following information Nurse Handoff Report, Intake/Output, MAR, Recent Results, and Cardiac Rhythm NSR .

## 2024-05-26 NOTE — PROGRESS NOTES
[facility] Hospitalist Group  Progress Note  Date:2024       Room:[address] [place] [address]  Patient Name:Juanpablo Jackson     YOB: 1940     Age:83 y.o.        Subjective    Subjective   Review of Systems    No acute events overnight.    Patient resting comfortably in bed.  Pain is well controlled with Tylenol.      Objective         Vitals Last 24 Hours:  TEMPERATURE:  Temp  Av.5 °F (36.9 °C)  Min: 98.1 °F (36.7 °C)  Max: 98.7 °F (37.1 °C)  RESPIRATIONS RANGE: Resp  Av  Min: 13  Max: 24  PULSE OXIMETRY RANGE: SpO2  Av.2 %  Min: 96 %  Max: 98 %  PULSE RANGE: Pulse  Av.7  Min: 73  Max: 78  BLOOD PRESSURE RANGE: Systolic (24hrs), Av , Min:108 , Max:139     ; Diastolic (24hrs), Av, Min:39, Max:78      I/O (24Hr):    Intake/Output Summary (Last 24 hours) at 2024 0823  Last data filed at 2024 1833  Gross per 24 hour   Intake --   Output 700 ml   Net -700 ml       Objective:  General Appearance:  Comfortable.    Vital signs: (most recent): Blood pressure 108/78, pulse 77, temperature 98.7 °F (37.1 °C), temperature source Oral, resp. rate 18, height 1.524 m (5'), weight 68 kg (150 lb), SpO2 97 %.    Output: Producing urine.    HEENT: Normal HEENT exam.    Lungs:  Normal effort and normal respiratory rate.    Heart: Normal rate.  Regular rhythm.    Abdomen: Abdomen is soft and flat.  Bowel sounds are normal.   There is no abdominal tenderness.     Extremities: Normal range of motion.  (Right calf dressed in gauze)  Pulses: Distal pulses are intact.    Neurological: Patient is alert and oriented to person, place and time.    Skin:  Warm and dry.          Labs/Imaging/Diagnostics    Labs:  CBC:  Recent Labs     24  0330 24  0253 24  0532   WBC 9.6 10.8 10.4   RBC 2.72* 2.81* 2.79*   HGB 8.5* 8.7* 8.5*   HCT 25.3* 26.6* 26.1*   MCV 93.0 94.7 93.5   RDW 14.2 14.1 13.9    276 322       CHEMISTRIES:  Recent Labs     24  0483

## 2024-05-26 NOTE — PROGRESS NOTES
New OT orders received and chart reviewed. Patient was evaluated on 05/22/2025 and on OT caseload. New OT order will be acknowledged. Thank you for the referral.     Beto Chandler MS, OTR/L

## 2024-05-26 NOTE — PLAN OF CARE
Problem: Discharge Planning  Goal: Discharge to home or other facility with appropriate resources  Outcome: Progressing     Problem: Pain  Goal: Verbalizes/displays adequate comfort level or baseline comfort level  Outcome: Progressing  Flowsheets  Taken 5/26/2024 0644 by Ellis Vora RN  Verbalizes/displays adequate comfort level or baseline comfort level:   Encourage patient to monitor pain and request assistance   Assess pain using appropriate pain scale   Administer analgesics based on type and severity of pain and evaluate response  Taken 5/25/2024 2315 by Ellis Vora RN  Verbalizes/displays adequate comfort level or baseline comfort level:   Encourage patient to monitor pain and request assistance   Assess pain using appropriate pain scale   Administer analgesics based on type and severity of pain and evaluate response     Problem: Skin/Tissue Integrity  Goal: Absence of new skin breakdown  Description: 1.  Monitor for areas of redness and/or skin breakdown  2.  Assess vascular access sites hourly  3.  Every 4-6 hours minimum:  Change oxygen saturation probe site  4.  Every 4-6 hours:  If on nasal continuous positive airway pressure, respiratory therapy assess nares and determine need for appliance change or resting period.  Outcome: Progressing     Problem: ABCDS Injury Assessment  Goal: Absence of physical injury  Outcome: Progressing     Problem: Safety - Adult  Goal: Free from fall injury  Outcome: Progressing     Problem: Chronic Conditions and Co-morbidities  Goal: Patient's chronic conditions and co-morbidity symptoms are monitored and maintained or improved  Outcome: Progressing

## 2024-05-26 NOTE — PROGRESS NOTES
Physical Therapy  New PT orders received and chart reviewed. Patient was evaluated on 05/22/2024 and on PT caseload. New PT order will be acknowledged. Thank you for the referral.     Scarlet Fish PT, DPT

## 2024-05-27 LAB
BLD PROD TYP BPU: NORMAL
BLOOD BANK DISPENSE STATUS: NORMAL
BPU ID: NORMAL
ERYTHROCYTE [DISTWIDTH] IN BLOOD BY AUTOMATED COUNT: 14 % (ref 11.6–14.5)
HCT VFR BLD AUTO: 24.7 % (ref 35–45)
HGB BLD-MCNC: 8.2 G/DL (ref 12–16)
MCH RBC QN AUTO: 30.6 PG (ref 24–34)
MCHC RBC AUTO-ENTMCNC: 33.2 G/DL (ref 31–37)
MCV RBC AUTO: 92.2 FL (ref 78–100)
NRBC # BLD: 0.03 K/UL (ref 0–0.01)
NRBC BLD-RTO: 0.4 PER 100 WBC
PLATELET # BLD AUTO: 350 K/UL (ref 135–420)
PMV BLD AUTO: 10 FL (ref 9.2–11.8)
RBC # BLD AUTO: 2.68 M/UL (ref 4.2–5.3)
UNIT DIVISION: 0
WBC # BLD AUTO: 8.4 K/UL (ref 4.6–13.2)

## 2024-05-27 PROCEDURE — 2580000003 HC RX 258: Performed by: STUDENT IN AN ORGANIZED HEALTH CARE EDUCATION/TRAINING PROGRAM

## 2024-05-27 PROCEDURE — 6370000000 HC RX 637 (ALT 250 FOR IP): Performed by: SPECIALIST

## 2024-05-27 PROCEDURE — 2580000003 HC RX 258: Performed by: SPECIALIST

## 2024-05-27 PROCEDURE — 97530 THERAPEUTIC ACTIVITIES: CPT

## 2024-05-27 PROCEDURE — 6370000000 HC RX 637 (ALT 250 FOR IP): Performed by: SURGERY

## 2024-05-27 PROCEDURE — 6370000000 HC RX 637 (ALT 250 FOR IP): Performed by: INTERNAL MEDICINE

## 2024-05-27 PROCEDURE — 6360000002 HC RX W HCPCS: Performed by: SURGERY

## 2024-05-27 PROCEDURE — 6370000000 HC RX 637 (ALT 250 FOR IP): Performed by: STUDENT IN AN ORGANIZED HEALTH CARE EDUCATION/TRAINING PROGRAM

## 2024-05-27 PROCEDURE — 36415 COLL VENOUS BLD VENIPUNCTURE: CPT

## 2024-05-27 PROCEDURE — 99232 SBSQ HOSP IP/OBS MODERATE 35: CPT | Performed by: STUDENT IN AN ORGANIZED HEALTH CARE EDUCATION/TRAINING PROGRAM

## 2024-05-27 PROCEDURE — 85027 COMPLETE CBC AUTOMATED: CPT

## 2024-05-27 PROCEDURE — 2140000001 HC CVICU INTERMEDIATE R&B

## 2024-05-27 PROCEDURE — 97535 SELF CARE MNGMENT TRAINING: CPT

## 2024-05-27 PROCEDURE — 94761 N-INVAS EAR/PLS OXIMETRY MLT: CPT

## 2024-05-27 RX ORDER — DOXYCYCLINE HYCLATE 100 MG/1
100 CAPSULE ORAL EVERY 12 HOURS SCHEDULED
Status: DISCONTINUED | OUTPATIENT
Start: 2024-05-27 | End: 2024-05-29 | Stop reason: HOSPADM

## 2024-05-27 RX ADMIN — AMLODIPINE BESYLATE 5 MG: 5 TABLET ORAL at 08:51

## 2024-05-27 RX ADMIN — DOXYCYCLINE HYCLATE 100 MG: 100 CAPSULE ORAL at 08:50

## 2024-05-27 RX ADMIN — ENOXAPARIN SODIUM 30 MG: 100 INJECTION SUBCUTANEOUS at 08:53

## 2024-05-27 RX ADMIN — ASPIRIN 81 MG: 81 TABLET, COATED ORAL at 08:51

## 2024-05-27 RX ADMIN — POLYETHYLENE GLYCOL 3350 17 G: 17 POWDER, FOR SOLUTION ORAL at 20:30

## 2024-05-27 RX ADMIN — ACETAMINOPHEN 1000 MG: 500 TABLET ORAL at 14:02

## 2024-05-27 RX ADMIN — ATORVASTATIN CALCIUM 20 MG: 20 TABLET, FILM COATED ORAL at 20:29

## 2024-05-27 RX ADMIN — ENOXAPARIN SODIUM 30 MG: 100 INJECTION SUBCUTANEOUS at 20:30

## 2024-05-27 RX ADMIN — SODIUM CHLORIDE, PRESERVATIVE FREE 10 ML: 5 INJECTION INTRAVENOUS at 08:54

## 2024-05-27 RX ADMIN — BISACODYL 5 MG: 5 TABLET, COATED ORAL at 08:50

## 2024-05-27 RX ADMIN — DOXYCYCLINE HYCLATE 100 MG: 100 CAPSULE ORAL at 20:29

## 2024-05-27 RX ADMIN — OXYCODONE HYDROCHLORIDE 5 MG: 5 TABLET ORAL at 08:51

## 2024-05-27 RX ADMIN — SODIUM CHLORIDE, PRESERVATIVE FREE 10 ML: 5 INJECTION INTRAVENOUS at 22:00

## 2024-05-27 RX ADMIN — CLOPIDOGREL BISULFATE 75 MG: 75 TABLET ORAL at 08:51

## 2024-05-27 RX ADMIN — ACETAMINOPHEN 1000 MG: 500 TABLET ORAL at 06:50

## 2024-05-27 RX ADMIN — ASPIRIN 81 MG: 81 TABLET, COATED ORAL at 20:29

## 2024-05-27 RX ADMIN — ACETAMINOPHEN 1000 MG: 500 TABLET ORAL at 22:32

## 2024-05-27 ASSESSMENT — PAIN DESCRIPTION - ONSET
ONSET: PROGRESSIVE

## 2024-05-27 ASSESSMENT — PAIN DESCRIPTION - FREQUENCY
FREQUENCY: INTERMITTENT

## 2024-05-27 ASSESSMENT — PAIN SCALES - GENERAL
PAINLEVEL_OUTOF10: 0
PAINLEVEL_OUTOF10: 4
PAINLEVEL_OUTOF10: 0
PAINLEVEL_OUTOF10: 0
PAINLEVEL_OUTOF10: 4
PAINLEVEL_OUTOF10: 1
PAINLEVEL_OUTOF10: 4
PAINLEVEL_OUTOF10: 0
PAINLEVEL_OUTOF10: 0

## 2024-05-27 ASSESSMENT — PAIN - FUNCTIONAL ASSESSMENT
PAIN_FUNCTIONAL_ASSESSMENT: ACTIVITIES ARE NOT PREVENTED
PAIN_FUNCTIONAL_ASSESSMENT: PREVENTS OR INTERFERES SOME ACTIVE ACTIVITIES AND ADLS
PAIN_FUNCTIONAL_ASSESSMENT: ACTIVITIES ARE NOT PREVENTED
PAIN_FUNCTIONAL_ASSESSMENT: ACTIVITIES ARE NOT PREVENTED

## 2024-05-27 ASSESSMENT — PAIN DESCRIPTION - LOCATION
LOCATION: LEG

## 2024-05-27 ASSESSMENT — PAIN DESCRIPTION - ORIENTATION
ORIENTATION: RIGHT

## 2024-05-27 ASSESSMENT — PAIN DESCRIPTION - DESCRIPTORS
DESCRIPTORS: ACHING

## 2024-05-27 ASSESSMENT — PAIN DESCRIPTION - PAIN TYPE
TYPE: SURGICAL PAIN

## 2024-05-27 NOTE — PROGRESS NOTES
Vascular Surgery Progress Note     Admit Date: 2024  POD 2 Days Post-Op     Procedure:  Procedure(s):  RIGHT LOWER EXTREMITY FASCIOTOMY CLOSURE        Subjective:   Patient seen, chart reviewed, all acute events noted.  Patient resting comfortably.  Answers all questions appropriately.  Slow to following commands.  Patient has no new complaints.  No unusual pain in the leg or the foot.  Still on bedrest, and is wondering when she can get out of bed and start walking.     Objective:      Blood pressure (!) 139/42, pulse 73, temperature 98.1 °F (36.7 °C), temperature source Oral, resp. rate 13, height 1.524 m (5'), weight 68 kg (150 lb), SpO2 97 %.     Temp (24hrs), Av.6 °F (37 °C), Min:98.1 °F (36.7 °C), Max:99.7 °F (37.6 °C)        Physical Exam:    Awake alert oriented x 3.  Not in distress  Mild pallor, no icterus  Right foot pink warm and adequately perfused, with +2  Right calf incision clean dry and intact, bilateral incisions medial and lateral with no drainage or surrounding erythema, sutures intact, and is skin edges viable.  Palpable DP pulse noted, easily obtained Doppler signals noted bilaterally.  Right hip incision noted -scant strikethrough drainage noted at the lower end of the bandage.     Labs: Results:         Chemistry       Recent Labs     24  0330 24  0253    137 135*   K 3.6 3.4* 4.2    108 104   CO2  23 28   BUN 10 11 12      CBC w/Diff       Recent Labs     24  0330 24  0253   WBC 9.8 9.6 10.8   RBC 2.78* 2.72* 2.81*   HGB 8.5* 8.5* 8.7*   HCT 25.5* 25.3* 26.6*    249 276      Microbiology Invalid input(s): \"CULT\"   Coagulation     Recent Labs     24   INR 1.2*          Data Review: Labs reviewed     Assessment:      Principal Problem:    Closed fracture of shaft of right femur (HCC)  Active Problems:    Chronic anticoagulation    Status post total right knee replacement    Age-related osteoporosis

## 2024-05-27 NOTE — PLAN OF CARE
Problem: Discharge Planning  Goal: Discharge to home or other facility with appropriate resources  Outcome: Progressing     Problem: Pain  Goal: Verbalizes/displays adequate comfort level or baseline comfort level  Outcome: Progressing  Flowsheets  Taken 5/26/2024 2330 by Ellis Vora RN  Verbalizes/displays adequate comfort level or baseline comfort level:   Encourage patient to monitor pain and request assistance   Assess pain using appropriate pain scale   Administer analgesics based on type and severity of pain and evaluate response  Taken 5/26/2024 2245 by Ellis Vora RN  Verbalizes/displays adequate comfort level or baseline comfort level:   Encourage patient to monitor pain and request assistance   Assess pain using appropriate pain scale   Administer analgesics based on type and severity of pain and evaluate response     Problem: Skin/Tissue Integrity  Goal: Absence of new skin breakdown  Description: 1.  Monitor for areas of redness and/or skin breakdown  2.  Assess vascular access sites hourly  3.  Every 4-6 hours minimum:  Change oxygen saturation probe site  4.  Every 4-6 hours:  If on nasal continuous positive airway pressure, respiratory therapy assess nares and determine need for appliance change or resting period.  Outcome: Progressing     Problem: ABCDS Injury Assessment  Goal: Absence of physical injury  Outcome: Progressing     Problem: Safety - Adult  Goal: Free from fall injury  Outcome: Progressing     Problem: Chronic Conditions and Co-morbidities  Goal: Patient's chronic conditions and co-morbidity symptoms are monitored and maintained or improved  Outcome: Progressing

## 2024-05-27 NOTE — PROGRESS NOTES
S:   No events  No chest pain, shortness of breath, nausea,vomiting, fever, or chills  Tolerating PO  Voiding spontaneously  Passing gas    O:   Patient Vitals for the past 8 hrs:   Temp Pulse Resp BP SpO2   05/27/24 0736 97.9 °F (36.6 °C) 72 18 (!) 160/66 97 %   05/27/24 0330 98.3 °F (36.8 °C) 74 18 128/65 --            Alert, Oriented, NAD, non labored  Operative extremity:  Dressing clean, dry, intact with mild serous drainage on dressing. Extremity 2+, no swelling, sensation and motor intact throughout. No calf pain.  Quad firing fully and able to actively internally rotate leg and flex and extend knee with minimal pain      A/P:   83 y.o. female post-op day postop day 6 right femur open reduction internal fixation  -Postop day 6 right common femoral artery stenting and fasciotomy and postop day 3 fasciotomy closure   doing well. Postoperative protocol discussed.     - TTWB  - DVT prophy: TEDs, SCDs, Lovenox  - Pain control  - Ice, elevation operative extremity  - Labs: Acute blood loss anemia, Hgb 8.2 asymptomatic  - PT/OT  - Dispo: Ortho stable.  Appreciate case management following for placement.  Will have restricted weightbearing for 8 to 12 weeks and will be followed every 2 to 3 weeks with x-ray in the orthopedic office      Liu Carbajal DO  5/27/2024  9:35 AM

## 2024-05-27 NOTE — PROGRESS NOTES
Comprehensive Nutrition Assessment    Type and Reason for Visit:  Initial, RD Nutrition Re-Screen/LOS    Nutrition Recommendations/Plan:   Continue current diet order     Malnutrition Assessment:  Malnutrition Status:  No malnutrition (05/27/24 1601)    Context:  Chronic Illness     Findings of the 6 clinical characteristics of malnutrition:  Energy Intake:  No significant decrease in energy intake  Weight Loss:  No significant weight loss     Body Fat Loss:  Unable to assess     Muscle Mass Loss:  Unable to assess    Fluid Accumulation:  Unable to assess     Strength:  Not Performed    Nutrition Assessment:    LOS assessment. 84 y/o female, admitted for femur fracture, ORIF.    Nutrition Related Findings:    deferred; edema noted LBM 5/26 Wound Type: Surgical Incision       Current Nutrition Intake & Therapies:    Average Meal Intake: %  Average Supplements Intake: None Ordered  ADULT DIET; Regular    Anthropometric Measures:  Height: 152.4 cm (5')  Ideal Body Weight (IBW): 100 lbs (45 kg)       Current Body Weight: 68 kg (149 lb 14.6 oz), 149.9 % IBW. Weight Source: Other (Comment) (estimated)  Current BMI (kg/m2): 29.3  Usual Body Weight: 63.5 kg (139 lb 15.9 oz)  % Weight Change (Calculated): 7.1  Weight Adjustment For: No Adjustment                 BMI Categories: Overweight (BMI 25.0-29.9)    Estimated Daily Nutrient Needs:  Energy Requirements Based On: Kcal/kg     Energy (kcal/day): 5510-4671 kcal (25-30 kcal/kg)  Weight Used for Protein Requirements: Current  Protein (g/day): 68-82 g (1-1.2 g/kg)  Method Used for Fluid Requirements: 1 ml/kcal  Fluid (ml/day):      Nutrition Diagnosis:   No nutrition diagnosis at this time related to   as evidenced by      Nutrition Interventions:   Food and/or Nutrient Delivery: Continue Current Diet  Nutrition Education/Counseling: No recommendation at this time  Coordination of Nutrition Care: No recommendation at this time  Plan of Care discussed with:

## 2024-05-27 NOTE — PLAN OF CARE
Problem: Occupational Therapy - Adult  Goal: By Discharge: Performs self-care activities at highest level of function for planned discharge setting.  See evaluation for individualized goals.  Description: Occupational Therapy Goals:  Initiated 5/22/2024 to be met within 7-10 days.    1.  Patient will perform bed mobility for ADLs with supervision/set-up.   2.  Patient will perform grooming with supervision/set-up while sitting at EOB with Good balance.  3.  Patient will perform upper body dressing with supervision/set-up.  4.  Patient will perform toilet transfers with minimal assistance/contact guard assist.  5.  Patient will perform all aspects of toileting with minimal assistance/contact guard assist.  6.  Patient will participate in upper extremity therapeutic exercise/activities with supervision/set-up for 8 minutes to improve endurance and UB strength needed for ADLs    7.  Patient will utilize energy conservation techniques during functional activities with verbal cues.    PLOF: Pt lives alone in senior living apartment, reports being Mod Ind for ADLs and functional mobility using QC most recently.  Outcome: Progressing   OCCUPATIONAL THERAPY TREATMENT    Patient: Juanpablo Jackson (83 y.o. female)  Date: 5/27/2024  Diagnosis: Closed displaced comminuted fracture of shaft of right femur, initial encounter ([facility]) [S72.351A]  Age-related osteoporosis with current pathological fracture of left femur with malunion [M80.052P] Closed fracture of shaft of right femur ([facility])  Procedure(s) (LRB):  RIGHT LOWER EXTREMITY FASCIOTOMY CLOSURE (Right) 4 Days Post-Op  Precautions: Fall Risk, Surgical Protocols, Weight Bearing, Right Lower Extremity Weight Bearing: Toe Touch Weight Bearing,  ,  ,  ,  ,  ,      Chart, occupational therapy assessment, plan of care, and goals were reviewed.  ASSESSMENT:  Pt presented sitting upright in reclining chair upon entry and agreeable to work with OT. Reviewed WB status on R LE

## 2024-05-27 NOTE — PROGRESS NOTES
[facility]  (A Division of [facility])      Consultation Note      Date of Admission: 5/19/2024    Date of Note: 5/27/2024      Reason for Referral: hx of MRSA, s/p fasciotomy  Referring Physician: Dr. Rehan Restrepo from this admission:   5/20 urine cx: negative    Current Antimicrobials:    Prior Antimicrobials:  Ancef 5/21- present  Doxycycline 5/22 to present        Assessment:         Status post right common femoral artery exposure right SFA and popliteal stent placement   -  s/p stent placement 5/21  Displaced right-sided femoral fracture with underlying chronic femoral fracture.   - s/p ORIF 5/21  Fall at home  Osteoporosis  Remote history of possible MRSA skin lesion: Unable to find any supporting cultures from [facility] or [facility] since 2013    Plan:   Trend CBC, BMP to monitor for any medication associated toxicities.    Given complicated surgical history and requirement for fasciotomy, continue doxycycline   -Stop date 5/29  Fasciotomy wound care as per vascular surgery team.      Discussed with vascular surgery on a.m. rounds  Discussed with Dr. Brian Muhammad DO  [facility]  [address], [address], [place] [address]  Office: [phone], Ext [phone]      Lines / Catheters:  peripheral    Subjective:   Seen and examined.  Finally out of the ICU.  She is resting comfortably and wakes up easily.  Pain is controlled.  No fevers.    Tmax 98.5  WBCs 8.4    HPI:  Ms. Jackson is a an 83-year-old female with a past medical history of hypertension, osteoporosis, prior CVA, recurrent UTIs who had a chronic right femur fracture.  She had been followed as an outpatient by orthopedic surgery and did not want any additional surgical interventions.  Unfortunately she had a fall at home which resulted in a 100% displaced overriding fracture.  She was taken to the OR on 5/21 for placement of a symphysis condylar

## 2024-05-27 NOTE — PROGRESS NOTES
[facility]  Progress Note  Date:2024       Room:[address] [place] [address]  Patient Name:Juanpablo Jackson     YOB: 1940     Age:83 y.o.        Subjective    Subjective   Review of Systems    No acute events overnight.    Patient resting comfortably in recliner  Pain is well controlled with Tylenol and oxycodone.      Objective         Vitals Last 24 Hours:  TEMPERATURE:  Temp  Av.1 °F (36.7 °C)  Min: 97 °F (36.1 °C)  Max: 98.5 °F (36.9 °C)  RESPIRATIONS RANGE: Resp  Av  Min: 16  Max: 20  PULSE OXIMETRY RANGE: SpO2  Av.7 %  Min: 96 %  Max: 97 %  PULSE RANGE: Pulse  Av.7  Min: 69  Max: 78  BLOOD PRESSURE RANGE: Systolic (24hrs), Av , Min:126 , Max:160     ; Diastolic (24hrs), Av, Min:62, Max:66      I/O (24Hr):    Intake/Output Summary (Last 24 hours) at 2024 0837  Last data filed at 2024 1724  Gross per 24 hour   Intake 720 ml   Output 2100 ml   Net -1380 ml       Objective:  General Appearance:  Comfortable.    Vital signs: (most recent): Blood pressure (!) 160/66, pulse 72, temperature 97.9 °F (36.6 °C), temperature source Oral, resp. rate 18, height 1.524 m (5'), weight 68 kg (150 lb), SpO2 97 %.    Output: Producing urine.    HEENT: Normal HEENT exam.    Lungs:  Normal effort and normal respiratory rate.    Heart: Normal rate.  Regular rhythm.    Abdomen: Abdomen is soft and flat.  Bowel sounds are normal.   There is no abdominal tenderness.     Extremities: Normal range of motion.  (Right calf dressed in gauze)  Pulses: Distal pulses are intact.    Neurological: Patient is alert and oriented to person, place and time.    Skin:  Warm and dry.          Labs/Imaging/Diagnostics    Labs:  CBC:  Recent Labs     24  0253 24  0532 24  0524   WBC 10.8 10.4 8.4   RBC 2.81* 2.79* 2.68*   HGB 8.7* 8.5* 8.2*   HCT 26.6* 26.1* 24.7*   MCV 94.7 93.5 92.2   RDW 14.1 13.9 14.0    322 350       CHEMISTRIES:  Recent Labs  arthroplasty.      Current Medications:  Current Facility-Administered Medications: dextrose 5 % in lactated ringers infusion, , IntraVENous, Continuous  sodium chloride flush 0.9 % injection 5-40 mL, 5-40 mL, IntraVENous, 2 times per day  sodium chloride flush 0.9 % injection 5-40 mL, 5-40 mL, IntraVENous, PRN  0.9 % sodium chloride infusion, , IntraVENous, PRN  acetaminophen (TYLENOL) tablet 1,000 mg, 1,000 mg, Oral, 3 times per day  bisacodyl (DULCOLAX) EC tablet 5 mg, 5 mg, Oral, Daily  hydrOXYzine HCl (ATARAX) tablet 10 mg, 10 mg, Oral, Q8H PRN  traMADol (ULTRAM) tablet 50 mg, 50 mg, Oral, Q6H PRN  aspirin EC tablet 81 mg, 81 mg, Oral, BID  clopidogrel (PLAVIX) tablet 75 mg, 75 mg, Oral, Daily  doxycycline hyclate (VIBRAMYCIN) capsule 100 mg, 100 mg, Oral, 2 times per day  enoxaparin Sodium (LOVENOX) injection 30 mg, 30 mg, SubCUTAneous, BID  oxyCODONE (ROXICODONE) immediate release tablet 5 mg, 5 mg, Oral, Q4H PRN **OR** oxyCODONE (ROXICODONE) immediate release tablet 10 mg, 10 mg, Oral, Q4H PRN  labetalol (NORMODYNE;TRANDATE) injection 10 mg, 10 mg, IntraVENous, Q4H PRN  sodium chloride flush 0.9 % injection 5-40 mL, 5-40 mL, IntraVENous, 2 times per day  sodium chloride flush 0.9 % injection 5-40 mL, 5-40 mL, IntraVENous, PRN  0.9 % sodium chloride infusion, , IntraVENous, PRN  potassium chloride (KLOR-CON M) extended release tablet 40 mEq, 40 mEq, Oral, PRN **OR** potassium bicarb-citric acid (EFFER-K) effervescent tablet 40 mEq, 40 mEq, Oral, PRN **OR** potassium chloride 10 mEq/100 mL IVPB (Peripheral Line), 10 mEq, IntraVENous, PRN  magnesium sulfate 2000 mg in 50 mL IVPB premix, 2,000 mg, IntraVENous, PRN  ondansetron (ZOFRAN-ODT) disintegrating tablet 4 mg, 4 mg, Oral, Q8H PRN **OR** ondansetron (ZOFRAN) injection 4 mg, 4 mg, IntraVENous, Q6H PRN  amLODIPine (NORVASC) tablet 5 mg, 5 mg, Oral, Daily  atorvastatin (LIPITOR) tablet 20 mg, 20 mg, Oral, Daily  morphine (PF) injection 2 mg, 2 mg,

## 2024-05-28 LAB
ERYTHROCYTE [DISTWIDTH] IN BLOOD BY AUTOMATED COUNT: 14 % (ref 11.6–14.5)
HCT VFR BLD AUTO: 25.5 % (ref 35–45)
HGB BLD-MCNC: 8.4 G/DL (ref 12–16)
MCH RBC QN AUTO: 30.5 PG (ref 24–34)
MCHC RBC AUTO-ENTMCNC: 32.9 G/DL (ref 31–37)
MCV RBC AUTO: 92.7 FL (ref 78–100)
NRBC # BLD: 0.03 K/UL (ref 0–0.01)
NRBC BLD-RTO: 0.3 PER 100 WBC
PLATELET # BLD AUTO: 418 K/UL (ref 135–420)
PMV BLD AUTO: 9.9 FL (ref 9.2–11.8)
RBC # BLD AUTO: 2.75 M/UL (ref 4.2–5.3)
WBC # BLD AUTO: 10.2 K/UL (ref 4.6–13.2)

## 2024-05-28 PROCEDURE — 6370000000 HC RX 637 (ALT 250 FOR IP): Performed by: STUDENT IN AN ORGANIZED HEALTH CARE EDUCATION/TRAINING PROGRAM

## 2024-05-28 PROCEDURE — 99232 SBSQ HOSP IP/OBS MODERATE 35: CPT | Performed by: HOSPITALIST

## 2024-05-28 PROCEDURE — 94761 N-INVAS EAR/PLS OXIMETRY MLT: CPT

## 2024-05-28 PROCEDURE — 85027 COMPLETE CBC AUTOMATED: CPT

## 2024-05-28 PROCEDURE — 97164 PT RE-EVAL EST PLAN CARE: CPT

## 2024-05-28 PROCEDURE — 97530 THERAPEUTIC ACTIVITIES: CPT

## 2024-05-28 PROCEDURE — 2580000003 HC RX 258: Performed by: STUDENT IN AN ORGANIZED HEALTH CARE EDUCATION/TRAINING PROGRAM

## 2024-05-28 PROCEDURE — 2140000001 HC CVICU INTERMEDIATE R&B

## 2024-05-28 PROCEDURE — 2580000003 HC RX 258: Performed by: SPECIALIST

## 2024-05-28 PROCEDURE — 6370000000 HC RX 637 (ALT 250 FOR IP): Performed by: SURGERY

## 2024-05-28 PROCEDURE — 6370000000 HC RX 637 (ALT 250 FOR IP): Performed by: SPECIALIST

## 2024-05-28 PROCEDURE — 97535 SELF CARE MNGMENT TRAINING: CPT

## 2024-05-28 PROCEDURE — 6360000002 HC RX W HCPCS: Performed by: SURGERY

## 2024-05-28 PROCEDURE — 36415 COLL VENOUS BLD VENIPUNCTURE: CPT

## 2024-05-28 RX ADMIN — DOXYCYCLINE HYCLATE 100 MG: 100 CAPSULE ORAL at 08:46

## 2024-05-28 RX ADMIN — SODIUM CHLORIDE, PRESERVATIVE FREE 10 ML: 5 INJECTION INTRAVENOUS at 19:56

## 2024-05-28 RX ADMIN — LABETALOL HYDROCHLORIDE 10 MG: 5 INJECTION, SOLUTION INTRAVENOUS at 19:56

## 2024-05-28 RX ADMIN — ASPIRIN 81 MG: 81 TABLET, COATED ORAL at 08:46

## 2024-05-28 RX ADMIN — POLYETHYLENE GLYCOL 3350 17 G: 17 POWDER, FOR SOLUTION ORAL at 19:54

## 2024-05-28 RX ADMIN — CLOPIDOGREL BISULFATE 75 MG: 75 TABLET ORAL at 08:50

## 2024-05-28 RX ADMIN — ENOXAPARIN SODIUM 30 MG: 100 INJECTION SUBCUTANEOUS at 21:00

## 2024-05-28 RX ADMIN — ACETAMINOPHEN 1000 MG: 500 TABLET ORAL at 06:32

## 2024-05-28 RX ADMIN — BISACODYL 5 MG: 5 TABLET, COATED ORAL at 08:46

## 2024-05-28 RX ADMIN — AMLODIPINE BESYLATE 5 MG: 5 TABLET ORAL at 08:46

## 2024-05-28 RX ADMIN — ACETAMINOPHEN 1000 MG: 500 TABLET ORAL at 13:41

## 2024-05-28 RX ADMIN — ATORVASTATIN CALCIUM 20 MG: 20 TABLET, FILM COATED ORAL at 19:55

## 2024-05-28 RX ADMIN — OXYCODONE HYDROCHLORIDE 10 MG: 5 TABLET ORAL at 02:45

## 2024-05-28 RX ADMIN — ASPIRIN 81 MG: 81 TABLET, COATED ORAL at 19:55

## 2024-05-28 RX ADMIN — ACETAMINOPHEN 1000 MG: 500 TABLET ORAL at 23:02

## 2024-05-28 RX ADMIN — SODIUM CHLORIDE, PRESERVATIVE FREE 10 ML: 5 INJECTION INTRAVENOUS at 08:51

## 2024-05-28 RX ADMIN — ENOXAPARIN SODIUM 30 MG: 100 INJECTION SUBCUTANEOUS at 08:47

## 2024-05-28 RX ADMIN — DOXYCYCLINE HYCLATE 100 MG: 100 CAPSULE ORAL at 21:00

## 2024-05-28 ASSESSMENT — PAIN DESCRIPTION - LOCATION
LOCATION: LEG
LOCATION: LEG;BACK
LOCATION: OTHER (COMMENT)
LOCATION: BACK;LEG
LOCATION: BACK;LEG

## 2024-05-28 ASSESSMENT — PAIN DESCRIPTION - ORIENTATION
ORIENTATION: RIGHT;LOWER
ORIENTATION: RIGHT

## 2024-05-28 ASSESSMENT — PAIN SCALES - GENERAL
PAINLEVEL_OUTOF10: 0
PAINLEVEL_OUTOF10: 7
PAINLEVEL_OUTOF10: 2
PAINLEVEL_OUTOF10: 2
PAINLEVEL_OUTOF10: 0
PAINLEVEL_OUTOF10: 3

## 2024-05-28 ASSESSMENT — PAIN - FUNCTIONAL ASSESSMENT
PAIN_FUNCTIONAL_ASSESSMENT: ACTIVITIES ARE NOT PREVENTED
PAIN_FUNCTIONAL_ASSESSMENT: ACTIVITIES ARE NOT PREVENTED
PAIN_FUNCTIONAL_ASSESSMENT: PREVENTS OR INTERFERES SOME ACTIVE ACTIVITIES AND ADLS

## 2024-05-28 ASSESSMENT — PAIN DESCRIPTION - DESCRIPTORS
DESCRIPTORS: ACHING

## 2024-05-28 ASSESSMENT — PAIN SCALES - WONG BAKER: WONGBAKER_NUMERICALRESPONSE: NO HURT

## 2024-05-28 NOTE — CARE COORDINATION
Call placed to pt's daughter Naomy Humphreys [phone] to discuss dispo.  She stated she spoke with Justina of [facility].  She stated plan is for pt to go to ARU and then discharge to assisted living facility.  She stated per Justina they will have a meeting to make a decision and Justina will get back to pt's daughter.  CM will continue to follow.  Sent a message to Justina of [facility].          Seema Marques, ROBERTON RN  Care Management

## 2024-05-28 NOTE — CARE COORDINATION
Chart reviewed.  PTOT recommending inpatient rehab  Sent message to Justina [facility].            ROBERTO MikeN RN  Care Management

## 2024-05-28 NOTE — PLAN OF CARE
transitioned to EOB Supervision in prep for functional tasks. STS transfer MIN A with RW and performed SPT to reclining chair MOD A with RW, simulating BSC transfer. Once sitting, pt performed simple grooming tasks w/ S/U and was provided clean hospital gown w/ SBA. Pt was left in chair and left with BLE's elevated and all needs within reach. RN made aware.   Progression toward goals:  []          Improving appropriately and progressing toward goals  [x]          Improving slowly and progressing toward goals  []          Not making progress toward goals and plan of care will be adjusted     PLAN:  Patient continues to benefit from skilled intervention to address the above impairments.  Continue treatment per established plan of care.    Further Equipment Recommendations for Discharge: TBA    AMPA: AM-PAC Inpatient Daily Activity Raw Score: 17    Current research shows that an AM-PAC score of 17 or less is not associated with a discharge to the patient's home setting. Based on an AM-PAC score and their current ADL deficits; it is recommended that the patient have 5-7 sessions per week of Occupational Therapy at d/c to increase the patient's independence.  Currently, this patient demonstrates the potential endurance, and/or tolerance for 3 hours of therapy each day at d/c.      This AMPAC score should be considered in conjunction with interdisciplinary team recommendations to determine the most appropriate discharge setting. Patient's social support, diagnosis, medical stability, and prior level of function should also be taken into consideration.     SUBJECTIVE:   Patient stated, \"I am feeling better.\"    OBJECTIVE DATA SUMMARY:   Cognitive/Behavioral Status:  Orientation  Orientation Level: Oriented X4       Functional Mobility and Transfers for ADLs:   Bed Mobility:  Bed Mobility Training  Bed Mobility Training: Yes  Supine to Sit: Minimum assistance  Scooting: Minimum assistance   Transfers:  Transfer  Training  Sit to Stand: Minimum assistance  Stand to Sit: Minimum assistance  Stand Pivot Transfers: Moderate assistance    Balance:  Balance  Sitting: Impaired  Sitting - Static: Good (unsupported)  Sitting - Dynamic: Good (unsupported)  Standing: Impaired  Standing - Static: Fair  Standing - Dynamic: Fair    ADL Intervention:     Grooming: Setup        UE Dressing: Stand by assistance        Pain:  Intensity Pre-treatment: 0/10   Intensity Post-treatment: 0/10      Activity Tolerance:    Activity Tolerance: Patient tolerated treatment well  Please refer to the flowsheet for vital signs taken during this treatment.  After treatment:   [x]  Patient left in no apparent distress sitting up in chair  []  Patient left in no apparent distress in bed  [x]  Call bell left within reach  [x]  Nursing notified  []  Caregiver present  []  Bed alarm activated    COMMUNICATION/EDUCATION:   Patient Education  Education Given To: Patient  Education Provided: Plan of Care;Transfer Training;ADL Adaptive Strategies;Energy Conservation;Precautions;Fall Prevention Strategies;Role of Therapy  Education Method: Demonstration;Verbal;Teach Back  Barriers to Learning: None  Education Outcome: Continued education needed;Verbalized understanding      Thank you for this referral.  ZULLY Garcia  Minutes: 23

## 2024-05-28 NOTE — PROGRESS NOTES
0730 Bedside and Verbal shift change report given to GLADYS Hayes (oncoming nurse) by GLADYS Corral (offgoing nurse). Report included the following information Nurse Handoff Report, Adult Overview, Intake/Output, MAR, Recent Results, and Cardiac Rhythm NSR .

## 2024-05-28 NOTE — PROGRESS NOTES
Patient seen evaluated, lying in bed, no acute distress.  Awaiting input from ARU regarding discharge possibly today, will continue to follow.

## 2024-05-28 NOTE — PLAN OF CARE
Problem: Discharge Planning  Goal: Discharge to home or other facility with appropriate resources  Outcome: Progressing  Flowsheets (Taken 5/28/2024 0730)  Discharge to home or other facility with appropriate resources:   Identify barriers to discharge with patient and caregiver   Arrange for needed discharge resources and transportation as appropriate   Identify discharge learning needs (meds, wound care, etc)     Problem: Pain  Goal: Verbalizes/displays adequate comfort level or baseline comfort level  Outcome: Progressing  Flowsheets  Taken 5/28/2024 1300 by Abigail Escudero RN  Verbalizes/displays adequate comfort level or baseline comfort level:   Encourage patient to monitor pain and request assistance   Assess pain using appropriate pain scale   Implement non-pharmacological measures as appropriate and evaluate response   Consider cultural and social influences on pain and pain management  Taken 5/27/2024 2320 by Ellis Vora RN  Verbalizes/displays adequate comfort level or baseline comfort level:   Encourage patient to monitor pain and request assistance   Assess pain using appropriate pain scale   Administer analgesics based on type and severity of pain and evaluate response     Problem: ABCDS Injury Assessment  Goal: Absence of physical injury  Outcome: Progressing  Flowsheets (Taken 5/28/2024 1300)  Absence of Physical Injury: Implement safety measures based on patient assessment     Problem: Safety - Adult  Goal: Free from fall injury  Outcome: Progressing  Flowsheets (Taken 5/28/2024 1300)  Free From Fall Injury:   Based on caregiver fall risk screen, instruct family/caregiver to ask for assistance with transferring infant if caregiver noted to have fall risk factors   Instruct family/caregiver on patient safety     Problem: Chronic Conditions and Co-morbidities  Goal: Patient's chronic conditions and co-morbidity symptoms are monitored and maintained or improved  Recent Flowsheet

## 2024-05-28 NOTE — PROGRESS NOTES
Vascular Surgery Progress Note     Admit Date: 2024  POD 6 Days Post-Op     Procedure:  Procedure(s):  RIGHT LOWER EXTREMITY FASCIOTOMY CLOSURE        Subjective:   Patient seen, chart reviewed, all acute events noted.  Patient resting comfortably.  In good spirits.  Answers all questions appropriately.  Slow to following commands.  Excited because she started physical therapy.  States that she is going to go to rehab and wants to get stronger so she can go home.  Patient has no new complaints.  No unusual pain in the leg or the foot.  Tolerating her diet with no nausea vomiting.  Appetite increased.  Denies any bowel or bladder issues.     Objective:      Blood pressure (!) 139/42, pulse 73, temperature 98.1 °F (36.7 °C), temperature source Oral, resp. rate 13, height 1.524 m (5'), weight 68 kg (150 lb), SpO2 97 %.     Temp (24hrs), Av.6 °F (37 °C), Min:98.1 °F (36.7 °C), Max:99.7 °F (37.6 °C)        Physical Exam:    Awake alert oriented x 3.  Not in distress  Mild pallor, no icterus  Right foot pink warm and adequately perfused, with +2  Right calf incision clean dry and intact, bilateral incisions medial and lateral with no drainage or surrounding erythema, sutures intact, and is skin edges viable.  Palpable DP pulse noted, easily obtained Doppler signals noted bilaterally.  Right hip incision noted -no further strikethrough drainage noted at the lower end of the bandage.     Labs: Results:         Chemistry       Recent Labs     24  0330 24  0253    137 135*   K 3.6 3.4* 4.2    108 104   CO2  23 28   BUN 10 11 12      CBC w/Diff       Recent Labs     24  0330 24  0253   WBC 9.8 9.6 10.8   RBC 2.78* 2.72* 2.81*   HGB 8.5* 8.5* 8.7*   HCT 25.5* 25.3* 26.6*    249 276      Microbiology Invalid input(s): \"CULT\"   Coagulation     Recent Labs     24   INR 1.2*          Data Review: Labs reviewed     Assessment:

## 2024-05-28 NOTE — PROGRESS NOTES
[facility]  Progress Note  Date:2024       Room:[address] [place] [address]  Patient Name:Juanpablo Jackson     YOB: 1940     Age:83 y.o.        Subjective    Subjective   Review of Systems    No acute events overnight.    Patient resting comfortably in recliner  Pain is well controlled with Tylenol and oxycodone.    -patient seen evaluated, sitting up in recliner, no acute distress-continue current treatment plan.,  Awaiting input from acute rehab facility regarding discharge planning.      Objective         Vitals Last 24 Hours:  TEMPERATURE:  Temp  Av.8 °F (36.6 °C)  Min: 97.5 °F (36.4 °C)  Max: 98.1 °F (36.7 °C)  RESPIRATIONS RANGE: Resp  Av.9  Min: 16  Max: 18  PULSE OXIMETRY RANGE: SpO2  Av.5 %  Min: 96 %  Max: 99 %  PULSE RANGE: Pulse  Av.8  Min: 61  Max: 73  BLOOD PRESSURE RANGE: Systolic (24hrs), Av , Min:134 , Max:161     ; Diastolic (24hrs), Av, Min:56, Max:89      I/O (24Hr):    Intake/Output Summary (Last 24 hours) at 2024 1612  Last data filed at 2024 0851  Gross per 24 hour   Intake 10 ml   Output --   Net 10 ml       Objective:  General Appearance:  Comfortable.    Vital signs: (most recent): Blood pressure (!) 161/82, pulse 67, temperature 97.7 °F (36.5 °C), temperature source Temporal, resp. rate 18, height 1.524 m (5'), weight 68 kg (150 lb), SpO2 99 %.    Output: Producing urine.    HEENT: Normal HEENT exam.    Lungs:  Normal effort and normal respiratory rate.    Heart: Normal rate.  Regular rhythm.    Abdomen: Abdomen is soft and flat.  Bowel sounds are normal.   There is no abdominal tenderness.     Extremities: Normal range of motion.  (Right calf dressed in gauze)  Pulses: Distal pulses are intact.    Neurological: Patient is alert and oriented to person, place and time.    Skin:  Warm and dry.          Labs/Imaging/Diagnostics    Labs:  CBC:  Recent Labs     24  0532 24  0524 24  0424

## 2024-05-28 NOTE — PROGRESS NOTES
Wound clean and dry  Moving knee and toes well  Cap refill intact right foot  Continue PT  Discharge planning in progress

## 2024-05-28 NOTE — PROGRESS NOTES
[facility]  (A Division of [facility])      Consultation Note      Date of Admission: 5/19/2024    Date of Note: 5/28/2024      Reason for Referral: hx of MRSA, s/p fasciotomy  Referring Physician: Dr. Rehan Restrepo from this admission:   5/20 urine cx: negative    Current Antimicrobials:    Prior Antimicrobials:  Ancef 5/21- present  Doxycycline 5/22 to present        Assessment:         Status post right common femoral artery exposure right SFA and popliteal stent placement   -  s/p stent placement 5/21  Displaced right-sided femoral fracture with underlying chronic femoral fracture.   - s/p ORIF 5/21  Fall at home  Osteoporosis  Remote history of possible MRSA skin lesion: Unable to find any supporting cultures from [facility] or [facility] since 2013    Plan:   Trend CBC, BMP to monitor for any medication associated toxicities.    Given complicated surgical history and requirement for fasciotomy, continue doxycycline   -Stop date 5/29  Fasciotomy wound care as per vascular surgery team.      Discussed with vascular surgery on a.m. rounds  Discussed with Dr. Gwendoyln Muhammad DO  [facility]  [address], [address], [place] [address]  Office: [phone], Ext [phone]      Lines / Catheters:  peripheral    Subjective:   Seen and examined. She is resting comfortably and wakes up easily.  Pain is controlled.  No fevers.    Tmax 98.1  WBCs 10.2    HPI:  Ms. Jackson is a an 83-year-old female with a past medical history of hypertension, osteoporosis, prior CVA, recurrent UTIs who had a chronic right femur fracture.  She had been followed as an outpatient by orthopedic surgery and did not want any additional surgical interventions.  Unfortunately she had a fall at home which resulted in a 100% displaced overriding fracture.  She was taken to the OR on 5/21 for placement of a symphysis condylar plate and ORIF.  In the

## 2024-05-29 ENCOUNTER — HOSPITAL ENCOUNTER (INPATIENT)
Facility: HOSPITAL | Age: 84
DRG: 559 | End: 2024-05-29
Attending: EMERGENCY MEDICINE | Admitting: EMERGENCY MEDICINE
Payer: MEDICARE

## 2024-05-29 VITALS
SYSTOLIC BLOOD PRESSURE: 108 MMHG | HEIGHT: 60 IN | BODY MASS INDEX: 29.52 KG/M2 | DIASTOLIC BLOOD PRESSURE: 61 MMHG | OXYGEN SATURATION: 98 % | RESPIRATION RATE: 18 BRPM | TEMPERATURE: 97.9 F | WEIGHT: 150.35 LBS | HEART RATE: 71 BPM

## 2024-05-29 DIAGNOSIS — S72.351D CLOSED DISPLACED COMMINUTED FRACTURE OF SHAFT OF RIGHT FEMUR WITH ROUTINE HEALING: Primary | ICD-10-CM

## 2024-05-29 PROCEDURE — 6370000000 HC RX 637 (ALT 250 FOR IP): Performed by: STUDENT IN AN ORGANIZED HEALTH CARE EDUCATION/TRAINING PROGRAM

## 2024-05-29 PROCEDURE — 1180000000 HC REHAB R&B

## 2024-05-29 PROCEDURE — 94761 N-INVAS EAR/PLS OXIMETRY MLT: CPT

## 2024-05-29 PROCEDURE — 6370000000 HC RX 637 (ALT 250 FOR IP): Performed by: EMERGENCY MEDICINE

## 2024-05-29 PROCEDURE — 6360000002 HC RX W HCPCS: Performed by: SURGERY

## 2024-05-29 PROCEDURE — 2580000003 HC RX 258: Performed by: SPECIALIST

## 2024-05-29 PROCEDURE — 99239 HOSP IP/OBS DSCHRG MGMT >30: CPT | Performed by: HOSPITALIST

## 2024-05-29 PROCEDURE — 6370000000 HC RX 637 (ALT 250 FOR IP): Performed by: SPECIALIST

## 2024-05-29 PROCEDURE — 2580000003 HC RX 258: Performed by: STUDENT IN AN ORGANIZED HEALTH CARE EDUCATION/TRAINING PROGRAM

## 2024-05-29 PROCEDURE — 6370000000 HC RX 637 (ALT 250 FOR IP): Performed by: SURGERY

## 2024-05-29 RX ORDER — ASPIRIN 81 MG/1
81 TABLET ORAL 2 TIMES DAILY
Qty: 30 TABLET | Refills: 3 | Status: ON HOLD | OUTPATIENT
Start: 2024-05-29

## 2024-05-29 RX ORDER — ACETAMINOPHEN 325 MG/1
650 TABLET ORAL EVERY 4 HOURS PRN
Status: DISCONTINUED | OUTPATIENT
Start: 2024-05-29 | End: 2024-06-03

## 2024-05-29 RX ORDER — ASPIRIN 81 MG/1
81 TABLET ORAL 2 TIMES DAILY
Status: DISCONTINUED | OUTPATIENT
Start: 2024-05-29 | End: 2024-05-30

## 2024-05-29 RX ORDER — CLOPIDOGREL BISULFATE 75 MG/1
75 TABLET ORAL DAILY
Status: DISCONTINUED | OUTPATIENT
Start: 2024-05-30 | End: 2024-06-14 | Stop reason: HOSPADM

## 2024-05-29 RX ORDER — POLYETHYLENE GLYCOL 3350 17 G/17G
17 POWDER, FOR SOLUTION ORAL DAILY
Status: DISCONTINUED | OUTPATIENT
Start: 2024-05-30 | End: 2024-05-30

## 2024-05-29 RX ORDER — NALOXONE HYDROCHLORIDE 0.4 MG/ML
0.4 INJECTION, SOLUTION INTRAMUSCULAR; INTRAVENOUS; SUBCUTANEOUS PRN
Status: DISCONTINUED | OUTPATIENT
Start: 2024-05-29 | End: 2024-06-14 | Stop reason: HOSPADM

## 2024-05-29 RX ORDER — ATORVASTATIN CALCIUM 20 MG/1
20 TABLET, FILM COATED ORAL NIGHTLY
Status: DISCONTINUED | OUTPATIENT
Start: 2024-05-30 | End: 2024-06-14 | Stop reason: HOSPADM

## 2024-05-29 RX ORDER — BISACODYL 5 MG/1
5 TABLET, DELAYED RELEASE ORAL DAILY PRN
Status: DISCONTINUED | OUTPATIENT
Start: 2024-05-29 | End: 2024-06-14 | Stop reason: HOSPADM

## 2024-05-29 RX ORDER — OXYCODONE HYDROCHLORIDE 5 MG/1
5 TABLET ORAL EVERY 6 HOURS PRN
Qty: 12 TABLET | Refills: 0 | Status: ON HOLD
Start: 2024-05-29 | End: 2024-06-01

## 2024-05-29 RX ORDER — OXYCODONE HYDROCHLORIDE 5 MG/1
5 TABLET ORAL EVERY 6 HOURS PRN
Status: DISCONTINUED | OUTPATIENT
Start: 2024-05-29 | End: 2024-06-14 | Stop reason: HOSPADM

## 2024-05-29 RX ORDER — AMLODIPINE BESYLATE 5 MG/1
5 TABLET ORAL DAILY
Status: DISCONTINUED | OUTPATIENT
Start: 2024-05-30 | End: 2024-06-14 | Stop reason: HOSPADM

## 2024-05-29 RX ADMIN — AMLODIPINE BESYLATE 5 MG: 5 TABLET ORAL at 09:32

## 2024-05-29 RX ADMIN — SODIUM CHLORIDE, PRESERVATIVE FREE 10 ML: 5 INJECTION INTRAVENOUS at 09:34

## 2024-05-29 RX ADMIN — ASPIRIN 81 MG: 81 TABLET, COATED ORAL at 09:31

## 2024-05-29 RX ADMIN — CLOPIDOGREL BISULFATE 75 MG: 75 TABLET ORAL at 09:31

## 2024-05-29 RX ADMIN — BISACODYL 5 MG: 5 TABLET, COATED ORAL at 09:31

## 2024-05-29 RX ADMIN — ACETAMINOPHEN 1000 MG: 500 TABLET ORAL at 14:13

## 2024-05-29 RX ADMIN — DOXYCYCLINE HYCLATE 100 MG: 100 CAPSULE ORAL at 09:32

## 2024-05-29 RX ADMIN — Medication 1 TABLET: at 17:09

## 2024-05-29 RX ADMIN — ACETAMINOPHEN 650 MG: 325 TABLET ORAL at 22:47

## 2024-05-29 RX ADMIN — ENOXAPARIN SODIUM 30 MG: 100 INJECTION SUBCUTANEOUS at 09:32

## 2024-05-29 RX ADMIN — OXYCODONE HYDROCHLORIDE 5 MG: 5 TABLET ORAL at 20:13

## 2024-05-29 RX ADMIN — OXYCODONE HYDROCHLORIDE 5 MG: 5 TABLET ORAL at 00:10

## 2024-05-29 RX ADMIN — ACETAMINOPHEN 1000 MG: 500 TABLET ORAL at 05:36

## 2024-05-29 RX ADMIN — ASPIRIN 81 MG: 81 TABLET, COATED ORAL at 20:14

## 2024-05-29 ASSESSMENT — PAIN DESCRIPTION - ONSET
ONSET: ON-GOING
ONSET: ON-GOING

## 2024-05-29 ASSESSMENT — PAIN SCALES - GENERAL
PAINLEVEL_OUTOF10: 0
PAINLEVEL_OUTOF10: 0
PAINLEVEL_OUTOF10: 6
PAINLEVEL_OUTOF10: 0
PAINLEVEL_OUTOF10: 0
PAINLEVEL_OUTOF10: 3
PAINLEVEL_OUTOF10: 0
PAINLEVEL_OUTOF10: 1
PAINLEVEL_OUTOF10: 0
PAINLEVEL_OUTOF10: 5
PAINLEVEL_OUTOF10: 0

## 2024-05-29 ASSESSMENT — PAIN DESCRIPTION - DESCRIPTORS
DESCRIPTORS: ACHING

## 2024-05-29 ASSESSMENT — PAIN SCALES - WONG BAKER
WONGBAKER_NUMERICALRESPONSE: NO HURT

## 2024-05-29 ASSESSMENT — PAIN DESCRIPTION - ORIENTATION
ORIENTATION: RIGHT

## 2024-05-29 ASSESSMENT — PAIN DESCRIPTION - LOCATION
LOCATION: HIP
LOCATION: HIP
LOCATION: BACK;LEG
LOCATION: BACK;LEG

## 2024-05-29 ASSESSMENT — PAIN DESCRIPTION - FREQUENCY
FREQUENCY: CONTINUOUS
FREQUENCY: CONTINUOUS

## 2024-05-29 ASSESSMENT — PAIN - FUNCTIONAL ASSESSMENT: PAIN_FUNCTIONAL_ASSESSMENT: ACTIVITIES ARE NOT PREVENTED

## 2024-05-29 ASSESSMENT — PAIN DESCRIPTION - PAIN TYPE
TYPE: SURGICAL PAIN
TYPE: SURGICAL PAIN

## 2024-05-29 NOTE — PROGRESS NOTES
Patient will be discharged to acute rehab facility today.  Discharge plan discussed with patient who verbalized understanding.

## 2024-05-29 NOTE — PLAN OF CARE
Problem: Discharge Planning  Goal: Discharge to home or other facility with appropriate resources  5/29/2024 1151 by Farzad Serrano RN  Outcome: Completed  5/29/2024 1116 by Farzad Serrano RN  Outcome: Progressing  Flowsheets (Taken 5/29/2024 0800)  Discharge to home or other facility with appropriate resources:   Identify barriers to discharge with patient and caregiver   Arrange for needed discharge resources and transportation as appropriate  5/29/2024 0600 by Padma Godoy RN  Outcome: Progressing  Flowsheets (Taken 5/28/2024 1930)  Discharge to home or other facility with appropriate resources:   Identify barriers to discharge with patient and caregiver   Identify discharge learning needs (meds, wound care, etc)   Arrange for needed discharge resources and transportation as appropriate     Problem: Pain  Goal: Verbalizes/displays adequate comfort level or baseline comfort level  5/29/2024 1151 by Farzad Serrano RN  Outcome: Completed  5/29/2024 1116 by Farzad Serrano RN  Outcome: Progressing  Flowsheets (Taken 5/29/2024 0800)  Verbalizes/displays adequate comfort level or baseline comfort level:   Encourage patient to monitor pain and request assistance   Assess pain using appropriate pain scale   Administer analgesics based on type and severity of pain and evaluate response  5/29/2024 0600 by Padma Godoy RN  Outcome: Progressing     Problem: Skin/Tissue Integrity  Goal: Absence of new skin breakdown  Description: 1.  Monitor for areas of redness and/or skin breakdown  2.  Assess vascular access sites hourly  3.  Every 4-6 hours minimum:  Change oxygen saturation probe site  4.  Every 4-6 hours:  If on nasal continuous positive airway pressure, respiratory therapy assess nares and determine need for appliance change or resting period.  5/29/2024 1151 by Farzad Serrano RN  Outcome: Completed  5/29/2024 1116 by Farzad Serrano RN  Outcome: Progressing  5/29/2024 0600 by Padma Godoy RN  Outcome:

## 2024-05-29 NOTE — PROGRESS NOTES
Bedside and Verbal shift change report given  by Abigail BERGER (offgoing nurse). Report included the following information Nurse Handoff Report, Index, Intake/Output, Recent Results, and Cardiac Rhythm NSR .      1930: AOX4, on room air, resting in bed, in no apparent distress; shift assessment done; right leg dressings clean, dry and intact; assisted with oral hygiene; assisted with further needs    2100: meds given; milo care done    2301; scheduled Tylenol po given      > milo care done    0010: Oxycodone 5 mg Po given for intermittent back and right leg pain-  see flowsheet    0100: sleeping; no visual indicators for pain noted    0300: sleeping; needs within reach    0536: CHG bath given       > scheduled Tylenol given     0555: sleeping comfortably    Bedside and Verbal shift change report given to Farzad GRAHAM by Padma Godoy RN (offgoing nurse). Report included the following information Nurse Handoff Report, Index, Intake/Output, Recent Results, and Cardiac Rhythm NSR .

## 2024-05-29 NOTE — CARE COORDINATION
Pt has been accepted to ARU at 1pm to room 187.  Nursing aware.            Seema Marques, ROBERTON RN  Care Management

## 2024-05-29 NOTE — DISCHARGE SUMMARY
Sections of this note are dictated using utilizing voice recognition software.  Minor typographical errors may be present. If questions arise, please do not hesitate to contact me or call our department.

## 2024-05-29 NOTE — PROGRESS NOTES
Bedside and Verbal shift change report given to Farzad RN (oncoming nurse) by Padma RN (offgoing nurse). Report included the following information Nurse Handoff Report, Intake/Output, MAR, Recent Results, and Cardiac Rhythm NSR .

## 2024-05-29 NOTE — PLAN OF CARE
Problem: Pain  Goal: Verbalizes/displays adequate comfort level or baseline comfort level  Outcome: Progressing     Problem: Skin/Tissue Integrity  Goal: Absence of new skin breakdown  Description: 1.  Monitor for areas of redness and/or skin breakdown  2.  Assess vascular access sites hourly  3.  Every 4-6 hours minimum:  Change oxygen saturation probe site  4.  Every 4-6 hours:  If on nasal continuous positive airway pressure, respiratory therapy assess nares and determine need for appliance change or resting period.  Outcome: Progressing     Problem: Discharge Planning  Goal: Discharge to home or other facility with appropriate resources  Outcome: Progressing  Flowsheets (Taken 5/28/2024 1930)  Discharge to home or other facility with appropriate resources:   Identify barriers to discharge with patient and caregiver   Identify discharge learning needs (meds, wound care, etc)   Arrange for needed discharge resources and transportation as appropriate     Problem: ABCDS Injury Assessment  Goal: Absence of physical injury  Outcome: Progressing     Problem: Safety - Adult  Goal: Free from fall injury  Outcome: Progressing     Problem: Chronic Conditions and Co-morbidities  Goal: Patient's chronic conditions and co-morbidity symptoms are monitored and maintained or improved  Outcome: Progressing  Flowsheets (Taken 5/28/2024 1930)  Care Plan - Patient's Chronic Conditions and Co-Morbidity Symptoms are Monitored and Maintained or Improved:   Monitor and assess patient's chronic conditions and comorbid symptoms for stability, deterioration, or improvement   Collaborate with multidisciplinary team to address chronic and comorbid conditions and prevent exacerbation or deterioration   Update acute care plan with appropriate goals if chronic or comorbid symptoms are exacerbated and prevent overall improvement and discharge

## 2024-05-29 NOTE — PLAN OF CARE
Problem: Chronic Conditions and Co-morbidities  Goal: Patient's chronic conditions and co-morbidity symptoms are monitored and maintained or improved  Outcome: Progressing  Flowsheets (Taken 5/29/2024 1518 by Nadia Oshea, RN)  Care Plan - Patient's Chronic Conditions and Co-Morbidity Symptoms are Monitored and Maintained or Improved: Monitor and assess patient's chronic conditions and comorbid symptoms for stability, deterioration, or improvement     Problem: Safety - Adult  Goal: Free from fall injury  Outcome: Progressing  Flowsheets (Taken 5/29/2024 1536 by Nadia Oshea, RN)  Free From Fall Injury: Instruct family/caregiver on patient safety     Problem: Skin/Tissue Integrity  Goal: Absence of new skin breakdown  Description: 1.  Monitor for areas of redness and/or skin breakdown  2.  Assess vascular access sites hourly  3.  Every 4-6 hours minimum:  Change oxygen saturation probe site  4.  Every 4-6 hours:  If on nasal continuous positive airway pressure, respiratory therapy assess nares and determine need for appliance change or resting period.  Outcome: Progressing

## 2024-05-29 NOTE — PLAN OF CARE
Problem: Discharge Planning  Goal: Discharge to home or other facility with appropriate resources  5/29/2024 1116 by Farzad Serrano RN  Outcome: Progressing  Flowsheets (Taken 5/29/2024 0800)  Discharge to home or other facility with appropriate resources:   Identify barriers to discharge with patient and caregiver   Arrange for needed discharge resources and transportation as appropriate  5/29/2024 0600 by Padma Godoy RN  Outcome: Progressing  Flowsheets (Taken 5/28/2024 1930)  Discharge to home or other facility with appropriate resources:   Identify barriers to discharge with patient and caregiver   Identify discharge learning needs (meds, wound care, etc)   Arrange for needed discharge resources and transportation as appropriate     Problem: Pain  Goal: Verbalizes/displays adequate comfort level or baseline comfort level  5/29/2024 1116 by Farzad Serrano RN  Outcome: Progressing  Flowsheets (Taken 5/29/2024 0800)  Verbalizes/displays adequate comfort level or baseline comfort level:   Encourage patient to monitor pain and request assistance   Assess pain using appropriate pain scale   Administer analgesics based on type and severity of pain and evaluate response  5/29/2024 0600 by Padma Godoy RN  Outcome: Progressing     Problem: Skin/Tissue Integrity  Goal: Absence of new skin breakdown  Description: 1.  Monitor for areas of redness and/or skin breakdown  2.  Assess vascular access sites hourly  3.  Every 4-6 hours minimum:  Change oxygen saturation probe site  4.  Every 4-6 hours:  If on nasal continuous positive airway pressure, respiratory therapy assess nares and determine need for appliance change or resting period.  5/29/2024 1116 by Farzad Serrano RN  Outcome: Progressing  5/29/2024 0600 by Padma Godoy RN  Outcome: Progressing     Problem: ABCDS Injury Assessment  Goal: Absence of physical injury  5/29/2024 1116 by Farzad Serrano RN  Outcome: Progressing  5/29/2024 0600 by Jayne

## 2024-05-29 NOTE — PROGRESS NOTES
Verbal transfer report given to receiving ARU RN by Farzad GRAHAM. Report included the following information Nurse Handoff Report, MAR, Recent Results, and Cardiac Rhythm NSR .     Patient was provided with discharge instructions and education. The patient verbalized understanding of the discharge information. Time for questions was provided. No questions voiced at this time. The patient was discharged to ARU via wheelchair with patient transport.

## 2024-05-29 NOTE — PROGRESS NOTES
[facility]  (A Division of [facility] [place] [facility])      Consultation Note      Date of Admission: 5/19/2024    Date of Note: 5/29/2024      Reason for Referral: hx of MRSA, s/p fasciotomy  Referring Physician: Dr. Rehan Restrepo from this admission:   5/20 urine cx: negative    Current Antimicrobials:    Prior Antimicrobials:  Ancef 5/21- present  Doxycycline 5/22 to present        Assessment:         Status post right common femoral artery exposure right SFA and popliteal stent placement   -  s/p stent placement 5/21  Displaced right-sided femoral fracture with underlying chronic femoral fracture.   - s/p ORIF 5/21  Fall at home  Osteoporosis  Remote history of possible MRSA skin lesion: Unable to find any supporting cultures from [facility] or [place] since 2013    Plan:   Trend CBC, BMP to monitor for any medication associated toxicities.    Complete doxycycline today     Discussed with vascular surgery on a.m. rounds  Discussed with Dr. Gwendolyn Muhammad DO  [facility]  [address], [address], [place] [address]  Office: [phone], Ext [phone]      Lines / Catheters:  peripheral    Subjective:   Seen and examined. Overall doing better. Waiting for transfer to ARU.  Pain is controlled.  No fevers.    Tmax 98.4  WBCs 10.2    HPI:  Ms. Jackson is a an 83-year-old female with a past medical history of hypertension, osteoporosis, prior CVA, recurrent UTIs who had a chronic right femur fracture.  She had been followed as an outpatient by orthopedic surgery and did not want any additional surgical interventions.  Unfortunately she had a fall at home which resulted in a 100% displaced overriding fracture.  She was taken to the OR on 5/21 for placement of a symphysis condylar plate and ORIF.  In the trial operative.  It was noted that she had decreased pedal pulses on the right side with a suspected right SFA occlusion.  She  was taken to the OR again on  for angiography and SFA stenting and fasciotomy.         Objective:      BP (!) 147/68   Pulse 63   Temp 98.2 °F (36.8 °C) (Oral)   Resp 16   Ht 1.524 m (5')   Wt 68.2 kg (150 lb 5.7 oz)   SpO2 98%   BMI 29.36 kg/m²   Temp (24hrs), Av °F (36.7 °C), Min:97.6 °F (36.4 °C), Max:98.4 °F (36.9 °C)        General:   awake alert and oriented, appears stated age   Skin:   no rashes or skin lesions noted on limited exam, no jaundice   HEENT:  Normocephalic, atraumatic, PERRL, EOMI, no scleral icterus; no conjunctival hemmohage;    Lymph Nodes:   no cervical or inguinal adenopathy   Lungs:   non-labored, bilaterally clear to aspiration   Heart:  RRR, s1 and s2; no murmurs, no edema, + pedal pulses   Abdomen:  soft, non-distended, active bowel sounds. Non-tender   Genitourinary:  deferred   Extremities:   no clubbing, cyanosis; no joint effusions or swelling; muscle mass appropriate for age; clean dressings in place over the right calf; lateral incision is clean with intact dressings, no strikethrough is noted, slight increase in swelling compared to the left.   Neurologic:  No gross focal sensory abnormalities; equal muscle strength to upper and lower extremities. Speech appropirate. Cranial nerves intact   Psychiatric:   appropriate and interactive.       Labs: Results:   Chemistry No results for input(s): \"GLU\", \"NA\", \"K\", \"CL\", \"CO2\", \"BUN\", \"TP\", \"GLOB\" in the last 72 hours.    Invalid input(s): \"CREA\", \"CA\", \"AGAP\", \"BUCR\", \"TBIL\", \"GPT\", \"AP\", \"ALB\", \"AGRAT\"     CBC w/Diff Recent Labs     24  0524 24  0424   WBC 8.4 10.2   RBC 2.68* 2.75*   HGB 8.2* 8.4*   HCT 24.7* 25.5*    418            No results found for: \"SDES\" No components found for: \"CULT\"     Results       Procedure Component Value Units Date/Time    Culture, Urine [[id number]] Collected: 24 0140    Order Status: Completed Specimen: Urine, clean catch Updated: 24 1213     Special

## 2024-05-29 NOTE — PROGRESS NOTES
ARU/IPR REFERRAL CONTACT NOTE  [facility]      Thank you for the opportunity to review this patient's case for admission to [facility].    Based on our pre-admission screening:                          [x ] This patient meets criteria for admission to [facility].    Plan to admit patient to ARU room 187@1pm  Please call report to [phone] prior to admission  Please have completed discharge summary in the chart prior to admission.    Again, Thank you for this referral. Should you have any questions please do not hesitate to call.     Sincerely,  Justina Byers    Clinical Liaison  [facility]  [phone]

## 2024-05-29 NOTE — PROGRESS NOTES
ARU/IPR REFERRAL CONTACT NOTE  [facility]      Thank you for the opportunity to review this patient's case for admission to [facility].    Based on our pre-admission screening:                          [ x] Our Team/Medical Director is following this case.   Comments:Called daughter and left VM to please return call. Daughter had planned to meet with [facility] staff to confirm that pt could return to [facility] after rehab prior to returning to her independent living apartment.         Again, Thank you for this referral. Should you have any questions please do not hesitate to call.     Sincerely,  Justina Byers    Clinical Liaison  [facility]  [phone]

## 2024-05-30 LAB
ANION GAP SERPL CALC-SCNC: 5 MMOL/L (ref 3–18)
BASOPHILS # BLD: 0 K/UL (ref 0–0.1)
BASOPHILS NFR BLD: 0 % (ref 0–2)
BUN SERPL-MCNC: 15 MG/DL (ref 7–18)
BUN/CREAT SERPL: 27 (ref 12–20)
CALCIUM SERPL-MCNC: 9.2 MG/DL (ref 8.5–10.1)
CHLORIDE SERPL-SCNC: 104 MMOL/L (ref 100–111)
CO2 SERPL-SCNC: 26 MMOL/L (ref 21–32)
CREAT SERPL-MCNC: 0.55 MG/DL (ref 0.6–1.3)
DIFFERENTIAL METHOD BLD: ABNORMAL
EOSINOPHIL # BLD: 0.3 K/UL (ref 0–0.4)
EOSINOPHIL NFR BLD: 3 % (ref 0–5)
ERYTHROCYTE [DISTWIDTH] IN BLOOD BY AUTOMATED COUNT: 14.5 % (ref 11.6–14.5)
GLUCOSE SERPL-MCNC: 98 MG/DL (ref 74–99)
HCT VFR BLD AUTO: 25.6 % (ref 35–45)
HGB BLD-MCNC: 8.6 G/DL (ref 12–16)
IMM GRANULOCYTES # BLD AUTO: 0.2 K/UL (ref 0–0.04)
IMM GRANULOCYTES NFR BLD AUTO: 2 % (ref 0–0.5)
LYMPHOCYTES # BLD: 2.9 K/UL (ref 0.9–3.6)
LYMPHOCYTES NFR BLD: 27 % (ref 21–52)
MAGNESIUM SERPL-MCNC: 1.9 MG/DL (ref 1.6–2.6)
MCH RBC QN AUTO: 31.3 PG (ref 24–34)
MCHC RBC AUTO-ENTMCNC: 33.6 G/DL (ref 31–37)
MCV RBC AUTO: 93.1 FL (ref 78–100)
MONOCYTES # BLD: 0.7 K/UL (ref 0.05–1.2)
MONOCYTES NFR BLD: 6 % (ref 3–10)
NEUTS SEG # BLD: 6.5 K/UL (ref 1.8–8)
NEUTS SEG NFR BLD: 62 % (ref 40–73)
NRBC # BLD: 0.02 K/UL (ref 0–0.01)
NRBC BLD-RTO: 0.2 PER 100 WBC
PLATELET # BLD AUTO: 501 K/UL (ref 135–420)
PMV BLD AUTO: 9.1 FL (ref 9.2–11.8)
POTASSIUM SERPL-SCNC: 4.4 MMOL/L (ref 3.5–5.5)
RBC # BLD AUTO: 2.75 M/UL (ref 4.2–5.3)
SODIUM SERPL-SCNC: 135 MMOL/L (ref 136–145)
WBC # BLD AUTO: 10.6 K/UL (ref 4.6–13.2)

## 2024-05-30 PROCEDURE — 6370000000 HC RX 637 (ALT 250 FOR IP): Performed by: EMERGENCY MEDICINE

## 2024-05-30 PROCEDURE — 83735 ASSAY OF MAGNESIUM: CPT

## 2024-05-30 PROCEDURE — 99223 1ST HOSP IP/OBS HIGH 75: CPT | Performed by: EMERGENCY MEDICINE

## 2024-05-30 PROCEDURE — 85025 COMPLETE CBC W/AUTO DIFF WBC: CPT

## 2024-05-30 PROCEDURE — 80048 BASIC METABOLIC PNL TOTAL CA: CPT

## 2024-05-30 PROCEDURE — 97542 WHEELCHAIR MNGMENT TRAINING: CPT

## 2024-05-30 PROCEDURE — 97110 THERAPEUTIC EXERCISES: CPT

## 2024-05-30 PROCEDURE — 97535 SELF CARE MNGMENT TRAINING: CPT

## 2024-05-30 PROCEDURE — 1180000000 HC REHAB R&B

## 2024-05-30 PROCEDURE — 97162 PT EVAL MOD COMPLEX 30 MIN: CPT

## 2024-05-30 PROCEDURE — 36415 COLL VENOUS BLD VENIPUNCTURE: CPT

## 2024-05-30 PROCEDURE — 97530 THERAPEUTIC ACTIVITIES: CPT

## 2024-05-30 PROCEDURE — 6360000002 HC RX W HCPCS: Performed by: EMERGENCY MEDICINE

## 2024-05-30 PROCEDURE — 97166 OT EVAL MOD COMPLEX 45 MIN: CPT

## 2024-05-30 PROCEDURE — 97116 GAIT TRAINING THERAPY: CPT

## 2024-05-30 RX ORDER — ENOXAPARIN SODIUM 100 MG/ML
40 INJECTION SUBCUTANEOUS EVERY 24 HOURS
Status: DISCONTINUED | OUTPATIENT
Start: 2024-05-30 | End: 2024-06-14 | Stop reason: HOSPADM

## 2024-05-30 RX ORDER — POLYETHYLENE GLYCOL 3350 17 G/17G
17 POWDER, FOR SOLUTION ORAL DAILY
Status: DISCONTINUED | OUTPATIENT
Start: 2024-05-30 | End: 2024-06-14 | Stop reason: HOSPADM

## 2024-05-30 RX ORDER — ASPIRIN 81 MG/1
81 TABLET ORAL DAILY
Status: DISCONTINUED | OUTPATIENT
Start: 2024-05-31 | End: 2024-06-14 | Stop reason: HOSPADM

## 2024-05-30 RX ADMIN — CLOPIDOGREL BISULFATE 75 MG: 75 TABLET ORAL at 08:55

## 2024-05-30 RX ADMIN — Medication 1 TABLET: at 16:58

## 2024-05-30 RX ADMIN — OXYCODONE HYDROCHLORIDE 5 MG: 5 TABLET ORAL at 05:49

## 2024-05-30 RX ADMIN — POLYETHYLENE GLYCOL 3350 17 G: 17 POWDER, FOR SOLUTION ORAL at 09:14

## 2024-05-30 RX ADMIN — ENOXAPARIN SODIUM 40 MG: 100 INJECTION SUBCUTANEOUS at 16:57

## 2024-05-30 RX ADMIN — ATORVASTATIN CALCIUM 20 MG: 20 TABLET, FILM COATED ORAL at 20:26

## 2024-05-30 RX ADMIN — ASPIRIN 81 MG: 81 TABLET, COATED ORAL at 08:56

## 2024-05-30 RX ADMIN — Medication 1 TABLET: at 08:55

## 2024-05-30 RX ADMIN — OXYCODONE HYDROCHLORIDE 5 MG: 5 TABLET ORAL at 20:27

## 2024-05-30 RX ADMIN — ACETAMINOPHEN 650 MG: 325 TABLET ORAL at 09:05

## 2024-05-30 RX ADMIN — OXYCODONE HYDROCHLORIDE 5 MG: 5 TABLET ORAL at 14:00

## 2024-05-30 ASSESSMENT — PAIN DESCRIPTION - DIRECTION: RADIATING_TOWARDS: RIGHT LOWER LEG

## 2024-05-30 ASSESSMENT — PAIN SCALES - GENERAL
PAINLEVEL_OUTOF10: 0
PAINLEVEL_OUTOF10: 8
PAINLEVEL_OUTOF10: 0
PAINLEVEL_OUTOF10: 0
PAINLEVEL_OUTOF10: 2
PAINLEVEL_OUTOF10: 2
PAINLEVEL_OUTOF10: 7
PAINLEVEL_OUTOF10: 5
PAINLEVEL_OUTOF10: 0
PAINLEVEL_OUTOF10: 0
PAINLEVEL_OUTOF10: 3
PAINLEVEL_OUTOF10: 0
PAINLEVEL_OUTOF10: 3

## 2024-05-30 ASSESSMENT — PAIN DESCRIPTION - ORIENTATION
ORIENTATION: RIGHT
ORIENTATION: RIGHT;LOWER

## 2024-05-30 ASSESSMENT — PAIN DESCRIPTION - FREQUENCY
FREQUENCY: INTERMITTENT
FREQUENCY: INTERMITTENT
FREQUENCY: CONTINUOUS
FREQUENCY: INTERMITTENT

## 2024-05-30 ASSESSMENT — PAIN DESCRIPTION - ONSET
ONSET: GRADUAL
ONSET: ON-GOING

## 2024-05-30 ASSESSMENT — PAIN DESCRIPTION - LOCATION
LOCATION: LEG;BACK
LOCATION: HIP
LOCATION: LEG
LOCATION: LEG

## 2024-05-30 ASSESSMENT — PAIN - FUNCTIONAL ASSESSMENT
PAIN_FUNCTIONAL_ASSESSMENT: ACTIVITIES ARE NOT PREVENTED

## 2024-05-30 ASSESSMENT — PAIN DESCRIPTION - PAIN TYPE
TYPE: SURGICAL PAIN

## 2024-05-30 ASSESSMENT — PAIN DESCRIPTION - DESCRIPTORS
DESCRIPTORS: ACHING
DESCRIPTORS: ACHING
DESCRIPTORS: SHARP;THROBBING
DESCRIPTORS: ACHING

## 2024-05-30 NOTE — PROGRESS NOTES
conducted an initial consultation and Spiritual Assessment for Juanpablo Jackson, who is a 83 y.o.,female. Patient’s Primary Language is: English.   According to the patient’s EMR Spiritism Affiliation is: Religious.     The reason the Patient came to the hospital is:   Patient Active Problem List    Diagnosis Date Noted    Closed displaced comminuted fracture of shaft of right femur with routine healing 05/29/2024    Primary hypertension 05/24/2024    Insufficiency fracture of femur with nonunion, subsequent encounter 05/21/2024    Status post right hip replacement 05/21/2024    Acute lower limb ischemia 05/21/2024    Periprosthetic fracture of shaft of femur 05/21/2024    Closed fracture of shaft of right femur (HCC) 05/19/2024    Age-related osteoporosis with current pathological fracture of left femur with malunion 05/19/2024    Hip arthritis 05/08/2019    Chronic anticoagulation 03/29/2018    Neuritis 03/27/2017    Lumbar facet arthropathy 03/27/2017    Lumbar spinal stenosis 03/27/2017    Status post total right knee replacement 04/27/2015    Microscopic hematuria 10/02/2013    MOHSEN (stress urinary incontinence, female) 10/03/2012    UTI (urinary tract infection) 09/27/2012    Stroke (HCC) 09/27/2012    Constipation 09/27/2012    Hypercholesteremia 09/27/2012        The  provided the following Interventions:  Initiated a relationship of care and support.   Explored issues of cade, belief, spirituality and Adventist/ritual needs while hospitalized.  Listened empathically. Patient remains positive with her physical therapy believing that it is a work out but it is for her own good.  Provided chaplaincy education.  Provided information about Spiritual Care Services.  Offered prayer and assurance of continued prayers on patient's behalf.   Chart reviewed. Patient does not have any record of AMD but believes that one of her daughters brought in a copy of her AMD and gave it the nurse on the

## 2024-05-30 NOTE — PLAN OF CARE
Problem: Physical Therapy - Adult  Goal: By Discharge: Performs mobility at highest level of function for planned discharge setting.  See evaluation for individualized goals.  Description: Physical Therapy Short Term Goals  Initiated 5/30/2024 and to be accomplished within 7 day(s) 6/6/24  1.  Patient will performsupine to sit and sit to supine in bed with modified independence.    2.  Patient will transfer from bed to chair and chair to bed with supervision/set-up using the least restrictive device.  3.  Patient will perform sit to stand with contact guard assist  4.  Patient will ambulate with stand by assist for 40 feet with toe touch weight bearing with the least restrictive device.   5.  Patient will propel wheelchair 150 feet modified independent for mobility on this unit.  6. Patient will improve R knee AROM to 10-90 degrees for greater ease and comfort with ambulation.    Physical Therapy Long Term Goals  Initiated 5/30/2024 and to be accomplished within 14 day(s) 6/13/24  1.  Patient will perform supine to sit and sit to supine in bed with modified independence.    2.  Patient will transfer from bed to chair and chair to bed with modified independence using the least restrictive device.  3.  Patient will perform sit to stand with modified independence.  4.  Patient will ambulate with supervision for 150 feet with toe touch weight bearing with the least restrictive device.   5. Patient will increase R knee AROM to 5-120 degrees for greater mobility in the home.   5/30/2024 1155 by Abbi Crawford, CESAR  Outcome: Progressing       PHYSICAL THERAPY EVALUATION    Patient: Juanpablo Jackson (83 y.o. female)  Date: 5/30/2024  Diagnosis: Closed displaced comminuted fracture of shaft of right femur with routine healing [S72.351D]   Precautions: Fall Risk;Surgical Protocols;Weight Bearing                Chart, physical therapy assessment, plan of care and goals were reviewed.    Time in: 1040  Time out:  Minimal assistance   Supine to Sit Contact guard assistance (Pt performs long sit technique, performs slowly)   Sit to Stand Minimal Assistance (Pt requires assistance to maintain WB precautions)   Sit to Supine Contact guard assistance (Long sit technique, pt performs slowly, some difficulty with LE management)   Transfer Assist Score Minimal assistance   Comments     Car Transfer Minimal Assistance (Pt requires some assistance with LE management, verbal cues for hand placement and sequencing)   Car Type Car simulator       WHEELCHAIR MOBILITY/MANAGEMENT Initial Assessment 5/30/2024   Able to Propel 150 feet   Assist Level Stand by assistance   Curbs/ramps assistance required     Wheelchair management     Comments Pt uses RUE, LUE, LLE       GAIT Initial Assessment 5/30/2024   Quality of Gait TTWB RLE, decreased heel strike L, step to pattern   Gait Deviations Slow Jaclyn;Increased STEVAN;Decreased step length;Decreased step height       WALKING INDEPENDENCE Initial Assessment 5/30/2024   Assistive device Rolling Walker   Ambulation assistance - surface Contact guard assistance  Level tile   Distance 15, 25 feet   Comments         STEPS/STAIRS Initial Assessment 5/30/2024   Steps/Stairs ambulated     Step Height     Rail Use       Assistance Level     Comments  Not assessed due to safety concerns and WB precautions   Curbs/Ramps           EXERCISE   Sets   Reps   Active Active Assist   Passive Self ROM   Comments   Ankle Pumps 3 10  [x] [] [] []    Long Arc Quads 3 10 [x] [] [] []    Hip ADD 3 10 [x] [] [] [] Red Bolster   Seated Marches 3 10 [x] [] [] [] Active Assisted on R   Hamstring curls 3 10 [x] [] [] []        ASSESSMENT :  Based on the objective data described above, the patient presents with decreased bilateral LE strength, decreased AROM RLE, decreased standing tolerance, decreased endurance, increased pain, and decreased functional mobility. The patient presents with decreased R knee AROM secondary to  increased pain. Pt demonstrates limitations in upright mobility and gait secondary to toe touch weight bearing status, but is able to maintain precautions during transfers and transitions. Decreased ability to perform bed mobility independently secondary to pain and weight bear status. The patient is pleasant and motivated to participate in therapy. The patient was educated today on the purpose of therapy as well as the expectations for participation in the ARU. The patient was also educated on weight bearing precautions and importance of injury prevention, fall prevention, and safety awareness. Patient edu on use of call bell for any OOB task . Education on weight shifting/ position changes for skin integrity maintenance.     Patient will benefit from skilled intervention to address the above impairments.  Patient's rehabilitation potential is considered to be Therapy Prognosis: Good  Factors which may influence rehabilitation potential include:   [x]         None noted  []         Mental ability/status  []         Medical condition  []         Home/family situation and support systems  []         Safety awareness  []         Pain tolerance/management  []         Other:      PLAN :  Please see above evaluation and goals for details     Order received from MD for physical therapy services and chart reviewed.  Pt to be seen 5 times per week for 3 hours of total therapy per day for 2 weeks.  Thank you for the referral.    Pt would benefit from skilled physical therapy in order to improve independent functional mobility within the home. Interventions may include range of motion (AROM, PROM B LE/trunk), motor function (B LE/trunk strengthening/coordination), activity tolerance (vitals, oxygen saturation levels), bed mobility training, balance activities, gait training (progressive ambulation program), wheelchair management and functional transfer training. Patient would also benefit from concurrent and group therapy

## 2024-05-30 NOTE — PROGRESS NOTES
4 Eyes Skin Assessment     NAME:  Juanpablo Jackson  YOB: 1940  MEDICAL RECORD NUMBER:  [id number]    The patient is being assessed for  Shift Handoff    I agree that at least one RN has performed a thorough Head to Toe Skin Assessment on the patient. ALL assessment sites listed below have been assessed.      Areas assessed by both nurses:    Sacrum. Buttock, Coccyx, Ischium, Legs. Feet and Heels, and Other perineum        Does the Patient have a Wound? Yes wound(s) were present on assessment. LDA wound assessment was Initiated and completed by RN       Adelfo Prevention initiated by RN: Yes  Wound Care Orders initiated by RN: No    Pressure Injury (Stage 3,4, Unstageable, DTI, NWPT, and Complex wounds) if present, place Wound referral order by RN under : No    New Ostomies, if present place, Ostomy referral order under : No     Nurse 1 eSignature: Electronically signed by KRISHNA MELTON LPN on 5/30/24 at 7:35 PM EDT    **SHARE this note so that the co-signing nurse can place an eSignature**    Nurse 2 eSignature: Electronically signed by AAKASH OLIVER RN on 5/31/24 at 12:18 AM EDT

## 2024-05-30 NOTE — PLAN OF CARE
Problem: Occupational Therapy - Adult  Goal: By Discharge: Performs self-care activities at highest level of function for planned discharge setting.  See evaluation for individualized goals.  Description: Occupational Therapy Goals   Long Term Goals  Initiated 24 and to be accomplished within 2 week(s)  1. Pt will perform self-feeding with Jovanna.  2. Pt will perform grooming with Jovanna.  3. Pt will perform UB bathing with Jovanna.  4. Pt will perform LB bathing with CGA.  5. Pt will perform tub/shower transfer with CGA.   6. Pt will perform UB dressing with Jovanna.  7. Pt will perform LB dressing with CGA.  8. Pt will perform toileting task with CGA.  9. Pt will perform toilet transfer with CGA.      Short Term Goals   Initiated 24 and to be accomplished within 7 day(s)  1. Pt will perform self-feeding with setup.   2. Pt will perform grooming with setup.  3. Pt will perform UB bathing with setup.  4. Pt will perform LB bathing with Chacho.  5. Pt will perform tub/shower transfer with Chacho.  6. Pt will perform UB dressing with setup.  7. Pt will perform LB dressing with Chacho.  8. Pt will perform toileting task with Chacho.  9. Pt will perform toilet transfer with Chacho.    Outcome: Progressing   OCCUPATIONAL THERAPY EVALUATION    Patient: Juanpablo Jackson 83 y.o.  Date: 2024  Primary Diagnosis: Closed displaced comminuted fracture of shaft of right femur with routine healing [S72.351D]    Patient identified with name and : yes    Past Medical History:   Past Medical History:   Diagnosis Date    Constipation     History of right hip replacement     History of total right knee replacement     Hypercholesteremia     Hypertension     no meds now    Microscopic hematuria     MRSA (methicillin resistant staph aureus) culture positive 2018    On abdominal wall- tx with Vibramycin  (care everywhere)    Osteoporosis     Other fracture of right femur, initial encounter for closed fracture (HCC)     Severe  Accessible  Home Equipment: Walker - Rolling, Cane  Has the patient had two or more falls in the past year or any fall with injury in the past year?: Yes  Receives Help From: Family  ADL Assistance: Independent  Homemaking Assistance: Independent  Homemaking Responsibilities: Yes  Ambulation Assistance: Independent  Transfer Assistance: Independent  Active : No  Patient's  Info: Family and Staff in Senior Living Community  Mode of Transportation: Family  Occupation: Retired           [x]  Right hand dominant   []  Left hand dominant    Outcome Measures:     MMT Initial Assessment   Right Upper Extremity  Left Upper Extremity    UE AROM WFL WFL   Shoulder flexion 3+/5  3+/5   Shoulder extension 3+/5 3+/5   Shoulder ABDuction 3+/5 3+/5   Shoulder ADDUction 3+/5 3+/5   Elbow Flexion 4-/5 4-/5   Elbow Extension 4-/5 4-/5   0/5 No palpable muscle contraction  1/5 Palpable muscle contraction, no joint movement  2-/5 Less than full range of motion in gravity eliminated position  2/5 Able to complete full range of motion in gravity eliminated position  2+/5 Able to initiate movement against gravity  3-/5 More than half but not full range of motion against gravity  3/5 Able to complete full range of motion against gravity  3+/5 Completes full range of motion against gravity with minimal resistance  4-/5 Completes full range of motion against gravity with minimal resistance  4/5 Completes full range of motion against gravity with moderate resistance  5/5 Completes full range of motion against gravity with maximum resistance    Skin Integrity: Surgical wounds to RLE, please see nursing assessment     GROSS ASSESSMENT Initial Assessment   AROM Generally decreased, functional   PROM     Coordination Generally decreased, functional           Tone Normal    Sensation Intact  WNL     BALANCE Initial Assessment   Sitting Balance  Jovanna   Sitting Balance Comments    Standing Balance  Chacho   Standing Balance Comments       feet through pants with supervision and setup. Pt demonstrated ability to don LLE sock and Chacho to don L shoe from EOB.     TOILETING Initial Assessment   Functional Level Moderate assistance   Clinical factors Pt performed all toileting tasks with modA at FWW using 3:1 commode over toilet for increased safety.     TOILET TRANSFER Initial Assessment   Transfer Technique  Stand pivot   Functional Level  modA   Equipment  FWW, 3:1 commode over toilet   Toilet Transfer Comments  Pt performed toilet transfer with FWW to C over toilet with modA for sit to stand and maintaining RLE TTWB and standing balance.      WHEELCHAIR/BED TRANSFER INDEPENDENCE Initial Assessment   Transfer Technique    Stand pivot   Level of Assistance  modA   Equipment  FWW, w/c, EOB   Comments  with FWW and VC's for technique       Activity Tolerance:   Patient Tolerated treatment well         EDUCATION:   Education Given To: Patient  Education Provided: Role of Therapy;Plan of Care;Safety;ADL Function;Fall Prevention Strategies;Energy Conservation;Transfer Training;Mobility Training;Equipment  Education Provided Comments: Pt provided education re: POC, scope of OT, increased safety and fall prevention, use of call bell for all OOB/standing activity, weight shifting/positioning to maintain skin integrity and RLE TTWB.  Education Method: Demonstration;Verbal  Barriers to Learning: None  Education Outcome: Verbalized understanding;Demonstrated understanding;Continued education needed        ASSESSMENT :  Based on the objective data described above, the patient presents with impaired standing balance, RLE TTWB, fatigue, BUE weakness, and pain which negatively impact pt performance, independence and safety with ADL/IADL's and functional mobility.    Pt would benefit from skilled occupational therapy in order to improve independent functional mobility/ADLs,/IADLs within the home. Interventions may include range of motion (AROM, PROM B UE), motor  up in chair  [] Patient left in no apparent distress in bed  [] Patient handoff to SLP/PT  [x] Call bell left within reach  [x] Nursing notified (pain, nauseous)  [] Caregiver present  [] Bed alarm activated    Order received from MD for occupational therapy services and chart reviewed.  Pt to be seen 5 times per week for 3 hours of total therapy per day for 2  weeks.  Thank you for the referral.    IRF-MARIFER Completed: yes  Entered Differentiated Treatment minutes: yes    Thank you for this referral.  Trish Taylor, OT  5/30/2024

## 2024-05-30 NOTE — PROGRESS NOTES
4 Eyes Skin Assessment     NAME:  Juanpablo Jackson  YOB: 1940  MEDICAL RECORD NUMBER:  [id number]    The patient is being assessed for  Shift Handoff    I agree that at least one RN has performed a thorough Head to Toe Skin Assessment on the patient. ALL assessment sites listed below have been assessed.      Areas assessed by both nurses:    Sacrum. Buttock, Coccyx, Ischium and Legs. Feet and Heels        Does the Patient have a Wound? Yes wound(s) were present on assessment. LDA wound assessment was Initiated and completed by RN       Adelfo Prevention initiated by RN: Yes  Wound Care Orders initiated by RN: No    Pressure Injury (Stage 3,4, Unstageable, DTI, NWPT, and Complex wounds) if present, place Wound referral order by RN under : No    New Ostomies, if present place, Ostomy referral order under : No     Nurse 1 eSignature: {Esignature:[id number]}    **SHARE this note so that the co-signing nurse can place an eSignature**    Nurse 2 eSignature: {Esignature:[id number]}

## 2024-05-30 NOTE — H&P
[facility]  [address] [place] [id number]     INPATIENT REHABILITATION  HISTORY AND PHYSICAL      Name: Juanpablo Jackson CSN: [id number]   Age: 83 y.o.  MRN: [id number]   Sex: female Admit Date: 5/29/2024     PCP: Makayla Hanley MD         Subjective:     Patient seen and examined.    History of the Present Illness:   This is a 83-year-old female who was recently admitted to [facility].  Patient had a fall and was noted to have closed fracture of the shaft of the right femur.  Patient was evaluated by orthopedics.  Patient underwent a Plavix washout.  Subsequently patient went for open reduction and internal fixation.  Postoperatively patient was noted to have a loss of pulses on the right lower extremity.  Patient was noted to have a SFA and popliteal dissection following the complicated right distal femoral fracture surgery.  Vascular surgery saw the patient.  Patient underwent right lower extremity CFA exploration and superficial femoral and popliteal stents with 4 compartment fasciotomy.  Patient was monitored.  Patient remained stable.  Patient was subsequently evaluated by PT and OT and it was felt that patient may benefit from transitioning to the inpatient rehab facility.  For full details regarding patient's hospital course please refer to chart    The patient  was noted to have impaired mobility and ADLs. Patient was felt to be a good candidate for acute inpatient rehabilitation. Upon evaluation by Physical Therapy and Occupational Therapy, the patient was recommended for acute inpatient rehabilitation. The patient was discharged and was subsequently admitted to the [facility] for intensive rehabilitation to help recover strength, function and mobility  in the setting of a closed fracture of the shaft of right femur, status post open reduction and internal fixation and subsequent SFA-popliteal dissection and  patient is now status post right CFA exploration and superficial femoral and popliteal stents with 4 compartment fasciotomy.  I personally saw and evaluated this patient face-to-face today in the inpatient rehab facility at [facility].  Patient is sitting in a chair in no apparent distress.  Patient is awake and alert and follows command and responds appropriately.  No history of any chest pain or shortness of breath.  No history of any fever chills or cough.  No history of any nausea vomiting or abdominal pain.  No history of any diarrhea or urinary complaints.  Patient states that she did have some pain in the right lower extremity with therapy and she has received pain medicines for that and that has helped.  Patient complains of generalized weakness and some fatigue      Past Medical History:      Diagnosis Date    Constipation     History of right hip replacement     History of total right knee replacement     Hypercholesteremia     Hypertension     no meds now    Microscopic hematuria     MRSA (methicillin resistant staph aureus) culture positive 06/2018    On abdominal wall- tx with Vibramycin  (care everywhere)    Osteoporosis     Other fracture of right femur, initial encounter for closed fracture (HCC)     Severe scoliosis     Stroke ([facility]) not sure when    blurred vision, resolved (taking plavix)     MOHSEN (stress urinary incontinence, female)     UTI (urinary tract infection)        Past Surgical History:      Procedure Laterality Date    CATARACT REMOVAL Bilateral     CHOLECYSTECTOMY  1972    FIBULA FRACTURE SURGERY Right 5/21/2024    RIGHT DISTAL FEMUR OPEN REDUCTION INTERNAL FIXATION/ FRACTURE TABLE/ C-ARM/ [DEPUY SYNTHES ORTHO] SYNTHES CONDYLAR PLATE performed by Lars Harden MD at [facility] MAIN OR    HYSTERECTOMY (CERVIX STATUS UNKNOWN)  09/2016    total    LEG SURGERY Right 5/23/2024    RIGHT LOWER EXTREMITY FASCIOTOMY CLOSURE performed by Lawanda Van MD at [facility] MAIN OR     Pre-Admission Screen, the History and Physical Evaluation, and the therapy evaluations.        Total clinical care time is 75 minutes, including review of chart including all labs, radiology, past medical history, and discussion with patient. Greater than 50% of my time was spent in coordination of care and counseling.    Dragon medical dictation software was used for portions of this report. Unintended errors may occur.      Signed:    Gavino Huffman MD    May 30, 2024

## 2024-05-30 NOTE — PROGRESS NOTES
[facility] Physical Rehabilitation  Team Conference  Date: 5/30/2024  ACTIVE MONITORING OF CO-MORBID CONDITIONS/MANAGEMENT OF NEW MEDICAL ISSUES: Closed displaced comminuted fracture of shaft of right femur with routine healing [A07.831P]    **Please refer to patient's electronic medical record to view the care plan and goals**  NURSING Making gains Yes   Skin Care: Skin Integumentary   Skin Condition/Temp: Dry, Warm  Skin Integrity: Incision (see LDA)  Location: right lateral thigh  Skin Fold Management: No  Nails: Within Defined Limits  Multiple Skin Integrity Sites: No    Wound Care: LE have sutures with dressing and ace wrap; right hip aquacell ag and mepelex on 3 sides; right heel with mepelex       Pain: Pain Assessment  Pain Assessment: None - Denies Pain (05/30/24 1200)  Pain Level: 0 (05/30/24 1200)  Rucker-Tompkins Pain Rating: No hurt (05/29/24 1514)  Patient's Stated Pain Goal: 0 - No pain (05/30/24 1200)  Pain Location: Leg (05/30/24 0905)  Pain Orientation: Right (05/30/24 0905)  Pain Descriptors: Aching (05/30/24 0905)  Functional Pain Assessment: Activities are not prevented (05/30/24 0905)  Pain Type: Surgical pain (05/30/24 0905)  Pain Radiating Towards: none (05/30/24 0905)  Pain Frequency: Intermittent (05/30/24 0905)  Pain Onset: Gradual (05/30/24 0905)  Non-Pharmaceutical Pain Intervention(s): Repositioned;Rest (05/30/24 0905)  Response to Pain Intervention: Pain improved but above pain goal;Progressing toward functional goals (comment);Patient satisfied (05/30/24 1000)  Side Effects: No reported side effects (05/30/24 1000)  Multiple Pain Sites: No (05/30/24 1000)    Bladder/Bowel Urine Assessment  Urinary Status: Voiding  Urinary Incontinence: Absent  Urine Color: Yellow/straw  Urine Appearance: Clear  Urine Odor: No odor Stool Assessment  Last BM (including prior to admit): 05/29/24   Goal: Patient will have blood pressures within normal limits by discharge.       Barrier: hypotension

## 2024-05-30 NOTE — PLAN OF CARE
Problem: Chronic Conditions and Co-morbidities  Goal: Patient's chronic conditions and co-morbidity symptoms are monitored and maintained or improved  5/29/2024 2128 by Sheila Hawkins RN  Outcome: Progressing  5/29/2024 1837 by Fouzia Muhammad RN  Outcome: Progressing  Flowsheets (Taken 5/29/2024 1518 by Nadia Oshea, RN)  Care Plan - Patient's Chronic Conditions and Co-Morbidity Symptoms are Monitored and Maintained or Improved: Monitor and assess patient's chronic conditions and comorbid symptoms for stability, deterioration, or improvement     Problem: Safety - Adult  Goal: Free from fall injury  5/29/2024 2128 by Sheila Hawkins RN  Outcome: Progressing  5/29/2024 1837 by Fouzia Muhammad RN  Outcome: Progressing  Flowsheets (Taken 5/29/2024 1536 by Nadia Oshea, RN)  Free From Fall Injury: Instruct family/caregiver on patient safety     Problem: Skin/Tissue Integrity  Goal: Absence of new skin breakdown  Description: 1.  Monitor for areas of redness and/or skin breakdown  2.  Assess vascular access sites hourly  3.  Every 4-6 hours minimum:  Change oxygen saturation probe site  4.  Every 4-6 hours:  If on nasal continuous positive airway pressure, respiratory therapy assess nares and determine need for appliance change or resting period.  5/29/2024 2128 by Sheila Hawkins RN  Outcome: Progressing  5/29/2024 1837 by Fouzia Muhammad RN  Outcome: Progressing

## 2024-05-30 NOTE — PLAN OF CARE
Problem: Chronic Conditions and Co-morbidities  Goal: Patient's chronic conditions and co-morbidity symptoms are monitored and maintained or improved  5/30/2024 0805 by Jimmy Mckeon LPN  Outcome: Progressing  5/29/2024 2128 by Sheila Hawkins, RN  Outcome: Progressing  5/29/2024 1837 by Fouzia Muhammad, RN  Outcome: Progressing  Flowsheets (Taken 5/29/2024 1518 by Nadia Oshea, RN)  Care Plan - Patient's Chronic Conditions and Co-Morbidity Symptoms are Monitored and Maintained or Improved: Monitor and assess patient's chronic conditions and comorbid symptoms for stability, deterioration, or improvement

## 2024-05-31 LAB
IRON SATN MFR SERPL: 15 % (ref 20–50)
IRON SERPL-MCNC: 44 UG/DL (ref 50–175)
TIBC SERPL-MCNC: 289 UG/DL (ref 250–450)

## 2024-05-31 PROCEDURE — 97110 THERAPEUTIC EXERCISES: CPT

## 2024-05-31 PROCEDURE — 36415 COLL VENOUS BLD VENIPUNCTURE: CPT

## 2024-05-31 PROCEDURE — 6360000002 HC RX W HCPCS: Performed by: EMERGENCY MEDICINE

## 2024-05-31 PROCEDURE — 1180000000 HC REHAB R&B

## 2024-05-31 PROCEDURE — 6370000000 HC RX 637 (ALT 250 FOR IP): Performed by: EMERGENCY MEDICINE

## 2024-05-31 PROCEDURE — 97535 SELF CARE MNGMENT TRAINING: CPT

## 2024-05-31 PROCEDURE — 99232 SBSQ HOSP IP/OBS MODERATE 35: CPT | Performed by: EMERGENCY MEDICINE

## 2024-05-31 PROCEDURE — 97530 THERAPEUTIC ACTIVITIES: CPT

## 2024-05-31 PROCEDURE — 83550 IRON BINDING TEST: CPT

## 2024-05-31 PROCEDURE — 97116 GAIT TRAINING THERAPY: CPT

## 2024-05-31 PROCEDURE — 83540 ASSAY OF IRON: CPT

## 2024-05-31 RX ORDER — FERROUS SULFATE 325(65) MG
325 TABLET ORAL 2 TIMES DAILY WITH MEALS
Status: DISCONTINUED | OUTPATIENT
Start: 2024-05-31 | End: 2024-06-14 | Stop reason: HOSPADM

## 2024-05-31 RX ADMIN — POLYETHYLENE GLYCOL 3350 17 G: 17 POWDER, FOR SOLUTION ORAL at 09:10

## 2024-05-31 RX ADMIN — ACETAMINOPHEN 650 MG: 325 TABLET ORAL at 10:10

## 2024-05-31 RX ADMIN — OXYCODONE HYDROCHLORIDE 5 MG: 5 TABLET ORAL at 13:34

## 2024-05-31 RX ADMIN — Medication 1 TABLET: at 09:11

## 2024-05-31 RX ADMIN — FERROUS SULFATE TAB 325 MG (65 MG ELEMENTAL FE) 325 MG: 325 (65 FE) TAB at 17:57

## 2024-05-31 RX ADMIN — CLOPIDOGREL BISULFATE 75 MG: 75 TABLET ORAL at 09:10

## 2024-05-31 RX ADMIN — ACETAMINOPHEN 650 MG: 325 TABLET ORAL at 01:19

## 2024-05-31 RX ADMIN — ASPIRIN 81 MG: 81 TABLET, COATED ORAL at 09:10

## 2024-05-31 RX ADMIN — ATORVASTATIN CALCIUM 20 MG: 20 TABLET, FILM COATED ORAL at 20:13

## 2024-05-31 RX ADMIN — Medication 1 TABLET: at 17:57

## 2024-05-31 RX ADMIN — AMLODIPINE BESYLATE 5 MG: 5 TABLET ORAL at 09:10

## 2024-05-31 RX ADMIN — ENOXAPARIN SODIUM 40 MG: 100 INJECTION SUBCUTANEOUS at 15:12

## 2024-05-31 ASSESSMENT — PAIN DESCRIPTION - PAIN TYPE
TYPE: CHRONIC PAIN
TYPE: SURGICAL PAIN
TYPE: SURGICAL PAIN

## 2024-05-31 ASSESSMENT — PAIN DESCRIPTION - DIRECTION: RADIATING_TOWARDS: R LOWER LEG

## 2024-05-31 ASSESSMENT — PAIN DESCRIPTION - ONSET
ONSET: GRADUAL
ONSET: ON-GOING
ONSET: GRADUAL

## 2024-05-31 ASSESSMENT — PAIN SCALES - GENERAL
PAINLEVEL_OUTOF10: 0
PAINLEVEL_OUTOF10: 3
PAINLEVEL_OUTOF10: 3
PAINLEVEL_OUTOF10: 6
PAINLEVEL_OUTOF10: 0

## 2024-05-31 ASSESSMENT — PAIN SCALES - WONG BAKER
WONGBAKER_NUMERICALRESPONSE: NO HURT
WONGBAKER_NUMERICALRESPONSE: NO HURT

## 2024-05-31 ASSESSMENT — PAIN DESCRIPTION - FREQUENCY
FREQUENCY: CONTINUOUS
FREQUENCY: INTERMITTENT
FREQUENCY: OTHER (COMMENT)

## 2024-05-31 ASSESSMENT — PAIN DESCRIPTION - LOCATION
LOCATION: LEG
LOCATION: KNEE
LOCATION: LEG;HIP

## 2024-05-31 ASSESSMENT — PAIN - FUNCTIONAL ASSESSMENT
PAIN_FUNCTIONAL_ASSESSMENT: ACTIVITIES ARE NOT PREVENTED
PAIN_FUNCTIONAL_ASSESSMENT: PREVENTS OR INTERFERES SOME ACTIVE ACTIVITIES AND ADLS
PAIN_FUNCTIONAL_ASSESSMENT: PREVENTS OR INTERFERES SOME ACTIVE ACTIVITIES AND ADLS

## 2024-05-31 ASSESSMENT — PAIN DESCRIPTION - DESCRIPTORS
DESCRIPTORS: ACHING

## 2024-05-31 ASSESSMENT — PAIN DESCRIPTION - ORIENTATION
ORIENTATION: RIGHT
ORIENTATION: RIGHT
ORIENTATION: LEFT

## 2024-05-31 NOTE — PROGRESS NOTES
4 Eyes Skin Assessment     NAME:  Juanpablo Jackson  YOB: 1940  MEDICAL RECORD NUMBER:  [id number]    The patient is being assessed for  Shift Handoff    I agree that at least one RN has performed a thorough Head to Toe Skin Assessment on the patient. ALL assessment sites listed below have been assessed.      Areas assessed by both            s   :                                           Sacrum. Buttock, Coccyx, Ischium        Does the Patient have a Wound? Yes wound(s) were present on assessment. LDA wound assessment was Initiated and completed by RN       Adelfo Prevention initiated by RN: Yes  Wound Care Orders initiated by RN: Yes    Pressure Injury (Stage 3,4, Unstageable, DTI, NWPT, and Complex wounds) if present, place Wound referral order by RN under : No    New Ostomies, if present place, Ostomy referral order under : No     Nurse 1 eSignature: Electronically signed by AAKASH OLIVER RN on 5/31/24 at 6:46 AM EDT    **SHARE this note so that the co-signing nurse can place an eSignature**    Nurse 2 eSignature: Electronically signed by VAISHNAVI FIERRO RN on 5/31/24 at 7:35 PM EDT

## 2024-05-31 NOTE — PROGRESS NOTES
Pt is an 83 year old female admitted to ARU. Pt is alert and oriented, alone in the room. Pt states that she lives alone in an independent living apartment at [place]. Pt states her apartment is accessible with a walk in shower. Pt states that the family is working with [facility] to move pt to [facility] at [place] for a few months.     Pt states that prior to admission she was able to self care with the progressive use of a quad cane and walker. Pt states that she has a shower chair in her apartment and grab bars at the toilet. Pt states that she has used home health ([facility]) and outpatient therapy ([facility]) in the past and denies history with SNF. Pt states that she was at [facility] prior to [facility].     Pt states that she sees Makayla Hanley MD as her PCP and fills her medications at [facility] on [address]     Pt states that her daughter, Naomy Jackson ([phone]), is on her advanced directive and is her NOK contact. Pt states that her daughter works from home as an artist and is able to assist with some tasks.     Pt affirms her insurance as medicare and BCBS FEP. Sw reviewed dc planning and answered questions. Pt states understanding.    Sw will follow.

## 2024-05-31 NOTE — PLAN OF CARE
Problem: Occupational Therapy - Adult  Goal: By Discharge: Performs self-care activities at highest level of function for planned discharge setting.  See evaluation for individualized goals.  Description: Occupational Therapy Goals   Long Term Goals  Initiated 24 and to be accomplished within 2 week(s)  1. Pt will perform self-feeding with Jovanna.  2. Pt will perform grooming with Jovanna.  3. Pt will perform UB bathing with Jovanna.  4. Pt will perform LB bathing with CGA.  5. Pt will perform tub/shower transfer with CGA.   6. Pt will perform UB dressing with Jovanna.  7. Pt will perform LB dressing with CGA.  8. Pt will perform toileting task with CGA.  9. Pt will perform toilet transfer with CGA.    Short Term Goals   Initiated 24 and to be accomplished within 7 day(s)  1. Pt will perform self-feeding with setup.   2. Pt will perform grooming with setup.  3. Pt will perform UB bathing with setup.  4. Pt will perform LB bathing with Chacho.  5. Pt will perform tub/shower transfer with Chacho.  6. Pt will perform UB dressing with setup.  7. Pt will perform LB dressing with Chacho.  8. Pt will perform toileting task with Chacho.  9. Pt will perform toilet transfer with Chacho.    9HPT: RUE:33 sec. LUE: 30 sec. (24)  Outcome: Progressing   Occupational Therapy TREATMENT    Patient: Juanpablo Jackson   83 y.o.  Patient identified with name and : yes    Date: 2024    First Tx Session  Time In: 0830  Time Out: 1000    Diagnosis: Closed displaced comminuted fracture of shaft of right femur with routine healing [S72.351D]   Precautions: Restrictions/Precautions: Fall Risk, Surgical Protocols, Weight Bearing, Contact Precautions     Right Lower Extremity Weight Bearing: Toe Touch Weight Bearing   Chart, occupational therapy assessment, plan of care, and goals were reviewed.     Pain:  Pt reports 4/10 pain or discomfort prior to treatment; R hip/ aching   Pt reports 3/10 pain or discomfort post treatment; R hip/ aching    Intervention Provided: repositioning; intermittent rest breaks. Care coordinated with RN; staff nurse to follow-up.     SUBJECTIVE:   Patient stated “the adaptive equipment will help.\"    OBJECTIVE DATA SUMMARY:     COGNITION/PERCEPTION Daily Assessment   Overall orientation status Within Functional Limits   Orientation level Oriented X4   Overall cognitive status Exceptions    Problem Solving Assistance required to generate solutions;Assistance required to implement solutions    Sequencing Requires cues for some     THERAPEUTIC ACTIVITY Daily Assessment    Sitting Balance: Modified independent   Standing Balance: Minimal assistance     Therapist introduced use of AE for improved functional independence and safety with LB bathing and dressing e.g. long handle sponge, reacher, sock aid, dressing stick, and shoe horn. Pt will benefit from additional edu/ instruction and practice in use of AE within context of patient's self-care routine.      EATING Daily Assessment   Functional Level Supervision   Clinical factors Pt performed self feeding with supervision and standard utensils.     GROOMING Daily Assessment   Functional Level Supervision;Setup   Clinical factors  Pt performed grooming tasks (supv/ set-up) from w/c at sink side to perform oral hygiene/ brushed her teeth, washed her face using washcloth, and combed her hair.     UPPER BODY DRESSING/UNDRESSING Daily Assessment   Functional Level Stand by assistance   Clinical Factors Pt performed UB dressing (SBA) seated at EOB for donning wrap around jacket.     LOWER BODY DRESSING/UNDRESSING Daily Assessment   Functional Level Moderate assistance;Verbal cueing   Clinical Factors Pt performed LB dressing (Mod A) at edge of bed; Mod A for threading pant legs over distal BLE's. Pt requiring (Mod A) for pulling pants up over hips and buttocks in stance at FWW and for maintaining RLE TTWB with verbal cues. (Max A) for donning left slip on sneaker. Therapist isssued AE

## 2024-05-31 NOTE — PLAN OF CARE
Problem: Physical Therapy - Adult  Goal: By Discharge: Performs mobility at highest level of function for planned discharge setting.  See evaluation for individualized goals.  Description: Physical Therapy Short Term Goals  Initiated 5/30/2024 and to be accomplished within 7 day(s) 6/6/24  1.  Patient will performsupine to sit and sit to supine in bed with modified independence.    2.  Patient will transfer from bed to chair and chair to bed with supervision/set-up using the least restrictive device.  3.  Patient will perform sit to stand with contact guard assist  4.  Patient will ambulate with stand by assist for 40 feet with toe touch weight bearing with the least restrictive device.   5.  Patient will propel wheelchair 150 feet modified independent for mobility on this unit.  6. Patient will improve R knee AROM to 10-90 degrees for greater ease and comfort with ambulation.    Physical Therapy Long Term Goals  Initiated 5/30/2024 and to be accomplished within 14 day(s) 6/13/24  1.  Patient will perform supine to sit and sit to supine in bed with modified independence.    2.  Patient will transfer from bed to chair and chair to bed with modified independence using the least restrictive device.  3.  Patient will perform sit to stand with modified independence.  4.  Patient will ambulate with supervision for 150 feet with toe touch weight bearing with the least restrictive device.   5. Patient will increase R knee AROM to 5-120 degrees for greater mobility in the home.   Outcome: Progressing     PHYSICAL THERAPY TREATMENT    Patient: Juanpablo Jackson (83 y.o. female)  Date: 5/31/2024  Diagnosis: Closed displaced comminuted fracture of shaft of right femur with routine healing [S72.351D]   Precautions: fall risk, TTWB R LE  Chart, physical therapy assessment, plan of care and goals were reviewed.    Time in: 1000  Time out : 1100    Time in: 1315  Time out: 1415    Patient seen for: gait training, transfer    Posture Fair (Forward head, rounded shoulders, increased thoracic kyphosis)    Sitting - Static Fair    Sitting - Dynamic Fair;-    Standing - Static Poor    Standing - Dynamic Poor    Comments        WHEELCHAIR MOBILITY/MANAGEMENT Daily Assessment   Able to Propel  153 feet x2 (once in am and again in pm)   Assist Level Stand by assistance   Curbs/ramps assistance required     Wheelchair management  Verbal cues required to lock brakes   Comments Propels with BUE and L LE     THERAPEUTIC EXERCISES Daily Assessment    All exercises performed to increase strength and ROM to facilitate improved mobility with decreased assistance required.  Supine B LE ankle pumps, quad sets, glute sets, heel slides, SAQ, and hip abd. Seated heel raises, toe raises, LAQ, marching, hip add with bolster, and L hs curls with yellow TB, R heel slides, and hip abd with yellow TB 3x10 each         ASSESSMENT:  Patient tolerated today's sessions well however by the end of afternoon session reported increased fatigue.  Patient requires verbal cues to remember TTWB R LE with all mobility. Patient performed all exercises slowly with prolonged rest breaks in between due to pain and decreased activity tolerance.   Progression toward goals:  [x]      Improving appropriately and progressing toward goals  []      Improving slowly and progressing toward goals  []      Not making progress toward goals and plan of care will be adjusted      PLAN:  Patient continues to benefit from skilled intervention to address the above impairments.  Continue treatment per established plan of care.  Discharge Recommendations:  Home with Home health PT;24 hour supervision or assist (Assisted Living Facility)  Further Equipment Recommendations for Discharge:  Standard     Rolling    Pt reports having all necessary equipments        Estimated Discharge Date:TBD    Activity Tolerance:   Fair   Please refer to the flowsheet for vital signs taken during this  treatment.    After treatment:   [x] Patient left in no apparent distress in bed  [] Patient left in no apparent distress sitting up in chair  [] Patient left in no apparent distress in w/c mobilizing under own power  [] Patient left in no apparent distress dining area  [] Patient left in no apparent distress mobilizing under own power  [x] Call bell left within reach  [x] Nursing notified  [] Caregiver present  [] Bed alarm activated   [] Chair alarm activated        Susy Azevedo, FATUMA  5/31/2024

## 2024-05-31 NOTE — PLAN OF CARE
[facility] PHYSICAL REHABILITATION  Replaced by [facility] [place] [address]     INPATIENT REHABILITATION  OVERALL PLAN OF CARE    Name: Juanpablo Jackson CSN: [id number]   Age: 83 y.o.  MRN: [id number]   Sex: female Admit Date: 5/29/2024     Primary Rehabilitation Diagnosis impaired mobility and ADLs in the setting of a closed fracture of the shaft of right femur, status post open reduction and internal fixation and subsequent SFA-popliteal dissection and patient is now status post right CFA exploration and superficial femoral and popliteal stents with 4 compartment fasciotomy     Other Co-Morbid Conditions managed in Rehab      PAD, Status post right lower extremity CFA exploration and superficial femoral and popliteal stent placement with 4 compartment fasciotomy  Hypertension  Dyslipidemia  Prior history of CVA  Osteoporosis  Dyslipidemia  Anemia which appears to be chronic    Medical plan     impaired mobility and ADLs in the setting of a closed fracture of the shaft of right femur, status post open reduction and internal fixation and subsequent SFA-popliteal dissection and patient is now status post right CFA exploration and superficial femoral and popliteal stents with 4 compartment fasciotomy  Physical therapy and Occupational Therapy  Judicious pain control  Toe-touch weightbearing right lower extremity as per orthopedics  Fall precautions     PAD, Status post right lower extremity CFA exploration and superficial femoral and popliteal stent placement with 4 compartment fasciotomy  I have consulted vascular to follow  On aspirin and Plavix and statin     Hypertension  Continue amlodipine with hold parameters in place     Prior history of CVA  Continue aspirin and Plavix and statin     Osteoporosis  I have added calcium and vitamin D     Dyslipidemia  Continue atorvastatin     Anemia which appears to be chronic, no overt bleeding  Monitor blood counts intermittently  Check iron studies     I discussed the  motor function (B LE/trunk strengthening/coordination), activity tolerance (vitals, oxygen saturation levels), bed mobility training, balance activities, gait training (progressive ambulation program), wheelchair management and functional transfer training. Patient would also benefit from concurrent and group therapy sessions to promote increased participation in skilled therapy interventions and to provide opportunities for increased social interaction.        II. Occupational Therapy              A. Intensity: 1-2 hour per day              B. Frequency: 5 times per week              C. Duration: 2 weeks              D. Short Term Goals:    Initiated 5/30/24 and to be accomplished within 7 day(s)  1. Pt will perform self-feeding with setup.   2. Pt will perform grooming with setup.  3. Pt will perform UB bathing with setup.  4. Pt will perform LB bathing with Chacho.  5. Pt will perform tub/shower transfer with Chacho.  6. Pt will perform UB dressing with setup.  7. Pt will perform LB dressing with Chacho.  8. Pt will perform toileting task with Chacho.  9. Pt will perform toilet transfer with Chacho.               E. Long Term Goals:    Initiated 5/30/24 and to be accomplished within 2 week(s)  1. Pt will perform self-feeding with Jovanna.  2. Pt will perform grooming with Jovanna.  3. Pt will perform UB bathing with Jovanna.  4. Pt will perform LB bathing with CGA.  5. Pt will perform tub/shower transfer with CGA.   6. Pt will perform UB dressing with Jovanna.  7. Pt will perform LB dressing with CGA.  8. Pt will perform toileting task with CGA.  9. Pt will perform toilet transfer with CGA.    F. Interventions:                            Pt would benefit from skilled occupational therapy in order to improve independent functional mobility/ADLs,/IADLs within the home. Interventions may include range of motion (AROM, PROM B UE), motor function (B UE/ strengthening/coordination), activity tolerance (vitals, oxygen saturation levels),  balance training, ADL/IADL training and functional transfer training.        PHYSICIAN'S ASSESSMENT OF FINDINGS:    Are the established goals sufficient for achieving the optimal level of function?    [x]  Yes      []  No    What changes would you recommend to the goals as written? None      Are the interventions noted sufficient for achieving the optimal level of function?    [x]  Yes      []  No    What changes would you recommend to the interventions noted? If therapy staff is unable to provide 3 hr of total therapy per day in 5 days due to medical issues or decreased patient tolerance, may modify treatment schedule to 15 hr/week.      Estimated length of stay: 14 days    Willingness to participate in the program: Good      Medical rehabilitation prognosis:    []  Excellent     [x]  Good     []  Fair     []  Guarded       Discharge Destination:     [x]  Home     []  Assisted Living Facility     []  Skilled Nursing Facility     []  [facility]     []  Long Term Care Facility     []  Other:       Signed:    Gavino Huffman MD      May 31, 2024

## 2024-05-31 NOTE — PROGRESS NOTES
4 Eyes Skin Assessment     NAME:  Juanpablo Jackson  YOB: 1940  MEDICAL RECORD NUMBER:  [id number]    The patient is being assessed for  Shift Handoff    I agree that at least one RN has performed a thorough Head to Toe Skin Assessment on the patient. ALL assessment sites listed below have been assessed.      Areas assessed by both nurses:    Sacrum. Buttock, Coccyx, Ischium and Legs. Feet and Heels        Does the Patient have a Wound? Yes wound(s) were present on assessment. LDA wound assessment was Initiated and completed by RN       Adelfo Prevention initiated by RN: Yes  Wound Care Orders initiated by RN: Yes    Pressure Injury (Stage 3,4, Unstageable, DTI, NWPT, and Complex wounds) if present, place Wound referral order by RN under : No    New Ostomies, if present place, Ostomy referral order under : No     Nurse 1 eSignature: Electronically signed by VAISHNAVI FIERRO RN on 5/31/24 at 7:36 PM EDT    **SHARE this note so that the co-signing nurse can place an eSignature**    Nurse 2 eSignature: {Esignature:[id number]}

## 2024-05-31 NOTE — PROGRESS NOTES
[facility] PHYSICAL REHABILITATION  [address] [place] [id number]     INPATIENT REHABILITATION  DAILY PROGRESS NOTE     Date: 5/31/2024    Name: Juanpablo Jackson Age / Sex: 83 y.o. / female   CSN: [id number] MRN: [id number]   Admit Date: 5/29/2024 Length of Stay: 2 days     Primary Rehabilitation Diagnosis impaired mobility and ADLs in the setting of a closed fracture of the shaft of right femur, status post open reduction and internal fixation and subsequent SFA-popliteal dissection and patient is now status post right CFA exploration and superficial femoral and popliteal stents with 4 compartment fasciotomy        Subjective:     I personally saw and evaluated this patient face-to-face on May 31, 2024.  Patient is sitting in bed in no apparent distress.  Patient is awake and follows commands.  Pain is controlled      Objective:     Vital Signs:  Patient Vitals for the past 24 hrs:   BP Temp Temp src Pulse Resp SpO2   05/31/24 1528 121/61 98.5 °F (36.9 °C) Oral 72 18 --   05/31/24 0910 (!) 144/59 -- -- -- -- --   05/31/24 0800 (!) 144/59 97.6 °F (36.4 °C) Oral 99 18 --   05/30/24 2027 -- -- -- -- 18 --   05/30/24 1945 (!) 151/68 99.1 °F (37.3 °C) Oral 74 18 97 %        Physical Examination:  General:  Awake, alert  Cardiovascular:  S1S2+, RRR  Pulmonary:  CTA b/l  GI:  Soft, BS+, NT, ND  Extremities: Right lower extremity with dressings in place      Current Medications:  Current Facility-Administered Medications   Medication Dose Route Frequency    ferrous sulfate (IRON 325) tablet 325 mg  325 mg Oral BID WC    polyethylene glycol (GLYCOLAX) packet 17 g  17 g Oral Daily    aspirin EC tablet 81 mg  81 mg Oral Daily    enoxaparin (LOVENOX) injection 40 mg  40 mg SubCUTAneous Q24H    amLODIPine (NORVASC) tablet 5 mg  5 mg Oral Daily    clopidogrel (PLAVIX) tablet 75 mg  75 mg Oral Daily    oxyCODONE (ROXICODONE) immediate release tablet 5 mg  5 mg Oral Q6H PRN    naloxone (NARCAN) injection 0.4  tested     Legend:   7 - Independent   6 - Modified Independent   5 - Standby Assistance / Supervision / Set-up   4 - Minimum Assistance / Contact Guard Assistance   3 - Moderate Assistance   2 - Maximum Assistance   1 - Total Assistance / Dependent             Plan:     1. Justification for continued stay: Good progression towards established rehabilitation goals.    2. Medical Issues being followed closely:    [x]  Fall and safety precautions     [x]  Wound Care     [x]  Bowel and Bladder Function     [x]  Fluid Electrolyte and Nutrition Balance     [x]  Pain Control      3. Issues that 24 hour rehabilitation nursing is following:    [x]  Fall and safety precautions     [x]  Wound Care     [x]  Bowel and Bladder Function     [x]  Fluid Electrolyte and Nutrition Balance     [x]  Pain Control      [x]  Assistance with and education on in-room safety with transfers to and from the bed, wheelchair, toilet and shower.      4. Acute rehabilitation plan of care:    [x]  Continue current care and rehab.           [x]  Physical Therapy           [x]  Occupational Therapy           []  Speech Therapy      []  Hold Rehab until further notice     5. Medications:    [x]  MAR Reviewed     [x]  Continue Present Medications       6. Chemical DVT Prophylaxis:      [x]  Enoxaparin     []  Unfractionated Heparin     []  Warfarin     []  NOAC     []  Aspirin     []  None     7. Mechanical DVT Prophylaxis:      []  SANDRA Stockings     []  Sequential Compression Device     [x]  None     8. GI Prophylaxis:      []  PPI     []  H2 Blocker     [x]  None / Not indicated     9. Code status:Full     Dragon medical dictation software was used for portions of this report. Unintended errors may occur.    Personal Protective Equipment ( face mask ) was used while interacting with the patient        Signed:    Gavino Huffman MD      May 31, 2024

## 2024-05-31 NOTE — PROGRESS NOTES
TEAM CONFERENCE FOLLOW-UP  Patient: Juanpablo Jackson (83 y.o. female)  Date: 5/31/2024  Diagnosis: Closed displaced comminuted fracture of shaft of right femur with routine healing [S72.351D] Closed displaced comminuted fracture of shaft of right femur with routine healing      Precautions:      Met with patient to discuss the findings from 5/31/2024 Team Conference.    Patient requested further information and/or had questions:   Mayte Lema

## 2024-06-01 PROBLEM — D64.9 ANEMIA: Status: ACTIVE | Noted: 2024-06-01

## 2024-06-01 PROBLEM — I73.9 PAD (PERIPHERAL ARTERY DISEASE) (HCC): Status: ACTIVE | Noted: 2024-06-01

## 2024-06-01 PROCEDURE — 94761 N-INVAS EAR/PLS OXIMETRY MLT: CPT

## 2024-06-01 PROCEDURE — 1180000000 HC REHAB R&B

## 2024-06-01 PROCEDURE — 97530 THERAPEUTIC ACTIVITIES: CPT

## 2024-06-01 PROCEDURE — 6360000002 HC RX W HCPCS: Performed by: EMERGENCY MEDICINE

## 2024-06-01 PROCEDURE — 97535 SELF CARE MNGMENT TRAINING: CPT

## 2024-06-01 PROCEDURE — 97116 GAIT TRAINING THERAPY: CPT

## 2024-06-01 PROCEDURE — 97542 WHEELCHAIR MNGMENT TRAINING: CPT

## 2024-06-01 PROCEDURE — 6370000000 HC RX 637 (ALT 250 FOR IP): Performed by: EMERGENCY MEDICINE

## 2024-06-01 PROCEDURE — 97110 THERAPEUTIC EXERCISES: CPT

## 2024-06-01 RX ADMIN — ACETAMINOPHEN 650 MG: 325 TABLET ORAL at 13:22

## 2024-06-01 RX ADMIN — Medication 1 TABLET: at 08:45

## 2024-06-01 RX ADMIN — Medication 1 TABLET: at 17:27

## 2024-06-01 RX ADMIN — CLOPIDOGREL BISULFATE 75 MG: 75 TABLET ORAL at 08:45

## 2024-06-01 RX ADMIN — ACETAMINOPHEN 650 MG: 325 TABLET ORAL at 18:45

## 2024-06-01 RX ADMIN — POLYETHYLENE GLYCOL 3350 17 G: 17 POWDER, FOR SOLUTION ORAL at 08:45

## 2024-06-01 RX ADMIN — OXYCODONE HYDROCHLORIDE 5 MG: 5 TABLET ORAL at 11:13

## 2024-06-01 RX ADMIN — AMLODIPINE BESYLATE 5 MG: 5 TABLET ORAL at 08:45

## 2024-06-01 RX ADMIN — ASPIRIN 81 MG: 81 TABLET, COATED ORAL at 08:45

## 2024-06-01 RX ADMIN — FERROUS SULFATE TAB 325 MG (65 MG ELEMENTAL FE) 325 MG: 325 (65 FE) TAB at 08:45

## 2024-06-01 RX ADMIN — ENOXAPARIN SODIUM 40 MG: 100 INJECTION SUBCUTANEOUS at 16:09

## 2024-06-01 RX ADMIN — ATORVASTATIN CALCIUM 20 MG: 20 TABLET, FILM COATED ORAL at 21:04

## 2024-06-01 RX ADMIN — FERROUS SULFATE TAB 325 MG (65 MG ELEMENTAL FE) 325 MG: 325 (65 FE) TAB at 17:27

## 2024-06-01 RX ADMIN — ACETAMINOPHEN 650 MG: 325 TABLET ORAL at 08:45

## 2024-06-01 ASSESSMENT — PAIN DESCRIPTION - DESCRIPTORS
DESCRIPTORS: ACHING
DESCRIPTORS: THROBBING

## 2024-06-01 ASSESSMENT — PAIN SCALES - GENERAL
PAINLEVEL_OUTOF10: 3
PAINLEVEL_OUTOF10: 0
PAINLEVEL_OUTOF10: 3
PAINLEVEL_OUTOF10: 0
PAINLEVEL_OUTOF10: 0
PAINLEVEL_OUTOF10: 2
PAINLEVEL_OUTOF10: 1
PAINLEVEL_OUTOF10: 0
PAINLEVEL_OUTOF10: 6
PAINLEVEL_OUTOF10: 0
PAINLEVEL_OUTOF10: 2

## 2024-06-01 ASSESSMENT — PAIN DESCRIPTION - ONSET
ONSET: ON-GOING

## 2024-06-01 ASSESSMENT — PAIN DESCRIPTION - LOCATION
LOCATION: BACK;LEG
LOCATION: BACK
LOCATION: BACK;LEG
LOCATION: LEG

## 2024-06-01 ASSESSMENT — PAIN DESCRIPTION - PAIN TYPE
TYPE: SURGICAL PAIN;CHRONIC PAIN
TYPE: CHRONIC PAIN;SURGICAL PAIN
TYPE: SURGICAL PAIN
TYPE: CHRONIC PAIN

## 2024-06-01 ASSESSMENT — PAIN DESCRIPTION - ORIENTATION
ORIENTATION: RIGHT
ORIENTATION: RIGHT;LOWER
ORIENTATION: RIGHT;POSTERIOR
ORIENTATION: LOWER

## 2024-06-01 ASSESSMENT — PAIN SCALES - WONG BAKER
WONGBAKER_NUMERICALRESPONSE: NO HURT
WONGBAKER_NUMERICALRESPONSE: NO HURT

## 2024-06-01 ASSESSMENT — PAIN - FUNCTIONAL ASSESSMENT
PAIN_FUNCTIONAL_ASSESSMENT: ACTIVITIES ARE NOT PREVENTED

## 2024-06-01 ASSESSMENT — PAIN DESCRIPTION - FREQUENCY
FREQUENCY: INTERMITTENT

## 2024-06-01 NOTE — PROGRESS NOTES
4 Eyes Skin Assessment     NAME:  Juanpablo Jackson  YOB: 1940  MEDICAL RECORD NUMBER:  [id number]    The patient is being assessed for  Shift Handoff    I agree that at least one RN has performed a thorough Head to Toe Skin Assessment on the patient. ALL assessment sites listed below have been assessed.      Areas assessed by both nurses:    Sacrum. Buttock, Coccyx, Ischium and Legs. Feet and Heels        Does the Patient have a Wound? Yes wound(s) were present on assessment. LDA wound assessment was Initiated and completed by RN       Adelfo Prevention initiated by RN: Yes  Wound Care Orders initiated by RN: Yes    Pressure Injury (Stage 3,4, Unstageable, DTI, NWPT, and Complex wounds) if present, place Wound referral order by RN under : No    New Ostomies, if present place, Ostomy referral order under : No     Nurse 1 eSignature: Electronically signed by Yadira Jimenez RN on 6/1/24 at 7:38 PM EDT    **SHARE this note so that the co-signing nurse can place an eSignature**    Nurse 2 eSignature: Electronically signed by Rae Ravi RN on 6/1/24 at 7:39 PM EDT

## 2024-06-01 NOTE — PLAN OF CARE
Problem: Physical Therapy - Adult  Goal: By Discharge: Performs mobility at highest level of function for planned discharge setting.  See evaluation for individualized goals.  Description: Physical Therapy Short Term Goals  Initiated 5/30/2024 and to be accomplished within 7 day(s) 6/6/24  1.  Patient will performsupine to sit and sit to supine in bed with modified independence.    2.  Patient will transfer from bed to chair and chair to bed with supervision/set-up using the least restrictive device.  3.  Patient will perform sit to stand with contact guard assist  4.  Patient will ambulate with stand by assist for 40 feet with toe touch weight bearing with the least restrictive device.   5.  Patient will propel wheelchair 150 feet modified independent for mobility on this unit.  6. Patient will improve R knee AROM to 10-90 degrees for greater ease and comfort with ambulation.    Physical Therapy Long Term Goals  Initiated 5/30/2024 and to be accomplished within 14 day(s) 6/13/24  1.  Patient will perform supine to sit and sit to supine in bed with modified independence.    2.  Patient will transfer from bed to chair and chair to bed with modified independence using the least restrictive device.  3.  Patient will perform sit to stand with modified independence.  4.  Patient will ambulate with supervision for 150 feet with toe touch weight bearing with the least restrictive device.   5. Patient will increase R knee AROM to 5-120 degrees for greater mobility in the home.   Outcome: Progressing     PHYSICAL THERAPY TREATMENT    Patient: Juanpablo Jackson (83 y.o. female)  Date: 6/1/2024  Diagnosis: Closed displaced comminuted fracture of shaft of right femur with routine healing [S72.351D]   Precautions: TTWB R LE, fall risk, contact precautions  Chart, physical therapy assessment, plan of care and goals were reviewed.    Time in: 0935  Time out : 1106    Patient seen for: gait training, transfers training, w/c  Leg Raises 2 10 [x]L LE [x]R LE [] []    Supine Hip Abd 2 10 [x]L LE [x]R LE [] []    Long Arc Quads 2 10 [x]L LE [x]R LE [] []    Seated Marching 2 10 [x]L LE [x]R LE [] []    Standing Marching 1 10 [x] [] [] []    Standing hamstring curls 1 10 [x] [] [] []          ASSESSMENT:  Pt tolerated session well with slow pace and frequent rest breaks.  Pt able to tolerate session despite ongoing pain issues.  Slowly getting better foot clearance on L LE when advancing it to take a step while maintaining TTWB R LE. Pt agreeable to going outside in courtyard for a short while to work on w/c mobility on sidewalk surfaces and over doorway thresholds, as well as exercises for LE strengthening/ROM, to enjoy the nice weather.  Pt left sitting up in recliner chair in room, with legs elevated, at end of session.   Progression toward goals:  [x]      Improving appropriately and progressing toward goals  []      Improving slowly and progressing toward goals  []      Not making progress toward goals and plan of care will be adjusted      PLAN:  Patient continues to benefit from skilled intervention to address the above impairments.  Continue treatment per established plan of care.  Discharge Recommendations:  24 hour supervision or assist;Home with Home health PT  Further Equipment Recommendations for Discharge:  Standard wheelchair;     Rolling walker    Pt reports having all necessary equipment        Estimated Discharge Date: TBD, 2 weeks recommended at eval    Activity Tolerance:   Fair +; pt limited by pain and needed frequent rest breaks due to fatigue/pain.  Please refer to the flowsheet for vital signs taken during this treatment.    After treatment:   [] Patient left in no apparent distress in bed  [x] Patient left in no apparent distress sitting up in chair  [] Patient left in no apparent distress in w/c mobilizing under own power  [] Patient left in no apparent distress dining area  [] Patient left in no apparent distress  mobilizing under own power  [x] Call bell left within reach  [x] Nursing notified  [] Caregiver present  [] Bed alarm activated   [] Chair alarm activated        MASSIEL SCHULTZ, PT  6/1/2024

## 2024-06-01 NOTE — PLAN OF CARE
Problem: Occupational Therapy - Adult  Goal: By Discharge: Performs self-care activities at highest level of function for planned discharge setting.  See evaluation for individualized goals.  Description: Occupational Therapy Goals   Long Term Goals  Initiated 24 and to be accomplished within 2 week(s)  1. Pt will perform self-feeding with Jovanna.  2. Pt will perform grooming with Jovanna.  3. Pt will perform UB bathing with Jovanna.  4. Pt will perform LB bathing with CGA.  5. Pt will perform tub/shower transfer with CGA.   6. Pt will perform UB dressing with Jovanna.  7. Pt will perform LB dressing with CGA.  8. Pt will perform toileting task with CGA.  9. Pt will perform toilet transfer with CGA.      Short Term Goals   Initiated 24 and to be accomplished within 7 day(s)  1. Pt will perform self-feeding with setup.   2. Pt will perform grooming with setup.  3. Pt will perform UB bathing with setup.  4. Pt will perform LB bathing with Chacho.  5. Pt will perform tub/shower transfer with Chacho.  6. Pt will perform UB dressing with setup.  7. Pt will perform LB dressing with Chacho.  8. Pt will perform toileting task with Chacho.  9. Pt will perform toilet transfer with Chacho.    9HPT: RUE:33 sec. LUE: 30 sec. (24)  Outcome: Progressing   Occupational Therapy TREATMENT    Patient: Juanpablo Jackson   83 y.o.    Patient identified with name and : yes    Date: 2024    First Tx Session  Time In: 1230  Time Out: 1400      Diagnosis: Closed displaced comminuted fracture of shaft of right femur with routine healing [S72.351D]   Precautions: fall risk Restrictions/Precautions: Fall Risk, Surgical Protocols, Weight Bearing, Contact Precautions     Right Lower Extremity Weight Bearing: Toe Touch Weight Bearing Toe Touch Weight Bearing                     Chart, occupational therapy assessment, plan of care, and goals were reviewed.     Pain:  Pt reports 2/10 pain or discomfort prior to treatment.    Pt reports 2/10  Determined  Further Equipment Recommendations for Discharge:   TBD  Estimated LOS: per original poc     COMMUNICATION/EDUCATION:   [] Home safety education was provided and the patient/caregiver indicated understanding.  [x] Patient/family have participated as able in goal setting and plan of care.  [x] Patient/family agree to work toward stated goals and plan of care.  [] Patient understands intent and goals of therapy, but is neutral about his/her participation.  [] Patient is unable to participate in goal setting and plan of care.    Please refer to the flowsheet for vital signs taken during this treatment.  After treatment:   [x]  Patient left in no apparent distress sitting up in chair   []  Patient left in no apparent distress in bed  []  Patient handoff to SLP/PT  [x]  Call bell and immediate needs left within reach  [x]  Nursing notified  []  Caregiver present  []  Bed alarm activated      Entered Differentiated Treatment minutes: ledy Cortez OTR/L  6/1/2024

## 2024-06-02 VITALS
HEART RATE: 73 BPM | WEIGHT: 149.91 LBS | RESPIRATION RATE: 20 BRPM | HEIGHT: 60 IN | OXYGEN SATURATION: 97 % | DIASTOLIC BLOOD PRESSURE: 62 MMHG | TEMPERATURE: 98.1 F | BODY MASS INDEX: 29.43 KG/M2 | SYSTOLIC BLOOD PRESSURE: 126 MMHG

## 2024-06-02 PROCEDURE — 97110 THERAPEUTIC EXERCISES: CPT

## 2024-06-02 PROCEDURE — 97530 THERAPEUTIC ACTIVITIES: CPT

## 2024-06-02 PROCEDURE — 6370000000 HC RX 637 (ALT 250 FOR IP): Performed by: EMERGENCY MEDICINE

## 2024-06-02 PROCEDURE — 6360000002 HC RX W HCPCS: Performed by: EMERGENCY MEDICINE

## 2024-06-02 PROCEDURE — 94761 N-INVAS EAR/PLS OXIMETRY MLT: CPT

## 2024-06-02 PROCEDURE — 1180000000 HC REHAB R&B

## 2024-06-02 PROCEDURE — 97535 SELF CARE MNGMENT TRAINING: CPT

## 2024-06-02 RX ADMIN — ACETAMINOPHEN 650 MG: 325 TABLET ORAL at 05:17

## 2024-06-02 RX ADMIN — ENOXAPARIN SODIUM 40 MG: 100 INJECTION SUBCUTANEOUS at 15:11

## 2024-06-02 RX ADMIN — Medication 1 TABLET: at 09:20

## 2024-06-02 RX ADMIN — ATORVASTATIN CALCIUM 20 MG: 20 TABLET, FILM COATED ORAL at 20:59

## 2024-06-02 RX ADMIN — FERROUS SULFATE TAB 325 MG (65 MG ELEMENTAL FE) 325 MG: 325 (65 FE) TAB at 16:29

## 2024-06-02 RX ADMIN — CLOPIDOGREL BISULFATE 75 MG: 75 TABLET ORAL at 09:20

## 2024-06-02 RX ADMIN — POLYETHYLENE GLYCOL 3350 17 G: 17 POWDER, FOR SOLUTION ORAL at 09:20

## 2024-06-02 RX ADMIN — FERROUS SULFATE TAB 325 MG (65 MG ELEMENTAL FE) 325 MG: 325 (65 FE) TAB at 09:20

## 2024-06-02 RX ADMIN — OXYCODONE HYDROCHLORIDE 5 MG: 5 TABLET ORAL at 09:27

## 2024-06-02 RX ADMIN — ACETAMINOPHEN 650 MG: 325 TABLET ORAL at 13:19

## 2024-06-02 RX ADMIN — Medication 1 TABLET: at 16:29

## 2024-06-02 RX ADMIN — ASPIRIN 81 MG: 81 TABLET, COATED ORAL at 09:21

## 2024-06-02 RX ADMIN — AMLODIPINE BESYLATE 5 MG: 5 TABLET ORAL at 09:20

## 2024-06-02 RX ADMIN — OXYCODONE HYDROCHLORIDE 5 MG: 5 TABLET ORAL at 01:11

## 2024-06-02 ASSESSMENT — PAIN - FUNCTIONAL ASSESSMENT
PAIN_FUNCTIONAL_ASSESSMENT: ACTIVITIES ARE NOT PREVENTED
PAIN_FUNCTIONAL_ASSESSMENT: ACTIVITIES ARE NOT PREVENTED

## 2024-06-02 ASSESSMENT — PAIN DESCRIPTION - ONSET
ONSET: GRADUAL
ONSET: AWAKENED FROM SLEEP
ONSET: SUDDEN
ONSET: GRADUAL

## 2024-06-02 ASSESSMENT — PAIN DESCRIPTION - PAIN TYPE
TYPE: SURGICAL PAIN

## 2024-06-02 ASSESSMENT — PAIN SCALES - GENERAL
PAINLEVEL_OUTOF10: 0
PAINLEVEL_OUTOF10: 2
PAINLEVEL_OUTOF10: 3
PAINLEVEL_OUTOF10: 0
PAINLEVEL_OUTOF10: 0
PAINLEVEL_OUTOF10: 3
PAINLEVEL_OUTOF10: 0
PAINLEVEL_OUTOF10: 5
PAINLEVEL_OUTOF10: 0
PAINLEVEL_OUTOF10: 3
PAINLEVEL_OUTOF10: 3
PAINLEVEL_OUTOF10: 5

## 2024-06-02 ASSESSMENT — PAIN DESCRIPTION - DESCRIPTORS
DESCRIPTORS: ACHING;TENDER
DESCRIPTORS: ACHING
DESCRIPTORS: THROBBING
DESCRIPTORS: ACHING;SORE

## 2024-06-02 ASSESSMENT — PAIN DESCRIPTION - ORIENTATION
ORIENTATION: LEFT;RIGHT
ORIENTATION: RIGHT

## 2024-06-02 ASSESSMENT — PAIN DESCRIPTION - FREQUENCY
FREQUENCY: INTERMITTENT

## 2024-06-02 ASSESSMENT — PAIN DESCRIPTION - LOCATION
LOCATION: BACK;INCISION
LOCATION: INCISION
LOCATION: HIP
LOCATION: BACK;LEG

## 2024-06-02 ASSESSMENT — PAIN DESCRIPTION - DIRECTION: RADIATING_TOWARDS: RIGHT LOWER LEG

## 2024-06-02 NOTE — PROGRESS NOTES
4 Eyes Skin Assessment     NAME:  Juanpablo Jackson  YOB: 1940  MEDICAL RECORD NUMBER:  [id number]    The patient is being assessed for  Shift Handoff    I agree that at least one RN has performed a thorough Head to Toe Skin Assessment on the patient. ALL assessment sites listed below have been assessed.      Areas assessed by both nurses:    Sacrum. Buttock, Coccyx, Ischium, Legs. Feet and Heels, and Other          Does the Patient have a Wound? Yes wound(s) were present on assessment. LDA wound assessment was Initiated and completed by RN       Adelfo Prevention initiated by RN: Yes  Wound Care Orders initiated by RN: No    Pressure Injury (Stage 3,4, Unstageable, DTI, NWPT, and Complex wounds) if present, place Wound referral order by RN under : No    New Ostomies, if present place, Ostomy referral order under : No     Nurse 1 eSignature: Electronically signed by KRISHNA MELTON LPN on 6/2/24 at 7:17 PM EDT    **SHARE this note so that the co-signing nurse can place an eSignature**    Nurse 2 eSignature: Electronically signed by Rae Ravi RN on 6/2/24 at 7:31 PM EDT

## 2024-06-02 NOTE — PLAN OF CARE
Problem: Occupational Therapy - Adult  Goal: By Discharge: Performs self-care activities at highest level of function for planned discharge setting.  See evaluation for individualized goals.  Description: Occupational Therapy Goals   Long Term Goals  Initiated 24 and to be accomplished within 2 week(s)  1. Pt will perform self-feeding with Jovanna.  2. Pt will perform grooming with Jovanna.  3. Pt will perform UB bathing with Jovanna.  4. Pt will perform LB bathing with CGA.  5. Pt will perform tub/shower transfer with CGA.   6. Pt will perform UB dressing with Jovanna.  7. Pt will perform LB dressing with CGA.  8. Pt will perform toileting task with CGA.  9. Pt will perform toilet transfer with CGA.      Short Term Goals   Initiated 24 and to be accomplished within 7 day(s)  1. Pt will perform self-feeding with setup.   2. Pt will perform grooming with setup.  3. Pt will perform UB bathing with setup.  4. Pt will perform LB bathing with Chacho.  5. Pt will perform tub/shower transfer with Chacho.  6. Pt will perform UB dressing with setup.  7. Pt will perform LB dressing with Chacho.  8. Pt will perform toileting task with Chacho.  9. Pt will perform toilet transfer with Chacho.    9HPT: RUE:33 sec. LUE: 30 sec. (24)  Outcome: Progressing   Occupational Therapy TREATMENT    Patient: Juanpablo Jackson   83 y.o.    Patient identified with name and : yes    Date: 2024    First Tx Session  Time In:  0630  Time Out:  0800      Diagnosis: Closed displaced comminuted fracture of shaft of right femur with routine healing [S72.351D]   Precautions:  Restrictions/Precautions: Fall Risk, Surgical Protocols, Weight Bearing, Contact Precautions     Right Lower Extremity Weight Bearing: Toe Touch Weight Bearing Toe Touch Weight Bearing                     Chart, occupational therapy assessment, plan of care, and goals were reviewed.     Pain:  Pt reports 0/10 pain or discomfort prior to treatment.    Pt reports -/10 pain or  and maintaining RLE weightbearing restriction with mod-max VC's for technique and maintaining RLE weightbearing restrictions.     Activity Tolerance:  Patient Tolerated treatment well         EDUCATION:    Pt provided education for BUE strengthening as part of BUE HEP.     ASSESSMENT:  Pt is making progress with self cares, continued ADL training recommended to increase pt independence with self cares and functional mobility for self cares. Pt is increasing BUE strength and activity tolerance as tolerated with RB's prn. Pt requires max VC's for maintaining RLE TTWB with all functional mobility for toileting with FWW.   Progression toward goals:  [x]          Improving appropriately and progressing toward goals  []          Improving slowly and progressing toward goals  []          Not making progress toward goals and plan of care will be adjusted       PLAN:  Patient continues to benefit from skilled intervention to address the above impairments.  Continue treatment per established plan of care.  Discharge Recommendations:  Home health; home occupational therapy in half-way  Further Equipment Recommendations for Discharge:  none at this time, pt has built-in bench with grab bars in shower and handicap height toilets   Estimated LOS: TBD     COMMUNICATION/EDUCATION:   [] Home safety education was provided and the patient/caregiver indicated understanding.  [] Patient/family have participated as able in goal setting and plan of care.  [x] Patient/family agree to work toward stated goals and plan of care.  [] Patient understands intent and goals of therapy, but is neutral about his/her participation.  [] Patient is unable to participate in goal setting and plan of care.    Please refer to the flowsheet for vital signs taken during this treatment.  After treatment:   [x]  Patient left in no apparent distress sitting up in chair   []  Patient left in no apparent distress in bed  []  Patient handoff to SLP/PT  [x]  Call bell  and immediate needs left within reach  []  Nursing notified  []  Caregiver present  []  Bed alarm activated  []  Chair alarm activated  []  Heather Prominences offloaded  []  Heels activated for pressure relief  []  Telesitter/Care companion present      Entered Differentiated Treatment minutes: yes      Trish Taylor OT  6/2/2024

## 2024-06-02 NOTE — PROGRESS NOTES
4 Eyes Skin Assessment     NAME:  Juanpablo Jackson  YOB: 1940  MEDICAL RECORD NUMBER:  [id number]    The patient is being assessed for  Shift Handoff    I agree that at least one RN has performed a thorough Head to Toe Skin Assessment on the patient. ALL assessment sites listed below have been assessed.      Areas assessed by both nurses:    Sacrum. Buttock, Coccyx, Ischium and Legs. Feet and Heels        Does the Patient have a Wound? Yes wound(s) were present on assessment. LDA wound assessment was Initiated and completed by RN       Adelfo Prevention initiated by RN: Yes  Wound Care Orders initiated by RN: Yes    Pressure Injury (Stage 3,4, Unstageable, DTI, NWPT, and Complex wounds) if present, place Wound referral order by RN under : No    New Ostomies, if present place, Ostomy referral order under : No     Nurse 1 eSignature: Electronically signed by Rae Ravi RN on 6/2/24 at 7:31 AM EDT    **SHARE this note so that the co-signing nurse can place an eSignature**    Nurse 2 eSignature: Electronically signed by KRISHNA MELTON LPN on 6/2/24 at 8:22 AM EDT

## 2024-06-03 PROCEDURE — 94761 N-INVAS EAR/PLS OXIMETRY MLT: CPT

## 2024-06-03 PROCEDURE — 97530 THERAPEUTIC ACTIVITIES: CPT

## 2024-06-03 PROCEDURE — 97116 GAIT TRAINING THERAPY: CPT

## 2024-06-03 PROCEDURE — 6360000002 HC RX W HCPCS: Performed by: EMERGENCY MEDICINE

## 2024-06-03 PROCEDURE — 6370000000 HC RX 637 (ALT 250 FOR IP): Performed by: NURSE PRACTITIONER

## 2024-06-03 PROCEDURE — 97110 THERAPEUTIC EXERCISES: CPT

## 2024-06-03 PROCEDURE — 97535 SELF CARE MNGMENT TRAINING: CPT

## 2024-06-03 PROCEDURE — 99232 SBSQ HOSP IP/OBS MODERATE 35: CPT | Performed by: EMERGENCY MEDICINE

## 2024-06-03 PROCEDURE — 1180000000 HC REHAB R&B

## 2024-06-03 PROCEDURE — 6370000000 HC RX 637 (ALT 250 FOR IP): Performed by: EMERGENCY MEDICINE

## 2024-06-03 RX ORDER — ACETAMINOPHEN 325 MG/1
650 TABLET ORAL EVERY 4 HOURS PRN
Status: DISCONTINUED | OUTPATIENT
Start: 2024-06-03 | End: 2024-06-14 | Stop reason: HOSPADM

## 2024-06-03 RX ADMIN — FERROUS SULFATE TAB 325 MG (65 MG ELEMENTAL FE) 325 MG: 325 (65 FE) TAB at 08:12

## 2024-06-03 RX ADMIN — ASPIRIN 81 MG: 81 TABLET, COATED ORAL at 08:12

## 2024-06-03 RX ADMIN — POLYETHYLENE GLYCOL 3350 17 G: 17 POWDER, FOR SOLUTION ORAL at 08:11

## 2024-06-03 RX ADMIN — ACETAMINOPHEN 325MG 650 MG: 325 TABLET ORAL at 20:50

## 2024-06-03 RX ADMIN — AMLODIPINE BESYLATE 5 MG: 5 TABLET ORAL at 08:12

## 2024-06-03 RX ADMIN — Medication 1 TABLET: at 08:12

## 2024-06-03 RX ADMIN — OXYCODONE HYDROCHLORIDE 5 MG: 5 TABLET ORAL at 08:52

## 2024-06-03 RX ADMIN — FERROUS SULFATE TAB 325 MG (65 MG ELEMENTAL FE) 325 MG: 325 (65 FE) TAB at 16:10

## 2024-06-03 RX ADMIN — ACETAMINOPHEN 325MG 650 MG: 325 TABLET ORAL at 12:26

## 2024-06-03 RX ADMIN — ENOXAPARIN SODIUM 40 MG: 100 INJECTION SUBCUTANEOUS at 16:05

## 2024-06-03 RX ADMIN — ATORVASTATIN CALCIUM 20 MG: 20 TABLET, FILM COATED ORAL at 20:50

## 2024-06-03 RX ADMIN — CLOPIDOGREL BISULFATE 75 MG: 75 TABLET ORAL at 08:12

## 2024-06-03 RX ADMIN — Medication 1 TABLET: at 16:11

## 2024-06-03 ASSESSMENT — PAIN DESCRIPTION - DIRECTION: RADIATING_TOWARDS: L KNEE

## 2024-06-03 ASSESSMENT — PAIN DESCRIPTION - ONSET
ONSET: GRADUAL
ONSET: GRADUAL

## 2024-06-03 ASSESSMENT — PAIN SCALES - WONG BAKER
WONGBAKER_NUMERICALRESPONSE: HURTS A LITTLE BIT
WONGBAKER_NUMERICALRESPONSE: NO HURT

## 2024-06-03 ASSESSMENT — PAIN DESCRIPTION - DESCRIPTORS
DESCRIPTORS: ACHING
DESCRIPTORS: SORE
DESCRIPTORS: ACHING

## 2024-06-03 ASSESSMENT — PAIN SCALES - GENERAL
PAINLEVEL_OUTOF10: 1
PAINLEVEL_OUTOF10: 0
PAINLEVEL_OUTOF10: 0
PAINLEVEL_OUTOF10: 3
PAINLEVEL_OUTOF10: 4
PAINLEVEL_OUTOF10: 0
PAINLEVEL_OUTOF10: 2
PAINLEVEL_OUTOF10: 0

## 2024-06-03 ASSESSMENT — PAIN DESCRIPTION - PAIN TYPE
TYPE: ACUTE PAIN
TYPE: SURGICAL PAIN

## 2024-06-03 ASSESSMENT — PAIN DESCRIPTION - LOCATION
LOCATION: BACK;LEG
LOCATION: LEG
LOCATION: KNEE

## 2024-06-03 ASSESSMENT — PAIN DESCRIPTION - FREQUENCY
FREQUENCY: INTERMITTENT
FREQUENCY: INTERMITTENT

## 2024-06-03 ASSESSMENT — PAIN - FUNCTIONAL ASSESSMENT: PAIN_FUNCTIONAL_ASSESSMENT: ACTIVITIES ARE NOT PREVENTED

## 2024-06-03 ASSESSMENT — PAIN DESCRIPTION - ORIENTATION
ORIENTATION: RIGHT
ORIENTATION: RIGHT
ORIENTATION: LEFT

## 2024-06-03 NOTE — PLAN OF CARE
Problem: Occupational Therapy - Adult  Goal: By Discharge: Performs self-care activities at highest level of function for planned discharge setting.  See evaluation for individualized goals.  Description: Occupational Therapy Goals   Long Term Goals  Initiated 24 and to be accomplished within 2 week(s)  1. Pt will perform self-feeding with Jovanna.  2. Pt will perform grooming with Jovanna.  3. Pt will perform UB bathing with Jovanna.  4. Pt will perform LB bathing with CGA.  5. Pt will perform tub/shower transfer with CGA.   6. Pt will perform UB dressing with Jovanna.  7. Pt will perform LB dressing with CGA.  8. Pt will perform toileting task with CGA.  9. Pt will perform toilet transfer with CGA.      Short Term Goals   Initiated 24 and to be accomplished within 7 day(s)  1. Pt will perform self-feeding with setup.   2. Pt will perform grooming with setup.  3. Pt will perform UB bathing with setup.  4. Pt will perform LB bathing with Chacho.  5. Pt will perform tub/shower transfer with Chacho.  6. Pt will perform UB dressing with setup.  7. Pt will perform LB dressing with Chacho.  8. Pt will perform toileting task with Chacho.  9. Pt will perform toilet transfer with Chacho.    9HPT: RUE:33 sec. LUE: 30 sec. (24)  6/3/2024 1557 by Archana Bunch OTA  Outcome: Progressing  6/3/2024 1251 by Trish Taylor OT  Outcome: Progressing   Occupational Therapy TREATMENT    Patient: Juanpablo Jackson   83 y.o.    Patient identified with name and : Yes    Date: 6/3/2024    First Tx Session  Time In: 824  Time Out: 0901    Second Tx Session  Time In: 1500  Time Out: 1600    Diagnosis: Closed displaced comminuted fracture of shaft of right femur with routine healing [S72.223P]   Precautions:  Restrictions/Precautions: Fall Risk, Surgical Protocols, Weight Bearing, Contact Precautions     Right Lower Extremity Weight Bearing: Toe Touch Weight Bearing Toe Touch Weight Bearing                   Chart, occupational therapy  maintaining RLE weight bearing and technique for increased safety. Pt doffed LLE sock with supervision and donned LLE shoe with elastic shoe laces and supervision with good safety.     TOILETING Daily Assessment   Functional Level Minimal assistance   Clinical factors Pt performed toileting using FWW with min A for transfer and min A for clothing mgmt adhering to RLE TTWB at FWW with toileting tasks. Pt peformed toileting hygience with min A.     TOILET TRANSFER Daily Assessment   Transfer Technique Stand pivot    Level of Assistance Min A    Equipment RW    Toilet Transfer Comments F dynamic balance      FUNCTIONAL MOBILITY Daily Assessment    Functional - Mobility Device: Wheelchair  Activity: To/From therapy gym  Assist Level: Supervision  Functional Mobility Comments: Pt performed functional mobility from w/c level with supervision and verbal cues for w/c management and safety.   Stand Pivot Transfers: Minimal assistance;Moderate assistance  Sit to stand: Minimal assistance;Moderate assistance  Stand to sit: Minimal assistance  Transfer Comments: Pt performed sit to stand from EOB to FWW and min-modA and max VC's for hand placement, maintaining RLE TTWB, sequencing and safety technique.      WHEELCHAIR/BED TRANSFER INDEPENDENCE Daily Assessment   Transfer Technique Stand pivot   Level of Assistance Minimal assistance;Moderate assistance   Equipment Bed;Wheelchair   Comments Pt performed EOB to w/c transfer with FWW and min-modA with max VC's and education for maintaining RLE weightbearing restrictions and safe technique to reduce risks for falls. Pt verbalized understanding however demonstrated poor carryover of technique maintaining RLE weightbearing restrictions.     Activity Tolerance:  Patient Tolerated treatment well           EDUCATION:   Education Given To: Patient  Education Provided: Fall Prevention Strategies;Transfer Training;Safety;ADL Function;Equipment  Education Method:

## 2024-06-03 NOTE — PROGRESS NOTES
4 Eyes Skin Assessment     NAME:  Juanpablo Jackson  YOB: 1940  MEDICAL RECORD NUMBER:  [id number]    The patient is being assessed for  Shift Handoff    I agree that at least one RN has performed a thorough Head to Toe Skin Assessment on the patient. ALL assessment sites listed below have been assessed.      Areas assessed by both nurses:    Sacrum. Buttock, Coccyx, Ischium and Legs. Feet and Heels        Does the Patient have a Wound? Yes wound(s) were present on assessment. LDA wound assessment was Initiated and completed by RN       Adelfo Prevention initiated by RN: Yes  Wound Care Orders initiated by RN: No    Pressure Injury (Stage 3,4, Unstageable, DTI, NWPT, and Complex wounds) if present, place Wound referral order by RN under : No    New Ostomies, if present place, Ostomy referral order under : No     Nurse 1 eSignature: Electronically signed by Nadia Oshea RN on 6/3/24 at 7:31 PM EDT    **SHARE this note so that the co-signing nurse can place an eSignature**    Nurse 2 eSignature: Electronically signed by Haydee Jean Baptiste RN on 6/3/24 at 0730 PM EDT

## 2024-06-03 NOTE — PROGRESS NOTES
4 Eyes Skin Assessment     NAME:  Juanpablo Jackson  YOB: 1940  MEDICAL RECORD NUMBER:  [id number]    The patient is being assessed for  Shift Handoff    I agree that at least one RN has performed a thorough Head to Toe Skin Assessment on the patient. ALL assessment sites listed below have been assessed.      Areas assessed by both nurses:    Sacrum. Buttock, Coccyx, Ischium, Legs. Feet and Heels, and Other surgical incision        Does the Patient have a Wound? Yes wound(s) were present on assessment. LDA wound assessment was Initiated and completed by RN       Adelfo Prevention initiated by RN: Yes  Wound Care Orders initiated by RN: Yes    Pressure Injury (Stage 3,4, Unstageable, DTI, NWPT, and Complex wounds) if present, place Wound referral order by RN under : No    New Ostomies, if present place, Ostomy referral order under : No     Nurse 1 eSignature: Electronically signed by Rae Ravi RN on 6/3/24 at 7:50 AM EDT    **SHARE this note so that the co-signing nurse can place an eSignature**    Nurse 2 eSignature: Electronically signed by Nadia Oshea RN on 6/3/24 at 10:00 AM EDT

## 2024-06-03 NOTE — PLAN OF CARE
Problem: Occupational Therapy - Adult  Goal: By Discharge: Performs self-care activities at highest level of function for planned discharge setting.  See evaluation for individualized goals.  Description: Occupational Therapy Goals   Long Term Goals  Initiated 24 and to be accomplished within 2 week(s)  1. Pt will perform self-feeding with Jovanna.  2. Pt will perform grooming with Jovanna.  3. Pt will perform UB bathing with Jovanna.  4. Pt will perform LB bathing with CGA.  5. Pt will perform tub/shower transfer with CGA.   6. Pt will perform UB dressing with Jovanna.  7. Pt will perform LB dressing with CGA.  8. Pt will perform toileting task with CGA.  9. Pt will perform toilet transfer with CGA.      Short Term Goals   Initiated 24 and to be accomplished within 7 day(s)  1. Pt will perform self-feeding with setup.   2. Pt will perform grooming with setup.  3. Pt will perform UB bathing with setup.  4. Pt will perform LB bathing with Chacho.  5. Pt will perform tub/shower transfer with Chacho.  6. Pt will perform UB dressing with setup.  7. Pt will perform LB dressing with Chacho.  8. Pt will perform toileting task with Chacho.  9. Pt will perform toilet transfer with Chacho.    9HPT: RUE:33 sec. LUE: 30 sec. (24)  Outcome: Progressing   Occupational Therapy TREATMENT    Patient: Juanpablo Jackson   83 y.o.    Patient identified with name and : yes    Date: 6/3/2024    First Tx Session  Time In: 824  Time Out: 901    Diagnosis: Closed displaced comminuted fracture of shaft of right femur with routine healing [S72.351D]   Precautions:  Restrictions/Precautions: Fall Risk, Surgical Protocols, Weight Bearing, Contact Precautions     Right Lower Extremity Weight Bearing: Toe Touch Weight Bearing Toe Touch Weight Bearing                     Chart, occupational therapy assessment, plan of care, and goals were reviewed.     Pain:  Pt reports 4/10 pain or discomfort prior to treatment.  (BLE legs and lower back)  Pt

## 2024-06-03 NOTE — PROGRESS NOTES
[facility] PHYSICAL REHABILITATION  [address] [place] [id number]     INPATIENT REHABILITATION  DAILY PROGRESS NOTE     Date: 6/3/2024    Name: Juanpablo Jackson Age / Sex: 83 y.o. / female   CSN: [id number] MRN: [id number]   Admit Date: 5/29/2024 Length of Stay: 5 days     Primary Rehabilitation Diagnosis impaired mobility and ADLs in the setting of a closed fracture of the shaft of right femur, status post open reduction and internal fixation and subsequent SFA-popliteal dissection and patient is now status post right CFA exploration and superficial femoral and popliteal stents with 4 compartment fasciotomy        Subjective:     I personally saw and evaluated this patient face-to-face today.  Patient is sitting in bed in no apparent distress, awake and alert    Objective:     Vital Signs:  Patient Vitals for the past 24 hrs:   BP Temp Temp src Pulse Resp SpO2 Weight   06/03/24 1611 (!) 142/68 98.3 °F (36.8 °C) Oral 71 16 98 % --   06/03/24 0758 -- -- -- -- -- -- 68.5 kg (151 lb 0.2 oz)   06/03/24 0722 (!) 144/51 97.7 °F (36.5 °C) Oral 68 16 97 % --   06/02/24 2015 126/62 98.1 °F (36.7 °C) Oral 73 20 97 % --        Physical Examination:  General:  Awake, alert  Cardiovascular:  S1S2+, RRR  Pulmonary:  CTA b/l  GI:  Soft, BS+, NT, ND  Extremities: Right leg with dressings in place        Current Medications:  Current Facility-Administered Medications   Medication Dose Route Frequency    acetaminophen (TYLENOL) tablet 650 mg  650 mg Oral Q4H PRN    ferrous sulfate (IRON 325) tablet 325 mg  325 mg Oral BID WC    polyethylene glycol (GLYCOLAX) packet 17 g  17 g Oral Daily    aspirin EC tablet 81 mg  81 mg Oral Daily    enoxaparin (LOVENOX) injection 40 mg  40 mg SubCUTAneous Q24H    amLODIPine (NORVASC) tablet 5 mg  5 mg Oral Daily    clopidogrel (PLAVIX) tablet 75 mg  75 mg Oral Daily    oxyCODONE (ROXICODONE) immediate release tablet 5 mg  5 mg Oral Q6H PRN    naloxone (NARCAN) injection 0.4 mg  tested  SHOWER Transfers  Tub Transfers  Tub Transfers: Not tested     Legend:   7 - Independent   6 - Modified Independent   5 - Standby Assistance / Supervision / Set-up   4 - Minimum Assistance / Contact Guard Assistance   3 - Moderate Assistance   2 - Maximum Assistance   1 - Total Assistance / Dependent             Plan:     1. Justification for continued stay: Fair progression towards established rehabilitation goals.  Patient is now needing minimal assistance for toileting.  In comparison at the time of admission patient was needing moderate assistance for toileting    2. Medical Issues being followed closely:    [x]  Fall and safety precautions     [x]  Wound Care     [x]  Bowel and Bladder Function     [x]  Fluid Electrolyte and Nutrition Balance     [x]  Pain Control      3. Issues that 24 hour rehabilitation nursing is following:    [x]  Fall and safety precautions     [x]  Wound Care     [x]  Bowel and Bladder Function     [x]  Fluid Electrolyte and Nutrition Balance     [x]  Pain Control      [x]  Assistance with and education on in-room safety with transfers to and from the bed, wheelchair, toilet and shower.      4. Acute rehabilitation plan of care:    [x]  Continue current care and rehab.           [x]  Physical Therapy           [x]  Occupational Therapy           [x]  Speech Therapy .  Group speech therapy added on Siobhan 3     []  Hold Rehab until further notice     5. Medications:    [x]  MAR Reviewed     [x]  Continue Present Medications       6. Chemical DVT Prophylaxis:      [x]  Enoxaparin     []  Unfractionated Heparin     []  Warfarin     []  NOAC     []  Aspirin     []  None     7. Mechanical DVT Prophylaxis:      []  SANDRA Stockings     []  Sequential Compression Device     [x]  None     8. GI Prophylaxis:      []  PPI     []  H2 Blocker     [x]  None / Not indicated     9. Code status:Full     Dragon medical dictation software was used for portions of this report. Unintended errors may  occur.    Personal Protective Equipment ( face mask ) was used while interacting with the patient        Signed:    Gavino Huffman MD      Siobhan 3, 2024

## 2024-06-03 NOTE — PLAN OF CARE
Problem: Physical Therapy - Adult  Goal: By Discharge: Performs mobility at highest level of function for planned discharge setting.  See evaluation for individualized goals.  Description: Physical Therapy Short Term Goals  Initiated 5/30/2024 and to be accomplished within 7 day(s) 6/6/24  1.  Patient will performsupine to sit and sit to supine in bed with modified independence.    2.  Patient will transfer from bed to chair and chair to bed with supervision/set-up using the least restrictive device.  3.  Patient will perform sit to stand with contact guard assist  4.  Patient will ambulate with stand by assist for 40 feet with toe touch weight bearing with the least restrictive device.   5.  Patient will propel wheelchair 150 feet modified independent for mobility on this unit.  6. Patient will improve R knee AROM to 10-90 degrees for greater ease and comfort with ambulation.    Physical Therapy Long Term Goals  Initiated 5/30/2024 and to be accomplished within 14 day(s) 6/13/24  1.  Patient will perform supine to sit and sit to supine in bed with modified independence.    2.  Patient will transfer from bed to chair and chair to bed with modified independence using the least restrictive device.  3.  Patient will perform sit to stand with modified independence.  4.  Patient will ambulate with supervision for 150 feet with toe touch weight bearing with the least restrictive device.   5. Patient will increase R knee AROM to 5-120 degrees for greater mobility in the home.   Outcome: Progressing     PHYSICAL THERAPY TREATMENT    Patient: Juanpablo Jackson (83 y.o. female)  Date: 6/3/2024  Diagnosis: Closed displaced comminuted fracture of shaft of right femur with routine healing [S72.351D]   Precautions: fall risk, TTWB R LE  Chart, physical therapy assessment, plan of care and goals were reviewed.    Time in: 1100  Time out : 1200    Time in: 1300  Time out: 1330    Patient seen for: gait training, transfer  equipment        Estimated Discharge Date:6/11/24    Activity Tolerance:   Fair +  Please refer to the flowsheet for vital signs taken during this treatment.    After treatment:   [x] Patient left in no apparent distress in bed  [] Patient left in no apparent distress sitting up in chair  [] Patient left in no apparent distress in w/c mobilizing under own power  [] Patient left in no apparent distress dining area  [] Patient left in no apparent distress mobilizing under own power  [x] Call bell left within reach  [x] Nursing notified  [] Caregiver present  [x] Bed alarm activated   [] Chair alarm activated        Susy Azevedo, FATUMA  6/3/2024

## 2024-06-03 NOTE — PLAN OF CARE
Problem: Chronic Conditions and Co-morbidities  Goal: Patient's chronic conditions and co-morbidity symptoms are monitored and maintained or improved  Outcome: Progressing  Flowsheets (Taken 6/2/2024 2000)  Care Plan - Patient's Chronic Conditions and Co-Morbidity Symptoms are Monitored and Maintained or Improved: Monitor and assess patient's chronic conditions and comorbid symptoms for stability, deterioration, or improvement     Problem: Safety - Adult  Goal: Free from fall injury  Outcome: Progressing  Flowsheets (Taken 6/2/2024 2204)  Free From Fall Injury: Instruct family/caregiver on patient safety     Problem: Pain  Goal: Verbalizes/displays adequate comfort level or baseline comfort level  Outcome: Progressing

## 2024-06-03 NOTE — PROGRESS NOTES
Speech-Language Pathology    SLP was asked by members of Ms. Jackson's treatment team to talk with the patient for ideas on how to address her difficulty in retaining information presented for mobility and safety. Patient was appropriate in conversation and in answering questions posed by the SLP. When asked to perform 3 part instructions, she was able to do this easily, even more complex instructions.    Ms. Jackson may benefit from more visual cues to assist with recall as well as repeat back to enhance the presentations.     SLP recommends that patient be seen in a group setting for various social activities. If further deficits are noted, patient could be the followed for individual sessions.    Dana Stratton, CCC-SLP

## 2024-06-04 PROCEDURE — 6360000002 HC RX W HCPCS: Performed by: EMERGENCY MEDICINE

## 2024-06-04 PROCEDURE — 97530 THERAPEUTIC ACTIVITIES: CPT

## 2024-06-04 PROCEDURE — 97116 GAIT TRAINING THERAPY: CPT

## 2024-06-04 PROCEDURE — 6370000000 HC RX 637 (ALT 250 FOR IP): Performed by: NURSE PRACTITIONER

## 2024-06-04 PROCEDURE — 6370000000 HC RX 637 (ALT 250 FOR IP): Performed by: EMERGENCY MEDICINE

## 2024-06-04 PROCEDURE — 92508 TX SP LANG VOICE COMM GROUP: CPT

## 2024-06-04 PROCEDURE — 97110 THERAPEUTIC EXERCISES: CPT

## 2024-06-04 PROCEDURE — 1180000000 HC REHAB R&B

## 2024-06-04 PROCEDURE — 97150 GROUP THERAPEUTIC PROCEDURES: CPT

## 2024-06-04 PROCEDURE — 97535 SELF CARE MNGMENT TRAINING: CPT

## 2024-06-04 PROCEDURE — 99232 SBSQ HOSP IP/OBS MODERATE 35: CPT | Performed by: EMERGENCY MEDICINE

## 2024-06-04 RX ADMIN — ACETAMINOPHEN 325MG 650 MG: 325 TABLET ORAL at 21:43

## 2024-06-04 RX ADMIN — Medication 1 TABLET: at 09:11

## 2024-06-04 RX ADMIN — FERROUS SULFATE TAB 325 MG (65 MG ELEMENTAL FE) 325 MG: 325 (65 FE) TAB at 09:11

## 2024-06-04 RX ADMIN — Medication 1 TABLET: at 17:03

## 2024-06-04 RX ADMIN — ATORVASTATIN CALCIUM 20 MG: 20 TABLET, FILM COATED ORAL at 21:32

## 2024-06-04 RX ADMIN — POLYETHYLENE GLYCOL 3350 17 G: 17 POWDER, FOR SOLUTION ORAL at 09:09

## 2024-06-04 RX ADMIN — CLOPIDOGREL BISULFATE 75 MG: 75 TABLET ORAL at 09:11

## 2024-06-04 RX ADMIN — BISACODYL 5 MG: 5 TABLET, COATED ORAL at 09:10

## 2024-06-04 RX ADMIN — AMLODIPINE BESYLATE 5 MG: 5 TABLET ORAL at 09:11

## 2024-06-04 RX ADMIN — FERROUS SULFATE TAB 325 MG (65 MG ELEMENTAL FE) 325 MG: 325 (65 FE) TAB at 17:03

## 2024-06-04 RX ADMIN — OXYCODONE HYDROCHLORIDE 5 MG: 5 TABLET ORAL at 15:39

## 2024-06-04 RX ADMIN — ENOXAPARIN SODIUM 40 MG: 100 INJECTION SUBCUTANEOUS at 15:39

## 2024-06-04 RX ADMIN — ASPIRIN 81 MG: 81 TABLET, COATED ORAL at 09:11

## 2024-06-04 RX ADMIN — ACETAMINOPHEN 325MG 650 MG: 325 TABLET ORAL at 11:17

## 2024-06-04 RX ADMIN — OXYCODONE HYDROCHLORIDE 5 MG: 5 TABLET ORAL at 09:11

## 2024-06-04 ASSESSMENT — PAIN SCALES - GENERAL
PAINLEVEL_OUTOF10: 0
PAINLEVEL_OUTOF10: 8
PAINLEVEL_OUTOF10: 4
PAINLEVEL_OUTOF10: 0
PAINLEVEL_OUTOF10: 0
PAINLEVEL_OUTOF10: 4
PAINLEVEL_OUTOF10: 2
PAINLEVEL_OUTOF10: 5
PAINLEVEL_OUTOF10: 7
PAINLEVEL_OUTOF10: 5

## 2024-06-04 ASSESSMENT — PAIN DESCRIPTION - DIRECTION: RADIATING_TOWARDS: HIP

## 2024-06-04 ASSESSMENT — PAIN DESCRIPTION - LOCATION
LOCATION: LEG

## 2024-06-04 ASSESSMENT — PAIN DESCRIPTION - DESCRIPTORS
DESCRIPTORS: ACHING
DESCRIPTORS: SORE
DESCRIPTORS: ACHING
DESCRIPTORS: ACHING

## 2024-06-04 ASSESSMENT — PAIN - FUNCTIONAL ASSESSMENT
PAIN_FUNCTIONAL_ASSESSMENT: PREVENTS OR INTERFERES SOME ACTIVE ACTIVITIES AND ADLS
PAIN_FUNCTIONAL_ASSESSMENT: ACTIVITIES ARE NOT PREVENTED
PAIN_FUNCTIONAL_ASSESSMENT: ACTIVITIES ARE NOT PREVENTED

## 2024-06-04 ASSESSMENT — PAIN DESCRIPTION - ORIENTATION
ORIENTATION: RIGHT
ORIENTATION: RIGHT;LEFT
ORIENTATION: RIGHT;LEFT
ORIENTATION: RIGHT

## 2024-06-04 ASSESSMENT — PAIN DESCRIPTION - FREQUENCY
FREQUENCY: INTERMITTENT
FREQUENCY: INTERMITTENT

## 2024-06-04 ASSESSMENT — PAIN DESCRIPTION - ONSET
ONSET: ON-GOING
ONSET: GRADUAL

## 2024-06-04 ASSESSMENT — PAIN DESCRIPTION - PAIN TYPE
TYPE: SURGICAL PAIN
TYPE: SURGICAL PAIN

## 2024-06-04 NOTE — PLAN OF CARE
Problem: Physical Therapy - Adult  Goal: By Discharge: Performs mobility at highest level of function for planned discharge setting.  See evaluation for individualized goals.  Description: Physical Therapy Short Term Goals  Initiated 5/30/2024 and to be accomplished within 7 day(s) 6/6/24  1.  Patient will performsupine to sit and sit to supine in bed with modified independence.    2.  Patient will transfer from bed to chair and chair to bed with supervision/set-up using the least restrictive device.  3.  Patient will perform sit to stand with contact guard assist  4.  Patient will ambulate with stand by assist for 40 feet with toe touch weight bearing with the least restrictive device.   5.  Patient will propel wheelchair 150 feet modified independent for mobility on this unit.  6. Patient will improve R knee AROM to 10-90 degrees for greater ease and comfort with ambulation.    Physical Therapy Long Term Goals  Initiated 5/30/2024 and to be accomplished within 14 day(s) 6/13/24  1.  Patient will perform supine to sit and sit to supine in bed with modified independence.    2.  Patient will transfer from bed to chair and chair to bed with modified independence using the least restrictive device.  3.  Patient will perform sit to stand with modified independence.  4.  Patient will ambulate with supervision for 150 feet with toe touch weight bearing with the least restrictive device.   5. Patient will increase R knee AROM to 5-120 degrees for greater mobility in the home.   6/4/2024 1128 by Susy Azevedo PTA  Outcome: Progressing    PHYSICAL THERAPY TREATMENT    Patient: Juanpablo Jackson (83 y.o. female)  Date: 6/4/2024  Diagnosis: Closed displaced comminuted fracture of shaft of right femur with routine healing [S72.351D]   Precautions: fall risk, TTWB R LE  Chart, physical therapy assessment, plan of care and goals were reviewed.    Time in: 1100  Time out : 1200    Patient seen for: gait training, transfer  training, bed mobility, strengthening and ROM exercises, wheelchair mobility, and patient education     Pain:  Pt pain was reported as 8 pre-treatment.  Pt pain was reported as 2 post-treatment.  Intervention: Patient given Tylenol at 1120.    Patient identified with name and : Yes    SUBJECTIVE:      Patient reported getting a good night's sleep last but B LE are hurting her today (L knee and R hip).    OBJECTIVE DATA SUMMARY:    Objective:   Education: Education Given To: Patient  Education Provided: Precautions;Safety;Mobility Training;Transfer Training;Fall Prevention Strategies  Education Method: Demonstration;Verbal  Barriers to Learning: None  Education Outcome: Verbalized understanding;Continued education needed  BED/MAT MOBILITY Daily Assessment    Rolling Right  NT    Rolling Left  NT    Supine to Sit Contact guard assistance;Stand by assistance    Sit to Supine Stand by assistance      TRANSFERS Daily Assessment    Sit to Stand Minimal Assistance;Contact guard assistance (Pt requires assistance to maintain WB precautions)    Transfer Assist Score Minimal assistance;Contact guard assistance (stand step with RW; but pt trying to slide L foot due to decreased strength B UE's to off weight L LE and maintain TTWB R LE)             Comments  Verbal cueing for sequencing and to maintain TTWB R LE    Car Transfer  NT    Car Type NT        GAIT Daily Assessment    Gait Deviations Slow Jaclyn;Increased STEVAN;Decreased step length;Decreased step height    Assistive device Rolling Walker    Ambulation assistance - surface  Contact guard assistance  Level tile    Distance 45 feet    Comments Verbal reminders to maintain TTWB R LE    Ambulation-uneven surface  NT         BALANCE Daily Assessment    Posture      Sitting - Static Fair    Sitting - Dynamic Fair;-    Standing - Static Fair;- (with RW support)    Standing - Dynamic Fair;- (with RW support)    Comments        WHEELCHAIR MOBILITY/MANAGEMENT Daily Assessment

## 2024-06-04 NOTE — PROGRESS NOTES
4 Eyes Skin Assessment     NAME:  Juanpablo Jackson  YOB: 1940  MEDICAL RECORD NUMBER:  [id number]    The patient is being assessed for  Shift Handoff    I agree that at least one RN has performed a thorough Head to Toe Skin Assessment on the patient. ALL assessment sites listed below have been assessed.      Areas assessed by both nurses:    Sacrum. Buttock, Coccyx, Ischium and Legs. Feet and Heels        Does the Patient have a Wound? Yes wound(s) were present on assessment. LDA wound assessment was Initiated and completed by RN       Adelfo Prevention initiated by RN: Yes  Wound Care Orders initiated by RN: No    Pressure Injury (Stage 3,4, Unstageable, DTI, NWPT, and Complex wounds) if present, place Wound referral order by RN under : No    New Ostomies, if present place, Ostomy referral order under : No     Nurse 1 eSignature: Electronically signed by Haydee Jean Baptiste RN on 6/5/24 at 7:31 AM EDT    **SHARE this note so that the co-signing nurse can place an eSignature**    Nurse 2 eSignature: Electronically signed by Yadira Jimenez RN on 6/5/24 at 7:30 AM EDT

## 2024-06-04 NOTE — PLAN OF CARE
Minimal assistance;Verbal cueing;Adaptive equipment   Clinical Factors Pt performed LB bathing (Min A) seated on tub bench during ADL shower. Therapist applied waterproof barrier over RLE dressing prior to showering. Pt demonstrating ability to bathe upper thighs to just below knee using washcloth from seated position; verbal cue and instruction for washing distal LE's and B feet with (Min A). Pt demonstrating ability to bathe milo area using washcloth (supv/ SBA) in seated position by weight shifting. Patient requiring (Min A) to wash hips/ buttocks from seated position on tub bench.     SHOWER TRANSFER  Daily Assessment   Shower Transfer Technique Stand pivot (w/c <-> tub transfer bench using grab bar)   Shower Transfer Functional Level Minimal assistance;Verbal cues   Equipment Transfer tub bench   Shower Transfer Comments Stand pivot xfer performed (Min A) w/c <-> tub transfer bench; using grab bar. Mod VC's for hand placement, body positioning, and for maintaining TTWB R LE.     UPPER BODY DRESSING/UNDRESSING Daily Assessment   Functional Level Setup   Clinical Factors Pt performed UB dressing donning pullover shirt and button up cardigan with setup from seated position in w/c and no VC's.     LOWER BODY DRESSING/UNDRESSING Daily Assessment   Functional Level Minimal assistance;Adaptive equipment;Verbal cueing;Increased time to complete   Clinical Factors Pt performed LB dressing donning pullup/ depend and elastic waist pants wheelchair level; verbal cue for use of long handled reacher to thread BLE feet through clothing. Pt demonstrated ability to pull up pants and depend over buttocks in standing with min-mod A using grab bar in bathroom for sit to stand and VC's for maintaining RLE weight bearing (TTWB R LE). Pt donned slipper sock L foot with SBA using sock aid. VC's with instruction and increased time for use of AE for improved functional independence and safety during LB dressing task.     TOILETING Daily  Assessment   Functional Level Minimal assistance   Clinical factors Toileting (Min A) for clothing management (FWW) and hygiene using 3-in-1 commode.     TOILET TRANSFER Daily Assessment   Transfer Technique Stand step   Level of Assistance Minimal assistance   Equipment Standard bedside commode   Toilet Transfer Comments (Min A) using FWW to/ from 3-in-1 commode using FWW; gait belt. Bedside commode at the bedside. Verbal cues for hand placement, maintaining RLE TTWB, and safety awareness using AD (FWW).     FUNCTIONAL MOBILITY Daily Assessment    Functional - Mobility Device: Wheelchair  Activity: To/from bathroom  Assist Level: Minimal assistance (SBA/ Min A)  Functional Mobility Comments: Pt performed functional mobility from w/c level with (SBA/ Min A) and verbal cues for w/c management and safety within pt's room environment.   Stand Step Transfers: Minimal assistance  Stand Pivot Transfers: Minimal assistance  Sit to stand: Minimal assistance;Moderate assistance (Verbal cues for hand placement and for weight bearing restrictions (RLE TTWB))  Stand to sit: Minimal assistance;Moderate assistance (Verbal cues for hand placement and for weight bearing restrictions (RLE TTWB))  Transfer Comments: Pt performed sit to stand from EOB to FWW and Min A and VC's for hand placement, maintaining RLE TTWB, sequencing and safety technique.      WHEELCHAIR/BED TRANSFER INDEPENDENCE Daily Assessment   Transfer Technique Stand pivot   Level of Assistance Minimal assistance   Equipment Bed;Wheelchair (FWW; gait belt)   Comments Pt performed EOB to w/c transfer with FWW and Min A with VC's and education for maintaining RLE weightbearing restrictions and safe technique to reduce risks for falls.     Activity Tolerance:  Patient Tolerated treatment well;Patient limited by fatigue   Intermittent rest breaks due to fatigue.     EDUCATION:   Education Given To: Patient  Education Provided: Precautions;Safety;Mobility  Training;Transfer Training;Fall Prevention Strategies  Education Method: Demonstration;Verbal  Barriers to Learning: None  Education Outcome: Verbalized understanding;Continued education needed    ASSESSMENT:  Patient continues to benefit from skilled occupational therapy to address decreased (I) with LB ADL's, decreased functional strength/ endurance, impaired standing balance/ coordination, impaired standing tolerance, RLE TTWB, fatigue, BUE weakness, and pain which negatively impact pt performance, independence and safety with ADL/IADL's and functional mobility.   Progression toward goals:  []          Improving appropriately and progressing toward goals  [x]          Improving slowly and progressing toward goals  []          Not making progress toward goals and plan of care will be adjusted      PLAN:  Patient continues to benefit from skilled intervention to address the above impairments.  Continue treatment per established plan of care.  Discharge Recommendations:  Home health; home occupational therapy in retirement  Further Equipment Recommendations for Discharge:  none at this time, pt has built-in bench with grab bars in shower and handicap height toilets   Estimated LOS: TBD      COMMUNICATION/EDUCATION:   [] Home safety education was provided and the patient/caregiver indicated understanding.  [x] Patient/family have participated as able in goal setting and plan of care.  [x] Patient/family agree to work toward stated goals and plan of care.  [] Patient understands intent and goals of therapy, but is neutral about his/her participation.  [] Patient is unable to participate in goal setting and plan of care.    Please refer to the flowsheet for vital signs taken during this treatment.  After treatment:   [x]  Patient left in no apparent distress sitting up in wheelchair with needs met  []  Patient left in no apparent distress in bed  []  Patient handoff to SLP/PT  [x]  Call bell and immediate needs left within

## 2024-06-04 NOTE — PLAN OF CARE
Problem: Physical Therapy - Adult  Goal: By Discharge: Performs mobility at highest level of function for planned discharge setting.  See evaluation for individualized goals.  Description: Physical Therapy Short Term Goals  Initiated 5/30/2024 and to be accomplished within 7 day(s) 6/6/24  1.  Patient will performsupine to sit and sit to supine in bed with modified independence.    2.  Patient will transfer from bed to chair and chair to bed with supervision/set-up using the least restrictive device.  3.  Patient will perform sit to stand with contact guard assist  4.  Patient will ambulate with stand by assist for 40 feet with toe touch weight bearing with the least restrictive device.   5.  Patient will propel wheelchair 150 feet modified independent for mobility on this unit.  6. Patient will improve R knee AROM to 10-90 degrees for greater ease and comfort with ambulation.    Physical Therapy Long Term Goals  Initiated 5/30/2024 and to be accomplished within 14 day(s) 6/13/24  1.  Patient will perform supine to sit and sit to supine in bed with modified independence.    2.  Patient will transfer from bed to chair and chair to bed with modified independence using the least restrictive device.  3.  Patient will perform sit to stand with modified independence.  4.  Patient will ambulate with supervision for 150 feet with toe touch weight bearing with the least restrictive device.   5. Patient will increase R knee AROM to 5-120 degrees for greater mobility in the home.   6/4/2024 1801 by Jo Avery PTA  Outcome: Progressing     PHYSICAL THERAPY TREATMENT    Patient: Juanpablo Jackson (83 y.o. female)  Date: 6/4/2024  Diagnosis: Closed displaced comminuted fracture of shaft of right femur with routine healing [S72.351D]   Precautions: TTWB right LE, fall risk  Chart, physical therapy assessment, plan of care and goals were reviewed.    Time in: 1535  Time out : 1641    Patient seen for: group therapy, txfr  training, gait training    Pain:  Pt pain was reported as 8/10 pre-treatment.  Pt pain was reported as 2/10 post-treatment.  Intervention: pt received pain meds during session    Patient identified with name and : Yes    SUBJECTIVE:      Pt reports \"they said I'm supposed to get my staples out tomorrow, I think.\" Pt also reports pain in left knee is limiting factor to tolerance.     OBJECTIVE DATA SUMMARY:    Objective:   Education: Education Given To: Patient  Education Provided: Mobility Training;Transfer Training;Precautions;Safety  Education Method: Demonstration;Verbal  Barriers to Learning: None  Education Outcome: Verbalized understanding;Continued education needed    TRANSFERS Daily Assessment    Sit to Stand Minimal Assistance  Pt performed sit to stand from w/c with min assist for lift off due to left LE weakness and pt attempting to push through toes of right LE requiring v/c to maintain TTWB.    Transfer Assist Score              Comments        Car Transfer      Car Type         GAIT Daily Assessment    Gait Deviations Slow Jaclyn;Increased STEVAN;Decreased step length;Decreased step height    Assistive device Rolling Walker    Ambulation assistance - surface  Contact guard assistance  Level tile    Distance 20ft    Comments Pt ambulated 20ft with RW and CGA with w/c follow, performing step to pattern with attempt to maintain TTWB right LE, with compliance 50-75% of time. Pt demo'd decreased left LE step length and difficulty fully wt bearing through BUE on RW to maintain TTWB.    Ambulation-uneven surface              BALANCE Daily Assessment    Posture      Sitting - Static Fair    Sitting - Dynamic Fair;-    Standing - Static Fair;- (with RW)    Standing - Dynamic Fair;- (with RW)    Comments        THERAPEUTIC EXERCISES Daily Assessment     Standing right hip flexion x15      Group therapy treatment:    Seated  EXERCISE   Sets   Reps   Active Active Assist   Comments   Ankle Pumps 2 15 [x] []

## 2024-06-04 NOTE — PLAN OF CARE
Late entry for 6/3/24    Problem: SLP Adult - Disturbed Thought Process  Goal: By Discharge: Demonstrates cognitive skills at highest level of function for planned discharge setting.   See evaluation for individualized goals.  Description: Description: Long term goals (initiated 6/4/24; to be met by 6/18/24)  Patient will:  1.          Be oriented x 3 and recall events of the day, supervision.  2.          Recall names of group members with min cues.  3.          Demonstrate functional problem solving and reasoning in structured tasks with min assist.  4.          Recall functional information given printed cues, min assist-supervision.    Short term goals (by 6/11/24)  Patient will:  1.          Be oriented x 3 and recall events of the day, mod-min assist.  2.          Recall names of group members with min-mod cues.  3.          Demonstrate functional problem solving and reasoning in structured tasks with min assist.  4.          Recall functional information given printed cues, min assist-suprvision.      Note:   Speech LAnguage Pathology evaluation    Patient: Juanpablo Jackson (83 y.o. female)  Date: 6/4/2024  Primary Diagnosis: Closed displaced comminuted fracture of shaft of right femur with routine healing [S72.351D]       Precautions: Standard  PLOF: As per H&P    ASSESSMENT :  Based on the objective data described below, the patient presents with minimal cognitive deficits.    Patient will benefit from skilled intervention to address the above impairments.  Patient's rehabilitation potential/ .  Factors which may influence rehabilitation potential include:   []              None noted  []              Mental ability/status  []              Medical condition  [x]              Home/family situation and support systems  [x]              Safety awareness  [x]              Pain tolerance/management  []              Other:      PLAN :  Recommendations and Planned Interventions:  Recommendations  Total  Treatment Time: 60  Frequency/Duration: Patient will be followed by speech-language pathology 1-2 times per day/3-5 days per week to address goals.     SUBJECTIVE:   Patient stated, “I moved from my home to a Seniors apartment at [place]”.    OBJECTIVE:     Past Medical History:   Diagnosis Date    Constipation     History of right hip replacement     History of total right knee replacement     Hypercholesteremia     Hypertension     no meds now    Microscopic hematuria     MRSA (methicillin resistant staph aureus) culture positive 06/2018    On abdominal wall- tx with Vibramycin  (care everywhere)    Osteoporosis     Other fracture of right femur, initial encounter for closed fracture (HCC)     Severe scoliosis     Stroke ([facility]) not sure when    blurred vision, resolved (taking plavix)     MOHSEN (stress urinary incontinence, female)     UTI (urinary tract infection)      Past Surgical History:   Procedure Laterality Date    CATARACT REMOVAL Bilateral     CHOLECYSTECTOMY  1972    FIBULA FRACTURE SURGERY Right 5/21/2024    RIGHT DISTAL FEMUR OPEN REDUCTION INTERNAL FIXATION/ FRACTURE TABLE/ C-ARM/ [DEPUY SYNTHES ORTHO] SYNTHES CONDYLAR PLATE performed by Lars Harden MD at [facility] MAIN OR    HYSTERECTOMY (CERVIX STATUS UNKNOWN)  09/2016    total    LEG SURGERY Right 5/23/2024    RIGHT LOWER EXTREMITY FASCIOTOMY CLOSURE performed by Lawanda Van MD at [facility] MAIN OR    OTHER SURGICAL HISTORY  01/2019    basal skin ca removed     OTHER SURGICAL HISTORY  2005, 2015    skin ca removed     OTHER SURGICAL HISTORY  09/2016    bladder support    TONSILLECTOMY  1946    TOTAL KNEE ARTHROPLASTY Right 04/27/2015    VASCULAR SURGERY Right 5/21/2024    RIGHT COMMON FEMORAL ARTERY EXPOSURE, RIGHT LOWER EXTREMITY ANGIOGRAPHY, RIGHT SFA  AND POPLITEAL STENT PLACEMENT performed by Lawanda Van MD at [facility] CARDIAC SURGERY     Prior Level of Function/Home Situation:  Social/Functional History  Lives With:

## 2024-06-04 NOTE — PLAN OF CARE
Problem: SLP Adult - Disturbed Thought Process  Goal: By Discharge: Demonstrates cognitive skills at highest level of function for planned discharge setting.   See evaluation for individualized goals.  Description: Description: Long term goals (initiated 6/4/24; to be met by 6/18/24)  Patient will:  1.          Be oriented x 3 and recall events of the day, supervision.  2.          Recall names of group members with min cues.  3.          Demonstrate functional problem solving and reasoning in structured tasks with min assist.  4.          Recall functional information given printed cues, min assist-supervision.    Short term goals (by 6/11/24)  Patient will:  1.          Be oriented x 3 and recall events of the day, mod-min assist.  2.          Recall names of group members with min-mod cues.  3.          Demonstrate functional problem solving and reasoning in structured tasks with min assist.  4.          Recall functional information given printed cues, min assist-suprvision.      6/4/2024 1629 by Dana Stratton, SLP  Note:   SPEECH-LANGUAGE TREATMENT    Patient: Juanpablo Jackson (83 y.o. female)  Date: 6/4/2024  Diagnosis: Closed displaced comminuted fracture of shaft of right femur with routine healing [S72.351D] Closed displaced comminuted fracture of shaft of right femur with routine healing      Precautions: Standard  PLOF: As per H&P     ASSESSMENT:  Ms. Jackson enjoyed the leisure activity presented. She was able to   Progression toward goals:  [x]       Improving appropriately and progressing toward goals  []       Improving slowly and progressing toward goals  []       Not making progress toward goals and plan of care will be adjusted     PLAN:  Patient continues to benefit from skilled intervention to address the above impairments.  Continue treatment per established plan of care.     SUBJECTIVE:   Patient stated “All of the therapists are nice”.    OBJECTIVE:   Daily Assessment:  Patient was

## 2024-06-04 NOTE — PROGRESS NOTES
[facility] PHYSICAL REHABILITATION  [address] [place] [id number]     INPATIENT REHABILITATION  DAILY PROGRESS NOTE     Date: 6/4/2024    Name: Juanpablo Jackson Age / Sex: 83 y.o. / female   CSN: [id number] MRN: [id number]   Admit Date: 5/29/2024 Length of Stay: 6 days     Primary Rehabilitation Diagnosis impaired mobility and ADLs in the setting of a closed fracture of the shaft of right femur, status post open reduction and internal fixation and subsequent SFA-popliteal dissection and patient is now status post right CFA exploration and superficial femoral and popliteal stents with 4 compartment fasciotomy        Subjective:     I personally saw and evaluated this patient face-to-face today.  Patient is sitting in a chair in no apparent distress.  Patient is in good spirits    Objective:     Vital Signs:  Patient Vitals for the past 24 hrs:   BP Temp Temp src Pulse Resp SpO2   06/04/24 0708 (!) 141/76 97.6 °F (36.4 °C) Oral 66 17 99 %   06/03/24 1953 (!) 139/55 98.5 °F (36.9 °C) Oral 78 17 98 %        Physical Examination:  General:  Awake, alert  Cardiovascular:  S1S2+, RRR  Pulmonary:  CTA b/l  GI:  Soft, BS+, NT, ND  Extremities: Right leg with dressings in place  Right femur surgical incision site examined with RN present.  Staples are in place.  No redness or drainage noted during my examination.  I discussed with RN        Current Medications:  Current Facility-Administered Medications   Medication Dose Route Frequency    acetaminophen (TYLENOL) tablet 650 mg  650 mg Oral Q4H PRN    ferrous sulfate (IRON 325) tablet 325 mg  325 mg Oral BID WC    polyethylene glycol (GLYCOLAX) packet 17 g  17 g Oral Daily    aspirin EC tablet 81 mg  81 mg Oral Daily    enoxaparin (LOVENOX) injection 40 mg  40 mg SubCUTAneous Q24H    amLODIPine (NORVASC) tablet 5 mg  5 mg Oral Daily    clopidogrel (PLAVIX) tablet 75 mg  75 mg Oral Daily    oxyCODONE (ROXICODONE) immediate release tablet 5 mg  5 mg Oral  increased pain)  Rolling to Left: Contact guard assistance (with bed rail)  Supine to Sit: Contact guard assistance, Stand by assistance  Sit to Stand: Minimal Assistance, Contact guard assistance (Pt requires assistance to maintain WB precautions)  Sit to Supine: Stand by assistance  Bed to Chair: Minimal assistance, Contact guard assistance (stand step with RW; but pt trying to slide L foot due to decreased strength B UE's to off weight L LE and maintain TTWB R LE)  Car Transfer: Minimal Assistance (Pt requires some assistance with LE management, verbal cues for hand placement and sequencing)   Wheelchair Mobility  Yes       Wheelchair Mobility  Propulsion: Yes         Ambulation  Rolling Walker  Contact guard assistance  Level tile  15 Ambulation  Device: Rolling Walker  Assistance: Contact guard assistance  Surface: Level tile  Distance: 45 feet   Stairs  No (Not performed d/t safety concerns and weight bearing precautoins at this time)       Stairs  Stairs?: No (Not performed d/t safety concerns and weight bearing precautoins at this time)           Functional Progress:    OCCUPATIONAL THERAPY    ON ADMISSION MOST RECENT   Eating  Supervision Eating  Feeding: Setup   Grooming  Supervision, Setup Grooming  Grooming: Setup, Supervision   Bathing  UB Bathing Stand by assistance  LB Bathing Moderate assistance Bathing  UB Bathing   UE Bathing: Supervision  LB Bathing   FLOW([id number]:last)@   Upper Body Dressing  Supervision   Upper Body Dressing  UE Dressing: Setup   Lower Body Dressing  Moderate assistance Lower Body Dressing  LE Dressing: Minimal assistance, Adaptive equipment, Verbal cueing, Increased time to complete   Toileting  Moderate assistance Toileting  Toileting: Minimal assistance   Toilet Transfers  Moderate assistance Toilet Transfers  Toilet Transfer: Minimal assistance   Tub Transfers  Tub Transfers: Not tested  SHOWER Transfers  Shower - Transfer From: Wheelchair  Shower - Transfer Type: To and  From  Shower - Transfer To: Transfer tub bench  Shower - Technique: Stand pivot (w/c <-> tub transfer bench using grab bar)  Shower Transfers: Not tested  Shower Transfers Comments: NT this date due to pt feeling nauseated, fatigue,  reported to RN Tub Transfers  Tub Transfers  Tub Transfers: Not tested  SHOWER Transfers  Tub Transfers  Tub Transfers: Not tested     Legend:   7 - Independent   6 - Modified Independent   5 - Standby Assistance / Supervision / Set-up   4 - Minimum Assistance / Contact Guard Assistance   3 - Moderate Assistance   2 - Maximum Assistance   1 - Total Assistance / Dependent             Plan:     1. Justification for continued stay: Fair progression towards established rehabilitation goals.  Patient is now needing minimal assistance for toileting.  In comparison at the time of admission patient was needing moderate assistance for toileting    2. Medical Issues being followed closely:    [x]  Fall and safety precautions     [x]  Wound Care     [x]  Bowel and Bladder Function     [x]  Fluid Electrolyte and Nutrition Balance     [x]  Pain Control      3. Issues that 24 hour rehabilitation nursing is following:    [x]  Fall and safety precautions     [x]  Wound Care     [x]  Bowel and Bladder Function     [x]  Fluid Electrolyte and Nutrition Balance     [x]  Pain Control      [x]  Assistance with and education on in-room safety with transfers to and from the bed, wheelchair, toilet and shower.      4. Acute rehabilitation plan of care:    [x]  Continue current care and rehab.           [x]  Physical Therapy           [x]  Occupational Therapy           [x]  Speech Therapy .  Group speech therapy added on Siobhan 3     []  Hold Rehab until further notice     5. Medications:    [x]  MAR Reviewed     [x]  Continue Present Medications       6. Chemical DVT Prophylaxis:      [x]  Enoxaparin     []  Unfractionated Heparin     []  Warfarin     []  NOAC     []  Aspirin     []  None     7. Mechanical DVT

## 2024-06-04 NOTE — PROGRESS NOTES
4 Eyes Skin Assessment     NAME:  Juanpablo Jackson  YOB: 1940  MEDICAL RECORD NUMBER:  [id number]    The patient is being assessed for  Shift Handoff    I agree that at least one RN has performed a thorough Head to Toe Skin Assessment on the patient. ALL assessment sites listed below have been assessed.      Areas assessed by both nurses:    Sacrum. Buttock, Coccyx, Ischium and Legs. Feet and Heels        Does the Patient have a Wound? Yes wound(s) were present on assessment. LDA wound assessment was Initiated and completed by RN       Adelfo Prevention initiated by RN: Yes  Wound Care Orders initiated by RN: No    Pressure Injury (Stage 3,4, Unstageable, DTI, NWPT, and Complex wounds) if present, place Wound referral order by RN under : No    New Ostomies, if present place, Ostomy referral order under : No     Nurse 1 eSignature: Electronically signed by Fouzia Muhammad RN on 6/4/24 at 7:40 PM EDT    **SHARE this note so that the co-signing nurse can place an eSignature**    Nurse 2 eSignature: Electronically signed by Haydee Jean Baptiste RN on 6/4/24 at 07:42 PM EDT

## 2024-06-05 PROCEDURE — 97530 THERAPEUTIC ACTIVITIES: CPT

## 2024-06-05 PROCEDURE — 6360000002 HC RX W HCPCS: Performed by: EMERGENCY MEDICINE

## 2024-06-05 PROCEDURE — 97110 THERAPEUTIC EXERCISES: CPT

## 2024-06-05 PROCEDURE — 6370000000 HC RX 637 (ALT 250 FOR IP): Performed by: EMERGENCY MEDICINE

## 2024-06-05 PROCEDURE — 6370000000 HC RX 637 (ALT 250 FOR IP): Performed by: NURSE PRACTITIONER

## 2024-06-05 PROCEDURE — 1180000000 HC REHAB R&B

## 2024-06-05 PROCEDURE — 97535 SELF CARE MNGMENT TRAINING: CPT

## 2024-06-05 PROCEDURE — 94761 N-INVAS EAR/PLS OXIMETRY MLT: CPT

## 2024-06-05 PROCEDURE — 99232 SBSQ HOSP IP/OBS MODERATE 35: CPT | Performed by: EMERGENCY MEDICINE

## 2024-06-05 PROCEDURE — 97116 GAIT TRAINING THERAPY: CPT

## 2024-06-05 RX ADMIN — ACETAMINOPHEN 325MG 650 MG: 325 TABLET ORAL at 11:19

## 2024-06-05 RX ADMIN — CLOPIDOGREL BISULFATE 75 MG: 75 TABLET ORAL at 08:05

## 2024-06-05 RX ADMIN — ASPIRIN 81 MG: 81 TABLET, COATED ORAL at 08:05

## 2024-06-05 RX ADMIN — Medication 1 TABLET: at 16:23

## 2024-06-05 RX ADMIN — FERROUS SULFATE TAB 325 MG (65 MG ELEMENTAL FE) 325 MG: 325 (65 FE) TAB at 16:24

## 2024-06-05 RX ADMIN — AMLODIPINE BESYLATE 5 MG: 5 TABLET ORAL at 08:05

## 2024-06-05 RX ADMIN — Medication 1 TABLET: at 08:04

## 2024-06-05 RX ADMIN — OXYCODONE HYDROCHLORIDE 5 MG: 5 TABLET ORAL at 14:01

## 2024-06-05 RX ADMIN — ACETAMINOPHEN 325MG 650 MG: 325 TABLET ORAL at 16:24

## 2024-06-05 RX ADMIN — FERROUS SULFATE TAB 325 MG (65 MG ELEMENTAL FE) 325 MG: 325 (65 FE) TAB at 08:04

## 2024-06-05 RX ADMIN — OXYCODONE HYDROCHLORIDE 5 MG: 5 TABLET ORAL at 08:05

## 2024-06-05 RX ADMIN — POLYETHYLENE GLYCOL 3350 17 G: 17 POWDER, FOR SOLUTION ORAL at 08:04

## 2024-06-05 RX ADMIN — ATORVASTATIN CALCIUM 20 MG: 20 TABLET, FILM COATED ORAL at 21:01

## 2024-06-05 RX ADMIN — ENOXAPARIN SODIUM 40 MG: 100 INJECTION SUBCUTANEOUS at 16:23

## 2024-06-05 ASSESSMENT — PAIN SCALES - GENERAL
PAINLEVEL_OUTOF10: 1
PAINLEVEL_OUTOF10: 0
PAINLEVEL_OUTOF10: 1
PAINLEVEL_OUTOF10: 1
PAINLEVEL_OUTOF10: 0
PAINLEVEL_OUTOF10: 0
PAINLEVEL_OUTOF10: 4
PAINLEVEL_OUTOF10: 5
PAINLEVEL_OUTOF10: 0
PAINLEVEL_OUTOF10: 4
PAINLEVEL_OUTOF10: 8
PAINLEVEL_OUTOF10: 5
PAINLEVEL_OUTOF10: 0

## 2024-06-05 ASSESSMENT — PAIN DESCRIPTION - LOCATION
LOCATION: LEG;KNEE;BACK
LOCATION: LEG

## 2024-06-05 ASSESSMENT — PAIN - FUNCTIONAL ASSESSMENT
PAIN_FUNCTIONAL_ASSESSMENT: ACTIVITIES ARE NOT PREVENTED

## 2024-06-05 ASSESSMENT — PAIN DESCRIPTION - DESCRIPTORS
DESCRIPTORS: ACHING
DESCRIPTORS: STABBING;THROBBING

## 2024-06-05 ASSESSMENT — PAIN DESCRIPTION - PAIN TYPE
TYPE: SURGICAL PAIN

## 2024-06-05 ASSESSMENT — PAIN DESCRIPTION - ORIENTATION
ORIENTATION: RIGHT

## 2024-06-05 ASSESSMENT — PAIN DESCRIPTION - DIRECTION: RADIATING_TOWARDS: RIGHT KNEE

## 2024-06-05 ASSESSMENT — PAIN DESCRIPTION - FREQUENCY
FREQUENCY: INTERMITTENT

## 2024-06-05 ASSESSMENT — PAIN DESCRIPTION - ONSET
ONSET: ON-GOING
ONSET: GRADUAL
ONSET: ON-GOING
ONSET: ON-GOING

## 2024-06-05 NOTE — PLAN OF CARE
Problem: Occupational Therapy - Adult  Goal: By Discharge: Performs self-care activities at highest level of function for planned discharge setting.  See evaluation for individualized goals.  Description: Occupational Therapy Goals   Long Term Goals  Initiated 24 and to be accomplished within 2 week(s)  1. Pt will perform self-feeding with Jovanna.  2. Pt will perform grooming with Jovanna.  3. Pt will perform UB bathing with Jovanna.  4. Pt will perform LB bathing with CGA.  5. Pt will perform tub/shower transfer with CGA.   6. Pt will perform UB dressing with Jovanna.  7. Pt will perform LB dressing with CGA.  8. Pt will perform toileting task with CGA.  9. Pt will perform toilet transfer with CGA.    Short Term Goals   Initiated 24 and to be accomplished within 7 day(s), (to be reassessed by 2024)  1. Pt will perform self-feeding with setup.   2. Pt will perform grooming with setup.  3. Pt will perform UB bathing with setup.  4. Pt will perform LB bathing with Chacho.  5. Pt will perform tub/shower transfer with Chacho.  6. Pt will perform UB dressing with setup.  7. Pt will perform LB dressing with Chacho.  8. Pt will perform toileting task with Chacho.  9. Pt will perform toilet transfer with Chacho.    9HPT: RUE:33 sec. LUE: 30 sec. (24)  Outcome: Progressing   Occupational Therapy TREATMENT    Patient: Juanpablo Jackson   83 y.o.    Patient identified with name and : yes    Date: 2024    First Tx Session  Time In: 935  Time Out: 110  Diagnosis: Closed displaced comminuted fracture of shaft of right femur with routine healing [S72.351D]   Precautions: Restrictions/Precautions: Fall Risk, Surgical Protocols, Weight Bearing, Contact Precautions     Right Lower Extremity Weight Bearing: Toe Touch Weight Bearing     Chart, occupational therapy assessment, plan of care, and goals were reviewed.     Pain:  Intensity Pre-treatment: 3/10   Intensity Post-treatment: 4/10  Scale: Numeric Rating Scale  Location:  up cardigan seated in w/c and no VC's.     LOWER BODY DRESSING/UNDRESSING Daily Assessment   Functional Level Min A   Clinical Factors Pt edu/ instructed on use of AE for improved functional ability with doffing/ donning slipper socks using dressing stick and sock aid. Pt donned left slipper sock (SBA/ Min A) using sock aid.      FUNCTIONAL MOBILITY Daily Assessment    Functional - Mobility Device: Wheelchair  Activity: To/From therapy gym  Assist Level: Stand by assistance  Functional Mobility Comments: Pt performed functional mobility from w/c level with (SBA) and verbal cues for w/c management to/ from therapy gym.   Stand Step Transfers: Contact guard assistance;Minimal assistance (Stand step transfer (CGA/ Min A) using FWW; gait belt. Verbal cues for maintaining weight bearing restrictions (TTWB R LE).)  Sit to stand: Minimal assistance (Verbal cues for hand placement and for weight bearing restrictions (RLE TTWB))  Stand to sit: Minimal assistance (Verbal cues for hand placement and for weight bearing restrictions (RLE TTWB))    Activity Tolerance:  Patient limited by fatigue;Patient limited by pain   Intermittent rest breaks due to fatigue. Care coordinated with RN 2/2 pt's report of 4/10 pain R thigh/ hip intermittent aching; GLADYS May to follow-up.     EDUCATION:   Education Given To: Patient  Education Provided: Mobility Training;Transfer Training;Precautions;Safety;Energy Conservation;Equipment;Home Exercise Program  Education Method: Demonstration;Verbal  Barriers to Learning: None  Education Outcome: Verbalized understanding;Continued education needed    ASSESSMENT:  Patient continues to benefit from skilled occupational therapy to address decreased (I) with LB ADL's, decreased functional strength/ endurance, impaired standing balance/ coordination, impaired standing tolerance, RLE TTWB, fatigue, BUE weakness, and pain which negatively impact pt performance, independence and safety with ADL/IADL's and

## 2024-06-05 NOTE — PLAN OF CARE
Problem: Physical Therapy - Adult  Goal: By Discharge: Performs mobility at highest level of function for planned discharge setting.  See evaluation for individualized goals.  Description: Physical Therapy Short Term Goals  Initiated 5/30/2024 and to be accomplished within 7 day(s) 6/6/24  1.  Patient will performsupine to sit and sit to supine in bed with modified independence.    2.  Patient will transfer from bed to chair and chair to bed with supervision/set-up using the least restrictive device.  3.  Patient will perform sit to stand with contact guard assist  4.  Patient will ambulate with stand by assist for 40 feet with toe touch weight bearing with the least restrictive device.   5.  Patient will propel wheelchair 150 feet modified independent for mobility on this unit.  6. Patient will improve R knee AROM to 10-90 degrees for greater ease and comfort with ambulation.    Physical Therapy Long Term Goals  Initiated 5/30/2024 and to be accomplished within 14 day(s) 6/13/24  1.  Patient will perform supine to sit and sit to supine in bed with modified independence.    2.  Patient will transfer from bed to chair and chair to bed with modified independence using the least restrictive device.  3.  Patient will perform sit to stand with modified independence.  4.  Patient will ambulate with supervision for 150 feet with toe touch weight bearing with the least restrictive device.   5. Patient will increase R knee AROM to 5-120 degrees for greater mobility in the home.   6/5/2024 0747 by Susy Azevedo PTA  Outcome: Progressing    PHYSICAL THERAPY TREATMENT    Patient: Juanpablo Jackson (83 y.o. female)  Date: 6/5/2024  Diagnosis: Closed displaced comminuted fracture of shaft of right femur with routine healing [S72.351D]   Precautions: fall risk, TTWB R LE  Chart, physical therapy assessment, plan of care and goals were reviewed.    Time in: 0730  Time out : 0900    Patient seen for: gait training, transfer

## 2024-06-05 NOTE — PROGRESS NOTES
4 Eyes Skin Assessment     NAME:  Juanpablo Jackson  YOB: 1940  MEDICAL RECORD NUMBER:  [id number]    The patient is being assessed for  Shift Handoff    I agree that at least one RN has performed a thorough Head to Toe Skin Assessment on the patient. ALL assessment sites listed below have been assessed.      Areas assessed by both nurses:    Sacrum. Buttock, Coccyx, Ischium and Legs. Feet and Heels        Does the Patient have a Wound? Yes wound(s) were present on assessment. LDA wound assessment was Initiated and completed by RN       Adelfo Prevention initiated by RN: Yes  Wound Care Orders initiated by RN: No    Pressure Injury (Stage 3,4, Unstageable, DTI, NWPT, and Complex wounds) if present, place Wound referral order by RN under : No    New Ostomies, if present place, Ostomy referral order under : No     Nurse 1 eSignature: Electronically signed by Haydee Jean Baptiste RN on 6/5/24 at 7:11 AM EDT    **SHARE this note so that the co-signing nurse can place an eSignature**    Nurse 2 eSignature: Electronically signed by Yadira Jimenez RN on 6/5/24 at 11:14 AM EDT

## 2024-06-05 NOTE — PROGRESS NOTES
4 Eyes Skin Assessment     NAME:  Juanpablo Jackson  YOB: 1940  MEDICAL RECORD NUMBER:  [id number]    The patient is being assessed for  Shift Handoff    I agree that at least one RN has performed a thorough Head to Toe Skin Assessment on the patient. ALL assessment sites listed below have been assessed.      Areas assessed by both nurses:    Sacrum. Buttock, Coccyx, Ischium and Legs. Feet and Heels        Does the Patient have a Wound? Yes wound(s) were present on assessment. LDA wound assessment was Initiated and completed by RN       Adelfo Prevention initiated by RN: Yes  Wound Care Orders initiated by RN: No    Pressure Injury (Stage 3,4, Unstageable, DTI, NWPT, and Complex wounds) if present, place Wound referral order by RN under : No    New Ostomies, if present place, Ostomy referral order under : No     Nurse 1 eSignature: Electronically signed by Yadira Jimenez RN on 6/5/24 at 5:17 PM EDT    **SHARE this note so that the co-signing nurse can place an eSignature**    Nurse 2 eSignature: Electronically signed by Rae Ravi RN on 6/5/24 at 8:22 PM EDT

## 2024-06-05 NOTE — PROGRESS NOTES
[facility] PHYSICAL REHABILITATION  [address] [place] [id number]     INPATIENT REHABILITATION  DAILY PROGRESS NOTE     Date: 6/5/2024    Name: Juanpablo Jackson Age / Sex: 83 y.o. / female   CSN: [id number] MRN: [id number]   Admit Date: 5/29/2024 Length of Stay: 7 days     Primary Rehabilitation Diagnosis impaired mobility and ADLs in the setting of a closed fracture of the shaft of right femur, status post open reduction and internal fixation and subsequent SFA-popliteal dissection and patient is now status post right CFA exploration and superficial femoral and popliteal stents with 4 compartment fasciotomy        Subjective:     I personally saw and evaluated this patient face-to-face today.  Patient is sitting in a chair in no apparent distress, awake and alert    Objective:     Vital Signs:  Patient Vitals for the past 24 hrs:   BP Temp Temp src Pulse Resp SpO2   06/05/24 1615 (!) 152/67 98 °F (36.7 °C) Oral 70 16 100 %   06/05/24 0802 (!) 143/57 97.5 °F (36.4 °C) Oral 67 18 100 %   06/05/24 0230 127/60 -- -- 61 -- --   06/04/24 2130 (!) 150/62 97.6 °F (36.4 °C) Oral 64 18 100 %        Physical Examination:  General:  Awake, alert  Cardiovascular:  S1S2+, RRR  Pulmonary:  CTA b/l  GI:  Soft, BS+, NT, ND  Extremities: Right leg with dressing in place.  Patient states that the vascular surgical PA has been examining and changing the dressings.        Current Medications:  Current Facility-Administered Medications   Medication Dose Route Frequency    acetaminophen (TYLENOL) tablet 650 mg  650 mg Oral Q4H PRN    ferrous sulfate (IRON 325) tablet 325 mg  325 mg Oral BID WC    polyethylene glycol (GLYCOLAX) packet 17 g  17 g Oral Daily    aspirin EC tablet 81 mg  81 mg Oral Daily    enoxaparin (LOVENOX) injection 40 mg  40 mg SubCUTAneous Q24H    amLODIPine (NORVASC) tablet 5 mg  5 mg Oral Daily    clopidogrel (PLAVIX) tablet 75 mg  75 mg Oral Daily    oxyCODONE (ROXICODONE) immediate release

## 2024-06-06 PROCEDURE — 6360000002 HC RX W HCPCS: Performed by: EMERGENCY MEDICINE

## 2024-06-06 PROCEDURE — 97150 GROUP THERAPEUTIC PROCEDURES: CPT

## 2024-06-06 PROCEDURE — 97110 THERAPEUTIC EXERCISES: CPT

## 2024-06-06 PROCEDURE — 6370000000 HC RX 637 (ALT 250 FOR IP): Performed by: EMERGENCY MEDICINE

## 2024-06-06 PROCEDURE — 97116 GAIT TRAINING THERAPY: CPT

## 2024-06-06 PROCEDURE — 99232 SBSQ HOSP IP/OBS MODERATE 35: CPT | Performed by: EMERGENCY MEDICINE

## 2024-06-06 PROCEDURE — 92508 TX SP LANG VOICE COMM GROUP: CPT

## 2024-06-06 PROCEDURE — 94761 N-INVAS EAR/PLS OXIMETRY MLT: CPT

## 2024-06-06 PROCEDURE — 97535 SELF CARE MNGMENT TRAINING: CPT

## 2024-06-06 PROCEDURE — 6370000000 HC RX 637 (ALT 250 FOR IP): Performed by: NURSE PRACTITIONER

## 2024-06-06 PROCEDURE — 97530 THERAPEUTIC ACTIVITIES: CPT

## 2024-06-06 PROCEDURE — 1180000000 HC REHAB R&B

## 2024-06-06 RX ADMIN — Medication 1 TABLET: at 16:47

## 2024-06-06 RX ADMIN — ASPIRIN 81 MG: 81 TABLET, COATED ORAL at 09:03

## 2024-06-06 RX ADMIN — AMLODIPINE BESYLATE 5 MG: 5 TABLET ORAL at 09:03

## 2024-06-06 RX ADMIN — ENOXAPARIN SODIUM 40 MG: 100 INJECTION SUBCUTANEOUS at 15:29

## 2024-06-06 RX ADMIN — ATORVASTATIN CALCIUM 20 MG: 20 TABLET, FILM COATED ORAL at 20:35

## 2024-06-06 RX ADMIN — ACETAMINOPHEN 325MG 650 MG: 325 TABLET ORAL at 09:14

## 2024-06-06 RX ADMIN — FERROUS SULFATE TAB 325 MG (65 MG ELEMENTAL FE) 325 MG: 325 (65 FE) TAB at 09:03

## 2024-06-06 RX ADMIN — FERROUS SULFATE TAB 325 MG (65 MG ELEMENTAL FE) 325 MG: 325 (65 FE) TAB at 16:48

## 2024-06-06 RX ADMIN — Medication 1 TABLET: at 09:03

## 2024-06-06 RX ADMIN — OXYCODONE HYDROCHLORIDE 5 MG: 5 TABLET ORAL at 13:25

## 2024-06-06 RX ADMIN — CLOPIDOGREL BISULFATE 75 MG: 75 TABLET ORAL at 09:03

## 2024-06-06 RX ADMIN — POLYETHYLENE GLYCOL 3350 17 G: 17 POWDER, FOR SOLUTION ORAL at 09:03

## 2024-06-06 ASSESSMENT — PAIN DESCRIPTION - LOCATION
LOCATION: BACK;KNEE
LOCATION: HIP

## 2024-06-06 ASSESSMENT — PAIN DESCRIPTION - FREQUENCY
FREQUENCY: INTERMITTENT
FREQUENCY: INTERMITTENT

## 2024-06-06 ASSESSMENT — PAIN SCALES - GENERAL
PAINLEVEL_OUTOF10: 0
PAINLEVEL_OUTOF10: 6
PAINLEVEL_OUTOF10: 0
PAINLEVEL_OUTOF10: 0
PAINLEVEL_OUTOF10: 2
PAINLEVEL_OUTOF10: 0

## 2024-06-06 ASSESSMENT — PAIN DESCRIPTION - PAIN TYPE
TYPE: SURGICAL PAIN
TYPE: SURGICAL PAIN

## 2024-06-06 ASSESSMENT — PAIN DESCRIPTION - DIRECTION: RADIATING_TOWARDS: RIGHT KNEE

## 2024-06-06 ASSESSMENT — PAIN DESCRIPTION - ORIENTATION
ORIENTATION: LEFT
ORIENTATION: RIGHT

## 2024-06-06 ASSESSMENT — PAIN DESCRIPTION - ONSET
ONSET: GRADUAL
ONSET: GRADUAL

## 2024-06-06 ASSESSMENT — PAIN DESCRIPTION - DESCRIPTORS
DESCRIPTORS: ACHING
DESCRIPTORS: ACHING

## 2024-06-06 NOTE — PLAN OF CARE
Problem: Physical Therapy - Adult  Goal: By Discharge: Performs mobility at highest level of function for planned discharge setting.  See evaluation for individualized goals.  Description: Physical Therapy Short Term Goals  Initiated 5/30/2024 and to be accomplished within 7 day(s) 6/6/24  1.  Patient will performsupine to sit and sit to supine in bed with modified independence.  ( NOT MET 6/6/24)  2.  Patient will transfer from bed to chair and chair to bed with supervision/set-up using the least restrictive device. (NOT MET 6/6/24)  3.  Patient will perform sit to stand with contact guard assist (NOT MET 6/6/24)  4.  Patient will ambulate with stand by assist for 40 feet with toe touch weight bearing with the least restrictive device. (NOT MET 6/6/24)  5.  Patient will propel wheelchair 150 feet modified independent for mobility on this unit. (MET 6/6/24)  6. Patient will improve R knee AROM to 10-90 degrees for greater ease and comfort with ambulation. (NOT MET 6/6/24)    Physical Therapy Long Term Goals  Initiated 5/30/2024 and to be accomplished within 14 day(s) 6/13/24  1.  Patient will perform supine to sit and sit to supine in bed with modified independence.    2.  Patient will transfer from bed to chair and chair to bed with modified independence using the least restrictive device.  3.  Patient will perform sit to stand with modified independence.  4.  Patient will ambulate with supervision for 150 feet with toe touch weight bearing with the least restrictive device.   5. Patient will increase R knee AROM to 5-120 degrees for greater mobility in the home.   Outcome: Progressing     PHYSICAL THERAPY WEEKLY PROGRESS NOTE    Patient: Juanpablo Jackson (83 y.o. female)  Date: 6/6/2024  Diagnosis: Closed displaced comminuted fracture of shaft of right femur with routine healing [S72.509E]   Precautions: fall risk, TTWB R LE  Chart, physical therapy assessment, plan of care and goals were reviewed.    Time  in: 1130 (group session)  Time out: 1200    Time in:1400  Time out:1500  Entered Differentiated Treatment minutes: Yes    Patient seen for: gait training, transfer training, strengthening and ROM, NMRE, and patient education       Pain:  Pt pain was reported as  2 pre-treatment.  Pt pain was reported as 5 post-treatment.       Patient identified with name and : Yes    SUBJECTIVE:     Patient reports feeling like she is doing better today.     OBJECTIVE DATA SUMMARY:     Education: Education Given To: Patient  Education Provided: Mobility Training;Transfer Training;Precautions;Safety  Education Method: Demonstration;Verbal  Education Outcome: Verbalized understanding;Continued education needed  GROSS ASSESSMENT Weekly Progress Assessment 2024   AROM  RLE   LLE    Exception   WFL   Strength  RLE  LLE   Exception  Exception   Coordination  WFL   Tone  RLE  LLE   Normotonic  Normotonic   Sensation WFL   PROM  RLE  LLE   WFL    WFL     MMT Weekly Assessment   Right Lower Extremity Left Lower Extremity   Hip Flexion R Hip Flexion: 3-/5 L Hip Flexion: 3/5   Knee Extension R Knee Extension: 3+/5 L Knee Extension: 3+/5   Knee Flexion R Knee Flexion: 3/5 L Knee Flexion: 3+/5   Ankle Dorsiflexion R Ankle Dorsiflexion: 3/5 L Ankle Dorsiflexion: 3+/5   0/5 No palpable muscle contraction  1/5 Palpable muscle contraction, no joint movement  2-/5 Less than full range of motion in gravity eliminated position  2/5 Able to complete full range of motion in gravity eliminated position  2+/5 Able to initiate movement against gravity  3-/5 More than half but not full range of motion against gravity  3/5 Able to complete full range of motion against gravity  3+/5 Completes full range of motion against gravity with minimal resistance  4-/5 Completes full range of motion against gravity with minimal-moderate resistance  4/5 Completes full range of motion against gravity with moderate resistance  4+/5 Completes full range of motion     Modified independent    Comments  Propels with B UE       GAIT Initial Assessment Weekly Progress Assessment 6/6/2024   Quality of Gait   TTWB RLE, decreased heel strike L, step to pattern   Gait Deviations   Slow Jaclyn;Increased STEVAN;Decreased step length;Decreased step height       WALKING INDEPENDENCE Initial Assessment Weekly Progress Assessment 6/6/2024   Assistive device Rolling Walker Rolling Walker   Ambulation assistance - surface Contact guard assistance  Level tile Contact guard assistance  Level tile     Distance 15 70 feet   Comments verbal reminders to maintain TTWB R LE verbal reminders to maintain TTWB R LE   Ambulation-uneven surface 25 feet  Contact guard assistance  NT          STEPS/STAIRS Initial Assessment Weekly Progress Assessment 6/6/2024   Steps/Stairs ambulated       Not tested due to TTWB R LE and patient does not have stairs at [facility]      Rail Use           Assistance Level       Comments       Curbs/Ramps           Therapeutic Exercise:  Seated hip add with bolster and hip abd with yellow TB 3x10 each.     Group therapy treatment:    Seated  EXERCISE   Sets   Reps   Active Active Assist   Comments   Heel and toe raises  3 10 [x] [] 2# L LE   Long Arc Quads 3 10 [x] [] 2# L LE   Seated Marching 3 10 [x] [] 2# L LE     Seated balance  Activity   Sets   Reps   Time Spent   Comments   [] Beach ball toss/catch    Emphasis on maintaining sitting balance sitting away from back of chair/supportive surface.   [x] Balloon tapping 3  3 minutes Reaching with B UE out of base of support, across midline, and overhead      [x]   Patient appropriate to continue group therapy sessions to promote increased participation in skilled therapy interventions and to provide opportunities for increased social interaction.     ASSESSMENT:  Patient has met 1/5 STG's.  Patient still requires SBA with bed mobility and bed to chair transfer for safety. Patient is inconsistent with sit to stand requiring minimal

## 2024-06-06 NOTE — PROGRESS NOTES
[facility] PHYSICAL REHABILITATION  [address] [place] [id number]     INPATIENT REHABILITATION  DAILY PROGRESS NOTE     Date: 6/6/2024    Name: Juanpablo Jackson Age / Sex: 83 y.o. / female   CSN: [id number] MRN: [id number]   Admit Date: 5/29/2024 Length of Stay: 8 days     Primary Rehabilitation Diagnosis impaired mobility and ADLs in the setting of a closed fracture of the shaft of right femur, status post open reduction and internal fixation and subsequent SFA-popliteal dissection and patient is now status post right CFA exploration and superficial femoral and popliteal stents with 4 compartment fasciotomy        Subjective:     I personally saw and evaluated this patient face-to-face today.  Patient is sitting in bed in no apparent distress, in good spirits.  Vascular staples were removed earlier today by the vascular PA.  Hip sutures removed by nursing staff.  Patient is in good spirits    Objective:     Vital Signs:  Patient Vitals for the past 24 hrs:   BP Temp Temp src Pulse Resp SpO2   06/06/24 1605 136/62 98.3 °F (36.8 °C) Oral 69 17 92 %   06/06/24 0716 139/73 97.9 °F (36.6 °C) Oral 73 19 100 %   06/05/24 2019 135/62 97 °F (36.1 °C) Oral 63 20 98 %        Physical Examination:    General:  Awake, alert  Cardiovascular:  S1S2+, RRR  Pulmonary:  CTA b/l  GI:  Soft, BS+, NT, ND  Extremities: Right leg with dressing in place        Current Medications:  Current Facility-Administered Medications   Medication Dose Route Frequency    acetaminophen (TYLENOL) tablet 650 mg  650 mg Oral Q4H PRN    ferrous sulfate (IRON 325) tablet 325 mg  325 mg Oral BID WC    polyethylene glycol (GLYCOLAX) packet 17 g  17 g Oral Daily    aspirin EC tablet 81 mg  81 mg Oral Daily    enoxaparin (LOVENOX) injection 40 mg  40 mg SubCUTAneous Q24H    amLODIPine (NORVASC) tablet 5 mg  5 mg Oral Daily    clopidogrel (PLAVIX) tablet 75 mg  75 mg Oral Daily    oxyCODONE (ROXICODONE) immediate release tablet 5 mg  5 mg  speech therapy added on Siobhan 3     []  Hold Rehab until further notice     5. Medications:    [x]  MAR Reviewed     [x]  Continue Present Medications       6. Chemical DVT Prophylaxis:      [x]  Enoxaparin     []  Unfractionated Heparin     []  Warfarin     []  NOAC     []  Aspirin     []  None     7. Mechanical DVT Prophylaxis:      []  SANDRA Stockings     []  Sequential Compression Device     [x]  None     8. GI Prophylaxis:      []  PPI     []  H2 Blocker     [x]  None / Not indicated     9. Code status:Full     Dragon medical dictation software was used for portions of this report. Unintended errors may occur.    Personal Protective Equipment ( face mask ) was used while interacting with the patient        Signed:    Gavino Huffman MD      June 6, 2024

## 2024-06-06 NOTE — PLAN OF CARE
Problem: Chronic Conditions and Co-morbidities  Goal: Patient's chronic conditions and co-morbidity symptoms are monitored and maintained or improved  6/6/2024 0723 by Jimmy Mckeon LPN  Outcome: Progressing  6/6/2024 0723 by Jimmy Mckeon LPN  Outcome: Progressing  Flowsheets (Taken 6/5/2024 2037 by Rae Ravi RN)  Care Plan - Patient's Chronic Conditions and Co-Morbidity Symptoms are Monitored and Maintained or Improved: Monitor and assess patient's chronic conditions and comorbid symptoms for stability, deterioration, or improvement

## 2024-06-06 NOTE — PLAN OF CARE
Problem: Skin/Tissue Integrity  Goal: Absence of new skin breakdown  Description: 1.  Monitor for areas of redness and/or skin breakdown  2.  Assess vascular access sites hourly  3.  Every 4-6 hours minimum:  Change oxygen saturation probe site  4.  Every 4-6 hours:  If on nasal continuous positive airway pressure, respiratory therapy assess nares and determine need for appliance change or resting period.  6/5/2024 2135 by Rae Ravi RN  Outcome: Progressing  6/5/2024 0955 by Yadira Jimenez RN  Outcome: Progressing     Problem: Safety - Adult  Goal: Free from fall injury  6/5/2024 2135 by Rae Ravi RN  Outcome: Progressing  Flowsheets (Taken 6/5/2024 2134)  Free From Fall Injury: Instruct family/caregiver on patient safety  6/5/2024 0955 by Yadira Jimenez RN  Outcome: Progressing  Flowsheets (Taken 6/5/2024 0802)  Free From Fall Injury: Instruct family/caregiver on patient safety     Problem: Pain  Goal: Verbalizes/displays adequate comfort level or baseline comfort level  6/5/2024 2135 by Rae Ravi RN  Outcome: Progressing  6/5/2024 0955 by Yadira Jimenez RN  Outcome: Progressing

## 2024-06-06 NOTE — PLAN OF CARE
TTWB with VC's. Pt demonstrated ability to thread BLE feet through pants with supervision and setup. Pt demonstrated ability to don LLE sock and Chacho to don L shoe from EOB. Pt performed LB dressing for donning elastic waist pants seated at edge of bed; verbal cue for use of long handled reacher to thread BLE feet through pant legs. Pt demonstrating ability to pull up pants over hips and buttocks in standing with min-mod A using FWW and VC's for maintaining RLE weight bearing (TTWB R LE). Pt donned slipper sock L foot with SBA using sock aid; Min/ Mod A for donning R slipper sock. VC's and increased time for use of AE for improved functional independence.     TOILETING Initial Assessment Weekly Progress Assessment 6/6/2024   Functional Level Moderate assistance Minimal assistance   Clinical factors Pt performed all toileting tasks with modA at FWW using 3:1 commode over toilet for increased safety. Toileting (Min A) for clothing management (FWW) and hygiene using 3-in-1 commode; pt voided and had small bowel movement at this time.     TOILET TRANSFER INDEPENDENCE Initial Assessment Weekly Progress Assessment 6/6/2024   Transfer Technique Stand pivot, Stand step Stand step   Level of Assistance Moderate assistance Toilet Transfer: Contact guard assistance;Minimal assistance   Equipment Standard bedside commode (over toilet) Standard bedside commode   Toilet Transfer Comments Pt performed toilet transfer with FWW to [facility] over toilet with modA for sit to stand and maintaining RLE TTWB and standing balance. Toilet Transfers Comments: (Min A/ CGA) using FWW to/ from 3-in-1 commode using FWW; gait belt. Bedside commode at the bedside. Verbal cues for hand placement, maintaining RLE TTWB, and safety awareness using AD (FWW).     Skin Integrity: R hip dressing; RLE ace wrap dressing    Activity Tolerance:  Patient limited by fatigue   Intermittent rest breaks due to fatigue.          EDUCATION:   Education Given To:  Patient  Education Provided: Mobility Training;Transfer Training;Precautions;Safety;ADL Function;Energy Conservation;Equipment;Fall Prevention Strategies  Education Method: Demonstration;Verbal  Barriers to Learning: None  Education Outcome: Verbalized understanding;Continued education needed;Demonstrated understanding    ASSESSMENT:  Patient improving slowly and progressing toward goals having met 6/9 STG's this reporting period. Therapist reassessed goals based on patient's current functional ability and demonstrated performance. UB ADL's progressed to (set-up/ supv); LB ADL's (Min/ Mod A) using AE with verbal cues. Patient demonstrates inconsistency during sit to stand transitions requiring minimal assist/CGA and verbal reminders for weight bearing restrictions. Pt requiring (Min A/ CGA) using FWW to/ from 3-in-1 commode using FWW; verbal cues for hand placement, maintaining RLE TTWB, and safety awareness using AD (FWW).    Patient continues to benefit from skilled occupational therapy to address decreased (I) with LB ADL's, decreased functional strength/ endurance, impaired standing balance/ coordination, impaired standing tolerance, RLE TTWB, fatigue, BUE weakness, and pain which negatively impact pt performance, independence and safety with ADL/IADL's and functional mobility.     Progression toward goals:  []          Improving appropriately and progressing toward goals  [x]          Improving slowly and progressing toward goals  []          Not making progress toward goals and plan of care will be adjusted     PLAN:  Patient continues to benefit from skilled intervention to address the above impairments.  Continue treatment per established plan of care.  Discharge Recommendations:  Home health; home occupational therapy in [place]  Further Equipment Recommendations for Discharge:  pt has built-in bench with grab bars in shower and handicap height toilet; 3-in-1 commode as pt continues to rely heavily on use of

## 2024-06-06 NOTE — PROGRESS NOTES
TEAM CONFERENCE FOLLOW-UP  Patient: Juanpablo Jackson (83 y.o. female)  Date: 6/6/2024  Diagnosis: Closed displaced comminuted fracture of shaft of right femur with routine healing [S72.351D] Closed displaced comminuted fracture of shaft of right femur with routine healing      Precautions:      Met with patient to discuss the findings from 6/6/2024 Team Conference.    Patient requested further information and/or had questions:   Mayte Lema

## 2024-06-06 NOTE — PROGRESS NOTES
4 Eyes Skin Assessment     NAME:  Juanpablo Jackson  YOB: 1940  MEDICAL RECORD NUMBER:  [id number]    The patient is being assessed for  Shift Handoff    I agree that at least one RN has performed a thorough Head to Toe Skin Assessment on the patient. ALL assessment sites listed below have been assessed.      Areas assessed by both nurses:    Sacrum. Buttock, Coccyx, Ischium and Legs. Feet and Heels        Does the Patient have a Wound? Yes wound(s) were present on assessment. LDA wound assessment was Initiated and completed by RN       Adelfo Prevention initiated by RN: Yes  Wound Care Orders initiated by RN: No    Pressure Injury (Stage 3,4, Unstageable, DTI, NWPT, and Complex wounds) if present, place Wound referral order by RN under : No    New Ostomies, if present place, Ostomy referral order under : No     Nurse 1 eSignature: Electronically signed by Rae Ravi RN on 6/6/24 at 7:20 AM EDT    **SHARE this note so that the co-signing nurse can place an eSignature**    Nurse 2 eSignature: Electronically signed by KRISHNA MELTON LPN on 6/6/24 at 7:04 PM EDT

## 2024-06-06 NOTE — PLAN OF CARE
Problem: SLP Adult - Disturbed Thought Process  Goal: By Discharge: Demonstrates cognitive skills at highest level of function for planned discharge setting.   See evaluation for individualized goals.  Description: Description: Long term goals (initiated 6/4/24; to be met by 6/18/24)  Patient will:  1.          Be oriented x 3 and recall events of the day, supervision.  2.          Recall names of group members with min cues.  3.          Demonstrate functional problem solving and reasoning in structured tasks with min assist.  4.          Recall functional information given printed cues, min assist-supervision.    Short term goals (by 6/11/24)  Patient will:  1.          Be oriented x 3 and recall events of the day, mod-min assist.  2.          Recall names of group members with min-mod cues.  3.          Demonstrate functional problem solving and reasoning in structured tasks with min assist.  4.          Recall functional information given printed cues, min assist-suprvision.      Note:   SPEECH LANGUAGE PATHOLOGY SPEECH-LANGUAGE   AND SWALLOW TREATMENT    Patient: Juanpablo Jackson (83 y.o. female)  Date: 6/6/2024  Primary Diagnosis: Closed displaced comminuted fracture of shaft of right femur with routine healing [S72.351D]       Precautions: Aspiration    Speech-Language Tx:  Ms. Jackson was seen in a group session for cognition this afternoon.    Progression toward goals:  [x]         Improving appropriately and progressing toward goals  []         Improving slowly and progressing toward goals  []         Not making progress toward goals and plan of care will be adjusted      PLAN:  Recommendations and Planned Interventions:  Continue group activities for cognitive challenges.  Patient continues to benefit from skilled intervention to address the above impairments. Continue treatment per established plan of care.    Expected Discharge Date:  6/17/24     SUBJECTIVE:   Patient stated “That was

## 2024-06-06 NOTE — PROGRESS NOTES
[facility] Physical Rehabilitation  Team Conference  Date: 6/6/2024  ACTIVE MONITORING OF CO-MORBID CONDITIONS/MANAGEMENT OF NEW MEDICAL ISSUES: Closed displaced comminuted fracture of shaft of right femur with routine healing [M46.125F]    **Please refer to patient's electronic medical record to view the care plan and goals**  NURSING Making gains Yes   Skin Care: Skin Integumentary   Skin Color: Pink  Skin Condition/Temp: Dry, Warm  Skin Integrity: Ecchymosis, Incision (see LDA)  Location: Right lower extremity  Skin Fold Management: No  Nails: Within Defined Limits  Multiple Skin Integrity Sites: No    Skin Concerns: right femur staples      Pain: Pain Assessment  Pain Assessment: None - Denies Pain (06/06/24 1200)  Pain Level: 0 (06/06/24 1200)  Rucker-Baker Pain Rating: No hurt (06/03/24 1715)  Patient's Stated Pain Goal: 0 - No pain (06/06/24 1200)  Pain Location: Back;Knee (06/06/24 0914)  Pain Orientation: Left (06/06/24 0914)  Pain Descriptors: Aching (06/06/24 0914)  Functional Pain Assessment: Activities are not prevented (06/06/24 0914)  Pain Type: Surgical pain (06/06/24 0914)  Pain Radiating Towards: right knee (06/05/24 1401)  Pain Frequency: Intermittent (06/06/24 0914)  Pain Onset: Gradual (06/06/24 0914)  Non-Pharmaceutical Pain Intervention(s): Rest;Repositioned (06/06/24 0914)  Response to Pain Intervention: Patient satisfied (06/05/24 1700)  Side Effects: No reported side effects (06/05/24 1700)  Multiple Pain Sites: Yes (06/06/24 0914)    Bladder/Bowel Urine Assessment  Urinary Status: Voiding  Urinary Incontinence: Absent  Urine Color: Yellow/straw  Urine Appearance: Clear  Urine Odor: No odor Stool Assessment  Incontinence: No  Stool Appearance: Formed  Stool Color: Brown  Stool Amount: Smear  Stool Source: Rectum  Last BM (including prior to admit): 06/05/24   Goal: Incision will remain free of infection.       Barrier: mobility and skin integrity      Intervention: monitor wound

## 2024-06-06 NOTE — PROGRESS NOTES
16:00 Removed 48 staples from patients right hip, Incision well approximated, no s/s of infection, and no drainage noted. Patient tolerated procedure well.

## 2024-06-06 NOTE — PROGRESS NOTES
4 Eyes Skin Assessment     NAME:  Juanpablo Jackson  YOB: 1940  MEDICAL RECORD NUMBER:  [id number]    The patient is being assessed for  Shift Handoff    I agree that at least one RN has performed a thorough Head to Toe Skin Assessment on the patient. ALL assessment sites listed below have been assessed.      Areas assessed by both nurses:    Sacrum. Buttock, Coccyx, Ischium and Legs. Feet and Heels        Does the Patient have a Wound? Yes wound(s) were present on assessment. LDA wound assessment was Initiated and completed by RN       Adelfo Prevention initiated by RN: Yes  Wound Care Orders initiated by RN: No    Pressure Injury (Stage 3,4, Unstageable, DTI, NWPT, and Complex wounds) if present, place Wound referral order by RN under : No    New Ostomies, if present place, Ostomy referral order under : No     Nurse 1 eSignature: Electronically signed by KRISHNA MELTON LPN on 6/6/24 at 4:29 PM EDT    **SHARE this note so that the co-signing nurse can place an eSignature**    Nurse 2 eSignature: {Esignature:[id number]}

## 2024-06-07 LAB
ANION GAP SERPL CALC-SCNC: 6 MMOL/L (ref 3–18)
BASOPHILS # BLD: 0 K/UL (ref 0–0.1)
BASOPHILS NFR BLD: 1 % (ref 0–2)
BUN SERPL-MCNC: 14 MG/DL (ref 7–18)
BUN/CREAT SERPL: 26 (ref 12–20)
CALCIUM SERPL-MCNC: 8.9 MG/DL (ref 8.5–10.1)
CHLORIDE SERPL-SCNC: 105 MMOL/L (ref 100–111)
CO2 SERPL-SCNC: 25 MMOL/L (ref 21–32)
CREAT SERPL-MCNC: 0.53 MG/DL (ref 0.6–1.3)
DIFFERENTIAL METHOD BLD: ABNORMAL
EOSINOPHIL # BLD: 0.2 K/UL (ref 0–0.4)
EOSINOPHIL NFR BLD: 2 % (ref 0–5)
ERYTHROCYTE [DISTWIDTH] IN BLOOD BY AUTOMATED COUNT: 15.4 % (ref 11.6–14.5)
GLUCOSE SERPL-MCNC: 100 MG/DL (ref 74–99)
HCT VFR BLD AUTO: 28.4 % (ref 35–45)
HGB BLD-MCNC: 9 G/DL (ref 12–16)
IMM GRANULOCYTES # BLD AUTO: 0 K/UL (ref 0–0.04)
IMM GRANULOCYTES NFR BLD AUTO: 0 % (ref 0–0.5)
LYMPHOCYTES # BLD: 2.3 K/UL (ref 0.9–3.6)
LYMPHOCYTES NFR BLD: 28 % (ref 21–52)
MCH RBC QN AUTO: 30.5 PG (ref 24–34)
MCHC RBC AUTO-ENTMCNC: 31.7 G/DL (ref 31–37)
MCV RBC AUTO: 96.3 FL (ref 78–100)
MONOCYTES # BLD: 0.7 K/UL (ref 0.05–1.2)
MONOCYTES NFR BLD: 8 % (ref 3–10)
NEUTS SEG # BLD: 5.3 K/UL (ref 1.8–8)
NEUTS SEG NFR BLD: 62 % (ref 40–73)
NRBC # BLD: 0 K/UL (ref 0–0.01)
NRBC BLD-RTO: 0 PER 100 WBC
PLATELET # BLD AUTO: 634 K/UL (ref 135–420)
PMV BLD AUTO: 9.1 FL (ref 9.2–11.8)
POTASSIUM SERPL-SCNC: 4.6 MMOL/L (ref 3.5–5.5)
RBC # BLD AUTO: 2.95 M/UL (ref 4.2–5.3)
SODIUM SERPL-SCNC: 136 MMOL/L (ref 136–145)
WBC # BLD AUTO: 8.5 K/UL (ref 4.6–13.2)

## 2024-06-07 PROCEDURE — 80048 BASIC METABOLIC PNL TOTAL CA: CPT

## 2024-06-07 PROCEDURE — 85025 COMPLETE CBC W/AUTO DIFF WBC: CPT

## 2024-06-07 PROCEDURE — 97110 THERAPEUTIC EXERCISES: CPT

## 2024-06-07 PROCEDURE — 97116 GAIT TRAINING THERAPY: CPT

## 2024-06-07 PROCEDURE — 94761 N-INVAS EAR/PLS OXIMETRY MLT: CPT

## 2024-06-07 PROCEDURE — 97535 SELF CARE MNGMENT TRAINING: CPT

## 2024-06-07 PROCEDURE — 2700000000 HC OXYGEN THERAPY PER DAY

## 2024-06-07 PROCEDURE — 6370000000 HC RX 637 (ALT 250 FOR IP): Performed by: EMERGENCY MEDICINE

## 2024-06-07 PROCEDURE — 1180000000 HC REHAB R&B

## 2024-06-07 PROCEDURE — 99232 SBSQ HOSP IP/OBS MODERATE 35: CPT | Performed by: EMERGENCY MEDICINE

## 2024-06-07 PROCEDURE — 36415 COLL VENOUS BLD VENIPUNCTURE: CPT

## 2024-06-07 PROCEDURE — 6360000002 HC RX W HCPCS: Performed by: EMERGENCY MEDICINE

## 2024-06-07 PROCEDURE — 97530 THERAPEUTIC ACTIVITIES: CPT

## 2024-06-07 PROCEDURE — 6370000000 HC RX 637 (ALT 250 FOR IP): Performed by: NURSE PRACTITIONER

## 2024-06-07 RX ORDER — HYDRALAZINE HYDROCHLORIDE 10 MG/1
10 TABLET, FILM COATED ORAL EVERY 6 HOURS PRN
Status: DISCONTINUED | OUTPATIENT
Start: 2024-06-07 | End: 2024-06-14 | Stop reason: HOSPADM

## 2024-06-07 RX ADMIN — POLYETHYLENE GLYCOL 3350 17 G: 17 POWDER, FOR SOLUTION ORAL at 08:13

## 2024-06-07 RX ADMIN — FERROUS SULFATE TAB 325 MG (65 MG ELEMENTAL FE) 325 MG: 325 (65 FE) TAB at 16:22

## 2024-06-07 RX ADMIN — FERROUS SULFATE TAB 325 MG (65 MG ELEMENTAL FE) 325 MG: 325 (65 FE) TAB at 08:14

## 2024-06-07 RX ADMIN — Medication 1 TABLET: at 16:22

## 2024-06-07 RX ADMIN — AMLODIPINE BESYLATE 5 MG: 5 TABLET ORAL at 08:14

## 2024-06-07 RX ADMIN — ATORVASTATIN CALCIUM 20 MG: 20 TABLET, FILM COATED ORAL at 20:09

## 2024-06-07 RX ADMIN — ASPIRIN 81 MG: 81 TABLET, COATED ORAL at 08:14

## 2024-06-07 RX ADMIN — OXYCODONE HYDROCHLORIDE 5 MG: 5 TABLET ORAL at 20:10

## 2024-06-07 RX ADMIN — ACETAMINOPHEN 325MG 650 MG: 325 TABLET ORAL at 12:25

## 2024-06-07 RX ADMIN — CLOPIDOGREL BISULFATE 75 MG: 75 TABLET ORAL at 08:14

## 2024-06-07 RX ADMIN — ENOXAPARIN SODIUM 40 MG: 100 INJECTION SUBCUTANEOUS at 16:21

## 2024-06-07 RX ADMIN — OXYCODONE HYDROCHLORIDE 5 MG: 5 TABLET ORAL at 08:14

## 2024-06-07 RX ADMIN — Medication 1 TABLET: at 08:14

## 2024-06-07 ASSESSMENT — PAIN DESCRIPTION - DESCRIPTORS
DESCRIPTORS: ACHING

## 2024-06-07 ASSESSMENT — PAIN SCALES - GENERAL
PAINLEVEL_OUTOF10: 3
PAINLEVEL_OUTOF10: 0
PAINLEVEL_OUTOF10: 3
PAINLEVEL_OUTOF10: 2
PAINLEVEL_OUTOF10: 0
PAINLEVEL_OUTOF10: 2
PAINLEVEL_OUTOF10: 4
PAINLEVEL_OUTOF10: 4
PAINLEVEL_OUTOF10: 3
PAINLEVEL_OUTOF10: 5

## 2024-06-07 ASSESSMENT — PAIN DESCRIPTION - DIRECTION: RADIATING_TOWARDS: LOWER BACK

## 2024-06-07 ASSESSMENT — PAIN DESCRIPTION - FREQUENCY
FREQUENCY: INTERMITTENT

## 2024-06-07 ASSESSMENT — PAIN DESCRIPTION - ONSET
ONSET: ON-GOING

## 2024-06-07 ASSESSMENT — PAIN DESCRIPTION - PAIN TYPE
TYPE: SURGICAL PAIN

## 2024-06-07 ASSESSMENT — PAIN DESCRIPTION - LOCATION
LOCATION: LEG
LOCATION: BACK;LEG
LOCATION: LEG

## 2024-06-07 ASSESSMENT — PAIN DESCRIPTION - ORIENTATION
ORIENTATION: RIGHT;LEFT;LOWER
ORIENTATION: RIGHT;POSTERIOR
ORIENTATION: RIGHT

## 2024-06-07 NOTE — PROGRESS NOTES
[facility] PHYSICAL REHABILITATION  [address] [place] [id number]     INPATIENT REHABILITATION  DAILY PROGRESS NOTE     Date: 6/7/2024    Name: Juanpablo Jackson Age / Sex: 83 y.o. / female   CSN: [id number] MRN: [id number]   Admit Date: 5/29/2024 Length of Stay: 9 days     Primary Rehabilitation Diagnosis impaired mobility and ADLs in the setting of a closed fracture of the shaft of right femur, status post open reduction and internal fixation and subsequent SFA-popliteal dissection and patient is now status post right CFA exploration and superficial femoral and popliteal stents with 4 compartment fasciotomy        Subjective:     I personally saw and evaluated this patient face-to-face today.  Patient is sitting in bed in no apparent distress.  Patient denies any pain and is in good spirits.    Objective:     Vital Signs:  Patient Vitals for the past 24 hrs:   BP Temp Temp src Pulse Resp SpO2   06/07/24 1620 (!) 163/58 97 °F (36.1 °C) Oral 77 20 99 %   06/07/24 0731 (!) 144/64 97.5 °F (36.4 °C) Oral 69 16 97 %   06/06/24 2000 136/64 98.3 °F (36.8 °C) Oral 66 18 98 %        Physical Examination:    General:  Awake, alert  Cardiovascular:  S1S2+, RRR  Pulmonary:  CTA b/l  GI:  Soft, BS+, NT, ND  Extremities: Right leg dressing in place        Current Medications:  Current Facility-Administered Medications   Medication Dose Route Frequency    acetaminophen (TYLENOL) tablet 650 mg  650 mg Oral Q4H PRN    ferrous sulfate (IRON 325) tablet 325 mg  325 mg Oral BID WC    polyethylene glycol (GLYCOLAX) packet 17 g  17 g Oral Daily    aspirin EC tablet 81 mg  81 mg Oral Daily    enoxaparin (LOVENOX) injection 40 mg  40 mg SubCUTAneous Q24H    amLODIPine (NORVASC) tablet 5 mg  5 mg Oral Daily    clopidogrel (PLAVIX) tablet 75 mg  75 mg Oral Daily    oxyCODONE (ROXICODONE) immediate release tablet 5 mg  5 mg Oral Q6H PRN    naloxone (NARCAN) injection 0.4 mg  0.4 mg IntraVENous PRN    atorvastatin  Diagnosis impaired mobility and ADLs in the setting of a closed fracture of the shaft of right femur, status post open reduction and internal fixation and subsequent SFA-popliteal dissection and patient is now status post right CFA exploration and superficial femoral and popliteal stents with 4 compartment fasciotomy     Other Co-Morbid Conditions managed in Rehab      PAD, Status post right lower extremity CFA exploration and superficial femoral and popliteal stent placement with 4 compartment fasciotomy  Hypertension  Dyslipidemia  Prior history of CVA  Osteoporosis  Dyslipidemia  Anemia which appears to be chronic     Medical plan      impaired mobility and ADLs in the setting of a closed fracture of the shaft of right femur, status post open reduction and internal fixation and subsequent SFA-popliteal dissection and patient is now status post right CFA exploration and superficial femoral and popliteal stents with 4 compartment fasciotomy  Physical therapy and Occupational Therapy  Judicious pain control  Toe-touch weightbearing right lower extremity as per orthopedics  Fall precautions     PAD, Status post right lower extremity CFA exploration and superficial femoral and popliteal stent placement with 4 compartment fasciotomy  I have consulted vascular to follow  On aspirin and Plavix and statin     Hypertension  Continue amlodipine with hold parameters in place     Prior history of CVA  Continue aspirin and Plavix and statin     Osteoporosis  I have added calcium and vitamin D     Dyslipidemia  Continue atorvastatin     Anemia which appears to be chronic, no overt bleeding  Monitor blood counts intermittently  Check iron studies    5/31-I discussed the care plan with the patient.  I discussed with nursing staff    6/3-I discussed the care plan with the patient.  I discussed with the vascular PA    6/4-I discussed the care plan with the patient.  I discussed with RN    6/5-I discussed the care plan with the  2024

## 2024-06-07 NOTE — PLAN OF CARE
Problem: Skin/Tissue Integrity  Goal: Absence of new skin breakdown  Description: 1.  Monitor for areas of redness and/or skin breakdown  2.  Assess vascular access sites hourly  3.  Every 4-6 hours minimum:  Change oxygen saturation probe site  4.  Every 4-6 hours:  If on nasal continuous positive airway pressure, respiratory therapy assess nares and determine need for appliance change or resting period.  Outcome: Progressing     Problem: Pain  Goal: Verbalizes/displays adequate comfort level or baseline comfort level  Outcome: Progressing

## 2024-06-07 NOTE — PLAN OF CARE
Problem: Occupational Therapy - Adult  Goal: By Discharge: Performs self-care activities at highest level of function for planned discharge setting.  See evaluation for individualized goals.  Description: Occupational Therapy Goals   Long Term Goals  Initiated 24 and to be accomplished within 2 week(s)  1. Pt will perform self-feeding with Jovanna.  2. Pt will perform grooming with Jovanna.  3. Pt will perform UB bathing with Jovanna.  4. Pt will perform LB bathing with CGA using AE as needed.  5. Pt will perform tub/shower transfer with CGA.   6. Pt will perform UB dressing with Jovanna.  7. Pt will perform LB dressing with CGA using AE as needed.  8. Pt will perform toileting task with CGA.  9. Pt will perform toilet transfer with CGA using least restrictive assistive device.    Short Term Goals   Initiated 24 and to be accomplished within 7 day(s), (reassessed on 2024)  1. Pt will perform self-feeding with setup. (Goal met 2024)  2. Pt will perform grooming with setup.  3. Pt will perform UB bathing with setup.  4. Pt will perform LB bathing with Chacho using AE as needed. (Goal met 2024)  5. Pt will perform tub/shower transfer with Chacho. (Goal met 2024)  6. Pt will perform UB dressing with setup/ supv. (Goal met 2024)  7. Pt will perform LB dressing with Chacho using AE as needed.  8. Pt will perform toileting task with Chacho.(Goal met 2024)  9. Pt will perform toilet transfer with Chacho using least restrictive assistive device. (Goal met 2024)    9HPT: RUE:33 sec. LUE: 30 sec. (24)  Outcome: Progressing   Occupational Therapy TREATMENT    Patient: Juanpablo Jackson   83 y.o.    Patient identified with name and : yes  Date: 2024    First Tx Session  Time In: 0930  Time Out: 1105  Diagnosis: Closed displaced comminuted fracture of shaft of right femur with routine healing [S72.387L]   Precautions:  Restrictions/Precautions: Fall Risk, Surgical Protocols, Weight Bearing, Contact  occupational therapy [facility]  Further Equipment Recommendations for Discharge:  pt has built-in bench with grab bars in shower and handicap height toilet; 3-in-1 commode as pt continues to rely heavily on use of her upper extremities when transitioning (sit <->stand) 2/2 weight bearing restrictions (TTWB R LE).   Estimated LOS: 6/14/2024      COMMUNICATION/EDUCATION:   [] Home safety education was provided and the patient/caregiver indicated understanding.  [x] Patient/family have participated as able in goal setting and plan of care.  [x] Patient/family agree to work toward stated goals and plan of care.  [] Patient understands intent and goals of therapy, but is neutral about his/her participation.  [] Patient is unable to participate in goal setting and plan of care.    Please refer to the flowsheet for vital signs taken during this treatment.  After treatment:   [x]  Patient left in no apparent distress sitting up in wheelchair with needs met  []  Patient left in no apparent distress in bed  []  Patient handoff to SLP/PT  [x]  Call bell and immediate needs left within reach  [x]  Nursing notified  []  Caregiver present  []  Bed alarm activated  [x]  Wheelchair alarm activated  []  Heather Prominences offloaded  []  Heels activated for pressure relief  []  Telesitter/Care companion present    Entered Differentiated Treatment minutes: yes    Zaira Decker OT  6/7/2024

## 2024-06-07 NOTE — PLAN OF CARE
Problem: Chronic Conditions and Co-morbidities  Goal: Patient's chronic conditions and co-morbidity symptoms are monitored and maintained or improved  6/7/2024 1948 by Haydee Jean Baptiste RN  Outcome: Progressing  6/7/2024 0829 by Yadira Jimenez RN  Outcome: Progressing  Flowsheets  Taken 6/7/2024 0731 by Yadira Jimenez RN  Care Plan - Patient's Chronic Conditions and Co-Morbidity Symptoms are Monitored and Maintained or Improved: Monitor and assess patient's chronic conditions and comorbid symptoms for stability, deterioration, or improvement  Taken 6/6/2024 1958 by Rae Ravi RN  Care Plan - Patient's Chronic Conditions and Co-Morbidity Symptoms are Monitored and Maintained or Improved: Monitor and assess patient's chronic conditions and comorbid symptoms for stability, deterioration, or improvement     Problem: Safety - Adult  Goal: Free from fall injury  6/7/2024 1948 by Haydee Jean Baptiste RN  Outcome: Progressing  6/7/2024 0829 by Yadira Jimenez RN  Outcome: Progressing  Flowsheets  Taken 6/7/2024 0731 by Yadira Jimenez RN  Free From Fall Injury: Instruct family/caregiver on patient safety  Taken 6/6/2024 2215 by Rae Ravi RN  Free From Fall Injury: Instruct family/caregiver on patient safety     Problem: Skin/Tissue Integrity  Goal: Absence of new skin breakdown  Description: 1.  Monitor for areas of redness and/or skin breakdown  2.  Assess vascular access sites hourly  3.  Every 4-6 hours minimum:  Change oxygen saturation probe site  4.  Every 4-6 hours:  If on nasal continuous positive airway pressure, respiratory therapy assess nares and determine need for appliance change or resting period.  6/7/2024 1948 by Haydee Jean Baptiste, RN  Outcome: Progressing  6/7/2024 0829 by Yadira Jimenez RN  Outcome: Progressing     Problem: Pain  Goal: Verbalizes/displays adequate comfort level or baseline comfort level  6/7/2024 0829 by Yadira Jimenez RN  Outcome: Progressing

## 2024-06-07 NOTE — PLAN OF CARE
Problem: Physical Therapy - Adult  Goal: By Discharge: Performs mobility at highest level of function for planned discharge setting.  See evaluation for individualized goals.  Description: Physical Therapy Short Term Goals  Initiated 5/30/2024 and to be accomplished within 7 day(s) 6/6/24  1.  Patient will performsupine to sit and sit to supine in bed with modified independence.  ( NOT MET 6/6/24)  2.  Patient will transfer from bed to chair and chair to bed with supervision/set-up using the least restrictive device. (NOT MET 6/6/24)  3.  Patient will perform sit to stand with contact guard assist (NOT MET 6/6/24)  4.  Patient will ambulate with stand by assist for 40 feet with toe touch weight bearing with the least restrictive device. (NOT MET 6/6/24)  5.  Patient will propel wheelchair 150 feet modified independent for mobility on this unit. (MET 6/6/24)  6. Patient will improve R knee AROM to 10-90 degrees for greater ease and comfort with ambulation. (NOT MET 6/6/24)    Physical Therapy Long Term Goals  Initiated 5/30/2024 and to be accomplished within 14 day(s) 6/13/24  1.  Patient will perform supine to sit and sit to supine in bed with modified independence.    2.  Patient will transfer from bed to chair and chair to bed with modified independence using the least restrictive device.  3.  Patient will perform sit to stand with modified independence.  4.  Patient will ambulate with supervision for 150 feet with toe touch weight bearing with the least restrictive device.   5. Patient will increase R knee AROM to 5-120 degrees for greater mobility in the home.   Outcome: Progressing     PHYSICAL THERAPY TREATMENT    Patient: Juanpablo Jackson (83 y.o. female)  Date: 6/7/2024  Diagnosis: Closed displaced comminuted fracture of shaft of right femur with routine healing [S72.351D]   Precautions: fall risk, TTWB R LE  Chart, physical therapy assessment, plan of care and goals were reviewed.    Time in:  Level Modified independent   Curbs/ramps assistance required     Wheelchair management     Comments Propels with B UE     THERAPEUTIC EXERCISES Daily Assessment     All exercises performed to increase strength and ROM to facilitate improved transfers.       EXERCISE   Sets   Reps   Active Active Assist   Passive Self ROM   Comments   Ankle pumps 3 15  [x] []  []  []     Quad Sets/Glut Sets 3 15 [x]  []  []  []      R knee flexion sitting on edge of mat 1 5 [] []  [x]  []  10 second hold   Short Arc Quads 3 15 [x]  []  []  []  2# L LE   Heel Slides 3 15 [x]  []  []  []  2# L LE   Straight Leg Raises 3 15 [x]  []  []  []  2# L LE   Hip Add  3 15 [x]  []  []  []  With bolster   Long Arc Quads 3 15 [x]  []  []  []  2# L LE   Seated Marching 3 15 [x]  []  []  []  2# L LE   Supine hip abd 3 15 [x]  []  []  []  2# L LE        ASSESSMENT:  Patient tolerated today's session well and was able to ambulate increased distances and increase repetitions with all PRE's. Patient still requires frequent rest breaks due to decreased endurance. Patient performed sit to stand transfer multiple times only requiring CGA.  Patient could benefit from continued PT to address decreased strength and balance to facilitate improved mobility to allow patient to return to [facility] safely.   Progression toward goals:  []      Improving appropriately and progressing toward goals  [x]      Improving slowly and progressing toward goals  []      Not making progress toward goals and plan of care will be adjusted      PLAN:  Patient continues to benefit from skilled intervention to address the above impairments.  Continue treatment per established plan of care.  Discharge Recommendations:  24 hour supervision or assist;Home with Home health PT  Further Equipment Recommendations for Discharge:  Standard     Rolling    Patient has RW however will need a wheelchair        Estimated Discharge Date:6/14/24    Activity Tolerance:   Fair +  Please refer to the

## 2024-06-07 NOTE — PROGRESS NOTES
4 Eyes Skin Assessment     NAME:  Juanpablo Jackson  YOB: 1940  MEDICAL RECORD NUMBER:  [id number]    The patient is being assessed for  Shift Handoff    I agree that at least one RN has performed a thorough Head to Toe Skin Assessment on the patient. ALL assessment sites listed below have been assessed.      Areas assessed by both nurses:    Sacrum. Buttock, Coccyx, Ischium and Legs. Feet and Heels        Does the Patient have a Wound? Yes wound(s) were present on assessment. LDA wound assessment was Initiated and completed by RN, surgical incision       Adelfo Prevention initiated by RN: Yes  Wound Care Orders initiated by RN: No    Pressure Injury (Stage 3,4, Unstageable, DTI, NWPT, and Complex wounds) if present, place Wound referral order by RN under : No    New Ostomies, if present place, Ostomy referral order under : No     Nurse 1 eSignature: Electronically signed by Rae Ravi RN on 6/7/24 at 7:39 AM EDT    **SHARE this note so that the co-signing nurse can place an eSignature**    Nurse 2 eSignature: Electronically signed by Yadira Jimenez RN on 6/7/24 at 7:47 AM EDT

## 2024-06-07 NOTE — PROGRESS NOTES
4 Eyes Skin Assessment     NAME:  Juanpablo Jackson  YOB: 1940  MEDICAL RECORD NUMBER:  [id number]    The patient is being assessed for  Shift Handoff    I agree that at least one RN has performed a thorough Head to Toe Skin Assessment on the patient. ALL assessment sites listed below have been assessed.      Areas assessed by both nurses:    Sacrum. Buttock, Coccyx, Ischium and Legs. Feet and Heels        Does the Patient have a Wound? Yes wound(s) were present on assessment. LDA wound assessment was Initiated and completed by RN       Adelfo Prevention initiated by RN: Yes  Wound Care Orders initiated by RN: No    Pressure Injury (Stage 3,4, Unstageable, DTI, NWPT, and Complex wounds) if present, place Wound referral order by RN under : No    New Ostomies, if present place, Ostomy referral order under : No     Nurse 1 eSignature: Electronically signed by Yadira Jimenez RN on 6/7/24 at 7:04 PM EDT    **SHARE this note so that the co-signing nurse can place an eSignature**    Nurse 2 eSignature: Electronically signed by Haydee Jean Baptiste RN on 6/7/24 at 7:20 PM EDT

## 2024-06-08 LAB
BASOPHILS # BLD: 0 K/UL (ref 0–0.1)
BASOPHILS NFR BLD: 1 % (ref 0–2)
DIFFERENTIAL METHOD BLD: ABNORMAL
EOSINOPHIL # BLD: 0.1 K/UL (ref 0–0.4)
EOSINOPHIL NFR BLD: 2 % (ref 0–5)
ERYTHROCYTE [DISTWIDTH] IN BLOOD BY AUTOMATED COUNT: 15.1 % (ref 11.6–14.5)
HCT VFR BLD AUTO: 30.6 % (ref 35–45)
HGB BLD-MCNC: 9.6 G/DL (ref 12–16)
IMM GRANULOCYTES # BLD AUTO: 0 K/UL (ref 0–0.04)
IMM GRANULOCYTES NFR BLD AUTO: 0 % (ref 0–0.5)
LYMPHOCYTES # BLD: 1.7 K/UL (ref 0.9–3.6)
LYMPHOCYTES NFR BLD: 24 % (ref 21–52)
MCH RBC QN AUTO: 31.1 PG (ref 24–34)
MCHC RBC AUTO-ENTMCNC: 31.4 G/DL (ref 31–37)
MCV RBC AUTO: 99 FL (ref 78–100)
MONOCYTES # BLD: 0.3 K/UL (ref 0.05–1.2)
MONOCYTES NFR BLD: 5 % (ref 3–10)
NEUTS SEG # BLD: 5.1 K/UL (ref 1.8–8)
NEUTS SEG NFR BLD: 70 % (ref 40–73)
NRBC # BLD: 0 K/UL (ref 0–0.01)
NRBC BLD-RTO: 0 PER 100 WBC
PLATELET # BLD AUTO: 635 K/UL (ref 135–420)
PMV BLD AUTO: 9.6 FL (ref 9.2–11.8)
RBC # BLD AUTO: 3.09 M/UL (ref 4.2–5.3)
WBC # BLD AUTO: 7.3 K/UL (ref 4.6–13.2)

## 2024-06-08 PROCEDURE — 6370000000 HC RX 637 (ALT 250 FOR IP): Performed by: NURSE PRACTITIONER

## 2024-06-08 PROCEDURE — 1180000000 HC REHAB R&B

## 2024-06-08 PROCEDURE — 97116 GAIT TRAINING THERAPY: CPT

## 2024-06-08 PROCEDURE — 6360000002 HC RX W HCPCS: Performed by: EMERGENCY MEDICINE

## 2024-06-08 PROCEDURE — 36415 COLL VENOUS BLD VENIPUNCTURE: CPT

## 2024-06-08 PROCEDURE — 6370000000 HC RX 637 (ALT 250 FOR IP): Performed by: EMERGENCY MEDICINE

## 2024-06-08 PROCEDURE — 85025 COMPLETE CBC W/AUTO DIFF WBC: CPT

## 2024-06-08 PROCEDURE — 97110 THERAPEUTIC EXERCISES: CPT

## 2024-06-08 PROCEDURE — 97530 THERAPEUTIC ACTIVITIES: CPT

## 2024-06-08 RX ORDER — OXYCODONE HYDROCHLORIDE 5 MG/1
5 TABLET ORAL DAILY PRN
Status: DISCONTINUED | OUTPATIENT
Start: 2024-06-08 | End: 2024-06-14 | Stop reason: HOSPADM

## 2024-06-08 RX ADMIN — Medication 1 TABLET: at 08:59

## 2024-06-08 RX ADMIN — POLYETHYLENE GLYCOL 3350 17 G: 17 POWDER, FOR SOLUTION ORAL at 08:58

## 2024-06-08 RX ADMIN — ACETAMINOPHEN 325MG 650 MG: 325 TABLET ORAL at 09:13

## 2024-06-08 RX ADMIN — FERROUS SULFATE TAB 325 MG (65 MG ELEMENTAL FE) 325 MG: 325 (65 FE) TAB at 08:59

## 2024-06-08 RX ADMIN — Medication 1 TABLET: at 17:07

## 2024-06-08 RX ADMIN — ACETAMINOPHEN 325MG 650 MG: 325 TABLET ORAL at 00:23

## 2024-06-08 RX ADMIN — FERROUS SULFATE TAB 325 MG (65 MG ELEMENTAL FE) 325 MG: 325 (65 FE) TAB at 17:07

## 2024-06-08 RX ADMIN — CLOPIDOGREL BISULFATE 75 MG: 75 TABLET ORAL at 08:58

## 2024-06-08 RX ADMIN — ENOXAPARIN SODIUM 40 MG: 100 INJECTION SUBCUTANEOUS at 14:49

## 2024-06-08 RX ADMIN — OXYCODONE HYDROCHLORIDE 5 MG: 5 TABLET ORAL at 13:38

## 2024-06-08 RX ADMIN — AMLODIPINE BESYLATE 5 MG: 5 TABLET ORAL at 08:58

## 2024-06-08 RX ADMIN — ATORVASTATIN CALCIUM 20 MG: 20 TABLET, FILM COATED ORAL at 21:14

## 2024-06-08 RX ADMIN — ASPIRIN 81 MG: 81 TABLET, COATED ORAL at 09:00

## 2024-06-08 ASSESSMENT — PAIN DESCRIPTION - DESCRIPTORS
DESCRIPTORS: ACHING
DESCRIPTORS: ACHING
DESCRIPTORS: SORE

## 2024-06-08 ASSESSMENT — PAIN SCALES - GENERAL
PAINLEVEL_OUTOF10: 3
PAINLEVEL_OUTOF10: 0
PAINLEVEL_OUTOF10: 0
PAINLEVEL_OUTOF10: 4
PAINLEVEL_OUTOF10: 0
PAINLEVEL_OUTOF10: 2

## 2024-06-08 ASSESSMENT — PAIN DESCRIPTION - ONSET
ONSET: GRADUAL
ONSET: ON-GOING
ONSET: ON-GOING

## 2024-06-08 ASSESSMENT — PAIN DESCRIPTION - LOCATION
LOCATION: BACK

## 2024-06-08 ASSESSMENT — PAIN DESCRIPTION - ORIENTATION
ORIENTATION: LOWER
ORIENTATION: POSTERIOR

## 2024-06-08 ASSESSMENT — PAIN DESCRIPTION - PAIN TYPE
TYPE: ACUTE PAIN

## 2024-06-08 ASSESSMENT — PAIN DESCRIPTION - FREQUENCY
FREQUENCY: INTERMITTENT

## 2024-06-08 NOTE — PLAN OF CARE
Problem: Chronic Conditions and Co-morbidities  Goal: Patient's chronic conditions and co-morbidity symptoms are monitored and maintained or improved  Outcome: Progressing     Problem: Chronic Conditions and Co-morbidities  Goal: Patient's chronic conditions and co-morbidity symptoms are monitored and maintained or improved  Outcome: Progressing     Problem: Safety - Adult  Goal: Free from fall injury  Outcome: Progressing     Problem: Skin/Tissue Integrity  Goal: Absence of new skin breakdown  Description: 1.  Monitor for areas of redness and/or skin breakdown  2.  Assess vascular access sites hourly  3.  Every 4-6 hours minimum:  Change oxygen saturation probe site  4.  Every 4-6 hours:  If on nasal continuous positive airway pressure, respiratory therapy assess nares and determine need for appliance change or resting period.  Outcome: Progressing     Problem: ABCDS Injury Assessment  Goal: Absence of physical injury  Outcome: Progressing     Problem: Pain  Goal: Verbalizes/displays adequate comfort level or baseline comfort level  Outcome: Progressing

## 2024-06-08 NOTE — PROGRESS NOTES
4 Eyes Skin Assessment     NAME:  Juanpablo Jackson  YOB: 1940  MEDICAL RECORD NUMBER:  [id number]    The patient is being assessed for  Shift Handoff    I agree that at least one RN has performed a thorough Head to Toe Skin Assessment on the patient. ALL assessment sites listed below have been assessed.      Areas assessed by both nurses:    Sacrum. Buttock, Coccyx, Ischium and Legs. Feet and Heels        Does the Patient have a Wound? Yes wound(s) were present on assessment. LDA wound assessment was Initiated and completed by RN       Adelfo Prevention initiated by RN: Yes  Wound Care Orders initiated by RN NO    Pressure Injury (Stage 3,4, Unstageable, DTI, NWPT, and Complex wounds) if present, place Wound referral order by RN under : No    New Ostomies, if present place, Ostomy referral order under : No     Nurse 1 eSignature: Electronically signed by Haydee Jean Baptiste RN on 6/8/24 at 7:07 AM EDT    **SHARE this note so that the co-signing nurse can place an eSignature**    Nurse 2 eSignature: Electronically signed by Rola Gaitan RN on 6/8/24 at 7:10 PM EDT

## 2024-06-08 NOTE — PLAN OF CARE
Problem: Physical Therapy - Adult  Goal: By Discharge: Performs mobility at highest level of function for planned discharge setting.  See evaluation for individualized goals.  Description: Physical Therapy Short Term Goals  Initiated 5/30/2024 and to be accomplished within 7 day(s) 6/6/24  1.  Patient will performsupine to sit and sit to supine in bed with modified independence.  ( NOT MET 6/6/24)  2.  Patient will transfer from bed to chair and chair to bed with supervision/set-up using the least restrictive device. (NOT MET 6/6/24)  3.  Patient will perform sit to stand with contact guard assist (NOT MET 6/6/24)  4.  Patient will ambulate with stand by assist for 40 feet with toe touch weight bearing with the least restrictive device. (NOT MET 6/6/24)  5.  Patient will propel wheelchair 150 feet modified independent for mobility on this unit. (MET 6/6/24)  6. Patient will improve R knee AROM to 10-90 degrees for greater ease and comfort with ambulation. (NOT MET 6/6/24)    Physical Therapy Long Term Goals  Initiated 5/30/2024 and to be accomplished within 14 day(s) 6/13/24  1.  Patient will perform supine to sit and sit to supine in bed with modified independence.    2.  Patient will transfer from bed to chair and chair to bed with modified independence using the least restrictive device.  3.  Patient will perform sit to stand with modified independence.  4.  Patient will ambulate with supervision for 150 feet with toe touch weight bearing with the least restrictive device.   5. Patient will increase R knee AROM to 5-120 degrees for greater mobility in the home.   Outcome: Progressing     PHYSICAL THERAPY TREATMENT    Patient: Juanpablo Jackson (83 y.o. female)  Date: 6/8/2024  Diagnosis: Closed displaced comminuted fracture of shaft of right femur with routine healing [S72.351D]   Precautions: fall risk, TTWB right LE  Chart, physical therapy assessment, plan of care and goals were reviewed.    Time in:    [] Patient left in no apparent distress in bed  [x] Patient left in no apparent distress sitting up in chair  [] Patient left in no apparent distress in w/c mobilizing under own power  [] Patient left in no apparent distress dining area  [] Patient left in no apparent distress mobilizing under own power  [x] Call bell left within reach  [] Nursing notified  [] Caregiver present  [] Bed alarm activated   [x] Chair alarm activated        Jo Avery, FATUMA  6/8/2024

## 2024-06-08 NOTE — PLAN OF CARE
4 Eyes Skin Assessment     NAME:  Juanpablo Jackson  YOB: 1940  MEDICAL RECORD NUMBER:  [id number]    The patient is being assessed for  Shift Handoff    I agree that at least one RN has performed a thorough Head to Toe Skin Assessment on the patient. ALL assessment sites listed below have been assessed.      Areas assessed by both nurses:    Sacrum. Buttock, Coccyx, Ischium and Legs. Feet and Heels        Does the Patient have a Wound? Yes wound(s) were present on assessment. LDA wound assessment was Initiated and completed by RN       Adelfo Prevention initiated by RN: Yes  Wound Care Orders initiated by RN: No    Pressure Injury (Stage 3,4, Unstageable, DTI, NWPT, and Complex wounds) if present, place Wound referral order by RN under : No    New Ostomies, if present place, Ostomy referral order under : No     Nurse 1 eSignature: Electronically signed by Rola Gaitan RN on 6/8/24 at 7:10 PM EDT    **SHARE this note so that the co-signing nurse can place an eSignature**    Nurse 2 eSignature: Electronically signed by Stephanie Green RN on 6/9/24 at 7:20 AM EDT

## 2024-06-09 VITALS
OXYGEN SATURATION: 96 % | DIASTOLIC BLOOD PRESSURE: 59 MMHG | WEIGHT: 151.01 LBS | RESPIRATION RATE: 16 BRPM | TEMPERATURE: 98 F | BODY MASS INDEX: 29.65 KG/M2 | SYSTOLIC BLOOD PRESSURE: 127 MMHG | HEIGHT: 60 IN | HEART RATE: 76 BPM

## 2024-06-09 PROCEDURE — 94761 N-INVAS EAR/PLS OXIMETRY MLT: CPT

## 2024-06-09 PROCEDURE — 1180000000 HC REHAB R&B

## 2024-06-09 PROCEDURE — 6370000000 HC RX 637 (ALT 250 FOR IP): Performed by: NURSE PRACTITIONER

## 2024-06-09 PROCEDURE — 6370000000 HC RX 637 (ALT 250 FOR IP): Performed by: EMERGENCY MEDICINE

## 2024-06-09 PROCEDURE — 6360000002 HC RX W HCPCS: Performed by: EMERGENCY MEDICINE

## 2024-06-09 RX ADMIN — Medication 1 TABLET: at 08:19

## 2024-06-09 RX ADMIN — ATORVASTATIN CALCIUM 20 MG: 20 TABLET, FILM COATED ORAL at 21:28

## 2024-06-09 RX ADMIN — Medication 1 TABLET: at 17:06

## 2024-06-09 RX ADMIN — AMLODIPINE BESYLATE 5 MG: 5 TABLET ORAL at 08:19

## 2024-06-09 RX ADMIN — FERROUS SULFATE TAB 325 MG (65 MG ELEMENTAL FE) 325 MG: 325 (65 FE) TAB at 17:06

## 2024-06-09 RX ADMIN — ACETAMINOPHEN 325MG 650 MG: 325 TABLET ORAL at 02:41

## 2024-06-09 RX ADMIN — FERROUS SULFATE TAB 325 MG (65 MG ELEMENTAL FE) 325 MG: 325 (65 FE) TAB at 08:19

## 2024-06-09 RX ADMIN — ACETAMINOPHEN 325MG 650 MG: 325 TABLET ORAL at 13:03

## 2024-06-09 RX ADMIN — POLYETHYLENE GLYCOL 3350 17 G: 17 POWDER, FOR SOLUTION ORAL at 08:17

## 2024-06-09 RX ADMIN — ASPIRIN 81 MG: 81 TABLET, COATED ORAL at 08:18

## 2024-06-09 RX ADMIN — ENOXAPARIN SODIUM 40 MG: 100 INJECTION SUBCUTANEOUS at 15:41

## 2024-06-09 RX ADMIN — CLOPIDOGREL BISULFATE 75 MG: 75 TABLET ORAL at 08:19

## 2024-06-09 ASSESSMENT — PAIN DESCRIPTION - ORIENTATION
ORIENTATION: LOWER
ORIENTATION: RIGHT

## 2024-06-09 ASSESSMENT — PAIN DESCRIPTION - LOCATION
LOCATION: BACK;HIP
LOCATION: LEG

## 2024-06-09 ASSESSMENT — PAIN SCALES - GENERAL
PAINLEVEL_OUTOF10: 0
PAINLEVEL_OUTOF10: 2
PAINLEVEL_OUTOF10: 2
PAINLEVEL_OUTOF10: 0

## 2024-06-09 ASSESSMENT — PAIN DESCRIPTION - DESCRIPTORS
DESCRIPTORS: ACHING
DESCRIPTORS: ACHING

## 2024-06-09 ASSESSMENT — PAIN - FUNCTIONAL ASSESSMENT: PAIN_FUNCTIONAL_ASSESSMENT: ACTIVITIES ARE NOT PREVENTED

## 2024-06-09 ASSESSMENT — PAIN DESCRIPTION - PAIN TYPE: TYPE: ACUTE PAIN

## 2024-06-09 ASSESSMENT — PAIN DESCRIPTION - FREQUENCY: FREQUENCY: INTERMITTENT

## 2024-06-09 ASSESSMENT — PAIN DESCRIPTION - ONSET: ONSET: ON-GOING

## 2024-06-09 NOTE — PLAN OF CARE
Problem: Chronic Conditions and Co-morbidities  Goal: Patient's chronic conditions and co-morbidity symptoms are monitored and maintained or improved  Outcome: Progressing     Problem: Safety - Adult  Goal: Free from fall injury  Outcome: Progressing     Problem: Skin/Tissue Integrity  Goal: Absence of new skin breakdown  Description: 1.  Monitor for areas of redness and/or skin breakdown  2.  Assess vascular access sites hourly  3.  Every 4-6 hours minimum:  Change oxygen saturation probe site  4.  Every 4-6 hours:  If on nasal continuous positive airway pressure, respiratory therapy assess nares and determine need for appliance change or resting period.  Outcome: Progressing     Problem: ABCDS Injury Assessment  Goal: Absence of physical injury  Outcome: Progressing     Problem: Pain  Goal: Verbalizes/displays adequate comfort level or baseline comfort level  Outcome: Progressing

## 2024-06-09 NOTE — PROGRESS NOTES
[facility] PHYSICAL REHABILITATION  [address] [place] [id number]     INPATIENT REHABILITATION  DAILY PROGRESS NOTE     Date: 6/9/2024    Name: Juanpablo Jackson Age / Sex: 83 y.o. / female   CSN: [id number] MRN: [id number]   Admit Date: 5/29/2024 Length of Stay: 11 days     Primary Rehabilitation Diagnosis impaired mobility and ADLs in the setting of a closed fracture of the shaft of right femur, status post open reduction and internal fixation and subsequent SFA-popliteal dissection and patient is now status post right CFA exploration and superficial femoral and popliteal stents with 4 compartment fasciotomy        Subjective:     I personally saw and evaluated this patient face-to-face today 6/9/24.  Patient is sitting in bed in no apparent distress.  Reports pain is reasonably controlled at this time.     Objective:     Vital Signs:  Patient Vitals for the past 24 hrs:   BP Temp Temp src Pulse Resp SpO2   06/09/24 0819 (!) 143/62 97.4 °F (36.3 °C) Oral 79 18 97 %   06/08/24 2030 129/66 97.9 °F (36.6 °C) Oral 63 17 97 %   06/08/24 1728 139/69 97.4 °F (36.3 °C) Oral 70 17 100 %   06/08/24 1408 -- -- -- -- 18 --   06/08/24 1338 -- -- -- -- 18 --          Physical Examination:    General:  Awake, alert  Cardiovascular:  S1S2+, RRR  Pulmonary:  CTA b/l  GI:  Soft, BS+, NT, ND  Extremities: Right leg dressing in place clean, dry.  Trace edema RLE.          Current Medications:  Current Facility-Administered Medications   Medication Dose Route Frequency    oxyCODONE (ROXICODONE) immediate release tablet 5 mg  5 mg Oral Daily PRN    hydrALAZINE (APRESOLINE) tablet 10 mg  10 mg Oral Q6H PRN    acetaminophen (TYLENOL) tablet 650 mg  650 mg Oral Q4H PRN    ferrous sulfate (IRON 325) tablet 325 mg  325 mg Oral BID WC    polyethylene glycol (GLYCOLAX) packet 17 g  17 g Oral Daily    aspirin EC tablet 81 mg  81 mg Oral Daily    enoxaparin (LOVENOX) injection 40 mg  40 mg SubCUTAneous Q24H    amLODIPine  (NORVASC) tablet 5 mg  5 mg Oral Daily    clopidogrel (PLAVIX) tablet 75 mg  75 mg Oral Daily    oxyCODONE (ROXICODONE) immediate release tablet 5 mg  5 mg Oral Q6H PRN    naloxone (NARCAN) injection 0.4 mg  0.4 mg IntraVENous PRN    atorvastatin (LIPITOR) tablet 20 mg  20 mg Oral Nightly    oyster shell calcium w/D 500-5 MG-MCG tablet 1 tablet  1 tablet Oral BID WC    bisacodyl (DULCOLAX) EC tablet 5 mg  5 mg Oral Daily PRN       Allergies:  Allergies   Allergen Reactions    Nitrofurazone Hives and Other (See Comments)     Intolerance    Tobramycin Sulfate Other (See Comments)     Intolerance, extreme redness    Erythromycin Itching and Rash       inflammation       Lab/Data Review:  No results found for this or any previous visit (from the past 24 hour(s)).          Assessment:     Primary Rehabilitation Diagnosis impaired mobility and ADLs in the setting of a closed fracture of the shaft of right femur, status post open reduction and internal fixation and subsequent SFA-popliteal dissection and patient is now status post right CFA exploration and superficial femoral and popliteal stents with 4 compartment fasciotomy     Other Co-Morbid Conditions managed in Rehab      PAD, Status post right lower extremity CFA exploration and superficial femoral and popliteal stent placement with 4 compartment fasciotomy  Hypertension  Dyslipidemia  Prior history of CVA  Osteoporosis  Dyslipidemia  Anemia which appears to be chronic     Medical plan      impaired mobility and ADLs in the setting of a closed fracture of the shaft of right femur, status post open reduction and internal fixation and subsequent SFA-popliteal dissection and patient is now status post right CFA exploration and superficial femoral and popliteal stents with 4 compartment fasciotomy  Physical therapy and Occupational Therapy  Judicious pain control, add oxycodone 5mg to be given 1hr prior to physical therapy, pt may refuse  Toe-touch weightbearing right

## 2024-06-09 NOTE — PROGRESS NOTES
4 Eyes Skin Assessment     NAME:  Juanpablo Jackson  YOB: 1940  MEDICAL RECORD NUMBER:  [id number]    The patient is being assessed for  Shift Handoff    I agree that at least one RN has performed a thorough Head to Toe Skin Assessment on the patient. ALL assessment sites listed below have been assessed.      Areas assessed by both nurses:    Sacrum. Buttock, Coccyx, Ischium and Legs. Feet and Heels        Does the Patient have a Wound? Yes wound(s) were present on assessment. LDA wound assessment was Initiated and completed by RN       Adelfo Prevention initiated by RN: Yes  Wound Care Orders initiated by RN: No    Pressure Injury (Stage 3,4, Unstageable, DTI, NWPT, and Complex wounds) if present, place Wound referral order by RN under : No    New Ostomies, if present place, Ostomy referral order under : No     Nurse 1 eSignature: Electronically signed by Stephanie Green RN on 6/9/24 at 7:21 AM EDT    **SHARE this note so that the co-signing nurse can place an eSignature**    Nurse 2 eSignature: Electronically signed by Rola Gaitan RN on 6/9/24 at 2:11 PM EDT

## 2024-06-09 NOTE — PROGRESS NOTES
4 Eyes Skin Assessment     NAME:  Juanpablo Jackson  YOB: 1940  MEDICAL RECORD NUMBER:  [id number]    The patient is being assessed for  Shift Handoff    I agree that at least one RN has performed a thorough Head to Toe Skin Assessment on the patient. ALL assessment sites listed below have been assessed.      Areas assessed by both nurses:    Sacrum. Buttock, Coccyx, Ischium and Legs. Feet and Heels        Does the Patient have a Wound? Yes wound(s) were present on assessment. LDA wound assessment was Initiated and completed by RN       Adelfo Prevention initiated by RN: Yes  Wound Care Orders initiated by RN: No    Pressure Injury (Stage 3,4, Unstageable, DTI, NWPT, and Complex wounds) if present, place Wound referral order by RN under : No    New Ostomies, if present place, Ostomy referral order under : No     Nurse 1 eSignature: Electronically signed by Rola Gaitan RN on 6/9/24 at 5:49 PM EDT    **SHARE this note so that the co-signing nurse can place an eSignature**    Nurse 2 eSignature: Electronically signed by Stephanie Green RN on 6/10/24 at 7:20 AM EDT

## 2024-06-09 NOTE — PLAN OF CARE
Problem: Safety - Adult  Goal: Free from fall injury  6/8/2024 2047 by Stephanie Green RN  Outcome: Progressing  Flowsheets (Taken 6/8/2024 2045)  Free From Fall Injury: Instruct family/caregiver on patient safety  6/8/2024 1150 by Rola Gaitan RN  Outcome: Progressing     Problem: Chronic Conditions and Co-morbidities  Goal: Patient's chronic conditions and co-morbidity symptoms are monitored and maintained or improved  6/8/2024 2047 by Stephanie Green RN  Outcome: Progressing  Flowsheets (Taken 6/8/2024 2000)  Care Plan - Patient's Chronic Conditions and Co-Morbidity Symptoms are Monitored and Maintained or Improved:   Monitor and assess patient's chronic conditions and comorbid symptoms for stability, deterioration, or improvement   Collaborate with multidisciplinary team to address chronic and comorbid conditions and prevent exacerbation or deterioration  6/8/2024 1150 by Rola Gaitan RN  Outcome: Progressing     Problem: Skin/Tissue Integrity  Goal: Absence of new skin breakdown  Description: 1.  Monitor for areas of redness and/or skin breakdown  2.  Assess vascular access sites hourly  3.  Every 4-6 hours minimum:  Change oxygen saturation probe site  4.  Every 4-6 hours:  If on nasal continuous positive airway pressure, respiratory therapy assess nares and determine need for appliance change or resting period.  6/8/2024 2047 by Stephanie Green RN  Outcome: Progressing  6/8/2024 1150 by Rola Gaitan RN  Outcome: Progressing     Problem: ABCDS Injury Assessment  Goal: Absence of physical injury  6/8/2024 2047 by Stephanie Green RN  Outcome: Progressing  6/8/2024 1150 by Rola Gaitan RN  Outcome: Progressing     Problem: Pain  Goal: Verbalizes/displays adequate comfort level or baseline comfort level  6/8/2024 2047 by Stephanie Green RN  Outcome: Progressing  Flowsheets (Taken 6/8/2024 2000)  Verbalizes/displays adequate comfort level or baseline comfort level:   Encourage patient to monitor pain  and request assistance   Assess pain using appropriate pain scale   Administer analgesics based on type and severity of pain and evaluate response   Implement non-pharmacological measures as appropriate and evaluate response  6/8/2024 1150 by Rola Gaitan, RN  Outcome: Progressing

## 2024-06-10 PROCEDURE — 6360000002 HC RX W HCPCS: Performed by: EMERGENCY MEDICINE

## 2024-06-10 PROCEDURE — 97530 THERAPEUTIC ACTIVITIES: CPT

## 2024-06-10 PROCEDURE — 97150 GROUP THERAPEUTIC PROCEDURES: CPT

## 2024-06-10 PROCEDURE — 6370000000 HC RX 637 (ALT 250 FOR IP): Performed by: EMERGENCY MEDICINE

## 2024-06-10 PROCEDURE — 99232 SBSQ HOSP IP/OBS MODERATE 35: CPT | Performed by: EMERGENCY MEDICINE

## 2024-06-10 PROCEDURE — 1180000000 HC REHAB R&B

## 2024-06-10 PROCEDURE — 97116 GAIT TRAINING THERAPY: CPT

## 2024-06-10 PROCEDURE — 6370000000 HC RX 637 (ALT 250 FOR IP): Performed by: FAMILY MEDICINE

## 2024-06-10 PROCEDURE — 97110 THERAPEUTIC EXERCISES: CPT

## 2024-06-10 PROCEDURE — 6370000000 HC RX 637 (ALT 250 FOR IP): Performed by: NURSE PRACTITIONER

## 2024-06-10 RX ADMIN — OXYCODONE HYDROCHLORIDE 5 MG: 5 TABLET ORAL at 21:34

## 2024-06-10 RX ADMIN — ASPIRIN 81 MG: 81 TABLET, COATED ORAL at 08:07

## 2024-06-10 RX ADMIN — FERROUS SULFATE TAB 325 MG (65 MG ELEMENTAL FE) 325 MG: 325 (65 FE) TAB at 16:34

## 2024-06-10 RX ADMIN — ATORVASTATIN CALCIUM 20 MG: 20 TABLET, FILM COATED ORAL at 21:36

## 2024-06-10 RX ADMIN — ENOXAPARIN SODIUM 40 MG: 100 INJECTION SUBCUTANEOUS at 15:22

## 2024-06-10 RX ADMIN — Medication 1 TABLET: at 08:07

## 2024-06-10 RX ADMIN — ACETAMINOPHEN 325MG 650 MG: 325 TABLET ORAL at 13:20

## 2024-06-10 RX ADMIN — CLOPIDOGREL BISULFATE 75 MG: 75 TABLET ORAL at 08:07

## 2024-06-10 RX ADMIN — Medication 1 TABLET: at 16:34

## 2024-06-10 RX ADMIN — OXYCODONE HYDROCHLORIDE 5 MG: 5 TABLET ORAL at 11:06

## 2024-06-10 RX ADMIN — FERROUS SULFATE TAB 325 MG (65 MG ELEMENTAL FE) 325 MG: 325 (65 FE) TAB at 08:06

## 2024-06-10 RX ADMIN — POLYETHYLENE GLYCOL 3350 17 G: 17 POWDER, FOR SOLUTION ORAL at 08:07

## 2024-06-10 RX ADMIN — AMLODIPINE BESYLATE 5 MG: 5 TABLET ORAL at 08:06

## 2024-06-10 ASSESSMENT — PAIN SCALES - GENERAL
PAINLEVEL_OUTOF10: 5
PAINLEVEL_OUTOF10: 0
PAINLEVEL_OUTOF10: 4
PAINLEVEL_OUTOF10: 0
PAINLEVEL_OUTOF10: 4
PAINLEVEL_OUTOF10: 0

## 2024-06-10 ASSESSMENT — PAIN DESCRIPTION - LOCATION
LOCATION: LEG

## 2024-06-10 ASSESSMENT — PAIN DESCRIPTION - ONSET
ONSET: ON-GOING

## 2024-06-10 ASSESSMENT — PAIN DESCRIPTION - ORIENTATION
ORIENTATION: RIGHT

## 2024-06-10 ASSESSMENT — PAIN - FUNCTIONAL ASSESSMENT
PAIN_FUNCTIONAL_ASSESSMENT: PREVENTS OR INTERFERES SOME ACTIVE ACTIVITIES AND ADLS

## 2024-06-10 ASSESSMENT — PAIN DESCRIPTION - PAIN TYPE
TYPE: SURGICAL PAIN

## 2024-06-10 ASSESSMENT — PAIN DESCRIPTION - FREQUENCY
FREQUENCY: CONTINUOUS

## 2024-06-10 ASSESSMENT — PAIN DESCRIPTION - DESCRIPTORS
DESCRIPTORS: ACHING

## 2024-06-10 NOTE — PLAN OF CARE
Problem: Chronic Conditions and Co-morbidities  Goal: Patient's chronic conditions and co-morbidity symptoms are monitored and maintained or improved  Outcome: Progressing     Problem: Safety - Adult  Goal: Free from fall injury  Outcome: Progressing     Problem: Skin/Tissue Integrity  Goal: Absence of new skin breakdown  Description: 1.  Monitor for areas of redness and/or skin breakdown  2.  Assess vascular access sites hourly  3.  Every 4-6 hours minimum:  Change oxygen saturation probe site  4.  Every 4-6 hours:  If on nasal continuous positive airway pressure, respiratory therapy assess nares and determine need for appliance change or resting period.  Outcome: Progressing     Problem: ABCDS Injury Assessment  Goal: Absence of physical injury  Outcome: Progressing

## 2024-06-10 NOTE — PROGRESS NOTES
Pt was scheduled for assessment with [facility] in [place] for MAYRA last week and coordinator was not present. Sw has communicated by phone and email opportunities to reschedule with no response.     Sw placed order for a wheelchair through [employer]. Once approved sw will deliver from [employer].     Joseph will follow.    Pt would like written script not sent to [facility]. Discontinued at [facility] and [facility] script mailed to pt.

## 2024-06-10 NOTE — PROGRESS NOTES
[facility] PHYSICAL REHABILITATION  [address] [place] [id number]     INPATIENT REHABILITATION  DAILY PROGRESS NOTE     Date: 6/10/2024    Name: Juanpablo Jackson Age / Sex: 83 y.o. / female   CSN: [id number] MRN: [id number]   Admit Date: 5/29/2024 Length of Stay: 12 days     Primary Rehabilitation Diagnosis impaired mobility and ADLs in the setting of a closed fracture of the shaft of right femur, status post open reduction and internal fixation and subsequent SFA-popliteal dissection and patient is now status post right CFA exploration and superficial femoral and popliteal stents with 4 compartment fasciotomy        Subjective:     I personally saw and evaluated this patient face-to-face today.  Patient is sitting in bed in no apparent distress, awake and alert.  Patient states pain is controlled.  Patient is in good spirits  Objective:     Vital Signs:  Patient Vitals for the past 24 hrs:   BP Temp Temp src Pulse Resp SpO2   06/10/24 1527 -- (!) 59 °F (15 °C) -- -- -- --   06/10/24 1525 125/62 98.5 °F (36.9 °C) Oral 86 18 98 %   06/10/24 0736 (!) 153/65 98.3 °F (36.8 °C) Oral 68 18 93 %   06/09/24 2000 (!) 127/59 98 °F (36.7 °C) Oral 76 16 96 %        Physical Examination:    General:  Awake, alert  Cardiovascular:  S1S2+, RRR  Pulmonary:  CTA b/l  GI:  Soft, BS+, NT, ND  Extremities: 1+ edema right lower extremity.  I examined the incisions of the right lower extremity and right hip with RN present.  Incisions are clean and dry with no redness or drainage or swelling.  Steri-Strips in place          Current Medications:  Current Facility-Administered Medications   Medication Dose Route Frequency    oxyCODONE (ROXICODONE) immediate release tablet 5 mg  5 mg Oral Daily PRN    hydrALAZINE (APRESOLINE) tablet 10 mg  10 mg Oral Q6H PRN    acetaminophen (TYLENOL) tablet 650 mg  650 mg Oral Q4H PRN    ferrous sulfate (IRON 325) tablet 325 mg  325 mg Oral BID WC    polyethylene glycol (GLYCOLAX)

## 2024-06-10 NOTE — PLAN OF CARE
Problem: Physical Therapy - Adult  Goal: By Discharge: Performs mobility at highest level of function for planned discharge setting.  See evaluation for individualized goals.  Description: Physical Therapy Short Term Goals  Initiated 5/30/2024 and to be accomplished within 7 day(s) 6/6/24  1.  Patient will performsupine to sit and sit to supine in bed with modified independence.  ( NOT MET 6/6/24)  2.  Patient will transfer from bed to chair and chair to bed with supervision/set-up using the least restrictive device. (NOT MET 6/6/24)  3.  Patient will perform sit to stand with contact guard assist (NOT MET 6/6/24)  4.  Patient will ambulate with stand by assist for 40 feet with toe touch weight bearing with the least restrictive device. (NOT MET 6/6/24)  5.  Patient will propel wheelchair 150 feet modified independent for mobility on this unit. (MET 6/6/24)  6. Patient will improve R knee AROM to 10-90 degrees for greater ease and comfort with ambulation. (NOT MET 6/6/24)    Physical Therapy Long Term Goals  Initiated 5/30/2024 and to be accomplished within 14 day(s) 6/13/24  1.  Patient will perform supine to sit and sit to supine in bed with modified independence.    2.  Patient will transfer from bed to chair and chair to bed with modified independence using the least restrictive device.  3.  Patient will perform sit to stand with modified independence.  4.  Patient will ambulate with supervision for 150 feet with toe touch weight bearing with the least restrictive device.   5. Patient will increase R knee AROM to 5-120 degrees for greater mobility in the home.   Outcome: Progressing     PHYSICAL THERAPY TREATMENT    Patient: Juanpablo Jackson (83 y.o. female)  Date: 6/10/2024  Diagnosis: Closed displaced comminuted fracture of shaft of right femur with routine healing [S72.351D]   Precautions: fall risk, TTWB R LE  Chart, physical therapy assessment, plan of care and goals were reviewed.    Time in:  Patient left in no apparent distress in w/c mobilizing under own power  [] Patient left in no apparent distress dining area  [] Patient left in no apparent distress mobilizing under own power  [x] Call bell left within reach  [] Nursing notified  [] Caregiver present  [] Bed alarm activated   [x] Chair alarm activated        Susy Azevedo PTA  6/10/2024

## 2024-06-10 NOTE — PLAN OF CARE
Problem: Occupational Therapy - Adult  Goal: By Discharge: Performs self-care activities at highest level of function for planned discharge setting.  See evaluation for individualized goals.  Description: Occupational Therapy Goals   Long Term Goals  Initiated 24 and to be accomplished within 2 week(s)  1. Pt will perform self-feeding with Jovanna.  2. Pt will perform grooming with Jovanna.  3. Pt will perform UB bathing with Jovanna.  4. Pt will perform LB bathing with CGA using AE as needed.  5. Pt will perform tub/shower transfer with CGA.   6. Pt will perform UB dressing with Jovanna.  7. Pt will perform LB dressing with CGA using AE as needed.  8. Pt will perform toileting task with CGA.  9. Pt will perform toilet transfer with CGA using least restrictive assistive device.    Short Term Goals   Initiated 24 and to be accomplished within 7 day(s), (reassessed on 2024)  1. Pt will perform self-feeding with setup. (Goal met 2024)  2. Pt will perform grooming with setup.  3. Pt will perform UB bathing with setup.  4. Pt will perform LB bathing with Chacho using AE as needed. (Goal met 2024)  5. Pt will perform tub/shower transfer with Chacho. (Goal met 2024)  6. Pt will perform UB dressing with setup/ supv. (Goal met 2024)  7. Pt will perform LB dressing with Chacho using AE as needed.  8. Pt will perform toileting task with Chacho.(Goal met 2024)  9. Pt will perform toilet transfer with Chacho using least restrictive assistive device. (Goal met 2024)    9HPT: RUE:33 sec. LUE: 30 sec. (24)  Outcome: Progressing   Occupational Therapy TREATMENT    Patient: Juanpablo Jackson   83 y.o.    Patient identified with name and : yes    Date: 6/10/2024    First Tx Session  Time In: 1330  Time Out: 1500      Diagnosis: Closed displaced comminuted fracture of shaft of right femur with routine healing [S72.259D]   Precautions:  Restrictions/Precautions: Fall Risk, Surgical Protocols, Weight Bearing,  Raised toilet seat with rails   Toilet Transfer Comments Using FWW for safety     FUNCTIONAL MOBILITY Daily Assessment    Functional - Mobility Device: Wheelchair  Activity: To/From therapy gym  Assist Level: Supervision  Functional Mobility Comments: Pt using BUE to propel forward.   Stand Step Transfers: Contact guard assistance  Sit to stand: Stand by assistance  Stand to sit: Stand by assistance  Transfer Comments: Pt performed stand step transfer from w/c to recliner chair using FWW for safety. Pt having good recall of toe touch WB on right LE.      WHEELCHAIR/BED TRANSFER INDEPENDENCE Daily Assessment   Transfer Technique  Stand step   Level of Assistance  Contact guard assistance   Equipment  FWW, gait belt   Comments  Pt having good recall of toe touch WB precautions.     Activity Tolerance:  Fair          OCCUPATIONAL THERAPY GROUP   THERAC Time Comments   Seated UE Activity 30 Pt participated in group activity with one other pt. Pt played the game Sorry, having to draw/discard cards and move small game pieces around board. Pt requiring cuing throughout game for proper strategy due to not being familiar with game.   Standing UE Activity     Ball Toss     FM Coordination Activity     Functional Reach activity       EDUCATION:   Education Given To: Patient  Education Provided: Mobility Training;Transfer Training;Safety;Precautions  Education Method: Demonstration;Verbal  Education Outcome: Verbalized understanding;Continued education needed;Demonstrated understanding    ASSESSMENT:  Pt making progress with improving activity tolerance and increasing BUE strength. Pt having good recall of toe touch WB precautions during functional transfers. Pt pleasant throughout session and is eager to return to PLOF.  Progression toward goals:  []          Improving appropriately and progressing toward goals  [x]          Improving slowly and progressing toward goals  []          Not making progress toward goals and plan of

## 2024-06-10 NOTE — PROGRESS NOTES
4 Eyes Skin Assessment     NAME:  Juanpablo Jackson  YOB: 1940  MEDICAL RECORD NUMBER:  [id number]    The patient is being assessed for  Shift Handoff    I agree that at least one RN has performed a thorough Head to Toe Skin Assessment on the patient. ALL assessment sites listed below have been assessed.      Areas assessed by both nurses:    Sacrum. Buttock, Coccyx, Ischium and Legs. Feet and Heels        Does the Patient have a Wound? Yes wound(s) were present on assessment. LDA wound assessment was Initiated and completed by RN       Adelfo Prevention initiated by RN: Yes  Wound Care Orders initiated by RN: No    Pressure Injury (Stage 3,4, Unstageable, DTI, NWPT, and Complex wounds) if present, place Wound referral order by RN under : No    New Ostomies, if present place, Ostomy referral order under : No     Nurse 1 eSignature: Electronically signed by Stephanie Green RN on 6/10/24 at 7:20 AM EDT    **SHARE this note so that the co-signing nurse can place an eSignature**    Nurse 2 eSignature: {Esignature:[id number]}

## 2024-06-11 PROCEDURE — 6370000000 HC RX 637 (ALT 250 FOR IP): Performed by: EMERGENCY MEDICINE

## 2024-06-11 PROCEDURE — 97530 THERAPEUTIC ACTIVITIES: CPT

## 2024-06-11 PROCEDURE — 99232 SBSQ HOSP IP/OBS MODERATE 35: CPT | Performed by: EMERGENCY MEDICINE

## 2024-06-11 PROCEDURE — 97110 THERAPEUTIC EXERCISES: CPT

## 2024-06-11 PROCEDURE — 1180000000 HC REHAB R&B

## 2024-06-11 PROCEDURE — 97116 GAIT TRAINING THERAPY: CPT

## 2024-06-11 PROCEDURE — 6360000002 HC RX W HCPCS: Performed by: EMERGENCY MEDICINE

## 2024-06-11 PROCEDURE — 92508 TX SP LANG VOICE COMM GROUP: CPT

## 2024-06-11 PROCEDURE — 94761 N-INVAS EAR/PLS OXIMETRY MLT: CPT

## 2024-06-11 PROCEDURE — 97535 SELF CARE MNGMENT TRAINING: CPT

## 2024-06-11 RX ADMIN — Medication 1 TABLET: at 16:59

## 2024-06-11 RX ADMIN — CLOPIDOGREL BISULFATE 75 MG: 75 TABLET ORAL at 08:11

## 2024-06-11 RX ADMIN — FERROUS SULFATE TAB 325 MG (65 MG ELEMENTAL FE) 325 MG: 325 (65 FE) TAB at 17:05

## 2024-06-11 RX ADMIN — OXYCODONE HYDROCHLORIDE 5 MG: 5 TABLET ORAL at 14:35

## 2024-06-11 RX ADMIN — ASPIRIN 81 MG: 81 TABLET, COATED ORAL at 08:11

## 2024-06-11 RX ADMIN — AMLODIPINE BESYLATE 5 MG: 5 TABLET ORAL at 08:10

## 2024-06-11 RX ADMIN — ENOXAPARIN SODIUM 40 MG: 100 INJECTION SUBCUTANEOUS at 15:50

## 2024-06-11 RX ADMIN — OXYCODONE HYDROCHLORIDE 5 MG: 5 TABLET ORAL at 08:15

## 2024-06-11 RX ADMIN — POLYETHYLENE GLYCOL 3350 17 G: 17 POWDER, FOR SOLUTION ORAL at 08:10

## 2024-06-11 RX ADMIN — ATORVASTATIN CALCIUM 20 MG: 20 TABLET, FILM COATED ORAL at 21:17

## 2024-06-11 RX ADMIN — FERROUS SULFATE TAB 325 MG (65 MG ELEMENTAL FE) 325 MG: 325 (65 FE) TAB at 08:11

## 2024-06-11 RX ADMIN — HYDRALAZINE HYDROCHLORIDE 10 MG: 10 TABLET ORAL at 21:17

## 2024-06-11 RX ADMIN — OXYCODONE HYDROCHLORIDE 5 MG: 5 TABLET ORAL at 21:16

## 2024-06-11 RX ADMIN — Medication 1 TABLET: at 08:10

## 2024-06-11 ASSESSMENT — PAIN SCALES - GENERAL
PAINLEVEL_OUTOF10: 8
PAINLEVEL_OUTOF10: 0
PAINLEVEL_OUTOF10: 4
PAINLEVEL_OUTOF10: 4
PAINLEVEL_OUTOF10: 0

## 2024-06-11 ASSESSMENT — PAIN DESCRIPTION - LOCATION
LOCATION: HIP

## 2024-06-11 ASSESSMENT — PAIN DESCRIPTION - ONSET
ONSET: GRADUAL
ONSET: ON-GOING
ONSET: ON-GOING

## 2024-06-11 ASSESSMENT — PAIN DESCRIPTION - ORIENTATION
ORIENTATION: RIGHT

## 2024-06-11 ASSESSMENT — PAIN DESCRIPTION - DESCRIPTORS
DESCRIPTORS: ACHING

## 2024-06-11 ASSESSMENT — PAIN DESCRIPTION - FREQUENCY
FREQUENCY: INTERMITTENT

## 2024-06-11 ASSESSMENT — PAIN DESCRIPTION - PAIN TYPE
TYPE: SURGICAL PAIN

## 2024-06-11 NOTE — PROGRESS NOTES
4 Eyes Skin Assessment     NAME:  Juanpablo Jackson  YOB: 1940  MEDICAL RECORD NUMBER:  [id number]    The patient is being assessed for  Shift Handoff    I agree that at least one RN has performed a thorough Head to Toe Skin Assessment on the patient. ALL assessment sites listed below have been assessed.      Areas assessed by both nurses:    Sacrum. Buttock, Coccyx, Ischium and Legs. Feet and Heels        Does the Patient have a Wound? Yes wound(s) were present on assessment. LDA wound assessment was Initiated and completed by RN       Adelfo Prevention initiated by RN: Yes  Wound Care Orders initiated by RN: No    Pressure Injury (Stage 3,4, Unstageable, DTI, NWPT, and Complex wounds) if present, place Wound referral order by RN under : No    New Ostomies, if present place, Ostomy referral order under : No     Nurse 1 eSignature: Electronically signed by Stephanie Green RN on 6/11/24 at 7:14 AM EDT    **SHARE this note so that the co-signing nurse can place an eSignature**    Nurse 2 eSignature: Electronically signed by VAISHNAVI FIERRO RN on 6/11/24 at 7:14 PM EDT

## 2024-06-11 NOTE — PROGRESS NOTES
Justin, NP made aware pt. Had an episode of vomiting while at the gym with OT,vitals stable no order made..

## 2024-06-11 NOTE — PLAN OF CARE
Problem: Physical Therapy - Adult  Goal: By Discharge: Performs mobility at highest level of function for planned discharge setting.  See evaluation for individualized goals.  Description: Physical Therapy Short Term Goals  Initiated 5/30/2024 and to be accomplished within 7 day(s) 6/6/24  1.  Patient will performsupine to sit and sit to supine in bed with modified independence.  ( NOT MET 6/6/24)  2.  Patient will transfer from bed to chair and chair to bed with supervision/set-up using the least restrictive device. (NOT MET 6/6/24)  3.  Patient will perform sit to stand with contact guard assist (NOT MET 6/6/24)  4.  Patient will ambulate with stand by assist for 40 feet with toe touch weight bearing with the least restrictive device. (NOT MET 6/6/24)  5.  Patient will propel wheelchair 150 feet modified independent for mobility on this unit. (MET 6/6/24)  6. Patient will improve R knee AROM to 10-90 degrees for greater ease and comfort with ambulation. (NOT MET 6/6/24)    Physical Therapy Long Term Goals  Initiated 5/30/2024 and to be accomplished within 14 day(s) 6/13/24  1.  Patient will perform supine to sit and sit to supine in bed with modified independence.    2.  Patient will transfer from bed to chair and chair to bed with modified independence using the least restrictive device.  3.  Patient will perform sit to stand with modified independence.  4.  Patient will ambulate with supervision for 150 feet with toe touch weight bearing with the least restrictive device.   5. Patient will increase R knee AROM to 5-120 degrees for greater mobility in the home.   Outcome: Progressing     PHYSICAL THERAPY TREATMENT    Patient: Juanpablo Jackson (83 y.o. female)  Date: 6/11/2024  Diagnosis: Closed displaced comminuted fracture of shaft of right femur with routine healing [S72.351D]   Precautions: fall risk, TTWB R LE  Chart, physical therapy assessment, plan of care and goals were reviewed.    Time in:

## 2024-06-11 NOTE — PROGRESS NOTES
[facility] PHYSICAL REHABILITATION  [address] [place] [id number]     INPATIENT REHABILITATION  DAILY PROGRESS NOTE     Date: 6/11/2024    Name: Juanpablo Jackson Age / Sex: 83 y.o. / female   CSN: [id number] MRN: [id number]   Admit Date: 5/29/2024 Length of Stay: 13 days     Primary Rehabilitation Diagnosis impaired mobility and ADLs in the setting of a closed fracture of the shaft of right femur, status post open reduction and internal fixation and subsequent SFA-popliteal dissection and patient is now status post right CFA exploration and superficial femoral and popliteal stents with 4 compartment fasciotomy        Subjective:     I personally saw and evaluated this patient face-to-face today.  Patient is sitting in bed in no apparent distress.  Patient is in good spirits and looking forward to transitioning out of the rehab soon  Objective:     Vital Signs:  Patient Vitals for the past 24 hrs:   BP Temp Temp src Pulse Resp SpO2   06/11/24 1532 129/71 97.3 °F (36.3 °C) Oral 68 17 98 %   06/11/24 1131 137/63 98.8 °F (37.1 °C) Oral 67 18 --   06/11/24 0722 (!) 112/55 97.3 °F (36.3 °C) Oral 69 18 98 %   06/10/24 2204 -- -- -- -- 18 --   06/10/24 2134 (!) 151/61 98.3 °F (36.8 °C) Oral 73 18 97 %        Physical Examination:    General:  Awake, alert  Cardiovascular:  S1S2+, RRR  Pulmonary:  CTA b/l  GI:  Soft, BS+, NT, ND  Extremities: 1+ edema to right lower extremity            Current Medications:  Current Facility-Administered Medications   Medication Dose Route Frequency    oxyCODONE (ROXICODONE) immediate release tablet 5 mg  5 mg Oral Daily PRN    hydrALAZINE (APRESOLINE) tablet 10 mg  10 mg Oral Q6H PRN    acetaminophen (TYLENOL) tablet 650 mg  650 mg Oral Q4H PRN    ferrous sulfate (IRON 325) tablet 325 mg  325 mg Oral BID WC    polyethylene glycol (GLYCOLAX) packet 17 g  17 g Oral Daily    aspirin EC tablet 81 mg  81 mg Oral Daily    enoxaparin (LOVENOX) injection 40 mg  40 mg

## 2024-06-11 NOTE — PLAN OF CARE
Problem: SLP Adult - Disturbed Thought Process  Goal: By Discharge: Demonstrates cognitive skills at highest level of function for planned discharge setting.   See evaluation for individualized goals.  Description: Description: Long term goals (initiated 6/4/24; to be met by 6/18/24)  Patient will:  1.          Be oriented x 3 and recall events of the day, supervision.  2.          Recall names of group members with min cues.  3.          Demonstrate functional problem solving and reasoning in structured tasks with min assist.  4.          Recall functional information given printed cues, min assist-supervision.    Short term goals (by 6/11/24)  Patient will:  1.          Be oriented x 3 and recall events of the day, mod-min assist.  2.          Recall names of group members with min-mod cues.  3.          Demonstrate functional problem solving and reasoning in structured tasks with min assist.  4.          Recall functional information given printed cues, min assist-suprvision.      Note:   SPEECH LANGUAGE PATHOLOGY SPEECH-LANGUAGE   AND SWALLOW WEEKLY ASSESSMENT    Patient: Juanpablo Jackson (83 y.o. female)  Date: 6/11/2024  Primary Diagnosis: Closed displaced comminuted fracture of shaft of right femur with routine healing [S72.351D]       Precautions: Aspiration    Speech-Language Tx:      Progression toward goals:  []         Improving appropriately and progressing toward goals  []         Improving slowly and progressing toward goals  []         Not making progress toward goals and plan of care will be adjusted      PLAN:  Recommendations and Planned Interventions:  Patient continues to benefit from skilled intervention to address the above impairments. Continue treatment per established plan of care.    Expected Discharge Date: 6/14/24     SUBJECTIVE:   Patient stated “That was hard sometimes”.    OBJECTIVE:   Daily Assessment:  Orientation  Overall Orientation Status: Within Normal Limits    Motor

## 2024-06-11 NOTE — PLAN OF CARE
understanding;Continued education needed;Demonstrated understanding    ASSESSMENT:  Pt is making progress however continues to require VC's for maintaining RLE TTWB. Pt demonstrated episode of nausea this morning following breakfast which resulted in projectile vomit, ~ 600 ml. Pt participated well in self cares and is increasing independence and safety with self cares and increasing BUE strength and activity tolerance as tolerated for carryover with self cares and functional mobility. Pt has met 7/9 STG's and 4/9 LTG's. Continued OT necessary to increase pt independence and safety with self cares and functional mobility to reduce burden on caregivers.     Progression toward goals:  [x]          Improving appropriately and progressing toward goals  []          Improving slowly and progressing toward goals  []          Not making progress toward goals and plan of care will be adjusted     PLAN:  Patient continues to benefit from skilled intervention to address the above impairments.  Continue treatment per established plan of care.  Discharge Recommendations:  Home Health  Further Equipment Recommendations for Discharge:  TTB and 3:1 commode (pt has built-in bench with grab bars in shower and handicap height toilet, pt has commode)   Estimated LOS: 6/14/24     COMMUNICATION/EDUCATION:   [] Home safety education was provided and the patient/caregiver indicated understanding.  [] Patient/family have participated as able in goal setting and plan of care.  [x] Patient/family agree to work toward stated goals and plan of care.  [] Patient understands intent and goals of therapy, but is neutral about his/her participation.  [] Patient is unable to participate in goal setting and plan of care.    Please refer to the flow sheet for vital signs taken during this treatment.  After treatment:   [x]  Patient left in no apparent distress sitting up in chair  []  Patient left in no apparent distress in bed  []  Patient handoff to

## 2024-06-12 PROCEDURE — 97110 THERAPEUTIC EXERCISES: CPT

## 2024-06-12 PROCEDURE — 99232 SBSQ HOSP IP/OBS MODERATE 35: CPT | Performed by: EMERGENCY MEDICINE

## 2024-06-12 PROCEDURE — 97116 GAIT TRAINING THERAPY: CPT

## 2024-06-12 PROCEDURE — 6360000002 HC RX W HCPCS: Performed by: EMERGENCY MEDICINE

## 2024-06-12 PROCEDURE — 97530 THERAPEUTIC ACTIVITIES: CPT

## 2024-06-12 PROCEDURE — 97535 SELF CARE MNGMENT TRAINING: CPT

## 2024-06-12 PROCEDURE — 97150 GROUP THERAPEUTIC PROCEDURES: CPT

## 2024-06-12 PROCEDURE — 6370000000 HC RX 637 (ALT 250 FOR IP): Performed by: NURSE PRACTITIONER

## 2024-06-12 PROCEDURE — 1180000000 HC REHAB R&B

## 2024-06-12 PROCEDURE — 6370000000 HC RX 637 (ALT 250 FOR IP): Performed by: EMERGENCY MEDICINE

## 2024-06-12 RX ADMIN — CLOPIDOGREL BISULFATE 75 MG: 75 TABLET ORAL at 08:09

## 2024-06-12 RX ADMIN — Medication 1 TABLET: at 17:29

## 2024-06-12 RX ADMIN — ASPIRIN 81 MG: 81 TABLET, COATED ORAL at 08:10

## 2024-06-12 RX ADMIN — ATORVASTATIN CALCIUM 20 MG: 20 TABLET, FILM COATED ORAL at 20:37

## 2024-06-12 RX ADMIN — ACETAMINOPHEN 325MG 650 MG: 325 TABLET ORAL at 20:37

## 2024-06-12 RX ADMIN — FERROUS SULFATE TAB 325 MG (65 MG ELEMENTAL FE) 325 MG: 325 (65 FE) TAB at 17:29

## 2024-06-12 RX ADMIN — FERROUS SULFATE TAB 325 MG (65 MG ELEMENTAL FE) 325 MG: 325 (65 FE) TAB at 08:09

## 2024-06-12 RX ADMIN — POLYETHYLENE GLYCOL 3350 17 G: 17 POWDER, FOR SOLUTION ORAL at 08:10

## 2024-06-12 RX ADMIN — ENOXAPARIN SODIUM 40 MG: 100 INJECTION SUBCUTANEOUS at 15:00

## 2024-06-12 RX ADMIN — OXYCODONE HYDROCHLORIDE 5 MG: 5 TABLET ORAL at 09:28

## 2024-06-12 RX ADMIN — Medication 1 TABLET: at 08:09

## 2024-06-12 RX ADMIN — AMLODIPINE BESYLATE 5 MG: 5 TABLET ORAL at 08:09

## 2024-06-12 ASSESSMENT — PAIN DESCRIPTION - DESCRIPTORS
DESCRIPTORS: ACHING
DESCRIPTORS: ACHING

## 2024-06-12 ASSESSMENT — PAIN DESCRIPTION - FREQUENCY
FREQUENCY: INTERMITTENT
FREQUENCY: INTERMITTENT

## 2024-06-12 ASSESSMENT — PAIN SCALES - GENERAL
PAINLEVEL_OUTOF10: 0
PAINLEVEL_OUTOF10: 4
PAINLEVEL_OUTOF10: 0
PAINLEVEL_OUTOF10: 3

## 2024-06-12 ASSESSMENT — PAIN DESCRIPTION - PAIN TYPE
TYPE: SURGICAL PAIN
TYPE: SURGICAL PAIN

## 2024-06-12 ASSESSMENT — PAIN - FUNCTIONAL ASSESSMENT
PAIN_FUNCTIONAL_ASSESSMENT: PREVENTS OR INTERFERES WITH MANY ACTIVE NOT PASSIVE ACTIVITIES
PAIN_FUNCTIONAL_ASSESSMENT: ACTIVITIES ARE NOT PREVENTED

## 2024-06-12 ASSESSMENT — PAIN DESCRIPTION - LOCATION
LOCATION: LEG
LOCATION: LEG

## 2024-06-12 ASSESSMENT — PAIN DESCRIPTION - ONSET
ONSET: ON-GOING
ONSET: ON-GOING

## 2024-06-12 ASSESSMENT — PAIN DESCRIPTION - ORIENTATION
ORIENTATION: RIGHT
ORIENTATION: RIGHT

## 2024-06-12 NOTE — PROGRESS NOTES
[facility] PHYSICAL REHABILITATION  [address] [place] [id number]     INPATIENT REHABILITATION  DAILY PROGRESS NOTE     Date: 6/12/2024    Name: Juanpablo Jackson Age / Sex: 83 y.o. / female   CSN: [id number] MRN: [id number]   Admit Date: 5/29/2024 Length of Stay: 14 days     Primary Rehabilitation Diagnosis impaired mobility and ADLs in the setting of a closed fracture of the shaft of right femur, status post open reduction and internal fixation and subsequent SFA-popliteal dissection and patient is now status post right CFA exploration and superficial femoral and popliteal stents with 4 compartment fasciotomy        Subjective:     I personally saw and evaluated this patient face-to-face today.  Patient is sitting in bed in no apparent distress, awake and alert.  Patient is in good spirits and denies any pain  Objective:     Vital Signs:  Patient Vitals for the past 24 hrs:   BP Temp Temp src Pulse Resp SpO2 Height   06/12/24 1715 (!) 149/61 98.2 °F (36.8 °C) Oral 72 14 100 % --   06/12/24 0809 -- -- -- -- -- -- 1.575 m (5' 2\")   06/12/24 0800 138/66 97.7 °F (36.5 °C) Oral 66 16 99 % --   06/11/24 2215 130/78 -- -- 71 -- -- --   06/11/24 2100 (!) 170/87 96.9 °F (36.1 °C) Oral 89 19 -- --        Physical Examination:    General:  Awake, alert  Cardiovascular:  S1S2+, RRR  Pulmonary:  CTA b/l  GI:  Soft, BS+, NT, ND  Extremities: Patient has 1+ edema to the right lower extremity        Current Medications:  Current Facility-Administered Medications   Medication Dose Route Frequency    oxyCODONE (ROXICODONE) immediate release tablet 5 mg  5 mg Oral Daily PRN    hydrALAZINE (APRESOLINE) tablet 10 mg  10 mg Oral Q6H PRN    acetaminophen (TYLENOL) tablet 650 mg  650 mg Oral Q4H PRN    ferrous sulfate (IRON 325) tablet 325 mg  325 mg Oral BID WC    polyethylene glycol (GLYCOLAX) packet 17 g  17 g Oral Daily    aspirin EC tablet 81 mg  81 mg Oral Daily    enoxaparin (LOVENOX) injection 40 mg  40 mg  SubCUTAneous Q24H    amLODIPine (NORVASC) tablet 5 mg  5 mg Oral Daily    clopidogrel (PLAVIX) tablet 75 mg  75 mg Oral Daily    oxyCODONE (ROXICODONE) immediate release tablet 5 mg  5 mg Oral Q6H PRN    naloxone (NARCAN) injection 0.4 mg  0.4 mg IntraVENous PRN    atorvastatin (LIPITOR) tablet 20 mg  20 mg Oral Nightly    oyster shell calcium w/D 500-5 MG-MCG tablet 1 tablet  1 tablet Oral BID WC    bisacodyl (DULCOLAX) EC tablet 5 mg  5 mg Oral Daily PRN       Allergies:  Allergies   Allergen Reactions    Nitrofurazone Hives and Other (See Comments)     Intolerance    Tobramycin Sulfate Other (See Comments)     Intolerance, extreme redness    Erythromycin Itching and Rash       inflammation       Lab/Data Review:  No results found for this or any previous visit (from the past 24 hour(s)).          Assessment:     Primary Rehabilitation Diagnosis impaired mobility and ADLs in the setting of a closed fracture of the shaft of right femur, status post open reduction and internal fixation and subsequent SFA-popliteal dissection and patient is now status post right CFA exploration and superficial femoral and popliteal stents with 4 compartment fasciotomy     Other Co-Morbid Conditions managed in Rehab      PAD, Status post right lower extremity CFA exploration and superficial femoral and popliteal stent placement with 4 compartment fasciotomy  Hypertension  Dyslipidemia  Prior history of CVA  Osteoporosis  Dyslipidemia  Anemia which appears to be chronic     Medical plan      impaired mobility and ADLs in the setting of a closed fracture of the shaft of right femur, status post open reduction and internal fixation and subsequent SFA-popliteal dissection and patient is now status post right CFA exploration and superficial femoral and popliteal stents with 4 compartment fasciotomy  Physical therapy and Occupational Therapy  Judicious pain control  Toe-touch weightbearing right lower extremity as per orthopedics  Fall

## 2024-06-12 NOTE — PLAN OF CARE
Problem: Occupational Therapy - Adult  Goal: By Discharge: Performs self-care activities at highest level of function for planned discharge setting.  See evaluation for individualized goals.  Description: Occupational Therapy Goals   Long Term Goals  Initiated 24 and to be accomplished within 2 week(s)  1. Pt will perform self-feeding with Jovanna.  2. Pt will perform grooming with Jovanna.  3. Pt will perform UB bathing with Jovanna.  4. Pt will perform LB bathing with CGA using AE as needed.  5. Pt will perform tub/shower transfer with CGA. ( 24)  6. Pt will perform UB dressing with Jovanna.  7. Pt will perform LB dressing with CGA using AE as needed. (24)  8. Pt will perform toileting task with CGA. ( 6/10/24)  9. Pt will perform toilet transfer with CGA using least restrictive assistive device. ( 6/10/24)    Short Term Goals   Initiated 24 and to be accomplished within 7 day(s), (reassessed on 2024)  1. Pt will perform self-feeding with setup. (Goal met 2024)  2. Pt will perform grooming with setup.  3. Pt will perform UB bathing with setup.   4. Pt will perform LB bathing with Chacho using AE as needed. (Goal met 2024)  5. Pt will perform tub/shower transfer with Chacho. (Goal met 2024)  6. Pt will perform UB dressing with setup/ supv. (Goal met 2024)  7. Pt will perform LB dressing with Chacho using AE as needed. ( 24)  8. Pt will perform toileting task with Chacho.(Goal met 2024)  9. Pt will perform toilet transfer with Chacho using least restrictive assistive device. (Goal met 2024)    9HPT: RUE:33 sec. LUE: 30 sec. (24)    Outcome: Progressing   Occupational Therapy TREATMENT    Patient: Juanpablo Jackson   83 y.o.    Patient identified with name and : yes    Date: 2024    First Tx Session  Time In: 1031  Time Out: 1202    Diagnosis: Closed displaced comminuted fracture of shaft of right femur with routine healing [S72.715D]   Precautions:   Wheelchair  Activity: To/From therapy gym  Assist Level: Supervision  Functional Mobility Comments: self propelling w/c with BUE and supervision with min VC's for safety   Stand Step Transfers: Stand by assistance  Sit to stand: Stand by assistance  Stand to sit: Stand by assistance  Transfer Comments: with FWW      WHEELCHAIR/BED TRANSFER INDEPENDENCE Daily Assessment   Transfer Technique Stand step   Level of Assistance Stand by assistance   Equipment Wheelchair;Bed   Comments Pt performed EOB to w/c transfer with FWW and SBA with max VC's for RLE weightbearing restrictions.     Activity Tolerance:  Patient Tolerated treatment well         EDUCATION:   Education Given To: Patient  Education Provided: Transfer Training;Safety;ADL Function  Education Method: Demonstration;Verbal  Education Outcome: Verbalized understanding;Continued education needed;Demonstrated understanding    ASSESSMENT:  Pt is making progress and reported feeling better today with no s/sx of nausea. Pt is increasing BUE strength for carryover with self cares and functional mobility however continues to require education and max VC's for maintaining RLE TTWB.   Progression toward goals:  [x]          Improving appropriately and progressing toward goals  []          Improving slowly and progressing toward goals  []          Not making progress toward goals and plan of care will be adjusted       PLAN:  Patient continues to benefit from skilled intervention to address the above impairments.  Continue treatment per established plan of care.  Discharge Recommendations:  Home Health  Further Equipment Recommendations for Discharge:  TTB and 3:1 commode (pt has built-in bench with grab bars in shower and handicap height toilet, pt has commode)   Estimated LOS: 6/14/24     COMMUNICATION/EDUCATION:   [] Home safety education was provided and the patient/caregiver indicated understanding.  [] Patient/family have participated as able in goal setting and

## 2024-06-12 NOTE — PROGRESS NOTES
4 Eyes Skin Assessment     NAME:  Juanpablo Jackson  YOB: 1940  MEDICAL RECORD NUMBER:  [id number]    The patient is being assessed for  Shift Handoff    I agree that at least one RN has performed a thorough Head to Toe Skin Assessment on the patient. ALL assessment sites listed below have been assessed.      Areas assessed by both nurses:    Arms, Elbows, Hands, Sacrum. Buttock, Coccyx, Ischium, and Legs. Feet and Heels        Does the Patient have a Wound? Yes wound(s) were present on assessment. LDA wound assessment was Initiated and completed by RN       Adelfo Prevention initiated by RN: Yes  Wound Care Orders initiated by RN: No    Pressure Injury (Stage 3,4, Unstageable, DTI, NWPT, and Complex wounds) if present, place Wound referral order by RN under : No    New Ostomies, if present place, Ostomy referral order under : No     Nurse 1 eSignature: Electronically signed by VILLA GARCIA RN on 6/12/24 at 7:43 AM EDT    **SHARE this note so that the co-signing nurse can place an eSignature**    Nurse 2 eSignature: Electronically signed by JOSELYN ARIAS RN on 6/12/24 at 8:56 AM EDT

## 2024-06-12 NOTE — PROGRESS NOTES
4 Eyes Skin Assessment     NAME:  Juanpablo Jackson  YOB: 1940  MEDICAL RECORD NUMBER:  [id number]    The patient is being assessed for  Shift Handoff    I agree that at least one RN has performed a thorough Head to Toe Skin Assessment on the patient. ALL assessment sites listed below have been assessed.      Areas assessed by both nurses:    Sacrum. Buttock, Coccyx, Ischium        Does the Patient have a Wound? Yes wound(s) were present on assessment. LDA wound assessment was Initiated and completed by RN       Adelfo Prevention initiated by RN: No  Wound Care Orders initiated by RN: No    Pressure Injury (Stage 3,4, Unstageable, DTI, NWPT, and Complex wounds) if present, place Wound referral order by RN under : No    New Ostomies, if present place, Ostomy referral order under : No     Nurse 1 eSignature: Electronically signed by JOSELYN ARIAS RN on 6/12/24 at 6:22 PM EDT    **SHARE this note so that the co-signing nurse can place an eSignature**    Nurse 2 eSignature: Electronically signed by VILLA GARCIA RN on 6/12/24 at 8:00 PM EDT

## 2024-06-12 NOTE — PLAN OF CARE
Problem: Chronic Conditions and Co-morbidities  Goal: Patient's chronic conditions and co-morbidity symptoms are monitored and maintained or improved  6/12/2024 0855 by Leyla Luna RN  Outcome: Progressing  6/11/2024 2325 by Sheila Hawkins RN  Outcome: Progressing     Problem: Safety - Adult  Goal: Free from fall injury  6/12/2024 0855 by Leyla Luna RN  Outcome: Progressing  6/11/2024 2325 by Sheila Hawkins RN  Outcome: Progressing     Problem: Skin/Tissue Integrity  Goal: Absence of new skin breakdown  Description: 1.  Monitor for areas of redness and/or skin breakdown  2.  Assess vascular access sites hourly  3.  Every 4-6 hours minimum:  Change oxygen saturation probe site  4.  Every 4-6 hours:  If on nasal continuous positive airway pressure, respiratory therapy assess nares and determine need for appliance change or resting period.  6/12/2024 0855 by Leyla Luna RN  Outcome: Progressing  6/11/2024 2325 by Sheila Hawkins RN  Outcome: Progressing     Problem: ABCDS Injury Assessment  Goal: Absence of physical injury  6/12/2024 0855 by Leyla Luna RN  Outcome: Progressing  6/11/2024 2325 by Sheila Hawkins RN  Outcome: Progressing     Problem: Pain  Goal: Verbalizes/displays adequate comfort level or baseline comfort level  6/12/2024 0855 by Leyla Luna RN  Outcome: Progressing  6/11/2024 2325 by Sheila Hawkins RN  Outcome: Progressing

## 2024-06-12 NOTE — DISCHARGE INSTRUCTIONS
------------------------------------------------------------------------------------------------------------    DISCHARGE INSTRUCTIONS    1. Make sure that when you request refills at the pharmacy that the refill requests are sent to your PCP (NOT to the prescriber at the [facility]) to avoid any delays in getting your medication refills. The physician at the [facility] will not able to order medications or refills after discharge -- Please understand that though we would like to help, it is simply not safe for our physician to order you a medication that we cannot monitor.     2. If any of the prescribed medications require a PRIOR AUTHORIZATION, contact your Primary Care Physician or specialist to EITHER complete prior authorizations and paperwork on your behalf OR prescribe an alternative medication. -- Please understand that though we would like to help, it is simply not safe for our physician to order you a medication that we cannot monitor.     3. If any of your prescriptions medications PRIOR TO ADMISSION TO THE HOSPITAL needs a refill (and either the dosage, frequency or instructions was not changed), kindly contact your Primary Care Physician for refills.    -------------------------------------------------------------------------------------------------------------------        DISCHARGE SUMMARY from Nurse    PATIENT INSTRUCTIONS:    After general anesthesia or intravenous sedation, for 24 hours or while taking prescription Narcotics:  Limit your activities  Do not drive and operate hazardous machinery  Do not make important personal or business decisions  Do  not drink alcoholic beverages  If you have not urinated within 8 hours after discharge, please contact your surgeon on call.    Report the following to your surgeon:  Excessive pain, swelling, redness or odor of or around the surgical area  Temperature over 100.5  Nausea and vomiting

## 2024-06-12 NOTE — PROGRESS NOTES
Comprehensive Nutrition Assessment      Type and Reason for Visit:  Initial, RD Nutrition Re-Screen/LOS    Nutrition Recommendations/Plan:   Diet as ordered  Add Ensure Plus High Protein (provides 350 kcal, 20g protein)  Twice daily per pt request  Add Wilfredo (each provides 80 kcals, 2.5 g collagen protein, 300 mg vitamin C, 9.5 mg zinc)  Twice daily to promote wound healing  Continue to monitor tolerance of PO, compliance of oral supplements, weight, labs, and plan of care during admission.         Malnutrition Assessment:  Malnutrition Status:  No malnutrition (06/12/24 1403)    Context:  Acute Illness       Nutrition History and Allergies:   PMHx hypertension, osteoporosis, prior CVA, recurrent UTIs, chronic right femur fracture. Ht ( license): 5'2\". Wt hx (per EMR): 63.5 kg (10/27/23), 63.5 kg (11/15/23), 67.6 kg (3/13/24), 68.2 kg (admission, bedscale) - no wt loss noted PTA. Food Allergy: NKFA .    Nutrition Assessment:    Recent admitted to [facility] fo closed fracture of the shaft of the right femur and underwent open reduction and internal fixation 5/21/24. Transfered to ARU 5/29 for rehabitation post-op. Per visit, pt was OOBTC - rosa current BW. Observed lunch tray finished 100%. Per EMR, avg po % x 7 days. Pt reported N/V yesterday prior to therapy, none at present. Pt requested to try Ensure ONS - RD will order Standard high arnoldo/high pro ONS with meals. See nutrition recommendations/plan for details.     Nutrition Related Findings:    Pertinent meds: iron, calcium with vitamin D, Glycolax, lipitor. Labs: Glu 100, A1c- unavailable.   Wound Type: Surgical Incision     Last BM (including prior to admit): 06/12/24  Edema: Right lower extremity    Current Nutrition Intake & Therapies:    Average Meal Intake: %, 51-75%  Average Supplements Intake: None Ordered  ADULT DIET; Regular; Low Fat/Low Chol/High Fiber/2 gm Na  ADULT ORAL NUTRITION SUPPLEMENT; Dinner, Lunch; Standard High Calorie/High Protein  Oral Supplement  ADULT ORAL NUTRITION SUPPLEMENT; Breakfast, Dinner; Wound Healing Oral Supplement     Anthropometric Measures:  Height: 157.5 cm (5' 2\")  Ideal Body Weight (IBW): 110 lbs (50 kg)    Admission Body Weight: 68.2 kg (150 lb 5.7 oz)  Current Body Weight:  (rosa),   IBW.    Current BMI (kg/m2):          Weight Adjustment For: No Adjustment   BMI Categories: Overweight (BMI 25.0-29.9) (based on admission wt)    Estimated Daily Nutrient Needs:  Energy Requirements Based On: Kcal/kg  Weight Used for Energy Requirements: Admission  Energy (kcal/day): 5580-2853 (25-30 kcal/kg)  Weight Used for Protein Requirements: Admission  Protein (g/day): 68-82 (1-1.2 g/kg)  Method Used for Fluid Requirements: 1 ml/kcal  Fluid (ml/day): or per MD    Nutrition Diagnosis:   Increased nutrient needs related to  (wound healing) as evidenced by other (comment) (s/p R femur reduction and fixation)    Nutrition Interventions:   Food and/or Nutrient Delivery: Continue Current Diet, Start Oral Nutrition Supplement  Nutrition Education/Counseling: No recommendation at this time  Coordination of Nutrition Care: Continue to monitor while inpatient       Goals:     Goals: Meet at least 75% of estimated needs, by next RD assessment       Nutrition Monitoring and Evaluation:      Food/Nutrient Intake Outcomes: Food and Nutrient Intake, Supplement Intake  Physical Signs/Symptoms Outcomes: Biochemical Data, Weight, Nausea or Vomiting    Discharge Planning:          Antonella Bailey RD  Contact: [phone]

## 2024-06-12 NOTE — PLAN OF CARE
B brakes with extenders Jovanna   Comments Propels with B UE     THERAPEUTIC EXERCISES Daily Assessment     All exercises performed to increase strength and ROM to facilitate improved mobility with decreased assistance required.           EXERCISE   Sets   Reps   Active Active Assist   Passive Self ROM   Comments   Ankle Pumps 3 15  [x] [] [] []    Quad Sets/Glut Sets 3 15 [x] [] [] []    Hip abd 3 10 [x] [] [] [] 1# R LE & 3# L LE   Heel slides 3 10 [x] [] [] [] 1# R LE & 3# L LE   SAQ 3 10 [x] [] [] [] 1# R LE & 3# L LE   SLR 3 10 [x] [] [] [] 1# R LE & 3# L LE   Bridges 3 10 [x] [] [] []      Group therapy treatment:    Seated  EXERCISE   Sets   Reps   Active Active Assist   Comments   Seated heel and toe raises  3 15 [x] [] 1# R LE and 3# L LE   Long Arc Quads 3 15 [x] [] 1# R LE and 3# L LE   Seated Marching 3 15 [x] [] 1# R LE and 3# L LE   Hip ADD 3 15 [x] [] Small bolster   Hip Abd 3 15 [x] [] Red TB     Seated balance  Activity   Sets   Reps   Time Spent   Comments   [] Beach ball toss/catch    Emphasis on maintaining sitting balance sitting away from back of chair/supportive surface.   [x] Balloon tapping  3 5    [] Forward/multi-directional reaching at tabletop activity        []  Beach ball \"soccer\"            [x]   Patient appropriate to continue group therapy sessions to promote increased participation in skilled therapy interventions and to provide opportunities for increased social interaction.     ASSESSMENT:  Patient tolerated today's sessions well and is making good progress towards goals.  Patient ambulated increased distances however still requires SBA with wheelchair follow due to decreased strength and endurance.  Patient required 2 seated rest breaks during ambulation due to fatigue.  Continued reminders required to maintain TTWB R LE.   Progression toward goals:  [x]      Improving appropriately and progressing toward goals  []      Improving slowly and progressing toward goals  []      Not  making progress toward goals and plan of care will be adjusted      PLAN:  Patient continues to benefit from skilled intervention to address the above impairments.  Continue treatment per established plan of care.  Discharge Recommendations:  24 hour supervision or assist;Home with Home health PT ([facility])  Further Equipment Recommendations for Discharge:  Standard     Rolling    Patient has RW however will need a wheelchair        Estimated Discharge Date:6/14/24    Activity Tolerance:   Fair  Please refer to the flowsheet for vital signs taken during this treatment.    After treatment:   [] Patient left in no apparent distress in bed  [x] Patient left in no apparent distress sitting up in chair  [] Patient left in no apparent distress in w/c mobilizing under own power  [] Patient left in no apparent distress dining area  [] Patient left in no apparent distress mobilizing under own power  [x] Call bell left within reach  [] Nursing notified  [] Caregiver present  [] Bed alarm activated   [x] Chair alarm activated        Susy Azevedo, FATUMA  6/12/2024

## 2024-06-13 PROCEDURE — 6370000000 HC RX 637 (ALT 250 FOR IP): Performed by: EMERGENCY MEDICINE

## 2024-06-13 PROCEDURE — 97110 THERAPEUTIC EXERCISES: CPT

## 2024-06-13 PROCEDURE — 97530 THERAPEUTIC ACTIVITIES: CPT

## 2024-06-13 PROCEDURE — 6360000002 HC RX W HCPCS: Performed by: EMERGENCY MEDICINE

## 2024-06-13 PROCEDURE — 99232 SBSQ HOSP IP/OBS MODERATE 35: CPT | Performed by: EMERGENCY MEDICINE

## 2024-06-13 PROCEDURE — 92508 TX SP LANG VOICE COMM GROUP: CPT

## 2024-06-13 PROCEDURE — 1180000000 HC REHAB R&B

## 2024-06-13 PROCEDURE — 97116 GAIT TRAINING THERAPY: CPT

## 2024-06-13 PROCEDURE — 94761 N-INVAS EAR/PLS OXIMETRY MLT: CPT

## 2024-06-13 PROCEDURE — 97535 SELF CARE MNGMENT TRAINING: CPT

## 2024-06-13 PROCEDURE — 6370000000 HC RX 637 (ALT 250 FOR IP): Performed by: NURSE PRACTITIONER

## 2024-06-13 RX ORDER — BISACODYL 5 MG/1
5 TABLET, DELAYED RELEASE ORAL DAILY PRN
Qty: 5 TABLET | Refills: 0 | Status: SHIPPED | OUTPATIENT
Start: 2024-06-13

## 2024-06-13 RX ORDER — OXYCODONE HYDROCHLORIDE 5 MG/1
5 TABLET ORAL EVERY 6 HOURS PRN
Qty: 10 TABLET | Refills: 0 | Status: SHIPPED | OUTPATIENT
Start: 2024-06-13 | End: 2024-06-18

## 2024-06-13 RX ORDER — CLOPIDOGREL BISULFATE 75 MG/1
75 TABLET ORAL DAILY
Qty: 30 TABLET | Refills: 0 | Status: SHIPPED | OUTPATIENT
Start: 2024-06-13

## 2024-06-13 RX ORDER — ATORVASTATIN CALCIUM 20 MG/1
20 TABLET, FILM COATED ORAL NIGHTLY
Qty: 30 TABLET | Refills: 0 | Status: SHIPPED | OUTPATIENT
Start: 2024-06-13

## 2024-06-13 RX ORDER — POLYETHYLENE GLYCOL 3350 17 G/17G
17 POWDER, FOR SOLUTION ORAL DAILY
Qty: 10 EACH | Refills: 0 | Status: SHIPPED | OUTPATIENT
Start: 2024-06-13

## 2024-06-13 RX ORDER — ACETAMINOPHEN 325 MG/1
650 TABLET ORAL EVERY 4 HOURS PRN
Qty: 20 TABLET | Refills: 0 | Status: SHIPPED | OUTPATIENT
Start: 2024-06-13

## 2024-06-13 RX ORDER — ASPIRIN 81 MG/1
81 TABLET ORAL DAILY
Qty: 30 TABLET | Refills: 0 | Status: SHIPPED | OUTPATIENT
Start: 2024-06-13

## 2024-06-13 RX ORDER — AMLODIPINE BESYLATE 5 MG/1
5 TABLET ORAL DAILY
Qty: 30 TABLET | Refills: 0 | Status: SHIPPED | OUTPATIENT
Start: 2024-06-13

## 2024-06-13 RX ORDER — FERROUS SULFATE 325(65) MG
325 TABLET ORAL 2 TIMES DAILY WITH MEALS
Qty: 60 TABLET | Refills: 0 | Status: SHIPPED | OUTPATIENT
Start: 2024-06-13

## 2024-06-13 RX ADMIN — ACETAMINOPHEN 325MG 650 MG: 325 TABLET ORAL at 08:13

## 2024-06-13 RX ADMIN — Medication 1 TABLET: at 15:53

## 2024-06-13 RX ADMIN — OXYCODONE HYDROCHLORIDE 5 MG: 5 TABLET ORAL at 13:10

## 2024-06-13 RX ADMIN — ASPIRIN 81 MG: 81 TABLET, COATED ORAL at 08:16

## 2024-06-13 RX ADMIN — ACETAMINOPHEN 325MG 650 MG: 325 TABLET ORAL at 15:53

## 2024-06-13 RX ADMIN — AMLODIPINE BESYLATE 5 MG: 5 TABLET ORAL at 08:15

## 2024-06-13 RX ADMIN — FERROUS SULFATE TAB 325 MG (65 MG ELEMENTAL FE) 325 MG: 325 (65 FE) TAB at 15:53

## 2024-06-13 RX ADMIN — ENOXAPARIN SODIUM 40 MG: 100 INJECTION SUBCUTANEOUS at 15:53

## 2024-06-13 RX ADMIN — FERROUS SULFATE TAB 325 MG (65 MG ELEMENTAL FE) 325 MG: 325 (65 FE) TAB at 08:15

## 2024-06-13 RX ADMIN — CLOPIDOGREL BISULFATE 75 MG: 75 TABLET ORAL at 08:16

## 2024-06-13 RX ADMIN — ATORVASTATIN CALCIUM 20 MG: 20 TABLET, FILM COATED ORAL at 20:56

## 2024-06-13 RX ADMIN — Medication 1 TABLET: at 08:15

## 2024-06-13 RX ADMIN — POLYETHYLENE GLYCOL 3350 17 G: 17 POWDER, FOR SOLUTION ORAL at 08:15

## 2024-06-13 ASSESSMENT — PAIN - FUNCTIONAL ASSESSMENT
PAIN_FUNCTIONAL_ASSESSMENT: ACTIVITIES ARE NOT PREVENTED

## 2024-06-13 ASSESSMENT — PAIN SCALES - GENERAL
PAINLEVEL_OUTOF10: 0
PAINLEVEL_OUTOF10: 3
PAINLEVEL_OUTOF10: 2
PAINLEVEL_OUTOF10: 0
PAINLEVEL_OUTOF10: 2
PAINLEVEL_OUTOF10: 0
PAINLEVEL_OUTOF10: 3
PAINLEVEL_OUTOF10: 2
PAINLEVEL_OUTOF10: 4
PAINLEVEL_OUTOF10: 3

## 2024-06-13 ASSESSMENT — PAIN DESCRIPTION - ORIENTATION
ORIENTATION: RIGHT

## 2024-06-13 ASSESSMENT — PAIN DESCRIPTION - DESCRIPTORS
DESCRIPTORS: ACHING

## 2024-06-13 ASSESSMENT — PAIN DESCRIPTION - PAIN TYPE
TYPE: ACUTE PAIN;SURGICAL PAIN
TYPE: ACUTE PAIN
TYPE: ACUTE PAIN;SURGICAL PAIN

## 2024-06-13 ASSESSMENT — PAIN DESCRIPTION - ONSET
ONSET: ON-GOING

## 2024-06-13 ASSESSMENT — PAIN DESCRIPTION - FREQUENCY
FREQUENCY: INTERMITTENT

## 2024-06-13 ASSESSMENT — PAIN DESCRIPTION - LOCATION
LOCATION: LEG

## 2024-06-13 NOTE — PROGRESS NOTES
4 Eyes Skin Assessment     NAME:  Juanpablo Jackson  YOB: 1940  MEDICAL RECORD NUMBER:  [id number]    The patient is being assessed for  Shift Handoff    I agree that at least one RN has performed a thorough Head to Toe Skin Assessment on the patient. ALL assessment sites listed below have been assessed.      Areas assessed by both nurses:    Sacrum. Buttock, Coccyx, Ischium and Legs. Feet and Heels        Does the Patient have a Wound? Yes wound(s) were present on assessment. LDA wound assessment was Initiated and completed by RN       Adelfo Prevention initiated by RN: Yes  Wound Care Orders initiated by RN: No    Pressure Injury (Stage 3,4, Unstageable, DTI, NWPT, and Complex wounds) if present, place Wound referral order by RN under : No    New Ostomies, if present place, Ostomy referral order under : No     Nurse 1 eSignature: Electronically signed by Yadira Jimenez RN on 6/13/24 at 7:04 PM EDT    **SHARE this note so that the co-signing nurse can place an eSignature**    Nurse 2 eSignature: Electronically signed by Stephanie Green RN on 6/14/24 at 5:11 AM EDT

## 2024-06-13 NOTE — PROGRESS NOTES
4 Eyes Skin Assessment     NAME:  Juanpablo Jackson  YOB: 1940  MEDICAL RECORD NUMBER:  [id number]    The patient is being assessed for  Shift Handoff    I agree that at least one RN has performed a thorough Head to Toe Skin Assessment on the patient. ALL assessment sites listed below have been assessed.      Areas assessed by both nurses:    Arms, Elbows, Hands        Does the Patient have a Wound? Yes wound(s) were present on assessment. LDA wound assessment was Initiated and completed by RN       Adelfo Prevention initiated by RN: no  Wound Care Orders initiated by RN: No    Pressure Injury (Stage 3,4, Unstageable, DTI, NWPT, and Complex wounds) if present, place Wound referral order by RN under : No    New Ostomies, if present place, Ostomy referral order under : No     Nurse 1 eSignature: Electronically signed by VILLA GARCIA RN on 6/13/24 at 7:29 AM EDT    **SHARE this note so that the co-signing nurse can place an eSignature**    Nurse 2 eSignature: Electronically signed by Yadira Jimenez RN on 6/13/24 at 7:30 AM EDT

## 2024-06-13 NOTE — PLAN OF CARE
Problem: Chronic Conditions and Co-morbidities  Goal: Patient's chronic conditions and co-morbidity symptoms are monitored and maintained or improved  6/12/2024 2215 by Sheila Hawkins RN  Outcome: Progressing  6/12/2024 0855 by Leyla Luna RN  Outcome: Progressing     Problem: Safety - Adult  Goal: Free from fall injury  6/12/2024 2215 by Sheila Hawkins RN  Outcome: Progressing  6/12/2024 0855 by Leyla Luna RN  Outcome: Progressing     Problem: Skin/Tissue Integrity  Goal: Absence of new skin breakdown  Description: 1.  Monitor for areas of redness and/or skin breakdown  2.  Assess vascular access sites hourly  3.  Every 4-6 hours minimum:  Change oxygen saturation probe site  4.  Every 4-6 hours:  If on nasal continuous positive airway pressure, respiratory therapy assess nares and determine need for appliance change or resting period.  6/12/2024 2215 by Sheila Hawkins RN  Outcome: Progressing  6/12/2024 0855 by Leyla Luna RN  Outcome: Progressing     Problem: ABCDS Injury Assessment  Goal: Absence of physical injury  6/12/2024 2215 by Sheila Hawkins RN  Outcome: Progressing  6/12/2024 0855 by Leyla Luna RN  Outcome: Progressing     Problem: Pain  Goal: Verbalizes/displays adequate comfort level or baseline comfort level  6/12/2024 0855 by Leyla Luna RN  Outcome: Progressing

## 2024-06-13 NOTE — PLAN OF CARE
Problem: SLP Adult - Disturbed Thought Process  Goal: By Discharge: Demonstrates cognitive skills at highest level of function for planned discharge setting.   See evaluation for individualized goals.  Description: Description: Long term goals (initiated 6/4/24; to be met by 6/18/24)  Patient will:  1.          Be oriented x 3 and recall events of the day, supervision.  2.          Recall names of group members with min cues.  3.          Demonstrate functional problem solving and reasoning in structured tasks with min assist.  4.          Recall functional information given printed cues, min assist-supervision.    Short term goals (by 6/11/24)  Patient will:  1.          Be oriented x 3 and recall events of the day, mod-min assist.  2.          Recall names of group members with min-mod cues.  3.          Demonstrate functional problem solving and reasoning in structured tasks with min assist.  4.          Recall functional information given printed cues, min assist-suprvision.      Note: SPEECH LANGUAGE PATHOLOGY DISCHARGE SUMMARY      Patient: Juanpablo Jackson (83 y.o. female)  Date: 6/13/2024  Primary Diagnosis: Closed displaced comminuted fracture of shaft of right femur with routine healing [S72.351D]       Precautions: Aspiration    Speech-Language Tx:  Ms. Jackson has participated in group cognitive sessions with the SLP.  She has not needed assistance in the group activities and enjoyed interaction with the other group members.  In all tasks presented, she has been relatively independent in her responses, needing only occasional cues from the SLP for clarification.    Progression toward goals:  [x]         Improving appropriately and progressing toward goals  []         Improving slowly and progressing toward goals  []         Not making progress toward goals and plan of care will be adjusted      PLAN:  Recommendations and Planned Interventions:  Ms. Jackson did well in the group sessions.  She is

## 2024-06-13 NOTE — PROGRESS NOTES
[facility] PHYSICAL REHABILITATION  [address] [place] [id number]     INPATIENT REHABILITATION  DAILY PROGRESS NOTE     Date: 6/13/2024    Name: Juanpablo Jackson Age / Sex: 83 y.o. / female   CSN: [id number] MRN: [id number]   Admit Date: 5/29/2024 Length of Stay: 15 days     Primary Rehabilitation Diagnosis impaired mobility and ADLs in the setting of a closed fracture of the shaft of right femur, status post open reduction and internal fixation and subsequent SFA-popliteal dissection and patient is now status post right CFA exploration and superficial femoral and popliteal stents with 4 compartment fasciotomy        Subjective:     I personally saw and evaluated this patient face-to-face today.  Patient is sitting in bed in no apparent distress, awake and alert.  In good spirits.  Patient is looking forward to being discharged from the rehab tomorrow  Objective:     Vital Signs:  Patient Vitals for the past 24 hrs:   BP Temp Temp src Pulse Resp SpO2   06/13/24 1553 (!) 142/68 98.3 °F (36.8 °C) Oral 61 16 99 %   06/13/24 0813 121/61 99.5 °F (37.5 °C) Oral 68 18 99 %   06/12/24 2015 139/63 97.7 °F (36.5 °C) Oral 62 19 98 %        Physical Examination:    General:  Awake, alert  Cardiovascular:  S1S2+, RRR  Pulmonary:  CTA b/l  GI:  Soft, BS+, NT, ND  Extremities: 1+ edema to the right lower extremity.  Surgical incision site is clean and dry.  No redness or drainage          Current Medications:  Current Facility-Administered Medications   Medication Dose Route Frequency    oxyCODONE (ROXICODONE) immediate release tablet 5 mg  5 mg Oral Daily PRN    hydrALAZINE (APRESOLINE) tablet 10 mg  10 mg Oral Q6H PRN    acetaminophen (TYLENOL) tablet 650 mg  650 mg Oral Q4H PRN    ferrous sulfate (IRON 325) tablet 325 mg  325 mg Oral BID WC    polyethylene glycol (GLYCOLAX) packet 17 g  17 g Oral Daily    aspirin EC tablet 81 mg  81 mg Oral Daily    enoxaparin (LOVENOX) injection 40 mg  40 mg SubCUTAneous

## 2024-06-13 NOTE — PLAN OF CARE
displaced comminuted fracture of shaft of right femur with routine healing    Precautions:  Restrictions/Precautions: Fall Risk, Surgical Protocols, Weight Bearing, Contact Precautions     Right Lower Extremity Weight Bearing: Toe Touch Weight Bearing Toe Touch Weight Bearing                     Barriers to Learning/Limitations: None  Compensate with: visual, verbal, tactile, kinesthetic cues/model     Patient identified with name and :yes    SUBJECTIVE:   Patient stated “I enjoy all the activities.”    OBJECTIVE DATA SUMMARY:     Past Medical History:   Diagnosis Date    Constipation     History of right hip replacement     History of total right knee replacement     Hypercholesteremia     Hypertension     no meds now    Microscopic hematuria     MRSA (methicillin resistant staph aureus) culture positive 2018    On abdominal wall- tx with Vibramycin  (care everywhere)    Osteoporosis     Other fracture of right femur, initial encounter for closed fracture (HCC)     Severe scoliosis     Stroke ([facility]) not sure when    blurred vision, resolved (taking plavix)     MOHSEN (stress urinary incontinence, female)     UTI (urinary tract infection)      Past Surgical History:   Procedure Laterality Date    CATARACT REMOVAL Bilateral     CHOLECYSTECTOMY      FIBULA FRACTURE SURGERY Right 2024    RIGHT DISTAL FEMUR OPEN REDUCTION INTERNAL FIXATION/ FRACTURE TABLE/ C-ARM/ [DEPUY SYNTHES ORTHO] SYNTHES CONDYLAR PLATE performed by Lars Harden MD at [facility] MAIN OR    HYSTERECTOMY (CERVIX STATUS UNKNOWN)  2016    total    LEG SURGERY Right 2024    RIGHT LOWER EXTREMITY FASCIOTOMY CLOSURE performed by Lawanda Van MD at [facility] MAIN OR    OTHER SURGICAL HISTORY  2019    basal skin ca removed     OTHER SURGICAL HISTORY  2015    skin ca removed     OTHER SURGICAL HISTORY  2016    bladder support    TONSILLECTOMY  89    TOTAL KNEE ARTHROPLASTY Right 2015    VASCULAR SURGERY Right  required to generate solutions;Assistance required to implement solutions   Initiation Requires cues for some Requires cues for some     Sequencing Requires cues for some Requires cues for some   Cognition comment Verbal cues for maintaining weight bearing restrictions (TTWB R LE).     Vision - Basic assessment Prior Vision: Wears glasses only for reading  Patient Visual Report: No visual complaint reported. No visual complaint reported.   Perception WFL WFL   Overall Sensation Status WNL WFL     EATING Initial Assessment Discharge Assessment 6/13/2024   Functional Level Supervision Modified independent    Clinical factors Pt performed self feeding with supervision and standard utensils.       GROOMING Initial Assessment Discharge Assessment 6/13/2024   Functional Level Supervision, Setup Supervision   Clinical factors  Pt performed grooming tasks from seated position at sink side to perform oral care adn washing hands and face with setup from bed level. Pt performed grooming tasks and initiated oral care with 1 VC.     UPPER BODY BATHING Initial Assessment Discharge Progress Assessment 6/13/2024   Functional Level    Stand by assistance UE Bathing: Supervision;Adaptive equipment;Setup   Method Bath wipes Soap and water   Clinical Factors Pt performed UB bathing via sponge bath at EOB with SBA to wash BUE arms, underarms, chest and abdomen. Pt performed UB showering with supervision from seated position on TTB washing BUE arms, chest, and abdomen and no VC's.     LOWER BODY BATHING Initial Assessment Discharge Progress Assessment 6/13/2024   Functional Level    Moderate assistance Supervision;Adaptive equipment   Clinical Factors   Pt performed LB bathing from EOB via sponge bath with modA to wash lower BLE foot and assistance for maintaining stability in standing at FWW while washing buttocks and milo area in standing. Pt washed BLE thighs to knee from seated position with S/SBA. Pt performed LB showering from

## 2024-06-13 NOTE — PROGRESS NOTES
pain level every 4 hrs, prn pain medications      Lab value concerns   No       Occupational Therapy  Making gains  Yes   Goal: Patient will perform all toileting tasks with stand by assistance with good safety.       Barrier: ttwb on right le      Intervention: transfer and adl training       ADL Accessibility Limitations:none        Physical Therapy Making gains Yes   Goal: Patient will perform sit to stand with mod I while maintaining TTWB R LE       Barrier: decreased strength, TTWB RLE       Intervention: transfer training, strengthening       Household Mobility Accessibility Limitations:none         Speech Therapy Making gains Yes   Goal: Patient will participate in cognitive group activities independently.       Barrier: mild cognitive impairment       Intervention: continue cognitive activities         Therapy Minutes Density Met: Yes    Therapy Minutes Not met Action/Justification:   [] Pt on medical hold  [] Pt refusing therapy despite encouragement and education on benefits of therapy  [] Pt displays decreased tolerance to therapy  [] Other     CMG Date: 6/9/2024  Estimated Discharge Date: 6/14/2024  [x]Rehabilitation goals from IPOC/Treatment plan reviewed  [x]No changes identified  []Goal(s) changed:     Patient needs identified []Caregiver Education   []Family Conference         Recommended Discharge Plan []home alone   []home alone with assist prn    []Home with continuous supervision       []Home with continuous assistance   [x] Assisted living                     []SNF   Outpatient pt, ot    CURRENT EQUIPMENT NEEDS:  []Handicap Placard Application    Equipment needed at discharge: Wheelchair and Wheelchair cushion    ADL Equipment:Tub bench and 3 in 1 BSC    Plan/Adjustments to Plan   [x]Medical conditions exist that require a minimum of 3 times/week physician      oversight and 24-hour rehabilitation nursing to manage/progress the plan of care   [x]Functional deficits require intensive and  coordinated therapies to achieve   goals outlined in plan of care   [x]3 hours therapy 5 days/week OR 15 hours therapy over 7 days   [x]Continued plan of care as patient is showing progress and /or has an expectation to benefit    Patient's plan of care has been reviewed and/or adjusted. Continue treatment as outlined.   I have led this Team Conference and agree with the plan, Gavino Huffman MD, 6/13/2024, 1:01 PM      Team Conference Recorder Delaney Lema  Date 6/13/2024    Team members participating in today's conference.   [x] Naomy Lamas, PT     [x] Ernst Katz, PT           [x]    Dana Stratton, SLP       [x] Trish Chiang, OT      [x] Zaira Decker OT                    []  Beverly Ellison, BEATRIS  [x] MONO Shaver        [] Norma Ya RN             [x] RN: Rip Scott RN   [x] Fritz Nina NP                               [] Other:

## 2024-06-13 NOTE — PROGRESS NOTES
Joseph spoke with pt and pt's daughter regarding wheelchair from [employer]. Pt had a wheelchair from [employer] for approximately 3-4 months in 2023 and returned it. [facility] for [facility] remaining benefit period. Daughter took number to call and coordinate with [employer]. [facility]     Joseph will follow.     Joseph clarified cost with Deborah at [place] [facility] regarding pt's chair. Final answer was no upfront cost no rental cost. Joseph delivered from [employer].     Joseph provided the Medicare Important Message. Original filed in pt's paperchart.     Joseph discussed with NP and decision was made to send original scripts with pt's daughter to [facility] since faxes have not gone through in over 24 hours. Joseph retained a copy of all scripts.     Joseph will follow.

## 2024-06-13 NOTE — PROGRESS NOTES
TEAM CONFERENCE FOLLOW-UP  Patient: Juanpablo Jackson (83 y.o. female)  Date: 6/13/2024  Diagnosis: Closed displaced comminuted fracture of shaft of right femur with routine healing [S72.351D] Closed displaced comminuted fracture of shaft of right femur with routine healing      Precautions:      Met with patient to discuss the findings from 6/13/2024 Team Conference.    Patient requested further information and/or had questions:   Mayte Lema

## 2024-06-13 NOTE — PROGRESS NOTES
Prior to discharge, nurse practitioner discussed and went through current and discharge medications in detail with the patient at the bedside. The NP discussed which drugs to discontinue, resume, and continue after discharge. NP explained and answered questions about follow-up care/pcp/specialist appointments, as well as discharge process expectations. The patient expressed her understanding verbally.     Fritz Nina DNP, NP-BC

## 2024-06-13 NOTE — PLAN OF CARE
Therapy Diagnosis  Cognitive Diagnosis: Mild difficulty in memory tasks    PAIN:  Pain level pre-treatment: 0/10   Pain level post-treatment: 0/10     After treatment:   [x]            Patient left in no apparent distress sitting up in chair  []            Patient left in no apparent distress in bed  []            Call bell left within reach  []            Nursing notified  []            Family present  [x]            Caregiver present (left with the PTA)  [x]            Chair alarm activated    ASSESSMENT:  Ms. Jackson participated will in the  group activities.  She enjoyed the time with fellow patients outside of the therapy gym.  She appears to be at her functional baseline.       PLAN:  Patient appears to be at her functional baseline.  No SLP follow up recommended at this time.       COMMUNICATION/EDUCATION:   []            Aspiration precautions; swallow safety; as well as compensatory speech/language/comprehension techniques provided via demonstration, verbalization and teach back of comprehension  [x]         Patient/family have participated as able in goal setting and plan of care.  [x]            Patient/family agree to work toward stated goals and plan of care.  []            Patient understands intent and goals of therapy, neutral about participation.  []            Patient unable to participate in goal setting/plan of care secondary to cognition, hearing/vision deficits; education ongoing with interdisciplinary staff   []            Handout regarding diet recommendations and thickener instructions provided.  []         Posted safety precautions in patient's room.    Estimated Discharge Date: 6/14/24    Thank you for this referral,  Dana Stratton CCC-SLP    Speech Treatment Time: 40 minutes

## 2024-06-13 NOTE — PLAN OF CARE
Problem: Physical Therapy - Adult  Goal: By Discharge: Performs mobility at highest level of function for planned discharge setting.  See evaluation for individualized goals.  Description: Physical Therapy Short Term Goals  Initiated 5/30/2024, re-assessed 6/13/2024 for d/c 6/14/24  1.  Patient will performsupine to sit and sit to supine in bed with modified independence.  (MET 6/13/24)  2.  Patient will transfer from bed to chair and chair to bed with supervision/set-up using the least restrictive device. (SBA 6/13/24)  3.  Patient will perform sit to stand with contact guard assist (MET 6/13/24)  4.  Patient will ambulate with stand by assist for 40 feet with toe touch weight bearing with the least restrictive device. (CGA 6/13/24)  5.  Patient will propel wheelchair 150 feet modified independent for mobility on this unit. (MET 6/13/24)  6. Patient will improve R knee AROM to 10-90 degrees for greater ease and comfort with ambulation. (NOT MET 6/13/24)    Physical Therapy Long Term Goals  Initiated 5/30/2024 and to be accomplished within 14 day(s) 6/13/24  1.  Patient will perform supine to sit and sit to supine in bed with modified independence.    2.  Patient will transfer from bed to chair and chair to bed with modified independence using the least restrictive device.  3.  Patient will perform sit to stand with modified independence.  4.  Patient will ambulate with supervision for 150 feet with toe touch weight bearing with the least restrictive device.   5. Patient will increase R knee AROM to 5-120 degrees for greater mobility in the home.   Outcome: Progressing     PHYSICAL THERAPY DISCHARGE NOTE    Patient: Juanpablo Jackson (83 y.o. female)  Date: 6/13/2024  Diagnosis: Closed displaced comminuted fracture of shaft of right femur with routine healing [S72.039D]   Precautions:                fall risk, TTWB right LE  Chart, physical therapy assessment, plan of care and goals were reviewed.  Time in:      Therapeutic Exercises:   Supine BLE SAQ with 2# on left LE, hip abd and heel slides 2x15, bridging only with left LE 2x10  Standing right LE hip flexion, hip abd, ham curls and left LE heel raises x15 each    ASSESSMENT:  Pt has made progress during stay on ARU, meeting 3/6 STGs and not meeting any LTGs. Pt has demo'd significant improvement with strength and activity tolerance, with increase in MMT for BLE and performing all functional mobility with SBA/CGA. Pt continues to be limited by TTWB right LE, fatiguing with gait and pt's right heel cord has become tight due to maintaining toe touch positioning in standing/gait. Pt's right knee ROM also continues to be limited due to pain and edema, although has improved during stay, but continues to lack 10 deg extension and only achieving 74 deg of AAROM of flexion. Pt will benefit from continued skilled intervention to continue improving functional mobility and independence to return to OF.   LTGs: not met     PLAN:  Pt would benefit from continued skilled physical therapy in order to improve independent functional mobility at home health at [facility] level. Interventions may include range of motion (AROM, PROM B LE/trunk), motor function (B LE/trunk strengthening/coordination), activity tolerance (vitals, oxygen saturation levels), bed mobility training, balance activities, gait training (progressive ambulation program), and functional transfer training.     Discharge Recommendations:  24 hour supervision or assist;Home with Home health PT [facility]  Further Equipment Recommendations for Discharge:  Standard w/c    Rolling walker  Patient has RW however will need a wheelchair         Activity Tolerance:   Good-  Please refer to the flowsheet for vital signs taken during this treatment.  After treatment:   [] Patient left in no apparent distress in bed  [x] Patient left in no apparent distress sitting up in recliner after AM session, in w/c after PM session  [] Patient left

## 2024-06-14 VITALS
HEIGHT: 62 IN | RESPIRATION RATE: 16 BRPM | WEIGHT: 151.01 LBS | TEMPERATURE: 98.8 F | DIASTOLIC BLOOD PRESSURE: 64 MMHG | OXYGEN SATURATION: 98 % | BODY MASS INDEX: 27.79 KG/M2 | HEART RATE: 77 BPM | SYSTOLIC BLOOD PRESSURE: 106 MMHG

## 2024-06-14 PROCEDURE — 92508 TX SP LANG VOICE COMM GROUP: CPT

## 2024-06-14 PROCEDURE — 99239 HOSP IP/OBS DSCHRG MGMT >30: CPT | Performed by: NURSE PRACTITIONER

## 2024-06-14 PROCEDURE — 6370000000 HC RX 637 (ALT 250 FOR IP): Performed by: NURSE PRACTITIONER

## 2024-06-14 PROCEDURE — 6370000000 HC RX 637 (ALT 250 FOR IP): Performed by: EMERGENCY MEDICINE

## 2024-06-14 RX ADMIN — CLOPIDOGREL BISULFATE 75 MG: 75 TABLET ORAL at 08:16

## 2024-06-14 RX ADMIN — AMLODIPINE BESYLATE 5 MG: 5 TABLET ORAL at 08:16

## 2024-06-14 RX ADMIN — OXYCODONE HYDROCHLORIDE 5 MG: 5 TABLET ORAL at 11:27

## 2024-06-14 RX ADMIN — POLYETHYLENE GLYCOL 3350 17 G: 17 POWDER, FOR SOLUTION ORAL at 08:16

## 2024-06-14 RX ADMIN — ASPIRIN 81 MG: 81 TABLET, COATED ORAL at 08:16

## 2024-06-14 RX ADMIN — ACETAMINOPHEN 325MG 650 MG: 325 TABLET ORAL at 08:16

## 2024-06-14 RX ADMIN — Medication 1 TABLET: at 08:16

## 2024-06-14 RX ADMIN — FERROUS SULFATE TAB 325 MG (65 MG ELEMENTAL FE) 325 MG: 325 (65 FE) TAB at 08:16

## 2024-06-14 ASSESSMENT — PAIN DESCRIPTION - PAIN TYPE
TYPE: ACUTE PAIN;SURGICAL PAIN
TYPE: ACUTE PAIN;SURGICAL PAIN

## 2024-06-14 ASSESSMENT — PAIN DESCRIPTION - DESCRIPTORS
DESCRIPTORS: ACHING
DESCRIPTORS: ACHING

## 2024-06-14 ASSESSMENT — PAIN DESCRIPTION - LOCATION
LOCATION: LEG
LOCATION: LEG

## 2024-06-14 ASSESSMENT — PAIN SCALES - GENERAL
PAINLEVEL_OUTOF10: 1
PAINLEVEL_OUTOF10: 4
PAINLEVEL_OUTOF10: 0
PAINLEVEL_OUTOF10: 0
PAINLEVEL_OUTOF10: 2
PAINLEVEL_OUTOF10: 5
PAINLEVEL_OUTOF10: 3
PAINLEVEL_OUTOF10: 3

## 2024-06-14 ASSESSMENT — PAIN DESCRIPTION - FREQUENCY
FREQUENCY: INTERMITTENT
FREQUENCY: INTERMITTENT

## 2024-06-14 ASSESSMENT — PAIN DESCRIPTION - ORIENTATION
ORIENTATION: RIGHT
ORIENTATION: RIGHT

## 2024-06-14 ASSESSMENT — PAIN DESCRIPTION - ONSET
ONSET: ON-GOING
ONSET: ON-GOING

## 2024-06-14 NOTE — PLAN OF CARE
Problem: Chronic Conditions and Co-morbidities  Goal: Patient's chronic conditions and co-morbidity symptoms are monitored and maintained or improved  6/13/2024 2032 by Stephanie Green RN  Outcome: Progressing  Flowsheets (Taken 6/13/2024 2000)  Care Plan - Patient's Chronic Conditions and Co-Morbidity Symptoms are Monitored and Maintained or Improved:   Monitor and assess patient's chronic conditions and comorbid symptoms for stability, deterioration, or improvement   Collaborate with multidisciplinary team to address chronic and comorbid conditions and prevent exacerbation or deterioration  6/13/2024 1047 by Yadira Jimenez, RN  Outcome: Progressing  Flowsheets (Taken 6/13/2024 0813)  Care Plan - Patient's Chronic Conditions and Co-Morbidity Symptoms are Monitored and Maintained or Improved: Monitor and assess patient's chronic conditions and comorbid symptoms for stability, deterioration, or improvement     Problem: Safety - Adult  Goal: Free from fall injury  6/13/2024 2032 by Stephanie Green RN  Outcome: Progressing  Flowsheets (Taken 6/13/2024 2031)  Free From Fall Injury: Instruct family/caregiver on patient safety  6/13/2024 1047 by Yadira Jimenez RN  Outcome: Progressing  Flowsheets (Taken 6/13/2024 0813)  Free From Fall Injury: Instruct family/caregiver on patient safety     Problem: Skin/Tissue Integrity  Goal: Absence of new skin breakdown  Description: 1.  Monitor for areas of redness and/or skin breakdown  2.  Assess vascular access sites hourly  3.  Every 4-6 hours minimum:  Change oxygen saturation probe site  4.  Every 4-6 hours:  If on nasal continuous positive airway pressure, respiratory therapy assess nares and determine need for appliance change or resting period.  6/13/2024 2032 by Stephanie Green RN  Outcome: Progressing  6/13/2024 1047 by Yadira Jimenez RN  Outcome: Progressing     Problem: ABCDS Injury Assessment  Goal: Absence of physical injury  6/13/2024 2032 by Stephanie Green  RN  Outcome: Progressing  Flowsheets (Taken 6/13/2024 2031)  Absence of Physical Injury: Implement safety measures based on patient assessment  6/13/2024 1047 by Yadira Jimenez RN  Outcome: Progressing  Flowsheets (Taken 6/13/2024 0813)  Absence of Physical Injury: Implement safety measures based on patient assessment     Problem: Pain  Goal: Verbalizes/displays adequate comfort level or baseline comfort level  6/13/2024 2032 by Stephanie Green RN  Outcome: Progressing  Flowsheets (Taken 6/13/2024 2000)  Verbalizes/displays adequate comfort level or baseline comfort level:   Encourage patient to monitor pain and request assistance   Assess pain using appropriate pain scale   Administer analgesics based on type and severity of pain and evaluate response  6/13/2024 1047 by Yadira Jimenez, RN  Outcome: Progressing     Problem: Nutrition Deficit:  Goal: Optimize nutritional status  6/13/2024 2032 by Stephanie Green, RN  Outcome: Progressing  6/13/2024 1047 by Yadira Jimenez, RN  Outcome: Progressing

## 2024-06-14 NOTE — DISCHARGE SUMMARY
[facility] FOR [facility]ICAL [facility]ITATION  [address] [place] [id number]     INPATIENT REHABILITATION  DISCHARGE SUMMARY    Name: Juanpablo Jackson MRN: [id number]   Age / Sex: 83 y.o. / female CSN: [id number]   YOB: 1940 Length of Stay: 16 days   Admit Date: 5/29/2024 Discharge Date: 06/14/2024       PRIMARY CARE PHYSICIAN: Makayla Hanley MD      DISCHARGE DIAGNOSES:    Primary Rehabilitation Diagnosis   1. Impaired Mobility and ADLs in the setting of a closed fracture of the shaft of right femur, status post open reduction and internal fixation and subsequent SFA-popliteal dissection and patient is now status post right CFA exploration and superficial femoral and popliteal stents with 4 compartment fasciotomy.    CONSULTS CALLED: None    PROCEDURES DONE: None    BRIEF HISTORY: (from the history and physical completed by Dr. Gavino Huffman)   This is a 83-year-old female who was recently admitted to [facility].  Patient had a fall and was noted to have closed fracture of the shaft of the right femur.  Patient was evaluated by orthopedics.  Patient underwent a Plavix washout.  Subsequently patient went for open reduction and internal fixation.  Postoperatively patient was noted to have a loss of pulses on the right lower extremity.  Patient was noted to have a SFA and popliteal dissection following the complicated right distal femoral fracture surgery.  Vascular surgery saw the patient.  Patient underwent right lower extremity CFA exploration and superficial femoral and popliteal stents with 4 compartment fasciotomy.  Patient was monitored.  Patient remained stable.  Patient was subsequently evaluated by PT and OT and it was felt that patient may benefit from transitioning to the inpatient rehab facility.  For full details regarding patient's hospital course please refer to chart.     The patient  was noted to have impaired mobility and ADLs. Patient was felt to be a good

## 2024-06-14 NOTE — PROGRESS NOTES
Juanpablo Jackson 83 y.o. female was discharged home to [facility] in stable condition on 06/14/24. Patient's armband was removed and shredded. Discharge instructions were reviewed with patient, and she verbalized understanding. The patient was wheeled out to transportation vehicle by a staff member along with the patient's belongings. Transportation was provided by private vehicle.    Vitals:    06/14/24 1351   BP: 106/64   Pulse: 77   Resp: 16   Temp: 98.8 °F (37.1 °C)   SpO2: 98%        Yadira Jimenez RN

## 2024-06-14 NOTE — PLAN OF CARE
Problem: SLP Adult - Disturbed Thought Process  Goal: By Discharge: Demonstrates cognitive skills at highest level of function for planned discharge setting.   See evaluation for individualized goals.  Description: Description: Long term goals (initiated 6/4/24; to be met by 6/18/24)  Patient will:  1.          Be oriented x 3 and recall events of the day, supervision.  2.          Recall names of group members with min cues.  3.          Demonstrate functional problem solving and reasoning in structured tasks with min assist.  4.          Recall functional information given printed cues, min assist-supervision.    Short term goals (by 6/11/24)  Patient will:  1.          Be oriented x 3 and recall events of the day, mod-min assist.  2.          Recall names of group members with min-mod cues.  3.          Demonstrate functional problem solving and reasoning in structured tasks with min assist.  4.          Recall functional information given printed cues, min assist-suprvision.      Note:   SPEECH-LANGUAGE TREATMENT    Patient: Juanpablo Jackson (83 y.o. female)  Date: 6/14/2024  Diagnosis: Closed displaced comminuted fracture of shaft of right femur with routine healing [S72.351D] Closed displaced comminuted fracture of shaft of right femur with routine healing      Precautions: Standard  PLOF: As per H&P     ASSESSMENT:  Ms. Jacskon is consistently engaged and conversant in these group encounters.  Progression toward goals:  []       Improving appropriately and progressing toward goals  []       Improving slowly and progressing toward goals  []       Not making progress toward goals and plan of care will be adjusted     PLAN:  Patient continues to benefit from skilled intervention to address the above impairments.  Continue treatment per established plan of care.     SUBJECTIVE:   Patient stated “I will miss you when I leave” to a fellow group member.    OBJECTIVE:   Daily Assessment:  Orientation  Overall  Orientation Status: Within Normal Limits    Treatment & Interventions:  Neuro-Linguistics:  Ms. Jackson was seen this morning for a 40 minute group session prior to her discharge.  She was engaged and conversant in the group, responding well to questions posed from \"Table Topics\" cards.  She was attentive to the answers of other group members and at times commented upon other's answers.     Voice:  Soft voice quality     Response & Tolerance to Activities:  Ms. Jackson was an active member of today's group.    PAIN:  Start of Tx: No report of pain  End of Tx:  No report of pain     After treatment:   [x]       Patient left in no apparent distress sitting up in chair  []       Patient left in no apparent distress in bed  [x]       Call bell left within reach  [x]       Nursing notified  []       Caregiver present  []       Bed alarm activated    COMMUNICATION/EDUCATION:   [] Patient educated regarding compensatory speech/language/comprehension techniques provided via demonstration, verbalization and teach back of comprehension  [] Patient/family have participated as able in goal setting and plan of care.  [x] Patient/family agree to work toward stated goals and plan of care.  [] Patient understands intent and goals of therapy, neutral about participation.  [] Patient unable to participate in goal setting/plan of care secondary to cognition, hearing/vision deficits; education ongoing with interdisciplinary staff     Speech Therapy Minutes: 40    Dana Stratton CCC-SLP

## 2024-06-14 NOTE — PROGRESS NOTES
4 Eyes Skin Assessment     NAME:  Juanpablo Jackson  YOB: 1940  MEDICAL RECORD NUMBER:  [id number]    The patient is being assessed for  Shift Handoff    I agree that at least one RN has performed a thorough Head to Toe Skin Assessment on the patient. ALL assessment sites listed below have been assessed.      Areas assessed by both nurses:    Sacrum. Buttock, Coccyx, Ischium and Legs. Feet and Heels        Does the Patient have a Wound? Yes wound(s) were present on assessment. LDA wound assessment was Initiated and completed by RN       Adelfo Prevention initiated by RN: Yes  Wound Care Orders initiated by RN: No    Pressure Injury (Stage 3,4, Unstageable, DTI, NWPT, and Complex wounds) if present, place Wound referral order by RN under : No    New Ostomies, if present place, Ostomy referral order under : No     Nurse 1 eSignature: Electronically signed by Stephanie Green RN on 6/14/24 at 5:14 AM EDT    **SHARE this note so that the co-signing nurse can place an eSignature**    Nurse 2 eSignature: {Esignature:[id number]}

## 2024-06-18 ENCOUNTER — OFFICE VISIT (OUTPATIENT)
Age: 84
End: 2024-06-18
Payer: MEDICARE

## 2024-06-18 VITALS — WEIGHT: 151 LBS | BODY MASS INDEX: 27.79 KG/M2 | HEIGHT: 62 IN | TEMPERATURE: 98 F

## 2024-06-18 DIAGNOSIS — M79.604 RIGHT LEG PAIN: ICD-10-CM

## 2024-06-18 DIAGNOSIS — S72.8X1A OTHER FRACTURE OF RIGHT FEMUR, INITIAL ENCOUNTER FOR CLOSED FRACTURE (HCC): ICD-10-CM

## 2024-06-18 DIAGNOSIS — S72.091D OTHER FRACTURE OF HEAD AND NECK OF RIGHT FEMUR, SUBSEQUENT ENCOUNTER FOR CLOSED FRACTURE WITH ROUTINE HEALING: Primary | ICD-10-CM

## 2024-06-18 DIAGNOSIS — M25.561 RIGHT KNEE PAIN, UNSPECIFIED CHRONICITY: ICD-10-CM

## 2024-06-18 PROCEDURE — 99024 POSTOP FOLLOW-UP VISIT: CPT | Performed by: PHYSICIAN ASSISTANT

## 2024-06-18 PROCEDURE — 73552 X-RAY EXAM OF FEMUR 2/>: CPT | Performed by: PHYSICIAN ASSISTANT

## 2024-06-18 NOTE — PROGRESS NOTES
Juanpablo Pleitez returns to the office status post fall which resulted in findings consistent with a distal femur right shaft fracture with a pre-existing right total knee replacement.  Patient was stabilized operatively with a locking femoral condylar plate and screw fixation.  Postoperatively she developed numbness and pallor of the right foot.  She was found to have a vascular occlusion that resulted in a femoral artery bypass.   She was stabilized following both procedures and ultimately transferred to inpatient acute rehab.  She stayed for short while and was discharged to [facility].  Previously she had been a resident of [facility] assisted living.    Today she is doing well generally continuing toe-touch weightbearing right lower extremity with max assist.    She has scheduled appointment with vascular surgery upcoming early July 2024.    On exam of the right distal mid to lateral thigh there is a 38 cm surgical incision intact with wound borders well-approximated.  Steri-Strips are noted all removed today.  Skin surrounding soft.  No erythema edema ecchymosis or fluctuance or indurations noted.  Over the lower leg there is noted to additional surgical incisions medial and lateral measuring 6 cm apiece that was consistent with her vascular surgery intervention.    Patient's active range of motion at bedside of the right knee 85 degrees -15 degrees.  No instability on medial lateral stressing.        X-ray: [facility] 6/18/2024 space right femur AP lateral reveals extended locking supracondylar plate noted lateral of the femur with noted comminution of the distal shaft of the femur in acceptable alignment.  Total right knee replacement pre-existing hardware noted.  There is early callus developing associated with the femoral shaft fracture site.  Alignment is anatomical.  There is a bypass graft noted as seen in AP femoral associated to level of the knee.  No lytic or blastic

## 2024-07-02 ENCOUNTER — OFFICE VISIT (OUTPATIENT)
Age: 84
End: 2024-07-02

## 2024-07-02 ENCOUNTER — OFFICE VISIT (OUTPATIENT)
Age: 84
End: 2024-07-02
Payer: MEDICARE

## 2024-07-02 VITALS
WEIGHT: 151 LBS | OXYGEN SATURATION: 98 % | HEART RATE: 61 BPM | HEIGHT: 62 IN | BODY MASS INDEX: 27.79 KG/M2 | DIASTOLIC BLOOD PRESSURE: 70 MMHG | SYSTOLIC BLOOD PRESSURE: 110 MMHG

## 2024-07-02 DIAGNOSIS — Z95.828 PRESENCE OF ARTERIAL STENT: ICD-10-CM

## 2024-07-02 DIAGNOSIS — S72.091D OTHER FRACTURE OF HEAD AND NECK OF RIGHT FEMUR, SUBSEQUENT ENCOUNTER FOR CLOSED FRACTURE WITH ROUTINE HEALING: Primary | ICD-10-CM

## 2024-07-02 DIAGNOSIS — I99.8 ACUTE LOWER LIMB ISCHEMIA: Primary | ICD-10-CM

## 2024-07-02 PROCEDURE — 99024 POSTOP FOLLOW-UP VISIT: CPT | Performed by: PHYSICIAN ASSISTANT

## 2024-07-02 PROCEDURE — 99024 POSTOP FOLLOW-UP VISIT: CPT | Performed by: SURGERY

## 2024-07-02 PROCEDURE — 73552 X-RAY EXAM OF FEMUR 2/>: CPT | Performed by: PHYSICIAN ASSISTANT

## 2024-07-02 NOTE — PROGRESS NOTES
Juanpablo Pleitez returns to the office status post fall which resulted in findings consistent with a distal femur right shaft fracture with a pre-existing right total knee replacement.  Patient was stabilized operatively with a locking femoral condylar plate and screw fixation.  Postoperatively she developed numbness and pallor of the right foot.  She was found to have a vascular occlusion that resulted in a femoral artery bypass.   She was stabilized following both procedures and ultimately transferred to inpatient acute rehab.  She stayed for short while and was discharged to [facility] skilled [facility].  Previously she had been a resident of [facility] assisted [facility].    Today she is doing well and has returned to 25 to 50% weightbearing of the right lower extremity.    She has scheduled appointment with vascular surgery upcoming early July 2024.        Patient's active range of motion at bedside of the right knee 85 degrees -15 degrees.  No instability on medial lateral stressing.        X-ray: [facility] 7/2/2024 space right femur AP lateral reveals extended locking supracondylar plate noted lateral of the femur with noted fracture comminution of the distal shaft of the femur in acceptable alignment with early callus inlay noted.  This is improved compared to prior imaging..  Total right knee replacement pre-existing hardware noted.  .  Alignment is anatomical.  There is a bypass graft noted as seen in AP femoral associated to level of the knee.  No lytic or blastic lesions.  No soft tissue ossifications.  No other fracture deformities.      Plan: Continue 25 to 50% weightbearing right lower extremity for transfers only.  Follow-up in 6 weeks.

## 2024-07-02 NOTE — PROGRESS NOTES
FIBULA FRACTURE SURGERY Right 5/21/2024    RIGHT DISTAL FEMUR OPEN REDUCTION INTERNAL FIXATION/ FRACTURE TABLE/ C-ARM/ [DEPUY SYNTHES ORTHO] SYNTHES CONDYLAR PLATE performed by Lars Harden MD at [facility] MAIN OR    HYSTERECTOMY (CERVIX STATUS UNKNOWN)  09/2016    total    LEG SURGERY Right 5/23/2024    RIGHT LOWER EXTREMITY FASCIOTOMY CLOSURE performed by Lawanda Van MD at [facility] MAIN OR    OTHER SURGICAL HISTORY  01/2019    basal skin ca removed     OTHER SURGICAL HISTORY  2005, 2015    skin ca removed     OTHER SURGICAL HISTORY  09/2016    bladder support    TONSILLECTOMY  1946    TOTAL KNEE ARTHROPLASTY Right 04/27/2015    VASCULAR SURGERY Right 5/21/2024    RIGHT COMMON FEMORAL ARTERY EXPOSURE, RIGHT LOWER EXTREMITY ANGIOGRAPHY, RIGHT SFA  AND POPLITEAL STENT PLACEMENT performed by Lawanda Van MD at [facility] CARDIAC SURGERY     Current Outpatient Medications   Medication Sig Dispense Refill    acetaminophen (TYLENOL) 325 MG tablet Take 2 tablets by mouth every 4 hours as needed for Pain or Fever 20 tablet 0    aspirin 81 MG EC tablet Take 1 tablet by mouth daily 30 tablet 0    atorvastatin (LIPITOR) 20 MG tablet Take 1 tablet by mouth nightly 30 tablet 0    amLODIPine (NORVASC) 5 MG tablet Take 1 tablet by mouth daily 30 tablet 0    ferrous sulfate (IRON 325) 325 (65 Fe) MG tablet Take 1 tablet by mouth 2 times daily (with meals) 60 tablet 0    bisacodyl (DULCOLAX) 5 MG EC tablet Take 1 tablet by mouth daily as needed for Constipation 5 tablet 0    polyethylene glycol (GLYCOLAX) 17 g packet Take 1 packet by mouth daily 10 each 0    Calcium Carb-Cholecalciferol (OYSTER SHELL CALCIUM W/D) 500-5 MG-MCG TABS tablet Take 1 tablet by mouth 2 times daily (with meals) 60 tablet 0    clopidogrel (PLAVIX) 75 MG tablet Take 1 tablet by mouth daily 30 tablet 0     No current facility-administered medications for this visit.     Allergies   Allergen Reactions    Nitrofurazone Hives and Other (See

## 2024-07-09 ENCOUNTER — TELEPHONE (OUTPATIENT)
Age: 84
End: 2024-07-09

## 2024-07-09 NOTE — TELEPHONE ENCOUNTER
Patient is a resident at [facility], right femur fracture    Darlene, physical therapist at [facility] is requesting a call back to her personal cell, [phone].  She needs to clarify our order for a knee scooter (which the family has purchased) because her orders state patient is toe touch weight bearing at this time.

## 2024-07-12 ENCOUNTER — TELEPHONE (OUTPATIENT)
Age: 84
End: 2024-07-12

## 2024-07-12 NOTE — TELEPHONE ENCOUNTER
Spoke with Pt to elevate leg and it was okay to wear the Eduardo compromise stocking. Pt did have a concern with it leaving a indentation on her skin, did advise her to continue to wear stocking and resume elevation.

## 2024-07-12 NOTE — TELEPHONE ENCOUNTER
Pt called about being encouraged to wear compression on Leg post surgery. Will check with Dr. Van to make sure this is okay for pt to do.

## 2024-07-12 NOTE — TELEPHONE ENCOUNTER
Patient states that she's currently at [facility] taking PT and the therapist suggests that she wear the white compression hose but she wants to run it by Dr. Harden or MARGARITA Urrutia first and see what they suggest.    Please advise.    Patient can be reached at at [phone].

## 2024-08-06 ENCOUNTER — OFFICE VISIT (OUTPATIENT)
Age: 84
End: 2024-08-06
Payer: MEDICARE

## 2024-08-06 VITALS — BODY MASS INDEX: 30.43 KG/M2 | HEIGHT: 60 IN | WEIGHT: 155 LBS | TEMPERATURE: 97.7 F

## 2024-08-06 DIAGNOSIS — S72.091D OTHER FRACTURE OF HEAD AND NECK OF RIGHT FEMUR, SUBSEQUENT ENCOUNTER FOR CLOSED FRACTURE WITH ROUTINE HEALING: ICD-10-CM

## 2024-08-06 DIAGNOSIS — M25.562 ACUTE PAIN OF LEFT KNEE: Primary | ICD-10-CM

## 2024-08-06 PROCEDURE — 73562 X-RAY EXAM OF KNEE 3: CPT | Performed by: PHYSICIAN ASSISTANT

## 2024-08-06 PROCEDURE — 90000 NO LOS: CPT | Performed by: PHYSICIAN ASSISTANT

## 2024-08-06 PROCEDURE — 73552 X-RAY EXAM OF FEMUR 2/>: CPT | Performed by: PHYSICIAN ASSISTANT

## 2024-08-06 PROCEDURE — 20611 DRAIN/INJ JOINT/BURSA W/US: CPT | Performed by: PHYSICIAN ASSISTANT

## 2024-08-06 RX ORDER — BUPIVACAINE HYDROCHLORIDE 5 MG/ML
17 INJECTION, SOLUTION PERINEURAL ONCE
Status: COMPLETED | OUTPATIENT
Start: 2024-08-06 | End: 2024-08-06

## 2024-08-06 RX ORDER — TRIAMCINOLONE ACETONIDE 40 MG/ML
80 INJECTION, SUSPENSION INTRA-ARTICULAR; INTRAMUSCULAR ONCE
Status: COMPLETED | OUTPATIENT
Start: 2024-08-06 | End: 2024-08-06

## 2024-08-06 RX ADMIN — BUPIVACAINE HYDROCHLORIDE 85 MG: 5 INJECTION, SOLUTION PERINEURAL at 13:03

## 2024-08-06 RX ADMIN — TRIAMCINOLONE ACETONIDE 80 MG: 40 INJECTION, SUSPENSION INTRA-ARTICULAR; INTRAMUSCULAR at 13:04

## 2024-08-06 NOTE — PROGRESS NOTES
Juanpablo Pleitez returns to the office status post fall which resulted in findings consistent with a distal femur right shaft fracture with a pre-existing right total knee replacement.  Patient was stabilized operatively with a locking femoral condylar plate and screw fixation.  Postoperatively she developed numbness and pallor of the right foot.  She was found to have a vascular occlusion that resulted in a femoral artery bypass.   She was stabilized following both procedures and ultimately transferred to inpatient acute rehab.  She stayed for short while and was discharged to [facility].  She is currently in assisted living independent.    Today she is doing well and has returned to 25 to 50% weightbearing of the right lower extremity.            Patient's active range of motion at bedside of the right knee 85 degrees -15 degrees.  No instability on medial lateral stressing.        X-ray: [facility] 8/6/24 space right femur AP lateral reveals extended locking supracondylar plate noted lateral of the femur with noted fracture comminution of the distal shaft of the femur in acceptable alignment with early callus inlay noted.  This is improved compared to prior imaging..  Total right knee replacement pre-existing hardware noted.  .  Alignment is anatomical.  There is a bypass graft noted as seen in AP femoral associated to level of the knee.  No lytic or blastic lesions.  No soft tissue ossifications.  No other fracture deformities.    X-ray: [facility] 8/6/2024 space left knee tricompartmental end-stage osteoarthritis noted.    Procedure: 2 cc of Kenalog 40 mg/mL mixed with 7 mL of 0.5% Marcaine injected in the left knee using a superior lateral interarticular approach following a successful aspiration of 41 cc straw-colored blood tinted fluid.      Plan: Continue 25 to 50% weightbearing right lower extremity for transfers only.  Follow-up in 6 weeks.

## 2024-08-07 ENCOUNTER — OFFICE VISIT (OUTPATIENT)
Age: 84
End: 2024-08-07

## 2024-08-07 VITALS
SYSTOLIC BLOOD PRESSURE: 138 MMHG | BODY MASS INDEX: 30.43 KG/M2 | WEIGHT: 155 LBS | OXYGEN SATURATION: 99 % | HEART RATE: 78 BPM | HEIGHT: 60 IN | DIASTOLIC BLOOD PRESSURE: 70 MMHG

## 2024-08-07 DIAGNOSIS — Z95.828 PRESENCE OF ARTERIAL STENT: Primary | ICD-10-CM

## 2024-08-07 DIAGNOSIS — I99.8 ACUTE LOWER LIMB ISCHEMIA: ICD-10-CM

## 2024-08-07 RX ORDER — CLOPIDOGREL BISULFATE 75 MG/1
75 TABLET ORAL DAILY
Qty: 90 TABLET | Refills: 3 | Status: SHIPPED | OUTPATIENT
Start: 2024-08-07

## 2024-08-07 NOTE — PROGRESS NOTES
[facility]    Vascular Surgery Office Visit    Juanpablo Jackson  Chief Complaint   Patient presents with    Acute lower limb ischemia     Follow up with studies       History:  83 y.o. female returns for follow up. She is status post R groin exploration with distal SFA-popliteal artery stent for acute RLE ischemia following R femur ORIF on 5/19/24. Fasciotomy was also performed and closed several days later. She did well postop.  I saw her in follow up and her groin wound had a very small area of cutaneous dehiscence. She returns today to eval this area and to review postop surveillance studies. No RLE pain. She feels that she is doing well. She is back to living in a mostly independent status at her assisted living facility.        Physical Exam:    /70   Pulse 78   Ht 1.524 m (5')   Wt 70.3 kg (155 lb)   SpO2 99%   BMI 30.27 kg/m²      Constitutional:  Patient is well developed, well nourished, and not distressed.   HEENT: Atraumatic, normocephalic. No carotid bruits appreciated.  Eyes:   Cunjunctivae clear, no scleral icterus  Cardiovascular:  Normal rate, regular rhythm, normal heart sounds.  Pulmonary/Chest: Effort normal and breath sounds normal.  she has no wheezes or no rales.   Abdominal:   Soft, non-distended. No tenderness to palpation.   Extremities: BLE warm. Moderate RLE edema remains present. Incisions are healed.   Neurological:  she  is alert and oriented x3. Motor & sensory grossly intact in all 4 limbs.   Psych: Appropriate mood and affect.     Imaging/Studies:   Aug 2024  RLE arterial duplex w/ ALEKSANDR: Patent SFA-popliteal stent without stenosis. Patent R CFA. Occluded R peroneal artery. Normal ALEKSANDR bilaterally.   R ALEKSANDR 1.1, L 1.06    Impression:  Doing well approx 10 weeks s/p groin exploration with distal SFA-popliteal artery stent for acute RLE ischemia following R femur ORIF.     Plan:  Continue ASA, plavix, and statin. Plavix refilled today.   RTC in 6

## 2024-09-03 ENCOUNTER — OFFICE VISIT (OUTPATIENT)
Age: 84
End: 2024-09-03
Payer: MEDICARE

## 2024-09-03 VITALS — HEIGHT: 60 IN | WEIGHT: 155 LBS | TEMPERATURE: 97.7 F | BODY MASS INDEX: 30.43 KG/M2

## 2024-09-03 DIAGNOSIS — S72.091D OTHER FRACTURE OF HEAD AND NECK OF RIGHT FEMUR, SUBSEQUENT ENCOUNTER FOR CLOSED FRACTURE WITH ROUTINE HEALING: Primary | ICD-10-CM

## 2024-09-03 PROCEDURE — 99024 POSTOP FOLLOW-UP VISIT: CPT | Performed by: PHYSICIAN ASSISTANT

## 2024-09-03 PROCEDURE — 73552 X-RAY EXAM OF FEMUR 2/>: CPT | Performed by: PHYSICIAN ASSISTANT

## 2024-09-03 NOTE — PROGRESS NOTES
Juanpablo Pleitez returns to the office status post fall which resulted in findings consistent with a distal femur right shaft fracture with a pre-existing right total knee replacement.  Patient was stabilized operatively with a locking femoral condylar plate and screw fixation.  Postoperatively she developed numbness and pallor of the right foot.  She was found to have a vascular occlusion that resulted in a femoral artery bypass.   She was stabilized following both procedures and ultimately transferred to inpatient acute rehab.  She is currently in assisted living independent.    Today she is doing well and has returned to 25 to 50% weightbearing of the right lower extremity.            Patient's active range of motion at bedside of the right knee 85 degrees -15 degrees.  No instability on medial lateral stressing.        X-ray: [facility] 9/3/24 space right femur AP lateral reveals extended locking supracondylar plate noted lateral of the femur with noted fracture comminution of the distal shaft of the femur in acceptable alignment with  callus inlay noted, bone ball formation noted.  This is improved compared to prior imaging..  Total right knee replacement pre-existing hardware noted.  .  Alignment is anatomical.  There is a bypass graft noted as seen in AP femoral associated to level of the knee.  No lytic or blastic lesions.  No soft tissue ossifications.  No other fracture deformities.              Plan: Continue 25 to 50% weightbearing right lower extremity for transfers only.  Follow-up in 6 weeks.

## 2024-09-23 ENCOUNTER — TELEPHONE (OUTPATIENT)
Age: 84
End: 2024-09-23

## 2024-09-23 DIAGNOSIS — S72.091D OTHER FRACTURE OF HEAD AND NECK OF RIGHT FEMUR, SUBSEQUENT ENCOUNTER FOR CLOSED FRACTURE WITH ROUTINE HEALING: Primary | ICD-10-CM

## 2024-09-24 ENCOUNTER — TELEPHONE (OUTPATIENT)
Age: 84
End: 2024-09-24

## 2024-09-24 NOTE — TELEPHONE ENCOUNTER
Post op patient called for MARGARITA Urrutia.    Patient said that her Bone Stimulator had given out of treatment. That the place that takes care of the Bone Stimulator called her today.     Patient would like to know if the Bone Stimulator needs to be re-set up , or if she needs to wait until she see MARGARITA Urrutia on 10/01/24.    Patient tel. [phone].    Note : patient will see MARGARITA Urrutia for the Right Distal Femur.

## 2024-09-24 NOTE — TELEPHONE ENCOUNTER
9/25/24 Patient was called today. Left a voicemail that a new order for the Bone Stimulator was put in and Delaney with [employer] has the order and that Delaney should be reaching out to her.

## 2024-10-01 ENCOUNTER — OFFICE VISIT (OUTPATIENT)
Age: 84
End: 2024-10-01
Payer: MEDICARE

## 2024-10-01 VITALS — TEMPERATURE: 97.3 F | HEIGHT: 60 IN | WEIGHT: 155 LBS | BODY MASS INDEX: 30.43 KG/M2

## 2024-10-01 DIAGNOSIS — Z96.641 PRESENCE OF RIGHT ARTIFICIAL HIP JOINT: Primary | ICD-10-CM

## 2024-10-01 DIAGNOSIS — S72.091D OTHER FRACTURE OF HEAD AND NECK OF RIGHT FEMUR, SUBSEQUENT ENCOUNTER FOR CLOSED FRACTURE WITH ROUTINE HEALING: ICD-10-CM

## 2024-10-01 DIAGNOSIS — R26.2 IMPAIRED AMBULATION: ICD-10-CM

## 2024-10-01 DIAGNOSIS — S72.8X1A OTHER FRACTURE OF RIGHT FEMUR, INITIAL ENCOUNTER FOR CLOSED FRACTURE (HCC): ICD-10-CM

## 2024-10-01 PROCEDURE — 99024 POSTOP FOLLOW-UP VISIT: CPT | Performed by: PHYSICIAN ASSISTANT

## 2024-10-01 PROCEDURE — 73552 X-RAY EXAM OF FEMUR 2/>: CPT | Performed by: PHYSICIAN ASSISTANT

## 2024-10-01 NOTE — PROGRESS NOTES
Juanpablo Pleitez returns to the office status post fall which resulted in findings consistent with a distal femur right shaft fracture with a pre-existing right total knee replacement.  Patient was stabilized operatively with a locking femoral condylar plate and screw fixation.  Postoperatively she developed numbness and pallor of the right foot.  She was found to have a vascular occlusion that resulted in a femoral artery bypass.   She was stabilized following both procedures and ultimately transferred to inpatient acute rehab.  She is currently in assisted living independent.    Today she is doing well and has returned to 25 to 50% weightbearing of the right lower extremity.            Patient's active range of motion at bedside of the right knee 85 degrees -15 degrees.  No instability on medial lateral stressing.        X-ray: [facility] 10/1/24 space right femur AP lateral reveals extended locking supracondylar plate noted lateral of the femur with noted fracture comminution of the distal shaft of the femur in acceptable alignment with  callus inlay noted, bone ball formation noted.  This is improved compared to prior imaging..  Total right knee replacement pre-existing hardware noted.  .  Alignment is anatomical.  There is a bypass graft noted as seen in AP femoral associated to level of the knee.  No lytic or blastic lesions.  No soft tissue ossifications.  No other fracture deformities.              Plan: Continue 25 to 50% weightbearing right lower extremity for transfers only.  Follow-up in 6 weeks.

## 2024-10-02 ENCOUNTER — TELEPHONE (OUTPATIENT)
Age: 84
End: 2024-10-02

## 2024-10-02 NOTE — TELEPHONE ENCOUNTER
----- Message from Pamela ROSADO sent at 10/2/2024 10:11 AM EDT -----  Regarding: questions  Pt stated that she takes plavix 75mg and low dose aspirin 81mg. She stated that she has to do preservasion areds 2 for her eyes and she wants to know will this interfere with her blood thinners and is it ok for her to continue her blood thinners.

## 2024-10-02 NOTE — TELEPHONE ENCOUNTER
Called and advised pt that she is ok to use the eye drops and the plavix together with no issues. Pt understood

## 2024-11-05 ENCOUNTER — OFFICE VISIT (OUTPATIENT)
Age: 84
End: 2024-11-05

## 2024-11-05 VITALS — HEIGHT: 60 IN | WEIGHT: 155 LBS | TEMPERATURE: 97.1 F | BODY MASS INDEX: 30.43 KG/M2

## 2024-11-05 DIAGNOSIS — S72.091D OTHER FRACTURE OF HEAD AND NECK OF RIGHT FEMUR, SUBSEQUENT ENCOUNTER FOR CLOSED FRACTURE WITH ROUTINE HEALING: Primary | ICD-10-CM

## 2024-11-05 NOTE — PROGRESS NOTES
Juanpablo Pleitez returns to the office status post fall which resulted in findings consistent with a distal femur right shaft fracture with a pre-existing right total knee replacement.  Patient was stabilized operatively with a locking femoral condylar plate and screw fixation.    Postoperatively she developed numbness and pallor of the right foot.  She was found to have a vascular occlusion that resulted in a femoral artery bypass.   She was stabilized following both procedures and ultimately transferred to inpatient acute rehab.  She is currently in assisted living independent.    Today she is doing well and has returned to 25 to 50% weightbearing of the right lower extremity.            Patient's active range of motion at bedside of the right knee 90 degrees -15 degrees.  No instability on medial lateral stressing.        X-ray: [facility] 11/5/2024 space right femur AP lateral reveals extended locking supracondylar plate noted lateral of the femur with noted fracture comminution of the distal shaft of the femur in acceptable alignment with  callus inlay noted, bone ball formation noted.  This is improved compared to prior imaging..  Total right knee replacement pre-existing hardware noted.  .  Alignment is anatomical.  There is a bypass graft noted as seen in AP femoral associated to level of the knee.  No lytic or blastic lesions.  No soft tissue ossifications.  No other fracture deformities.              Plan: Continue 50 to 75% weightbearing right lower extremity f only.  Follow-up in 6 weeks.

## 2024-12-11 ENCOUNTER — OFFICE VISIT (OUTPATIENT)
Age: 84
End: 2024-12-11

## 2024-12-11 VITALS — BODY MASS INDEX: 30.43 KG/M2 | HEIGHT: 60 IN | WEIGHT: 155 LBS | TEMPERATURE: 98.3 F

## 2024-12-11 DIAGNOSIS — Z96.9 PRESENCE OF RETAINED HARDWARE: ICD-10-CM

## 2024-12-11 DIAGNOSIS — L03.115 CELLULITIS OF RIGHT LOWER EXTREMITY: ICD-10-CM

## 2024-12-11 DIAGNOSIS — M79.89 PAIN AND SWELLING OF RIGHT LOWER LEG: ICD-10-CM

## 2024-12-11 DIAGNOSIS — R26.2 IMPAIRED AMBULATION: ICD-10-CM

## 2024-12-11 DIAGNOSIS — L53.9 LEG ERYTHEMA: ICD-10-CM

## 2024-12-11 DIAGNOSIS — Z96.641 PRESENCE OF RIGHT ARTIFICIAL HIP JOINT: ICD-10-CM

## 2024-12-11 DIAGNOSIS — M79.604 RIGHT LEG PAIN: ICD-10-CM

## 2024-12-11 DIAGNOSIS — M79.661 PAIN AND SWELLING OF RIGHT LOWER LEG: ICD-10-CM

## 2024-12-11 DIAGNOSIS — S72.8X1A OTHER FRACTURE OF RIGHT FEMUR, INITIAL ENCOUNTER FOR CLOSED FRACTURE (HCC): Primary | ICD-10-CM

## 2024-12-11 DIAGNOSIS — M89.8X5 PAIN IN FEMUR: ICD-10-CM

## 2024-12-11 DIAGNOSIS — M84.451P: ICD-10-CM

## 2024-12-11 DIAGNOSIS — Z96.651 STATUS POST RIGHT KNEE REPLACEMENT: ICD-10-CM

## 2024-12-11 RX ORDER — CEPHALEXIN 500 MG/1
500 CAPSULE ORAL 4 TIMES DAILY
Qty: 40 CAPSULE | Refills: 0 | Status: SHIPPED | OUTPATIENT
Start: 2024-12-11

## 2024-12-11 NOTE — PROGRESS NOTES
YOB: 1940      Age:  83 y.o.        LOS:  LOS: 2 days      Preoperative Diagnosis:  Closed fracture of distal end of right femur, unspecified fracture morphology, initial encounter ([facility]) [S72.401A], fracture nonunion, Periprosthetic fracture     Postoperative Diagnosis:  Same      Surgeon:  Lars Harden MD      Assistant:  Dwain Cross     Anesthesia:  General anesthesia     Procedure:  Procedure(s):  RIGHT DISTAL FEMUR OPEN REDUCTION INTERNAL FIXATION/ FRACTURE TABLE/ C-ARM/ [[facility] ORTHO] SYNTHES CONDYLAR PLATE, cancellous bone allograft     Time out performed: YES     Estimated Blood Loss:  350 cc           Implants:    Implant Name Type Inv. Item Serial No.  Lot No. LRB No. Used Action   PLATE BNE L301MM 14 H R CNDYL S STL CRV DALJIT ANG HILDA COMPR - [id number]   PLATE BNE L301MM 14 H R CNDYL S STL CRV DALJIT ANG HILDA COMPR [id number] DEPU[employer] SYNTHES [place]- [id number] Right 1 Implanted   SCREW BNE L30MM DIA5MM CNDYL S STL ST DALJIT ANG W[place]LEY HILDA FULL - [id number]   SCREW BNE L30MM DIA5MM CNDYL S STL ST DALJIT ANG WESLEY HILDA FULL [id number] DEPU[employer] SYNTHES [place]-WD [id number] Right 1 Implanted   SCREW BNE L12MM DIA5MM CNDYL S STL ST DALJIT ANG HILDA COMPR T25 - [id number]   SCREW BNE L12MM DIA5MM CNDYL S STL ST DALJIT ANG HILDA COMPR T25 [id number] [employer]PU[employer] SYNTH[employer] [place]-WD [id number] Right 1 Implanted   SCREW BNE L70MM DIA5MM CNDYL S STL ST DALJIT ANG HILDA FULL THRD - [id number]   SCREW BNE L70MM DIA5MM CNDYL S STL ST DALJIT ANG HILDA FULL THRD [id number] DEPU[employer] SYNTH[employer] [place]-WD [id number] Right 1 Implanted   SCREW BNE L65MM DIA5MM CNDYL S STL ST DALJIT ANG WESLEY HILDA FULL - [id number]   SCREW BNE L65MM DIA5MM CNDYL S STL ST DALJIT ANG WESLEY HILDA FULL [id number] DEPU[employer] SYNTH[employer] [place]-WD [id number] Right 1 Implanted   SCREW BNE L70MM DIA5MM S STL SELF DRL DALJIT ANG WESLEY HILDA FULL - [id number]   SCREW BNE L70MM DIA5MM S STL SELF DRL DALJIT ANG WESLEY HILDA FULL [id number] [employer] [place]-WD [id number] Right 1 Implanted   SCREW BNE L50MM DIA5MM CNDYL S STL ST DALJIT ANG HILDA FULL THRD -

## 2024-12-12 ENCOUNTER — TELEPHONE (OUTPATIENT)
Age: 84
End: 2024-12-12

## 2024-12-12 NOTE — TELEPHONE ENCOUNTER
Patient daughter Naomy Humphreys called and said that the patient was seen yesterday by MARGARITA Urrutia. That MARGARITA Urrutia wanted her to call today and give him an update on the patient.    Mrs. Humphreys said that the patient was taken to [facility]. That the patient was admitted. That they will remove the plate today , and evaluate before they do the Replacement.     Naomy Humphreys said that if we have any questions to please call her .    Naomy Humphreys tel. [phone].    Note : patient was seen by MARGARITA Urrutia for the Right Distal Femur.

## 2025-02-28 ENCOUNTER — TELEPHONE (OUTPATIENT)
Age: 85
End: 2025-02-28

## 2025-02-28 NOTE — TELEPHONE ENCOUNTER
Lvm for patient to call back to schedule for a ALEKSANDR/Lower Extremity Duplex and follow up with Dr Marcelino .

## 2025-03-26 ENCOUNTER — TELEPHONE (OUTPATIENT)
Age: 85
End: 2025-03-26

## 2025-03-26 RX ORDER — AMOXICILLIN 500 MG/1
CAPSULE ORAL
Qty: 4 CAPSULE | Refills: 3 | Status: SHIPPED | OUTPATIENT
Start: 2025-03-26

## 2025-03-26 NOTE — TELEPHONE ENCOUNTER
Spoke to patient and informed patient of MARGARITA Beltre message.     Patient verbalized understanding.

## 2025-03-26 NOTE — TELEPHONE ENCOUNTER
Yes    Continue amoxil prior to dental work    Amoxil 500mg   4 po one hour prior to appt    Rx done and sent to pharmacy

## 2025-03-26 NOTE — TELEPHONE ENCOUNTER
Patient called for .     Patient called and said that Dr. Griffin had done sx on her Right Hip and Right Knee several years ago. Patient said that last year in December she had surgery done on her Femur. That a Doctor in [place] did the surgery.     Patient also mentioned that she had a procedure .     Patient said that due to Dr. Griffin had prescribed her some Amoxicillin 500 mg medication, patient would like to know if she needs to still take the dosage the same. That she is taking Amoxicillin 500 mg tablets. Patient said she takes four tablets , one hour before a Dental Procedure.     Patient said she has a dental appt tomorrow.     Patient would like a call back at tel. [phone].

## 2025-04-23 ENCOUNTER — OFFICE VISIT (OUTPATIENT)
Age: 85
End: 2025-04-23
Payer: MEDICARE

## 2025-04-23 VITALS
HEART RATE: 79 BPM | SYSTOLIC BLOOD PRESSURE: 110 MMHG | BODY MASS INDEX: 30.43 KG/M2 | DIASTOLIC BLOOD PRESSURE: 60 MMHG | WEIGHT: 155 LBS | OXYGEN SATURATION: 96 % | HEIGHT: 60 IN

## 2025-04-23 DIAGNOSIS — I99.8 ACUTE LOWER LIMB ISCHEMIA: ICD-10-CM

## 2025-04-23 DIAGNOSIS — Z95.828 PRESENCE OF ARTERIAL STENT: Primary | ICD-10-CM

## 2025-04-23 PROCEDURE — 3078F DIAST BP <80 MM HG: CPT | Performed by: SURGERY

## 2025-04-23 PROCEDURE — G8417 CALC BMI ABV UP PARAM F/U: HCPCS | Performed by: SURGERY

## 2025-04-23 PROCEDURE — 1123F ACP DISCUSS/DSCN MKR DOCD: CPT | Performed by: SURGERY

## 2025-04-23 PROCEDURE — G8400 PT W/DXA NO RESULTS DOC: HCPCS | Performed by: SURGERY

## 2025-04-23 PROCEDURE — 1036F TOBACCO NON-USER: CPT | Performed by: SURGERY

## 2025-04-23 PROCEDURE — G8427 DOCREV CUR MEDS BY ELIG CLIN: HCPCS | Performed by: SURGERY

## 2025-04-23 PROCEDURE — 99214 OFFICE O/P EST MOD 30 MIN: CPT | Performed by: SURGERY

## 2025-04-23 PROCEDURE — 1159F MED LIST DOCD IN RCRD: CPT | Performed by: SURGERY

## 2025-04-23 PROCEDURE — 1090F PRES/ABSN URINE INCON ASSESS: CPT | Performed by: SURGERY

## 2025-04-23 PROCEDURE — 3074F SYST BP LT 130 MM HG: CPT | Performed by: SURGERY

## 2025-04-23 NOTE — PROGRESS NOTES
[facility]    Vascular Surgery Office Visit    Juanpablo Jackson  Chief Complaint   Patient presents with    Claudication     Follow up        History:  84 y.o. female returns for follow up. She is status post R groin exploration with distal SFA-popliteal artery stent for acute RLE ischemia following R femur ORIF on 5/19/24, approx 11 mos ago. Fasciotomy was also performed and closed several days later. She did well postop.  She returns today after having undergone surveillance imaging.     She feels that she is doing well. She is back to living in a mostly independent status at her assisted living facility, [facility]. She denies RLE rest pain or claudication symptoms. No further edema is present. She occasionally has R lateral thigh pain along her orthopedic incision.        Physical Exam:    /60   Pulse 79   Ht 1.524 m (5')   Wt 70.3 kg (155 lb)   SpO2 96%   BMI 30.27 kg/m²      Constitutional:  Patient is well developed, well nourished, and not distressed.   HEENT: Atraumatic, normocephalic. No carotid bruits appreciated.  Eyes:   Cunjunctivae clear, no scleral icterus  Cardiovascular:  Normal rate, regular rhythm, normal heart sounds.  Pulmonary/Chest: Effort normal and breath sounds normal.  she has no wheezes or no rales.   Abdominal:   Soft, non-distended. No tenderness to palpation.   Extremities: BLE warm. No edema.    Neurological:  she  is alert and oriented x3. Motor & sensory grossly intact in all 4 limbs.   Psych: Appropriate mood and affect.     Imaging/Studies:   Mar 2025  RLE arterial duplex: Patent R SFA pop stent. R ALEKSANDR 1.15, L 1.16    Aug 2024  RLE arterial duplex w/ ALEKSANDR: Patent SFA-popliteal stent without stenosis. Patent R CFA. Occluded R peroneal artery. Normal ALEKSANDR bilaterally.   R ALEKSANDR 1.1, L 1.06    Impression:  Doing well approx 1 year s/p groin exploration with distal SFA-popliteal artery stent for acute RLE ischemia following R femur ORIF.

## 2025-04-29 NOTE — PROGRESS NOTES
[facility] PHYSICAL REHABILITATION  [address] [place] [id number]     INPATIENT REHABILITATION  DAILY PROGRESS NOTE     Date: 6/8/2024    Name: Juanpablo Jackson Age / Sex: 83 y.o. / female   CSN: [id number] MRN: [id number]   Admit Date: 5/29/2024 Length of Stay: 10 days     Primary Rehabilitation Diagnosis impaired mobility and ADLs in the setting of a closed fracture of the shaft of right femur, status post open reduction and internal fixation and subsequent SFA-popliteal dissection and patient is now status post right CFA exploration and superficial femoral and popliteal stents with 4 compartment fasciotomy        Subjective:     I personally saw and evaluated this patient face-to-face today 6/8/24.  Patient is sitting in bed in no apparent distress, son is at bedside.  Patient denies any pain with rest however notes with PT pain increases.  States was told previously could have her pain medication prior to therapy and wants to know if still able to do this.     Objective:     Vital Signs:  Patient Vitals for the past 24 hrs:   BP Temp Temp src Pulse Resp SpO2   06/08/24 0751 (!) 153/70 97.8 °F (36.6 °C) Oral 64 17 98 %   06/07/24 2010 127/60 99 °F (37.2 °C) Oral 73 18 98 %   06/07/24 1620 (!) 163/58 97 °F (36.1 °C) Oral 77 20 99 %          Physical Examination:    General:  Awake, alert  Cardiovascular:  S1S2+, RRR  Pulmonary:  CTA b/l  GI:  Soft, BS+, NT, ND  Extremities: Right leg dressing in place clean, dry.  Trace edema RLE.          Current Medications:  Current Facility-Administered Medications   Medication Dose Route Frequency    hydrALAZINE (APRESOLINE) tablet 10 mg  10 mg Oral Q6H PRN    acetaminophen (TYLENOL) tablet 650 mg  650 mg Oral Q4H PRN    ferrous sulfate (IRON 325) tablet 325 mg  325 mg Oral BID WC    polyethylene glycol (GLYCOLAX) packet 17 g  17 g Oral Daily    aspirin EC tablet 81 mg  81 mg Oral Daily    enoxaparin (LOVENOX) injection 40 mg  40 mg SubCUTAneous Q24H     [FreeTextEntry1] : 60yo F with no significant PMHx presents with complaints of PADMINI positive.  It was done when she complained of generalized pain.  Rt shoulder, left forearm but no swelling. Heavy weights can trigger/worsen the pain.   No dry eyes, no dry mouth but had many cavities over the past 5yrs.  Has severe vaginal dryness.  No rash.    No FH of autoimmune illness.  Contact guard assistance (Pt requires verbal cues to maintain WB precautions)  Sit to Supine: Stand by assistance  Bed to Chair: Contact guard assistance  Car Transfer: Minimal Assistance (minimal assistance required to stand from car seat with verbal cueing for hand placement and sequencing)   Wheelchair Mobility  Yes       Wheelchair Mobility  Propulsion: Yes         Ambulation  Rolling Walker  Contact guard assistance  Level tile  15 Ambulation  Device: Rolling Walker  Assistance: Contact guard assistance  Surface: Level tile  Distance: 91 feet   Stairs  No (Not performed d/t safety concerns and weight bearing precautoins at this time)       Stairs  Stairs?: No (Not performed d/t safety concerns and weight bearing precautoins at this time)           Functional Progress:    OCCUPATIONAL THERAPY    ON ADMISSION MOST RECENT   Eating  Supervision Eating  Feeding: Setup, Modified independent    Grooming  Supervision, Setup Grooming  Grooming: Setup, Supervision   Bathing  UB Bathing Stand by assistance  LB Bathing Moderate assistance Bathing  UB Bathing   UE Bathing: Setup, Supervision  LB Bathing   FLOW([id number]:last)@   Upper Body Dressing  Supervision   Upper Body Dressing  UE Dressing: Setup   Lower Body Dressing  Moderate assistance Lower Body Dressing  LE Dressing: Minimal assistance, Adaptive equipment, Verbal cueing, Increased time to complete, Moderate assistance   Toileting  Moderate assistance Toileting  Toileting: Minimal assistance   Toilet Transfers  Moderate assistance Toilet Transfers  Toilet Transfer: Contact guard assistance   Tub Transfers  Tub Transfers: Not tested  SHOWER Transfers  Shower - Transfer From: Wheelchair  Shower - Transfer Type: To and From  Shower - Transfer To: Transfer tub bench  Shower - Technique: Stand pivot (w/c <-> tub transfer bench using grab bar)  Shower Transfers: Not tested  Shower Transfers Comments: NT this date due to pt feeling nauseated, fatigue,  reported to RN  Tub Transfers  Tub Transfers  Tub Transfers: Not tested  SHOWER Transfers  Tub Transfers  Tub Transfers: Not tested     Legend:   7 - Independent   6 - Modified Independent   5 - Standby Assistance / Supervision / Set-up   4 - Minimum Assistance / Contact Guard Assistance   3 - Moderate Assistance   2 - Maximum Assistance   1 - Total Assistance / Dependent             Plan:     1. Justification for continued stay: Fair progression towards established rehabilitation goals.  Patient is now able to ambulate 91 feet with a rolling walker and contact-guard assistance.  In comparison at the time of admission patient was able to ambulate 15 feet with a rolling walker and contact-guard assistance    2. Medical Issues being followed closely:    [x]  Fall and safety precautions     [x]  Wound Care     [x]  Bowel and Bladder Function     [x]  Fluid Electrolyte and Nutrition Balance     [x]  Pain Control      3. Issues that 24 hour rehabilitation nursing is following:    [x]  Fall and safety precautions     [x]  Wound Care     [x]  Bowel and Bladder Function     [x]  Fluid Electrolyte and Nutrition Balance     [x]  Pain Control      [x]  Assistance with and education on in-room safety with transfers to and from the bed, wheelchair, toilet and shower.      4. Acute rehabilitation plan of care:    [x]  Continue current care and rehab.           [x]  Physical Therapy           [x]  Occupational Therapy           [x]  Speech Therapy .  Group speech therapy added on Siobhan 3     []  Hold Rehab until further notice     5. Medications:    [x]  MAR Reviewed     [x]  Continue Present Medications       6. Chemical DVT Prophylaxis:      [x]  Enoxaparin     []  Unfractionated Heparin     []  Warfarin     []  NOAC     []  Aspirin     []  None     7. Mechanical DVT Prophylaxis:      []  SANDRA Stockings     []  Sequential Compression Device     [x]  None     8. GI Prophylaxis:      []  PPI     []  H2 Blocker     [x]  None / Not indicated     9.

## 2025-05-20 ENCOUNTER — HOSPITAL ENCOUNTER (OUTPATIENT)
Facility: HOSPITAL | Age: 85
Discharge: HOME | End: 2025-05-23
Payer: MEDICARE

## 2025-05-20 VITALS — HEIGHT: 59 IN | WEIGHT: 160 LBS | BODY MASS INDEX: 32.25 KG/M2

## 2025-05-20 DIAGNOSIS — Z12.31 OTHER SCREENING MAMMOGRAM: ICD-10-CM

## 2025-05-20 PROCEDURE — 77063 BREAST TOMOSYNTHESIS BI: CPT

## 2025-06-29 NOTE — PROGRESS NOTES
PHYSICAL / OCCUPATIONAL THERAPY - DAILY TREATMENT NOTE (updated )    Patient Name: Helga Amanda    Date: 2023    : 1940  Insurance: Payor: MEDICARE / Plan: MEDICARE PART A AND B / Product Type: *No Product type* /      Patient  verified Yes     Visit #   Current / Total 3 16   Time   In / Out 130 220   Pain   In / Out 1 0   Subjective Functional Status/Changes: Patient complains of pain in left knee 1/10      TREATMENT AREA =  Pain in left knee [M25.562]  Unsteadiness on feet [R26.81]    OBJECTIVE    Modalities Rationale:     decrease inflammation and increase tissue extensibility to improve patient's ability to progress to PLOF and address remaining functional goals. min [] Estim Unattended, type/location:                                      []  w/ice    []  w/heat    min [] Estim Attended, type/location:                                     []  w/US     []  w/ice    []  w/heat    []  TENS insruct      min []  Mechanical Traction: type/lbs                   []  pro   []  sup   []  int   []  cont    []  before manual    []  after manual    min []  Ultrasound, settings/location:     10 min  unbill [x]  Ice     []  Heat    location/position: B knee soreness in supine     min []  Paraffin,  details:     min []  Vasopneumatic Device, press/temp:     min []  Sharion Chou / Lizette Laws: If using vaso (only need to measure limb vaso being performed on)      pre-treatment girth :       post-treatment girth :       measured at (landmark location) :      min []  Other:    Skin assessment post-treatment:  Intact      Therapeutic Procedures: Tx Min Billable or 1:1 Min (if diff from Tx Min) Procedure, Rationale, Specifics    [id number] Therapeutic Exercise (timed):  increase ROM, strength, coordination, balance, and proprioception to improve patient's ability to progress to PLOF and address remaining functional goals.  (see flow sheet as applicable)     Details if applicable:        [id number] Cigarettes/Vaping/e-Cigarettes

## 2025-08-29 ENCOUNTER — TRANSCRIBE ORDERS (OUTPATIENT)
Facility: HOSPITAL | Age: 85
End: 2025-08-29

## 2025-08-29 DIAGNOSIS — M81.0 AGE-RELATED OSTEOPOROSIS WITHOUT CURRENT PATHOLOGICAL FRACTURE: Primary | ICD-10-CM

## (undated) DEVICE — (D)BNDG ADHESIVE FABRIC 3/4X3 -- DISC BY MFR USE ITEM 357960

## (undated) DEVICE — SHEET,DRAPE,70X100,STERILE: Brand: MEDLINE

## (undated) DEVICE — Device: Brand: MEDEX

## (undated) DEVICE — TABLE COVER: Brand: CONVERTORS

## (undated) DEVICE — TAPE,ELASTIC,FOAM,CURAD,3"X5.5YD,LF: Brand: CURAD

## (undated) DEVICE — GAUZE,SPONGE,4"X4",16PLY,STRL,LF,10/TRAY: Brand: MEDLINE

## (undated) DEVICE — 1LYRTR 16FR10ML100%SILI UMSNAP: Brand: MEDLINE INDUSTRIES, INC.

## (undated) DEVICE — DRESSING FOAM POST OPERATIVE 4X10 IN MEPILEX BORDER AG

## (undated) DEVICE — SCREW BNE L42MM DIA5MM CNDYL S STL ST VAR ANG LOK T25
Type: IMPLANTABLE DEVICE | Site: FEMUR | Status: NON-FUNCTIONAL
Removed: 2024-05-21

## (undated) DEVICE — ADHESIVE SKIN CLOSURE 4X22 CM PREMIERPRO EXOFINFUSION DISP

## (undated) DEVICE — SYR LR LCK 1ML GRAD NSAF 30ML --

## (undated) DEVICE — INTENDED FOR TISSUE SEPARATION, AND OTHER PROCEDURES THAT REQUIRE A SHARP SURGICAL BLADE TO PUNCTURE OR CUT.: Brand: BARD-PARKER ® CARBON RIB-BACK BLADES

## (undated) DEVICE — (D)SYR 10ML 1/5ML GRAD NSAF -- PKGING CHANGE USE ITEM 338027

## (undated) DEVICE — SOLUTION IRRIG 1000ML 0.9% SOD CHL USP POUR PLAS BTL

## (undated) DEVICE — Device

## (undated) DEVICE — GOWN,REINFORCED,POLY,AURORA,XXLARGE,STR: Brand: MEDLINE

## (undated) DEVICE — NEEDLE HYPO 18GA L1.5IN PNK S STL HUB POLYPR SHLD REG BVL

## (undated) DEVICE — 3M™ DURAPORE™ SURGICAL TAPE 1538-3, 3 INCH X 10 YARD (7,5CM X 9,1M), 4 ROLLS/BOX: Brand: 3M™ DURAPORE™

## (undated) DEVICE — Z DISCONTINUED BY MEDLINE USE 2711682 TRAY SKIN PREP DRY W/ PREM GLV

## (undated) DEVICE — MEDIA CONTRAST 10ML 200MG/ML 41%

## (undated) DEVICE — HEX-LOCKING BLADE ELECTRODE: Brand: EDGE

## (undated) DEVICE — SOLUTION IV 1000ML 0.9% SOD CHL

## (undated) DEVICE — SUTURE VCRL SZ 0 L36IN ABSRB UD L36MM CT-1 1/2 CIR J946H

## (undated) DEVICE — 3M™ COBAN™ NL STERILE NON-LATEX SELF-ADHERENT WRAP, 2084S, 4 IN X 5 YD (10 CM X 4,5 M), 18 ROLLS/CASE: Brand: 3M™ COBAN™

## (undated) DEVICE — T5 HOOD WITH PEEL AWAY FACE SHIELD

## (undated) DEVICE — DRAPE XR C ARM 41X74IN LF --

## (undated) DEVICE — NDL SPNE MANAN 22GX6IN --

## (undated) DEVICE — STERILE POLYISOPRENE POWDER-FREE SURGICAL GLOVES: Brand: PROTEXIS

## (undated) DEVICE — SHEET, DRAPE, SPLIT, STERILE: Brand: MEDLINE

## (undated) DEVICE — 3M™ COBAN™ NL STERILE NON-LATEX SELF-ADHERENT WRAP, 2086S, 6 IN X 5 YD (15 CM X 4,5 M), 12 ROLLS/CASE: Brand: 3M™ COBAN™

## (undated) DEVICE — PENCIL ES L3M BTTN SWCH S STL HEX LOK BLDE ELECTRD HOLSTER

## (undated) DEVICE — KIT OR TURNOVER

## (undated) DEVICE — GLOVE SURG SZ 7.5 L11.73IN FNGR THK9.8MIL STRW LTX POLYMER

## (undated) DEVICE — KIT CLN UP BON SECOURS MARYV

## (undated) DEVICE — STOCKING ANTIEMB KNEE MED REG --

## (undated) DEVICE — SMARTSLEEVE SURGICAL GOWN, 3XL LONG: Brand: CONVERTORS

## (undated) DEVICE — 3M™ STERI-STRIP™ COMPOUND BENZOIN TINCTURE 40 BAGS/CARTON 4 CARTONS/CASE C1544: Brand: 3M™ STERI-STRIP™

## (undated) DEVICE — PREP SKN CHLRAPRP 26ML TNT -- CONVERT TO ITEM 373320

## (undated) DEVICE — GUIDEWIRE VASC L180CM DIA0.035IN L15CM STR TIP PTFE S STL

## (undated) DEVICE — SKIN PREP TRAY 4 COMPARTM TRAY: Brand: MEDLINE INDUSTRIES, INC.

## (undated) DEVICE — RADIFOCUS GLIDEWIRE ADVANTAGE GUIDEWIRE: Brand: GLIDEWIRE ADVANTAGE

## (undated) DEVICE — VALVE HEMSTAS 9FR POLYCARB L BOR DBL Y ACCS +

## (undated) DEVICE — SOLUTION IRRIG 3000ML 0.9% SOD CHL ARTHROMATIC PLAS CONT

## (undated) DEVICE — GEL BAG FOR WRAPS --

## (undated) DEVICE — DRESSING PETRO W3XL8IN OIL EMUL N ADH GZ KNIT IMPREG CELOS

## (undated) DEVICE — SUTURE ABSORBABLE ANTIBACT 1-0 CT-1 24 IN STRATAFIX PDS + SXPP1A443

## (undated) DEVICE — INTENDED FOR TISSUE SEPARATION, AND OTHER PROCEDURES THAT REQUIRE A SHARP SURGICAL BLADE TO PUNCTURE OR CUT.: Brand: BARD-PARKER SAFETY BLADES SIZE 15, STERILE

## (undated) DEVICE — SUTURE VICRYL SZ 2 L27IN ABSRB VLT L65MM TP-1 1/2 CIR J649G

## (undated) DEVICE — GUIDEWIRE WITH ICE™ HYDROPHILIC COATING: Brand: V-18™ CONTROL WIRE™

## (undated) DEVICE — TRAY PREP DRY W/ PREM GLV 2 APPL 6 SPNG 2 UNDPD 1 OVERWRAP

## (undated) DEVICE — BANDAGE PSTE W4INXL10YD ZN OXIDE UNNA BOOT

## (undated) DEVICE — COVER SURG EQUIP W36XL28IN W/ E BND ARND BTM

## (undated) DEVICE — LIMB HOLDER, WRIST/ANKLE: Brand: DEROYAL

## (undated) DEVICE — 3M™ STERI-DRAPE™ INSTRUMENT POUCH 1018: Brand: STERI-DRAPE™

## (undated) DEVICE — (D)NDL SPNE 22GX15CM -- DISC BY MFR W/NO SUB

## (undated) DEVICE — ELECTRODE PT RET AD L9FT HI MOIST COND ADH HYDRGEL CORDED

## (undated) DEVICE — DRSG AQUACEL SURG 3.5 X 10IN -- CONVERT TO ITEM 370183

## (undated) DEVICE — DRAPE C ARM UNIV W41XL74IN CLR PLAS XR VELC CLSR POLY STRP

## (undated) DEVICE — DRAPE TWL SURG 16X26IN BLU ORB04] ALLCARE INC]

## (undated) DEVICE — FOGARTY THRU-LUMEN EMBOLECTOMY CATHETER 4F 80CM: Brand: FOGARTY

## (undated) DEVICE — SUTURE NONABSORBABLE MONOFILAMENT 6-0 C-1 1X30 IN PROLENE 8706H

## (undated) DEVICE — SPIROMETER INCENT 2500ML W ONE W VLV

## (undated) DEVICE — LIGHT HANDLE: Brand: DEVON

## (undated) DEVICE — SUTURE MONOCRYL STRATAFIX SPRL + SZ 2-0 L18IN ABSRB UD CT-1 SXMP1B413

## (undated) DEVICE — PAD,ABDOMINAL,8"X10",ST,LF: Brand: MEDLINE

## (undated) DEVICE — SHEATH 7FR 11CM BRITETIP

## (undated) DEVICE — SUTURE PERMAHAND SZ 2-0 L30IN NONABSORBABLE BLK SILK W/O A305H

## (undated) DEVICE — BNDG,ELSTC,MATRIX,STRL,6"X5YD,LF,HOOK&LP: Brand: MEDLINE

## (undated) DEVICE — GUIDEWIRE ORTH DIA2.5MM LOK SMOOTH DRL TIP FLX THRD FOR

## (undated) DEVICE — 4FR MICROPUNCTURE KIT

## (undated) DEVICE — PTA BALLOON DILATATION CATHETER: Brand: MUSTANG™

## (undated) DEVICE — SUTURE MONOCRYL SZ 4 0 L18IN ABSRB VLT PS 1 L24MM 3 8 CIR REV Y682H

## (undated) DEVICE — THREE-QUARTER SHEET: Brand: CONVERTORS

## (undated) DEVICE — Z DISCONTINUED USE 2220190 SUTURE VICRYL SZ 3-0 L27IN ABSRB UD L26MM SH 1/2 CIR J416H

## (undated) DEVICE — SYRINGE MED 3ML NDL 22GA L1 1/2IN REG BVL SFGLDE

## (undated) DEVICE — HIP PILLOW, ABDUCTION: Brand: DEROYAL

## (undated) DEVICE — (D)STRIP SKN CLSR 0.5X4IN WHT --

## (undated) DEVICE — SKIN MARKER,REGULAR TIP WITH RULER AND LABELS: Brand: DEVON

## (undated) DEVICE — SUTURE VCRL SZ 2 L27IN ABSRB VLT L65MM TP-1 1/2 CIR J649G

## (undated) DEVICE — SUTURE MCRYL SZ 4-0 L27IN ABSRB UD L24MM PS-1 3/8 CIR PRIM Y935H

## (undated) DEVICE — BLADE ES L2.75IN ELASTOMERIC COAT DURABLE BEND UPTO 90DEG

## (undated) DEVICE — INTENDED FOR TISSUE SEPARATION, AND OTHER PROCEDURES THAT REQUIRE A SHARP SURGICAL BLADE TO PUNCTURE OR CUT.: Brand: BARD-PARKER SAFETY BLADES SIZE 10, STERILE

## (undated) DEVICE — NON-ADHERENT STRIPS,OIL EMULSION: Brand: CURITY

## (undated) DEVICE — PACK PROCEDURE SURG TOT HIP BSHR LF

## (undated) DEVICE — STRYKER PERFORMANCE SERIES SAGITTAL BLADE: Brand: STRYKER PERFORMANCE SERIES

## (undated) DEVICE — REM POLYHESIVE ADULT PATIENT RETURN ELECTRODE: Brand: VALLEYLAB

## (undated) DEVICE — SUTURE MCRYL SZ 2-0 L36IN ABSRB UD L36MM CT-1 1/2 CIR Y945H

## (undated) DEVICE — NEEDLE HYPO 21GA L1.5IN INTRAMUSCULAR S STL LATCH BVL UP

## (undated) DEVICE — 3M™ STERI-DRAPE™ U-DRAPE 1015: Brand: STERI-DRAPE™

## (undated) DEVICE — FLEXOR, CHECK-FLO, INTRODUCER ANSEL MODIFICATION - WITH HIGH-FLEX DILATOR AND HYDROPHILIC COATING: Brand: FLEXOR

## (undated) DEVICE — PACK PROCEDURE SURG MAJ W/ BASIN LF

## (undated) DEVICE — SUTURE VICRYL SZ 2-0 L36IN ABSRB UD L36MM CT-1 1/2 CIR J945H

## (undated) DEVICE — APPLIER CLP L9.38IN M LIG TI DISP STR RNG HNDL LIGACLP

## (undated) DEVICE — PROBE VASC 8MHZ WTRPRF

## (undated) DEVICE — BANDAGE,GAUZE,BULKEE II,4.5"X4.1YD,STRL: Brand: MEDLINE

## (undated) DEVICE — SUTURE MONOCRYL SZ 3-0 L27IN ABSRB UD L26MM SH 1/2 CIR Y416H

## (undated) DEVICE — DRAPE,TOP,102X53,STERILE: Brand: MEDLINE

## (undated) DEVICE — SUTURE VCRL SZ 1 L27IN ABSRB VLT CTX L48MM 1 2 CIR SGL ARMED J365H

## (undated) DEVICE — INTENDED FOR TISSUE SEPARATION, AND OTHER PROCEDURES THAT REQUIRE A SHARP SURGICAL BLADE TO PUNCTURE OR CUT.: Brand: BARD-PARKER SAFETY BLADES SIZE 11, STERILE

## (undated) DEVICE — INTENDED FOR TISSUE SEPARATION, AND OTHER PROCEDURES THAT REQUIRE A SHARP SURGICAL BLADE TO PUNCTURE OR CUT.: Brand: BARD-PARKER ® STAINLESS STEEL BLADES

## (undated) DEVICE — TRAY MYEL SFTY +

## (undated) DEVICE — ELECTRODE BLDE L4IN NONINSULATED EDGE

## (undated) DEVICE — DECANTER BAG 9": Brand: MEDLINE INDUSTRIES, INC.

## (undated) DEVICE — SYRINGE MEDICAL 3ML CLEAR PLASTIC STANDARD NON CONTROL LUERLOCK TIP DISPOSABLE

## (undated) DEVICE — SHEATH 6FR 11CM BRITETIP

## (undated) DEVICE — SUTURE PROL SZ 7-0 L30IN NONABSORBABLE BLU L9.3MM BV-1 1/2 8703H

## (undated) DEVICE — SHEET, T, LAPAROTOMY, STERILE: Brand: MEDLINE

## (undated) DEVICE — SUTURE VCRL SZ 0 L18IN ABSRB UD POLYGLACTIN 910 BRAID TIE J912G

## (undated) DEVICE — BLADE ELECTRODE: Brand: EDGE

## (undated) DEVICE — SNAP KOVER: Brand: UNBRANDED

## (undated) DEVICE — BIT DRL L180MM DIA4.3MM QUIK CPL W/O STP REUSE

## (undated) DEVICE — SUTURE ABSORBABLE BRAIDED 2-0 CT-1 27 IN UD VICRYL J259H

## (undated) DEVICE — BIPOLAR SEALER 23-112-1 AQM 6.0: Brand: AQUAMANTYS ®

## (undated) DEVICE — AIRLIFE™  ADULT CUSHION NASAL CANNULA WITH 7 FOOT (2.1 M) CRUSH-RESISTANT OXYGEN TUBING, AND U/CONNECT-IT ADAPTER: Brand: AIRLIFE™

## (undated) DEVICE — SYR 10ML LUER LOK 1/5ML GRAD --

## (undated) DEVICE — WRAP HIP NO GEL BAGS --

## (undated) DEVICE — GLOVE SURG SZ 65 CRM LTX FREE POLYISOPRENE POLYMER BEAD ANTI

## (undated) DEVICE — SUTURE MONOCRYL SZ 2-0 L36IN ABSRB UD L36MM CT-1 1/2 CIR Y945H

## (undated) DEVICE — SUTURE PERMA-HAND SZ 3-0 L24IN NONABSORBABLE BLK W/O NDL SA74H

## (undated) DEVICE — SUTURE VCRL SZ 0 L27IN ABSRB UD L36MM CT-1 1/2 CIR J260H

## (undated) DEVICE — 3M™ BAIR PAWS FLEX™ WARMING GOWN, STANDARD, 20 PER CASE 81003: Brand: BAIR PAWS™

## (undated) DEVICE — 4-PORT MANIFOLD: Brand: NEPTUNE 2

## (undated) DEVICE — GLOVE SURG SZ 7 L11.33IN FNGR THK9.8MIL STRW LTX POLYMER

## (undated) DEVICE — HANDPIECE SET WITH COAXIAL HIGH FLOW TIP AND SUCTION TUBE: Brand: INTERPULSE

## (undated) DEVICE — BLADE CLIPPER GEN PURP NS

## (undated) DEVICE — RADIFOCUS GLIDEWIRE: Brand: GLIDEWIRE

## (undated) DEVICE — HANDPIECE SET WITH HIGH FLOW TIP AND SUCTION TUBE: Brand: INTERPULSE

## (undated) DEVICE — IMPLANTABLE DEVICE
Type: IMPLANTABLE DEVICE | Site: FEMUR | Status: NON-FUNCTIONAL
Removed: 2024-05-21

## (undated) DEVICE — APPLIER CLP L9.375IN APER 2.1MM CLS L3.8MM 20 SM TI CLP

## (undated) DEVICE — INFLATION DEVICE: Brand: ENCORE 26

## (undated) DEVICE — BIT DRL L145MM DIA3.2MM QUIK CPL W/O STP REUSE

## (undated) DEVICE — APPLICATOR MEDICATED 26 CC SOLUTION HI LT ORNG CHLORAPREP

## (undated) DEVICE — STERILE LATEX POWDER-FREE SURGICAL GLOVESWITH NITRILE COATING: Brand: PROTEXIS

## (undated) DEVICE — KENDALL SCD EXPRESS SLEEVES, KNEE LENGTH, MEDIUM: Brand: KENDALL SCD

## (undated) DEVICE — GOWN,REINFORCE,POLY,SIRUS,SETSLV,XLARGE: Brand: MEDLINE